# Patient Record
Sex: FEMALE | Race: BLACK OR AFRICAN AMERICAN | NOT HISPANIC OR LATINO | Employment: OTHER | URBAN - METROPOLITAN AREA
[De-identification: names, ages, dates, MRNs, and addresses within clinical notes are randomized per-mention and may not be internally consistent; named-entity substitution may affect disease eponyms.]

---

## 2017-01-03 ENCOUNTER — TELEPHONE (OUTPATIENT)
Dept: ADMINISTRATIVE | Facility: HOSPITAL | Age: 57
End: 2017-01-03

## 2017-01-03 NOTE — TELEPHONE ENCOUNTER
----- Message from Nilam Craven sent at 1/3/2017  8:48 AM CST -----  Contact: [facility]/Meredith/[phone]  Type: Home Health (orders, updates, clarifications, etc.)    Home Health Agency/Nurse:[facility]/[place]    Phone [phone]    Reason for call:need orders    Comments:Meredith said that she is calling in regards to pt has developed new stasis ulcer to her right lower leg and, she is requesting continued home health and wound care, she wants to know if a [facility] nurse can got out to visit pt because she is home bound. Please call and advise      Thank you

## 2017-01-04 NOTE — TELEPHONE ENCOUNTER
Earlene is calling to let the doctor know the pt has a new open wound and need to have wound care orders. Pt need to have home health orders as well

## 2017-01-04 NOTE — TELEPHONE ENCOUNTER
----- Message from Latisha Panda sent at 1/4/2017  8:50 AM CST -----  Contact: Earlene/ [facility]/ [phone]   Earlene is calling to let the doctor know the pt has a new open wound and need to have wound care orders. Pt need to have home health orders as well. Please call and advise       Thank you

## 2017-01-06 DIAGNOSIS — M79.662 PAIN OF LEFT LOWER LEG: Primary | ICD-10-CM

## 2017-01-19 RX ORDER — OXYCODONE HYDROCHLORIDE 5 MG/1
5 TABLET ORAL EVERY 8 HOURS PRN
Qty: 20 TABLET | Refills: 0 | Status: SHIPPED | OUTPATIENT
Start: 2017-01-19 | End: 2017-02-09 | Stop reason: SDUPTHER

## 2017-01-19 NOTE — TELEPHONE ENCOUNTER
----- Message from Lj Crockett sent at 1/19/2017 10:16 AM CST -----  Contact: Self/[phone] cell  Type: Rx    Name of medication(s): oxycodone (ROXICODONE) 5 MG immediate release tablet    Is this a refill? New rx?refill    Who prescribed medication?    Pharmacy Name, Phone, & Location:[facility]    Comments:Patient is calling to request a refill on medication above. Please call and advise.    Thank you!

## 2017-01-27 ENCOUNTER — TELEPHONE (OUTPATIENT)
Dept: INTERNAL MEDICINE | Facility: CLINIC | Age: 57
End: 2017-01-27

## 2017-02-10 NOTE — TELEPHONE ENCOUNTER
----- Message from Latisha Panda sent at 2/10/2017  9:11 AM CST -----  Contact: Self/ [phone]   Type: Rx    Name of medication(s):  oxycodone (ROXICODONE) 5 MG immediate release tablet    Is this a refill? New rx? Refill     Who prescribed medication? Dr. Salazar     Pharmacy Name, Phone, & Location:       Comments: pt is calling to have a  on Monday. Please call and advise     Thank you

## 2017-02-14 ENCOUNTER — TELEPHONE (OUTPATIENT)
Dept: INTERNAL MEDICINE | Facility: CLINIC | Age: 57
End: 2017-02-14

## 2017-02-14 RX ORDER — OXYCODONE HYDROCHLORIDE 5 MG/1
5 TABLET ORAL EVERY 8 HOURS PRN
Qty: 30 TABLET | Refills: 0 | Status: SHIPPED | OUTPATIENT
Start: 2017-02-14 | End: 2017-06-30 | Stop reason: SDUPTHER

## 2017-02-14 NOTE — TELEPHONE ENCOUNTER
Pt is calling to check the status of the request made on last Thursday for her pain medications.  Pt is completely out and would like to request that it be printed out today, if possible

## 2017-02-14 NOTE — TELEPHONE ENCOUNTER
----- Message from Evan Najera sent at 2/14/2017 12:44 PM CST -----  Contact: self/ [phone] cell  Pt is calling to check the status of the request made on last Thursday for her pain medications.  Pt is completely out and would like to request that it be printed out today, if possible.  Please call and advise.    Thank you

## 2017-03-02 ENCOUNTER — TELEPHONE (OUTPATIENT)
Dept: ORTHOPEDICS | Facility: CLINIC | Age: 57
End: 2017-03-02

## 2017-03-02 NOTE — TELEPHONE ENCOUNTER
----- Message from Iain Mariano MD sent at 3/1/2017  5:56 PM CST -----  Contact: self/home  Ask her why she wants to cancel? She is wheelchair bound. If it is transportation then that is fine.  ----- Message -----     From: Antonette Argueta LPN     Sent: 3/1/2017   1:28 PM       To: Iain Mariano MD     Not sure what to do in regards to this, typically  does not operate on patient's who doesn't attend the class. What's you protocol in regards to patients not attending class?  ----- Message -----     From: Dewayne Monroe     Sent: 3/1/2017  12:58 PM       To: Lisette Johnson Staff    Pt would like to cancel her joint camp class appt.

## 2017-03-03 ENCOUNTER — TELEPHONE (OUTPATIENT)
Dept: SPINE | Facility: CLINIC | Age: 57
End: 2017-03-03

## 2017-03-03 NOTE — TELEPHONE ENCOUNTER
----- Message from Angeles Shin sent at 3/3/2017 10:33 AM CST -----  Contact: pt   _X  1st Request  _  2nd Request  _  3rd Request        Who: CATY MELENDEZ [[id number]]    Why: pt is calling in regards to getting injections in her knees pt states she was unable to have surgery     What Number to Call Back: [phone].    When to Expect a call back: (Before the end of the day)   -- if the call is after 12:00, the call back will be tomorrow.

## 2017-03-08 ENCOUNTER — TELEPHONE (OUTPATIENT)
Dept: ADMINISTRATIVE | Facility: CLINIC | Age: 57
End: 2017-03-08

## 2017-03-08 ENCOUNTER — OFFICE VISIT (OUTPATIENT)
Dept: SPINE | Facility: CLINIC | Age: 57
End: 2017-03-08
Attending: PHYSICAL MEDICINE & REHABILITATION
Payer: MEDICARE

## 2017-03-08 VITALS
HEIGHT: 60 IN | BODY MASS INDEX: 43.19 KG/M2 | DIASTOLIC BLOOD PRESSURE: 83 MMHG | SYSTOLIC BLOOD PRESSURE: 129 MMHG | WEIGHT: 220 LBS | HEART RATE: 92 BPM

## 2017-03-08 DIAGNOSIS — M25.562 ARTHRALGIA OF BOTH KNEES: ICD-10-CM

## 2017-03-08 DIAGNOSIS — M17.0 PRIMARY OSTEOARTHRITIS OF BOTH KNEES: Primary | ICD-10-CM

## 2017-03-08 DIAGNOSIS — M25.561 ARTHRALGIA OF BOTH KNEES: ICD-10-CM

## 2017-03-08 DIAGNOSIS — G80.1 SPASTIC DIPLEGIC CEREBRAL PALSY: ICD-10-CM

## 2017-03-08 DIAGNOSIS — R26.81 GAIT INSTABILITY: ICD-10-CM

## 2017-03-08 PROCEDURE — 99999 PR PBB SHADOW E&M-EST. PATIENT-LVL II: CPT | Mod: PBBFAC,,, | Performed by: PHYSICAL MEDICINE & REHABILITATION

## 2017-03-08 PROCEDURE — 99214 OFFICE O/P EST MOD 30 MIN: CPT | Mod: 25,S$GLB,, | Performed by: PHYSICAL MEDICINE & REHABILITATION

## 2017-03-08 PROCEDURE — 99499 UNLISTED E&M SERVICE: CPT | Mod: S$PBB,,, | Performed by: PHYSICAL MEDICINE & REHABILITATION

## 2017-03-08 PROCEDURE — 3074F SYST BP LT 130 MM HG: CPT | Mod: S$GLB,,, | Performed by: PHYSICAL MEDICINE & REHABILITATION

## 2017-03-08 PROCEDURE — 3079F DIAST BP 80-89 MM HG: CPT | Mod: S$GLB,,, | Performed by: PHYSICAL MEDICINE & REHABILITATION

## 2017-03-08 PROCEDURE — 20610 DRAIN/INJ JOINT/BURSA W/O US: CPT | Mod: 50,S$GLB,, | Performed by: PHYSICAL MEDICINE & REHABILITATION

## 2017-03-08 PROCEDURE — 1160F RVW MEDS BY RX/DR IN RCRD: CPT | Mod: S$GLB,,, | Performed by: PHYSICAL MEDICINE & REHABILITATION

## 2017-03-08 RX ORDER — TRIAMCINOLONE ACETONIDE 40 MG/ML
80 INJECTION, SUSPENSION INTRA-ARTICULAR; INTRAMUSCULAR
Status: DISCONTINUED | OUTPATIENT
Start: 2017-03-08 | End: 2017-03-08 | Stop reason: HOSPADM

## 2017-03-08 RX ORDER — DICLOFENAC SODIUM 10 MG/G
GEL TOPICAL 4 TIMES DAILY
Qty: 1 TUBE | Refills: 1 | Status: SHIPPED | OUTPATIENT
Start: 2017-03-08 | End: 2017-12-12

## 2017-03-08 RX ORDER — BACLOFEN 10 MG/1
TABLET ORAL
COMMUNITY
Start: 2017-01-10 | End: 2017-12-12 | Stop reason: SDUPTHER

## 2017-03-08 RX ADMIN — TRIAMCINOLONE ACETONIDE 80 MG: 40 INJECTION, SUSPENSION INTRA-ARTICULAR; INTRAMUSCULAR at 03:03

## 2017-03-08 NOTE — PROGRESS NOTES
Subjective:      Patient ID: Naga Benavidez is a 56 y.o. female.    Chief Complaint: Knee Pain    HPI Comments: Ms Benavidez is a 55 yo female with CP  here for follow up of her bilateral knee pain and knee DJD.  She was last seen by me on 8/26/2016 and she had bilateral knee injections and we discussed knee replacements.  She has had the knee replacement scheduled multiple times, but cannot find a reliable person to care for her mother.  Her sister still cannot help.  She has not had an injection in august.  She has been trying to put off injection to have surgery.  She is ready to have the injections today.  The pain is in both knees.  She is having some hip pain as well.  The knees bother the most.  The hip is on the right.  Left knee is worse than right, so put more pressure on the right knee and leg.  The pain is 9/10.  She does feel like she is able to function more with shots.  She does not walk at all, and is all in wheelchair    Past Medical History:  No date: Anemia  No date: Arthritis  No date: Asthma  No date: Cerebral palsy      Comment: stable  No date: DJD (degenerative joint disease)  No date: Hyperlipidemia  No date: Hypertension  No date: Neuromuscular disorder  No date: Obstructive sleep apnea  No date: Uterine fibroids affecting pregnancy      Comment: with adnexal mass    Past Surgical History:  No date: FOOT CAPSULE RELEASE W/ PERCUTANEOUS HEEL CORD*  No date: HAMSTRING RELEASE    Review of patient's family history indicates:    Diabetes                       Mother                    Hypertension                   Mother                      Social History    Marital status: Single              Spouse name:                       Years of education:                 Number of children:               Social History Main Topics    Smoking status: Never Smoker                                                                Alcohol use: No              Sexual activity: No                "        Current Outpatient Prescriptions:  acetaminophen (TYLENOL) 325 MG tablet, Take 650 mg by mouth 4 (four) times daily as needed for Pain., Disp: , Rfl:   amlodipine (NORVASC) 2.5 MG tablet, Take 1 tablet (2.5 mg total) by mouth once daily., Disp: 90 tablet, Rfl: 3  ascorbic acid (VITAMIN C) 1000 MG tablet, Take 1,000 mg by mouth Daily. , Disp: , Rfl:   b complex vitamins capsule, Take 1 capsule by mouth every morning., Disp: , Rfl:   biotin 10 mg Tab, Take 1 capsule by mouth once daily. , Disp: , Rfl:   calcium carbonate (OS-HERNANDEZ) 600 mg (1,500 mg) Tab, Take 600 mg by mouth once daily. , Disp: , Rfl:   co-enzyme Q-10 30 mg capsule, Take 300 mg by mouth once daily., Disp: , Rfl:   foam bandage (MEPILEX) 4 X 4 " Bndg, Apply 2 application topically daily as needed., Disp: 1 each, Rfl: 10  hydrochlorothiazide (HYDRODIURIL) 25 MG tablet, Take 1 tablet (25 mg total) by mouth once daily. (Patient taking differently: Take 25 mg by mouth daily as needed. ), Disp: 30 tablet, Rfl: 11  losartan (COZAAR) 100 MG tablet, TAKE ONE TABLET BY MOUTH ONCE DAILY, Disp: 90 tablet, Rfl: 3  meloxicam (MOBIC) 15 MG tablet, TAKE ONE TABLET BY MOUTH DAILY WITH FOOD, Disp: 90 tablet, Rfl: 3  miconazole (MICATIN) 2 % cream, Apply topically 2 (two) times daily. (Patient taking differently: Apply topically 2 (two) times daily as needed. ), Disp: , Rfl: 0  multivitamin capsule, Take 1 capsule by mouth once daily., Disp: , Rfl:   omega-3 fatty acids-dha-epa (MEGARED PLANT-OMEGA-3) 300 mg Cap, Take 1 tablet by mouth once daily., Disp: , Rfl:   oxycodone (ROXICODONE) 5 MG immediate release tablet, Take 1 tablet (5 mg total) by mouth every 8 (eight) hours as needed for Pain., Disp: 30 tablet, Rfl: 0  diclofenac sodium (VOLTAREN) 1 % Gel, Apply topically 4 (four) times daily., Disp: 1 Tube, Rfl: 1    No current facility-administered medications for this visit.       Review of patient's allergies indicates:   -- Tramadol -- Other (See Comments)    " --  She could not wake up        Review of Systems   Constitution: Negative for weight gain and weight loss.   Cardiovascular: Negative for chest pain.   Respiratory: Negative for shortness of breath.    Musculoskeletal: Positive for joint pain and joint swelling. Negative for back pain.   Gastrointestinal: Negative for abdominal pain and bowel incontinence.   Genitourinary: Negative for bladder incontinence.   Neurological: Negative for numbness.         Objective:        General: Naga is well-developed, well-nourished, appears stated age, in no acute distress, alert and oriented to time, place and person.     General    Vitals reviewed.  Constitutional: She is oriented to person, place, and time. She appears well-developed and well-nourished.   HENT:   Head: Normocephalic and atraumatic.   Pulmonary/Chest: Effort normal.   Neurological: She is alert and oriented to person, place, and time. She displays abnormal reflex. She exhibits abnormal muscle tone.   Psychiatric: She has a normal mood and affect. Her behavior is normal. Judgment and thought content normal.     General Musculoskeletal Exam   Gait: abnormal     Right Ankle/Foot Exam     Tests   Heel Walk: unable to perform  Tiptoe Walk: unable to perform    Comments:  Ankles wrapped    Left Ankle/Foot Exam     Tests   Heel Walk: unable to perform  Tiptoe Walk: unable to perform    Right Knee Exam     Inspection   Swelling: present  Deformity: deformity    Tenderness   The patient is tender to palpation of the lateral joint line.    Crepitus   The patient has crepitus of the patella.    Tests   Meniscus   Brandy:  Medial - negative Lateral - negative  Ligament Examination Lachman: normal (-1 to 2mm)   MCL - Valgus: normal (0 to 2mm)  LCL - Varus: normalReverse Pivot Shift: normal (Equal)    Other   Sensation: normal    Left Knee Exam     Inspection   Swelling: present  Deformity: present    Tenderness   The patient tender to palpation of the lateral joint  line.    Crepitus   The patient has crepitus of the patella.    Tests   Meniscus   Brandy:  Medial - negative Lateral - negative  Stability Lachman: normal (-1 to 2mm)   MCL - Valgus: normal (0 to 2mm)  LCL - Varus: normal (0 to 2mm)Reverse Pivot Shift: normal (Equal)    Other   Sensation: normal    Comments:  Bilateral knee deformity, bilateral spasticity  Valgus deformity    Muscle Strength   Right Lower Extremity   Hip Abduction: 5/5   Quadriceps:  5/5   Hamstrin/5   Left Lower Extremity   Hip Abduction: 5/5   Quadriceps:  5/5   Hamstrin/5     Reflexes     Left Side  Biceps:  2+  Quadriceps:  2+  Achilles:  2+    Right Side   Biceps:  2+  Quadriceps:  2+  Achilles:  2+    Vascular Exam     Right Pulses        Carotid:                  2+    Left Pulses        Carotid:                  2+    Edema  Right Lower Leg: absent  Left Lower Leg: present    Bruise on right medial ankle from table 3 days ago          Assessment:       1. Primary osteoarthritis of both knees    2. Spastic diplegic cerebral palsy    3. Gait instability    4. Arthralgia of both knees           Plan:       Orders Placed This Encounter    Large Joint Aspiration/Injection    diclofenac sodium (VOLTAREN) 1 % Gel     1. Bilateral knee injections please see procedure note. A time out was performed, the correct patient, procedure, site, and position confirmed.   2. Baclofen 10mg to try to help with spasticity as needed, she only takes when going to OB  3. Continue mobic daily  4. Continue tylenol   5. Continue voltaren gel as needed   6. Her joint is getting harder to inject without x-ray guidance.  We might have to get her to  Pain management for injections.  She does not want to go to pain management currently.  Her left knee deformity is increasing.  We discussed a replacement, pain is stopping her from standing and moving more.  She is becoming more and more dependent on wheelchair.  She has not been able to get the TKA due to her  mothers care.  We discussed the importance for her long term ability to care for herself that the knee replacement is important  8.  rtc 3 months    Follow-up: Return in about 3 months (around 6/8/2017). If there are any questions prior to this, the patient was instructed to contact the office.

## 2017-03-08 NOTE — MR AVS SNAPSHOT
[facility]  [address]  [address]  [facility] [address]  Phone: [phone]  Fax: [phone]                  Naga Benavidez   3/8/2017 3:00 PM   Office Visit    Description:  Female : 1960   Provider:  Miriam Tate MD   Department:  [facility]           Reason for Visit     Knee Pain           Diagnoses this Visit        Comments    Primary osteoarthritis of both knees    -  Primary     Spastic diplegic cerebral palsy         Gait instability         Arthralgia of both knees                To Do List           Future Appointments        Provider Department Dept Phone    2017 1:00 PM Miriam Tate MD [facility] [phone]      Goals (5 Years of Data)     None      Follow-Up and Disposition     Return in about 3 months (around 2017).    Follow-up and Disposition History       These Medications        Disp Refills Start End    diclofenac sodium (VOLTAREN) 1 % Gel 1 Tube 1 3/8/2017 3/18/2017    Apply topically 4 (four) times daily. - Topical    Pharmacy: [facility] - [address] Ph #: [phone]         [facility] On Call     [facility] On Call Nurse Care Line -  Assistance  Registered nurses in the [facility] On Call Center provide clinical advisement, health education, appointment booking, and other advisory services.  Call for this free service at [phone].             Medications           Message regarding Medications     Verify the changes and/or additions to your medication regime listed below are the same as discussed with your clinician today.  If any of these changes or additions are incorrect, please notify your healthcare provider.        These medications were administered today        Dose Freq    triamcinolone acetonide injection 80 mg 80 mg     Sig: Inject 80 mg into the articular space.    Class: Normal    Route: Intra-articular           Verify  "that the below list of medications is an accurate representation of the medications you are currently taking.  If none reported, the list may be blank. If incorrect, please contact your healthcare provider. Carry this list with you in case of emergency.           Current Medications     acetaminophen (TYLENOL) 325 MG tablet Take 650 mg by mouth 4 (four) times daily as needed for Pain.    amlodipine (NORVASC) 2.5 MG tablet Take 1 tablet (2.5 mg total) by mouth once daily.    ascorbic acid (VITAMIN C) 1000 MG tablet Take 1,000 mg by mouth Daily.     b complex vitamins capsule Take 1 capsule by mouth every morning.    biotin 10 mg Tab Take 1 capsule by mouth once daily.     calcium carbonate (OS-HERNANDEZ) 600 mg (1,500 mg) Tab Take 600 mg by mouth once daily.     co-enzyme Q-10 30 mg capsule Take 300 mg by mouth once daily.    foam bandage (MEPILEX) 4 X 4 " Bndg Apply 2 application topically daily as needed.    hydrochlorothiazide (HYDRODIURIL) 25 MG tablet Take 1 tablet (25 mg total) by mouth once daily.    losartan (COZAAR) 100 MG tablet TAKE ONE TABLET BY MOUTH ONCE DAILY    meloxicam (MOBIC) 15 MG tablet TAKE ONE TABLET BY MOUTH DAILY WITH FOOD    miconazole (MICATIN) 2 % cream Apply topically 2 (two) times daily.    multivitamin capsule Take 1 capsule by mouth once daily.    omega-3 fatty acids-dha-epa (MEGARED PLANT-OMEGA-3) 300 mg Cap Take 1 tablet by mouth once daily.    oxycodone (ROXICODONE) 5 MG immediate release tablet Take 1 tablet (5 mg total) by mouth every 8 (eight) hours as needed for Pain.    baclofen (LIORESAL) 10 MG tablet     diclofenac sodium (VOLTAREN) 1 % Gel Apply topically 4 (four) times daily.           Clinical Reference Information           Your Vitals Were     BP Pulse Height Weight Last Period BMI    129/83 (BP Location: Left arm, Patient Position: Sitting, BP Method: Automatic) 92 5' (1.524 m) 99.8 kg (220 lb) 09/07/2016 42.97 kg/m2      Blood Pressure          Most Recent Value    BP  " 129/83      Allergies as of 3/8/2017     Tramadol      Immunizations Administered on Date of Encounter - 3/8/2017     None      Orders Placed During Today's Visit      Normal Orders This Visit    Large Joint Aspiration/Injection       Administrations This Visit     triamcinolone acetonide injection 80 mg     Admin Date Action Dose Route Administered By             03/08/2017 Given 80 mg Intra-articular Miriam Tate MD                      Language Assistance Services     ATTENTION: Language assistance services are available, free of charge. Please call [phone].      ATENCIÓN: Si eleuteriola lopez, tiene a truong disposición servicios gratuitos de asistencia lingüística. Llame al [phone].     CHÚ Ý: N?u b?n nói Ti?ng Vi?t, có các d?ch v? h? tr? ngôn ng? mi?n phí dành cho b?n. G?i s? [phone].         [facility] complies with applicable Federal civil rights laws and does not discriminate on the basis of race, color, national origin, age, disability, or sex.

## 2017-03-08 NOTE — PROCEDURES
Large Joint Aspiration/Injection  Date/Time: 3/8/2017 3:14 PM  Performed by: DIANE HOLM  Authorized by: DIANE HOLM     Consent Done?:  Yes (Verbal)  Indications:  Pain  Procedure site marked: Yes    Timeout: Prior to procedure the correct patient, procedure, and site was verified      Location:  Knee  Site:  R knee and L knee  Prep: Patient was prepped and draped in usual sterile fashion    Ultrasonic Guidance for needle placement: No  Needle size:  22 G  Approach:  Lateral  Medications:  80 mg triamcinolone acetonide 40 mg/mL  Patient tolerance:  Patient tolerated the procedure well with no immediate complications

## 2017-03-13 ENCOUNTER — TELEPHONE (OUTPATIENT)
Dept: INTERNAL MEDICINE | Facility: CLINIC | Age: 57
End: 2017-03-13

## 2017-03-13 DIAGNOSIS — D64.9 ANEMIA, UNSPECIFIED TYPE: Primary | ICD-10-CM

## 2017-03-13 DIAGNOSIS — R73.9 HYPERGLYCEMIA: ICD-10-CM

## 2017-03-13 DIAGNOSIS — E78.5 HYPERLIPIDEMIA, UNSPECIFIED HYPERLIPIDEMIA TYPE: ICD-10-CM

## 2017-03-13 DIAGNOSIS — I10 ESSENTIAL HYPERTENSION: ICD-10-CM

## 2017-03-13 DIAGNOSIS — E55.9 MILD VITAMIN D DEFICIENCY: ICD-10-CM

## 2017-03-13 NOTE — TELEPHONE ENCOUNTER
----- Message from Corina Steward sent at 3/13/2017 10:48 AM CDT -----  Contact: self/[phone]  Pt called in regards to getting epp lab orders put into the system. Her appointment is on 5-17-17.      Please advise

## 2017-04-18 ENCOUNTER — TELEPHONE (OUTPATIENT)
Dept: ADMINISTRATIVE | Facility: CLINIC | Age: 57
End: 2017-04-18

## 2017-04-20 ENCOUNTER — TELEPHONE (OUTPATIENT)
Dept: INTERNAL MEDICINE | Facility: CLINIC | Age: 57
End: 2017-04-20

## 2017-06-07 ENCOUNTER — OFFICE VISIT (OUTPATIENT)
Dept: SPINE | Facility: CLINIC | Age: 57
End: 2017-06-07
Attending: PHYSICAL MEDICINE & REHABILITATION
Payer: MEDICARE

## 2017-06-07 VITALS
SYSTOLIC BLOOD PRESSURE: 155 MMHG | BODY MASS INDEX: 43.19 KG/M2 | HEIGHT: 60 IN | HEART RATE: 93 BPM | DIASTOLIC BLOOD PRESSURE: 102 MMHG | WEIGHT: 220 LBS

## 2017-06-07 DIAGNOSIS — G89.29 CHRONIC PAIN OF BOTH KNEES: ICD-10-CM

## 2017-06-07 DIAGNOSIS — G80.1 SPASTIC DIPLEGIC CEREBRAL PALSY: ICD-10-CM

## 2017-06-07 DIAGNOSIS — M25.562 ARTHRALGIA OF BOTH KNEES: ICD-10-CM

## 2017-06-07 DIAGNOSIS — R26.81 GAIT INSTABILITY: ICD-10-CM

## 2017-06-07 DIAGNOSIS — M25.562 CHRONIC PAIN OF BOTH KNEES: ICD-10-CM

## 2017-06-07 DIAGNOSIS — M25.561 CHRONIC PAIN OF BOTH KNEES: ICD-10-CM

## 2017-06-07 DIAGNOSIS — M17.0 PRIMARY OSTEOARTHRITIS OF BOTH KNEES: Primary | ICD-10-CM

## 2017-06-07 DIAGNOSIS — M25.561 ARTHRALGIA OF BOTH KNEES: ICD-10-CM

## 2017-06-07 PROCEDURE — 20610 DRAIN/INJ JOINT/BURSA W/O US: CPT | Mod: 50,S$GLB,, | Performed by: PHYSICAL MEDICINE & REHABILITATION

## 2017-06-07 PROCEDURE — 99499 UNLISTED E&M SERVICE: CPT | Mod: S$PBB,,, | Performed by: PHYSICAL MEDICINE & REHABILITATION

## 2017-06-07 PROCEDURE — 99999 PR PBB SHADOW E&M-EST. PATIENT-LVL III: CPT | Mod: PBBFAC,,, | Performed by: PHYSICAL MEDICINE & REHABILITATION

## 2017-06-07 PROCEDURE — 99214 OFFICE O/P EST MOD 30 MIN: CPT | Mod: 25,S$GLB,, | Performed by: PHYSICAL MEDICINE & REHABILITATION

## 2017-06-07 RX ORDER — MELOXICAM 15 MG/1
TABLET ORAL
Qty: 90 TABLET | Refills: 3 | Status: SHIPPED | OUTPATIENT
Start: 2017-06-07 | End: 2017-12-12 | Stop reason: SDUPTHER

## 2017-06-07 RX ORDER — TRIAMCINOLONE ACETONIDE 40 MG/ML
80 INJECTION, SUSPENSION INTRA-ARTICULAR; INTRAMUSCULAR
Status: DISCONTINUED | OUTPATIENT
Start: 2017-06-07 | End: 2017-06-07 | Stop reason: HOSPADM

## 2017-06-07 RX ADMIN — TRIAMCINOLONE ACETONIDE 80 MG: 40 INJECTION, SUSPENSION INTRA-ARTICULAR; INTRAMUSCULAR at 01:06

## 2017-06-07 NOTE — PROCEDURES
Large Joint Aspiration/Injection  Date/Time: 6/7/2017 1:09 PM  Performed by: DIANE HOLM  Authorized by: DIANE HOLM     Consent Done?:  Yes (Verbal)  Indications:  Pain  Procedure site marked: Yes    Timeout: Prior to procedure the correct patient, procedure, and site was verified      Location:  Knee  Site:  R knee and L knee  Prep: Patient was prepped and draped in usual sterile fashion    Ultrasonic Guidance for needle placement: No  Needle size:  22 G  Approach:  Lateral  Medications:  80 mg triamcinolone acetonide 40 mg/mL  Patient tolerance:  Patient tolerated the procedure well with no immediate complications

## 2017-06-07 NOTE — PROGRESS NOTES
Subjective:      Patient ID: Naga Benavidez is a 57 y.o. female.    Chief Complaint: Knee Pain    Ms Benavidez is a 58 yo female here for follow up of her bilateral knee pain and knee DJD.   She was last seen by me on 3/8/2017 and we did bilateral knee injections.  SHe was planning on knee replacement, but still having difficulty with care for her mother.  Today, she is having knee pain.  She feels like the knee injections helped.  She got almost 3 months worth of relief.  She is still not able to stand and walk.  She is still caring for her mother.  Pain is 9/10.  Her blood pressure is high, she feels like it happens when anxious about the bus.      Past Medical History:  No date: Anemia  No date: Arthritis  No date: Asthma  No date: Cerebral palsy      Comment: stable  No date: DJD (degenerative joint disease)  No date: Hyperlipidemia  No date: Hypertension  No date: Neuromuscular disorder  No date: Obstructive sleep apnea  No date: Uterine fibroids affecting pregnancy      Comment: with adnexal mass    Past Surgical History:  No date: FOOT CAPSULE RELEASE W/ PERCUTANEOUS HEEL CORD*  No date: HAMSTRING RELEASE    Review of patient's family history indicates:    Diabetes                       Mother                    Hypertension                   Mother                      Social History    Marital status: Single              Spouse name:                       Years of education:                 Number of children:               Social History Main Topics    Smoking status: Never Smoker                                                                Alcohol use: No              Sexual activity: No                       Current Outpatient Prescriptions:  acetaminophen (TYLENOL) 325 MG tablet, Take 650 mg by mouth 4 (four) times daily as needed for Pain., Disp: , Rfl:   amlodipine (NORVASC) 2.5 MG tablet, Take 1 tablet (2.5 mg total) by mouth once daily., Disp: 90 tablet, Rfl: 3  ascorbic acid (VITAMIN C)  "1000 MG tablet, Take 1,000 mg by mouth Daily. , Disp: , Rfl:   b complex vitamins capsule, Take 1 capsule by mouth every morning., Disp: , Rfl:   baclofen (LIORESAL) 10 MG tablet, , Disp: , Rfl:   biotin 10 mg Tab, Take 1 capsule by mouth once daily. , Disp: , Rfl:   calcium carbonate (OS-HERNANDEZ) 600 mg (1,500 mg) Tab, Take 600 mg by mouth once daily. , Disp: , Rfl:   co-enzyme Q-10 30 mg capsule, Take 300 mg by mouth once daily., Disp: , Rfl:   diclofenac sodium (VOLTAREN) 1 % Gel, Apply topically 4 (four) times daily., Disp: 1 Tube, Rfl: 1  foam bandage (MEPILEX) 4 X 4 " Bndg, Apply 2 application topically daily as needed., Disp: 1 each, Rfl: 10  hydrochlorothiazide (HYDRODIURIL) 25 MG tablet, Take 1 tablet (25 mg total) by mouth once daily. (Patient taking differently: Take 25 mg by mouth daily as needed. ), Disp: 30 tablet, Rfl: 11  losartan (COZAAR) 100 MG tablet, TAKE ONE TABLET BY MOUTH ONCE DAILY, Disp: 90 tablet, Rfl: 3  meloxicam (MOBIC) 15 MG tablet, TAKE ONE TABLET BY MOUTH DAILY WITH FOOD, Disp: 90 tablet, Rfl: 3  miconazole (MICATIN) 2 % cream, Apply topically 2 (two) times daily. (Patient taking differently: Apply topically 2 (two) times daily as needed. ), Disp: , Rfl: 0  multivitamin capsule, Take 1 capsule by mouth once daily., Disp: , Rfl:   omega-3 fatty acids-dha-epa (MEGARED PLANT-OMEGA-3) 300 mg Cap, Take 1 tablet by mouth once daily., Disp: , Rfl:   oxycodone (ROXICODONE) 5 MG immediate release tablet, Take 1 tablet (5 mg total) by mouth every 8 (eight) hours as needed for Pain., Disp: 30 tablet, Rfl: 0    No current facility-administered medications for this visit.       Review of patient's allergies indicates:   -- Tramadol -- Other (See Comments)    --  She could not wake up              Review of Systems   Constitution: Negative for weight gain and weight loss.   Cardiovascular: Negative for chest pain.   Respiratory: Negative for shortness of breath.    Musculoskeletal: Positive for joint " pain and joint swelling. Negative for back pain.   Gastrointestinal: Negative for abdominal pain and bowel incontinence.   Genitourinary: Negative for bladder incontinence.   Neurological: Negative for numbness.         Objective:        General: Naga is well-developed, well-nourished, appears stated age, in no acute distress, alert and oriented to time, place and person.     General    Vitals reviewed.  Constitutional: She is oriented to person, place, and time. She appears well-developed and well-nourished.   HENT:   Head: Normocephalic and atraumatic.   Pulmonary/Chest: Effort normal.   Neurological: She is alert and oriented to person, place, and time. She displays abnormal reflex. She exhibits abnormal muscle tone.   Psychiatric: She has a normal mood and affect. Her behavior is normal. Judgment and thought content normal.     General Musculoskeletal Exam   Gait: abnormal     Right Ankle/Foot Exam     Tests   Heel Walk: unable to perform  Tiptoe Walk: unable to perform    Comments:  Ankles wrapped    Left Ankle/Foot Exam     Tests   Heel Walk: unable to perform  Tiptoe Walk: unable to perform    Right Knee Exam     Inspection   Swelling: present  Deformity: deformity    Tenderness   The patient is tender to palpation of the lateral joint line.    Crepitus   The patient has crepitus of the patella.    Tests   Meniscus   Brandy:  Medial - negative Lateral - negative  Ligament Examination Lachman: normal (-1 to 2mm)   MCL - Valgus: normal (0 to 2mm)  LCL - Varus: normalReverse Pivot Shift: normal (Equal)    Other   Sensation: normal    Comments:  Unable to flex to 90 degrees    Left Knee Exam     Inspection   Swelling: present  Deformity: present    Tenderness   The patient tender to palpation of the lateral joint line.    Crepitus   The patient has crepitus of the patella.    Tests   Meniscus   Brandy:  Medial - negative Lateral - negative  Stability Lachman: normal (-1 to 2mm)   MCL - Valgus: normal (0 to  2mm)  LCL - Varus: normal (0 to 2mm)Reverse Pivot Shift: normal (Equal)    Other   Sensation: normal    Comments:  Bilateral knee deformity, bilateral spasticity  Valgus deformity  Unable to fully extend    Muscle Strength   Right Lower Extremity   Hip Abduction: 5/5   Quadriceps:  5/5   Hamstrin/5   Left Lower Extremity   Hip Abduction: 5/5   Quadriceps:  5/5   Hamstrin/5     Vascular Exam     Right Pulses        Carotid:                  2+    Left Pulses        Carotid:                  2+    Edema  Right Lower Leg: absent  Left Lower Leg: absent              Assessment:       1. Primary osteoarthritis of both knees    2. Spastic diplegic cerebral palsy    3. Gait instability    4. Arthralgia of both knees    5. Chronic pain of both knees           Plan:       Orders Placed This Encounter    Large Joint Aspiration/Injection    meloxicam (MOBIC) 15 MG tablet     1. Bilateral knee injections please see procedure note. A time out was performed, the correct patient, procedure, site, and position confirmed.   2. Baclofen 10mg to try to help with spasticity as needed, she does not take often  3. Continue mobic daily  4. Continue tylenol   5. Continue voltaren gel as needed   6. Her joint is getting harder to inject without x-ray guidance.  We might have to get her to  Pain management for injections.  She does not want to go to pain management currently.  Her left knee deformity is increasing, and is hard to enter the joint.  We discussed a replacement, pain is stopping her from standing and moving more.  She is becoming more and more dependent on wheelchair.  She has not been able to get the TKA due to her mothers care.    7.  rtc 3 months    Follow-up: Return in about 3 months (around 2017). If there are any questions prior to this, the patient was instructed to contact the office.

## 2017-06-09 ENCOUNTER — HOSPITAL ENCOUNTER (EMERGENCY)
Facility: HOSPITAL | Age: 57
Discharge: HOME | End: 2017-06-10
Attending: EMERGENCY MEDICINE
Payer: MEDICARE

## 2017-06-09 VITALS
HEIGHT: 62 IN | RESPIRATION RATE: 18 BRPM | BODY MASS INDEX: 40.48 KG/M2 | OXYGEN SATURATION: 100 % | HEART RATE: 82 BPM | WEIGHT: 220 LBS | TEMPERATURE: 99 F | DIASTOLIC BLOOD PRESSURE: 96 MMHG | SYSTOLIC BLOOD PRESSURE: 148 MMHG

## 2017-06-09 DIAGNOSIS — R07.9 CHEST PAIN: Primary | ICD-10-CM

## 2017-06-09 LAB
ALBUMIN SERPL BCP-MCNC: 3.6 G/DL
ALP SERPL-CCNC: 92 U/L
ALT SERPL W/O P-5'-P-CCNC: 17 U/L
ANION GAP SERPL CALC-SCNC: 11 MMOL/L
AST SERPL-CCNC: 20 U/L
BASOPHILS # BLD AUTO: 0 K/UL
BASOPHILS NFR BLD: 0 %
BILIRUB SERPL-MCNC: 0.6 MG/DL
BNP SERPL-MCNC: 57 PG/ML
BUN SERPL-MCNC: 26 MG/DL
CALCIUM SERPL-MCNC: 9.3 MG/DL
CHLORIDE SERPL-SCNC: 110 MMOL/L
CO2 SERPL-SCNC: 20 MMOL/L
CREAT SERPL-MCNC: 0.8 MG/DL
D DIMER PPP IA.FEU-MCNC: 0.72 MG/L FEU
DIFFERENTIAL METHOD: ABNORMAL
EOSINOPHIL # BLD AUTO: 0 K/UL
EOSINOPHIL NFR BLD: 0 %
ERYTHROCYTE [DISTWIDTH] IN BLOOD BY AUTOMATED COUNT: 14 %
EST. GFR  (AFRICAN AMERICAN): >60 ML/MIN/1.73 M^2
EST. GFR  (NON AFRICAN AMERICAN): >60 ML/MIN/1.73 M^2
GLUCOSE SERPL-MCNC: 97 MG/DL
HCT VFR BLD AUTO: 40.6 %
HGB BLD-MCNC: 13 G/DL
LYMPHOCYTES # BLD AUTO: 1 K/UL
LYMPHOCYTES NFR BLD: 10.2 %
MCH RBC QN AUTO: 28.5 PG
MCHC RBC AUTO-ENTMCNC: 32 %
MCV RBC AUTO: 89 FL
MONOCYTES # BLD AUTO: 0.6 K/UL
MONOCYTES NFR BLD: 6.4 %
NEUTROPHILS # BLD AUTO: 8.1 K/UL
NEUTROPHILS NFR BLD: 83.2 %
PLATELET # BLD AUTO: 197 K/UL
PMV BLD AUTO: 12 FL
POTASSIUM SERPL-SCNC: 3.9 MMOL/L
PROT SERPL-MCNC: 7.2 G/DL
RBC # BLD AUTO: 4.56 M/UL
SODIUM SERPL-SCNC: 141 MMOL/L
TROPONIN I SERPL DL<=0.01 NG/ML-MCNC: <0.006 NG/ML
TROPONIN I SERPL DL<=0.01 NG/ML-MCNC: <0.006 NG/ML
WBC # BLD AUTO: 9.69 K/UL

## 2017-06-09 PROCEDURE — 99285 EMERGENCY DEPT VISIT HI MDM: CPT | Mod: 25

## 2017-06-09 PROCEDURE — 84484 ASSAY OF TROPONIN QUANT: CPT

## 2017-06-09 PROCEDURE — 84484 ASSAY OF TROPONIN QUANT: CPT | Mod: 91

## 2017-06-09 PROCEDURE — 93010 ELECTROCARDIOGRAM REPORT: CPT | Mod: S$GLB,,, | Performed by: INTERNAL MEDICINE

## 2017-06-09 PROCEDURE — 80053 COMPREHEN METABOLIC PANEL: CPT

## 2017-06-09 PROCEDURE — 85025 COMPLETE CBC W/AUTO DIFF WBC: CPT

## 2017-06-09 PROCEDURE — 99285 EMERGENCY DEPT VISIT HI MDM: CPT | Mod: ,,, | Performed by: EMERGENCY MEDICINE

## 2017-06-09 PROCEDURE — 85379 FIBRIN DEGRADATION QUANT: CPT

## 2017-06-09 PROCEDURE — 83880 ASSAY OF NATRIURETIC PEPTIDE: CPT

## 2017-06-09 PROCEDURE — 94761 N-INVAS EAR/PLS OXIMETRY MLT: CPT

## 2017-06-09 PROCEDURE — 93005 ELECTROCARDIOGRAM TRACING: CPT

## 2017-06-09 NOTE — PROVIDER PROGRESS NOTES - EMERGENCY DEPT.
Encounter Date: 6/9/2017    ED Physician Progress Notes       SCRIBE NOTE: I, Beulah Terry, am scribing for, and in the presence of,  Dr. Oneill.  Physician Statement: I, Dr. Oneill, personally performed the services described in this documentation as scribed by Beulah Terry in my presence, and it is both accurate and complete.      EKG - STEMI Decision  Initial Reading: No STEMI present.

## 2017-06-10 PROCEDURE — 25500020 PHARM REV CODE 255: Performed by: EMERGENCY MEDICINE

## 2017-06-10 RX ORDER — ONDANSETRON 4 MG/1
4 TABLET, FILM COATED ORAL EVERY 6 HOURS
Qty: 12 TABLET | Refills: 0 | Status: SHIPPED | OUTPATIENT
Start: 2017-06-10 | End: 2017-06-30

## 2017-06-10 RX ADMIN — IOHEXOL 75 ML: 350 INJECTION, SOLUTION INTRAVENOUS at 12:06

## 2017-06-10 NOTE — ED PROVIDER NOTES
"Encounter Date: 6/9/2017    SCRIBE #1 NOTE: I, Harvinder Mayen, am scribing for, and in the presence of,  Dr. Desai. I have scribed the following portions of the note - the EKG reading and the Resident attestation.       History     Chief Complaint   Patient presents with    Chest Pain     acute onset about 1 hour ago with radiation to right jaw lasting about 5 minutes. currently reports chest pain is left chest, 3/10, reports bilateral jaw "tightness"     Review of patient's allergies indicates:   Allergen Reactions    Tramadol Other (See Comments)     She could not wake up      HPI   Pt presents after episode of CP that started about 1hr ago that radiated up into her right arm and her arm felt heavier.  She was sitting in her wheelchair resting when this happened.    The pain lasted 5-7min then got much better and was a dull pain.  Right now the pain is not there, it has resolved.  She states when the pain was there it radiated into both sides of her jaw as well.  Right now the jaw pain is not there and her right arm is no longer heavy.  When I asked her about the nursing note and stated that her chest pain was in the left chest she states that that was incorrect that it was in the right side of her chest.      She denies recent fever, cough, SOB, HA, Abd pain, N/V/D, dysuria, chills, flank pain.      Past Medical History:   Diagnosis Date    Anemia     Arthritis     Asthma     Cerebral palsy     stable    DJD (degenerative joint disease)     Hyperlipidemia     Hypertension     Neuromuscular disorder     Obstructive sleep apnea     Uterine fibroids affecting pregnancy     with adnexal mass     Past Surgical History:   Procedure Laterality Date    FOOT CAPSULE RELEASE W/ PERCUTANEOUS HEEL CORD LENGTHENING, TIBIAL TENDON TRANSFER      HAMSTRING RELEASE       Family History   Problem Relation Age of Onset    Diabetes Mother     Hypertension Mother      Social History   Substance Use Topics    Smoking " status: Never Smoker    Smokeless tobacco: Not on file    Alcohol use No     Review of Systems   Cardiovascular: Positive for chest pain.   Musculoskeletal:        Right arm heaviness, now resolved.   All other systems reviewed and are negative.      Physical Exam     Initial Vitals [06/09/17 1808]   BP Pulse Resp Temp SpO2   (!) 141/80 83 18 98.7 °F (37.1 °C) 98 %     Physical Exam  Gen/Constitutional: Interactive. No acute distress  Head: Normocephalic, Atraumatic  Neck: supple, no masses or LAD  Eyes: PERRLA, conjunctiva clear  Ears, Nose and Throat: No rhinorrhea or stridor.  Cardiac: Reg Rhythm, No murmur  Pulmonary: CTA Bilat, no wheezes, rhonchi, rales.  GI: Abdomen soft, non-tender, non-distended; no rebound or guarding  : No CVA tenderness.  Musculoskeletal: Extremities warm, well perfused, no erythema, no edema  Skin: No rashes  Neuro: Alert and Oriented x 3; No drift in either upper extremity. face droop, dysarthria, normal finger to nose.  Unable to participate in drift or hell to shin testing in lower extremities because of chronic neuro deficits 2/2 CP (pt states she feels at baseline).  Psych: Normal affect    ED Course   Procedures  Labs Reviewed   CBC W/ AUTO DIFFERENTIAL - Abnormal; Notable for the following:        Result Value    Gran # 8.1 (*)     Gran% 83.2 (*)     Lymph% 10.2 (*)     All other components within normal limits   COMPREHENSIVE METABOLIC PANEL - Abnormal; Notable for the following:     CO2 20 (*)     BUN, Bld 26 (*)     All other components within normal limits   D DIMER, QUANTITATIVE - Abnormal; Notable for the following:     D-Dimer 0.72 (*)     All other components within normal limits    Narrative:     add on [id number] per dr. saldaña 06/09/2017  20:39    TROPONIN I   B-TYPE NATRIURETIC PEPTIDE   D DIMER, QUANTITATIVE   TROPONIN I     CTA Chest Non-Coronary (PE Study)   Final Result         No evidence of pulmonary thromboembolism or pulmonary infarction.      Mild  pericardial effusion.   ______________________________________       Electronically signed by resident: AGA ROMEO MD   Date:     06/10/17   Time:    00:36                  As the supervising and teaching physician, I personally reviewed the images and resident's interpretation and I agree with the findings.               Electronically signed by: ALEJANDRO WARNER MD   Date:     06/10/17   Time:    01:11       X-Ray Chest AP Portable   Final Result           No acute findings in the chest.    ______________________________________       Electronically signed by resident: AGA ROMEO MD   Date:     06/09/17   Time:    20:15                  As the supervising and teaching physician, I personally reviewed the images and resident's interpretation and I agree with the findings.               Electronically signed by: ALEJANDRO WARNER MD   Date:     06/09/17   Time:    20:24           EKG Readings: (Independently Interpreted)   EKG: NSR, no ALANNA's or STD's, non-specific twave pattern, no STEMI                  APC / Resident Notes:   PGY3 MDM  DDx incl ACS, PNA, esophageal spasm, GERD, very low suspicion for PE given no SOB, tachycardia, hypoxemia, EKG changes. Very low suspicion for CVA given no true neurologic symptoms just subjective heaviness associated with pain. ASA given by EMS. EKG w/ no acute ischemic changes. Will trend Tns, get CXR, and reevaluate.  Darnell Sosa M.D.  PGY3 [facility]  6/9/2017 8:27 PM    Update  Initial Tn negative. Dimer elevated. Will get CTA.  Darnell Sosa M.D.  PGY3 [facility]  6/9/2017 11:39 PM         Scribe Attestation:   Scribe #1: I performed the above scribed service and the documentation accurately describes the services I performed. I attest to the accuracy of the note.    Attending Attestation:   Physician Attestation Statement for Resident:  As the supervising MD   Physician Attestation Statement: I have personally seen and examined this patient.   I agree with the above history.  -: Pt presents with right sided chest pain that radiated to her right arm and jaw. I considered MI/ACS, aortic dissection, PE, pneumonia, GERD, MSK chest pain among other diagnoses. Cardiac labs negative. EKG non ischemic appearing. D-dimer pending. Repeat troponin pending    As the supervising MD I agree with the above PE.    As the supervising MD I agree with the above treatment, course, plan, and disposition.  I have reviewed and agree with the residents interpretation of the following: EKG, lab data and x-rays.          Physician Attestation for Scribe:  Physician Attestation Statement for Scribe #1: I, Dr. Desai, reviewed documentation, as scribed by Harvinder Mayen in my presence, and it is both accurate and complete.         Attending ED Notes:   CTA chest negative for PE, troponin negative x2. The pt's chest pain has resolved. Clinically I felt the chance of MI/ACS was low given work up. I felt she was stable for discharge and outpatient cardiac risk stratification. She felt comfortable with this as well. She was given strict precautions regarding chest pain and expressed understanding of these. Stable for discharge and PCP follow up.           ED Course     Clinical Impression:   The encounter diagnosis was Chest pain.          Julian Desai MD  06/10/17 0266

## 2017-06-10 NOTE — ED TRIAGE NOTES
"acute onset about 1 hour ago with radiation to right jaw lasting about 5 minutes. Was reporting chest pain is left chest, 3/10, reports bilateral jaw "tightness". Denies pain at this time.   "

## 2017-06-30 ENCOUNTER — OFFICE VISIT (OUTPATIENT)
Dept: INTERNAL MEDICINE | Facility: CLINIC | Age: 57
End: 2017-06-30
Payer: MEDICARE

## 2017-06-30 VITALS
WEIGHT: 220 LBS | HEART RATE: 89 BPM | BODY MASS INDEX: 40.48 KG/M2 | HEIGHT: 62 IN | DIASTOLIC BLOOD PRESSURE: 87 MMHG | SYSTOLIC BLOOD PRESSURE: 142 MMHG

## 2017-06-30 DIAGNOSIS — R53.81 DEBILITY: ICD-10-CM

## 2017-06-30 DIAGNOSIS — R07.9 CHEST PAIN, UNSPECIFIED TYPE: Primary | ICD-10-CM

## 2017-06-30 DIAGNOSIS — M25.569 KNEE PAIN, UNSPECIFIED CHRONICITY, UNSPECIFIED LATERALITY: ICD-10-CM

## 2017-06-30 DIAGNOSIS — Z12.31 SCREENING MAMMOGRAM FOR HIGH-RISK PATIENT: ICD-10-CM

## 2017-06-30 DIAGNOSIS — R73.9 HYPERGLYCEMIA: ICD-10-CM

## 2017-06-30 DIAGNOSIS — E78.2 HYPERLIPIDEMIA, MIXED: ICD-10-CM

## 2017-06-30 DIAGNOSIS — E55.9 MILD VITAMIN D DEFICIENCY: ICD-10-CM

## 2017-06-30 DIAGNOSIS — I10 ESSENTIAL HYPERTENSION: ICD-10-CM

## 2017-06-30 PROCEDURE — 99499 UNLISTED E&M SERVICE: CPT | Mod: S$PBB,,, | Performed by: INTERNAL MEDICINE

## 2017-06-30 PROCEDURE — 99214 OFFICE O/P EST MOD 30 MIN: CPT | Mod: S$GLB,,, | Performed by: INTERNAL MEDICINE

## 2017-06-30 PROCEDURE — 99999 PR PBB SHADOW E&M-EST. PATIENT-LVL V: CPT | Mod: PBBFAC,,, | Performed by: INTERNAL MEDICINE

## 2017-06-30 RX ORDER — OXYCODONE HYDROCHLORIDE 5 MG/1
5 TABLET ORAL EVERY 8 HOURS PRN
Qty: 30 TABLET | Refills: 0 | Status: SHIPPED | OUTPATIENT
Start: 2017-06-30 | End: 2020-09-24

## 2017-07-05 NOTE — PROGRESS NOTES
Subjective:       Patient ID: Naga Benavidez is a 57 y.o. female.    Chief Complaint: Annual Exam    HPIPt had to cancel knee surgery as her mother is ill.  She reports occasional CP no SOB or radiation.  Review of Systems   Respiratory: Negative for shortness of breath.    Cardiovascular: Positive for chest pain. Negative for palpitations.   Gastrointestinal: Negative for abdominal pain, diarrhea, nausea and vomiting.   Genitourinary: Negative for dysuria.   Musculoskeletal: Negative for neck pain.   Neurological: Negative for seizures, syncope and headaches.       Objective:      Physical Exam   Constitutional: She is oriented to person, place, and time. She appears well-developed and well-nourished. No distress.   HENT:   Head: Normocephalic.   Mouth/Throat: Oropharynx is clear and moist.   Neck: Neck supple. No JVD present. No thyromegaly present.   Cardiovascular: Normal rate, regular rhythm, normal heart sounds and intact distal pulses.  Exam reveals no gallop and no friction rub.    No murmur heard.  Pulmonary/Chest: Effort normal and breath sounds normal. She has no wheezes. She has no rales.   Abdominal: Soft. Bowel sounds are normal. She exhibits no distension and no mass. There is no tenderness. There is no rebound and no guarding.   Musculoskeletal: She exhibits no edema.   Lymphadenopathy:     She has no cervical adenopathy.   Neurological: She is alert and oriented to person, place, and time.   Skin: Skin is warm and dry.   Psychiatric: She has a normal mood and affect. Her behavior is normal. Judgment and thought content normal.       Assessment:       1. Chest pain, unspecified type    2. Hyperlipidemia, mixed    3. Essential hypertension    4. Hyperglycemia    5. Mild vitamin D deficiency    6. Screening mammogram for high-risk patient    7. Knee pain, unspecified chronicity, unspecified laterality    8. Debility        Plan:   Chest pain, unspecified type  -     Pharmacological stress echo;  Future; Expected date: 07/14/2017    Hyperlipidemia, mixed  -     Lipid panel; Future; Expected date: 06/30/2017    Essential hypertension  -     TSH; Future; Expected date: 06/30/2017  -     Urinalysis; Future; Expected date: 06/30/2017  -     Microalbumin/creatinine urine ratio; Future; Expected date: 06/30/2017  Controlled - continue current meds    Hyperglycemia  -     Hemoglobin A1c; Future; Expected date: 06/30/2017    Mild vitamin D deficiency  -     Vitamin D; Future; Expected date: 06/30/2017    Screening mammogram for high-risk patient  -     Mammo Digital Screening Bilat with CAD; Future; Expected date: 06/30/2017    Knee pain, unspecified chronicity, unspecified laterality    Debility  -     Ambulatory consult to Physical Therapy    Other orders  -     oxycodone (ROXICODONE) 5 MG immediate release tablet; Take 1 tablet (5 mg total) by mouth every 8 (eight) hours as needed for Pain.  Dispense: 30 tablet; Refill: 0

## 2017-07-18 ENCOUNTER — NURSE TRIAGE (OUTPATIENT)
Dept: ADMINISTRATIVE | Facility: CLINIC | Age: 57
End: 2017-07-18

## 2017-07-18 NOTE — TELEPHONE ENCOUNTER
Reason for Disposition   Question about upcoming scheduled test, no triage required and triager able to answer question    Protocols used: ST INFORMATION ONLY CALL-A-  pt has questions re:fasting , meds and her scheduled labwork--questions answered    Jennifer Griffin RN

## 2017-07-18 NOTE — TELEPHONE ENCOUNTER
Reason for Disposition   Message left on identified answering machine.    Protocols used: ST NO CONTACT OR DUPLICATE CONTACT CALL-A-AH  call returned--no answer/ left VM on identified recorder/ # verified in EPIC    Jennifer Griffin RN

## 2017-09-06 ENCOUNTER — OFFICE VISIT (OUTPATIENT)
Dept: SPINE | Facility: CLINIC | Age: 57
End: 2017-09-06
Attending: PHYSICAL MEDICINE & REHABILITATION
Payer: MEDICARE

## 2017-09-06 VITALS
BODY MASS INDEX: 38.64 KG/M2 | DIASTOLIC BLOOD PRESSURE: 92 MMHG | WEIGHT: 210 LBS | HEIGHT: 62 IN | SYSTOLIC BLOOD PRESSURE: 130 MMHG | HEART RATE: 86 BPM

## 2017-09-06 DIAGNOSIS — G80.1 SPASTIC DIPLEGIC CEREBRAL PALSY: ICD-10-CM

## 2017-09-06 DIAGNOSIS — R26.81 GAIT INSTABILITY: ICD-10-CM

## 2017-09-06 DIAGNOSIS — M25.561 ARTHRALGIA OF BOTH KNEES: ICD-10-CM

## 2017-09-06 DIAGNOSIS — M17.0 PRIMARY OSTEOARTHRITIS OF BOTH KNEES: Primary | ICD-10-CM

## 2017-09-06 DIAGNOSIS — G89.29 CHRONIC PAIN OF BOTH KNEES: ICD-10-CM

## 2017-09-06 DIAGNOSIS — M25.562 ARTHRALGIA OF BOTH KNEES: ICD-10-CM

## 2017-09-06 DIAGNOSIS — M25.562 CHRONIC PAIN OF BOTH KNEES: ICD-10-CM

## 2017-09-06 DIAGNOSIS — M25.561 CHRONIC PAIN OF BOTH KNEES: ICD-10-CM

## 2017-09-06 PROCEDURE — 3075F SYST BP GE 130 - 139MM HG: CPT | Mod: S$GLB,,, | Performed by: PHYSICAL MEDICINE & REHABILITATION

## 2017-09-06 PROCEDURE — 99499 UNLISTED E&M SERVICE: CPT | Mod: S$PBB,,, | Performed by: PHYSICAL MEDICINE & REHABILITATION

## 2017-09-06 PROCEDURE — 3008F BODY MASS INDEX DOCD: CPT | Mod: S$GLB,,, | Performed by: PHYSICAL MEDICINE & REHABILITATION

## 2017-09-06 PROCEDURE — 99999 PR PBB SHADOW E&M-EST. PATIENT-LVL III: CPT | Mod: PBBFAC,,, | Performed by: PHYSICAL MEDICINE & REHABILITATION

## 2017-09-06 PROCEDURE — 3080F DIAST BP >= 90 MM HG: CPT | Mod: S$GLB,,, | Performed by: PHYSICAL MEDICINE & REHABILITATION

## 2017-09-06 PROCEDURE — 20610 DRAIN/INJ JOINT/BURSA W/O US: CPT | Mod: 50,S$GLB,, | Performed by: PHYSICAL MEDICINE & REHABILITATION

## 2017-09-06 PROCEDURE — 99214 OFFICE O/P EST MOD 30 MIN: CPT | Mod: 25,S$GLB,, | Performed by: PHYSICAL MEDICINE & REHABILITATION

## 2017-09-06 RX ORDER — TRIAMCINOLONE ACETONIDE 40 MG/ML
80 INJECTION, SUSPENSION INTRA-ARTICULAR; INTRAMUSCULAR
Status: DISCONTINUED | OUTPATIENT
Start: 2017-09-06 | End: 2017-09-06 | Stop reason: HOSPADM

## 2017-09-06 RX ADMIN — TRIAMCINOLONE ACETONIDE 80 MG: 40 INJECTION, SUSPENSION INTRA-ARTICULAR; INTRAMUSCULAR at 01:09

## 2017-09-06 NOTE — PROGRESS NOTES
Subjective:      Patient ID: Naga Benavidez is a 57 y.o. female.    Chief Complaint: No chief complaint on file.    Ms Benavidez is a 56 yo female with CP here for follow up of her bilateral knee DJD.  She was last seen by me on 6/7/2017 and she had bilateral knee injections.  She has been getting regular knee injections for several years.  We have discussed the pros and cons.  She has trouble using the walker and transferring without the knee injections.  She has been considering TKA however she has not been able to be away from her mom for any period.  She is doing ok.  She is anxious about the storm.  She is worried about transportation.  She does feel like the injections will work.  She has a couple of good workers for her mom, so she hopes that she will be able to get surgery soon.  Pain is 9/10    Past Medical History:  No date: Anemia  No date: Arthritis  No date: Asthma  No date: Cerebral palsy      Comment: stable  No date: DJD (degenerative joint disease)  No date: Hyperlipidemia  No date: Hypertension  No date: Neuromuscular disorder  No date: Obstructive sleep apnea  No date: Uterine fibroids affecting pregnancy      Comment: with adnexal mass    Past Surgical History:  No date: FOOT CAPSULE RELEASE W/ PERCUTANEOUS HEEL CORD*  No date: HAMSTRING RELEASE    Review of patient's family history indicates:    Diabetes                       Mother                    Hypertension                   Mother                      Social History    Marital status: Single              Spouse name:                       Years of education:                 Number of children:               Social History Main Topics    Smoking status: Never Smoker                                                                Alcohol use: No              Sexual activity: No                       Current Outpatient Prescriptions:  acetaminophen (TYLENOL) 325 MG tablet, Take 650 mg by mouth 4 (four) times daily as needed for Pain.,  Disp: , Rfl:   amlodipine (NORVASC) 2.5 MG tablet, Take 1 tablet (2.5 mg total) by mouth once daily., Disp: 90 tablet, Rfl: 3  ascorbic acid (VITAMIN C) 1000 MG tablet, Take 1,000 mg by mouth Daily. , Disp: , Rfl:   b complex vitamins capsule, Take 1 capsule by mouth every morning., Disp: , Rfl:   baclofen (LIORESAL) 10 MG tablet, , Disp: , Rfl:   biotin 10 mg Tab, Take 1 capsule by mouth once daily. , Disp: , Rfl:   calcium carbonate (OS-HERNANDEZ) 600 mg (1,500 mg) Tab, Take 600 mg by mouth once daily. , Disp: , Rfl:   co-enzyme Q-10 30 mg capsule, Take 300 mg by mouth once daily., Disp: , Rfl:   diclofenac sodium (VOLTAREN) 1 % Gel, Apply topically 4 (four) times daily., Disp: 1 Tube, Rfl: 1  hydrochlorothiazide (HYDRODIURIL) 25 MG tablet, Take 1 tablet (25 mg total) by mouth once daily. (Patient taking differently: Take 25 mg by mouth daily as needed. ), Disp: 30 tablet, Rfl: 11  losartan (COZAAR) 100 MG tablet, TAKE ONE TABLET BY MOUTH ONCE DAILY, Disp: 90 tablet, Rfl: 3  meloxicam (MOBIC) 15 MG tablet, TAKE ONE TABLET BY MOUTH DAILY WITH FOOD, Disp: 90 tablet, Rfl: 3  miconazole (MICATIN) 2 % cream, Apply topically 2 (two) times daily. (Patient taking differently: Apply topically 2 (two) times daily as needed. ), Disp: , Rfl: 0  multivitamin capsule, Take 1 capsule by mouth once daily., Disp: , Rfl:   omega-3 fatty acids-dha-epa (MEGARED PLANT-OMEGA-3) 300 mg Cap, Take 1 tablet by mouth once daily., Disp: , Rfl:   oxycodone (ROXICODONE) 5 MG immediate release tablet, Take 1 tablet (5 mg total) by mouth every 8 (eight) hours as needed for Pain., Disp: 30 tablet, Rfl: 0    No current facility-administered medications for this visit.       Review of patient's allergies indicates:   -- Tramadol -- Other (See Comments)    --  She could not wake up              Review of Systems   Constitution: Negative for weight gain and weight loss.   Cardiovascular: Negative for chest pain.   Respiratory: Negative for shortness of  breath.    Musculoskeletal: Positive for joint pain and joint swelling. Negative for back pain.   Gastrointestinal: Negative for abdominal pain and bowel incontinence.   Genitourinary: Negative for bladder incontinence.   Neurological: Negative for numbness.         Objective:        General: Naga is well-developed, well-nourished, appears stated age, in no acute distress, alert and oriented to time, place and person.     General    Vitals reviewed.  Constitutional: She is oriented to person, place, and time. She appears well-developed and well-nourished.   HENT:   Head: Normocephalic and atraumatic.   Pulmonary/Chest: Effort normal.   Neurological: She is alert and oriented to person, place, and time. She displays abnormal reflex. She exhibits abnormal muscle tone.   Psychiatric: She has a normal mood and affect. Her behavior is normal. Judgment and thought content normal.     General Musculoskeletal Exam   Gait: abnormal     Right Ankle/Foot Exam     Tests   Heel Walk: unable to perform  Tiptoe Walk: unable to perform    Comments:  Ankles wrapped    Left Ankle/Foot Exam     Tests   Heel Walk: unable to perform  Tiptoe Walk: unable to perform    Right Knee Exam     Inspection   Swelling: present  Deformity: deformity    Tenderness   The patient is tender to palpation of the lateral joint line and medial joint line.    Crepitus   The patient has crepitus of the patella.    Tests   Meniscus   Brandy:  Medial - negative Lateral - negative  Ligament Examination Lachman: normal (-1 to 2mm)   MCL - Valgus: normal (0 to 2mm)  LCL - Varus: normalReverse Pivot Shift: normal (Equal)    Other   Sensation: normal    Comments:  Unable to flex to 90 degrees    Left Knee Exam     Inspection   Swelling: present  Deformity: present    Tenderness   The patient tender to palpation of the lateral joint line and medial joint line.    Crepitus   The patient has crepitus of the patella.    Tests   Meniscus   Brandy:  Medial -  negative Lateral - negative  Stability Lachman: normal (-1 to 2mm)   MCL - Valgus: normal (0 to 2mm)  LCL - Varus: normal (0 to 2mm)Reverse Pivot Shift: normal (Equal)    Other   Sensation: normal    Comments:  Bilateral knee deformity, bilateral spasticity  Valgus deformity  Unable to fully extend    Muscle Strength   Right Lower Extremity   Hip Abduction: 5/5   Quadriceps:  5/5   Hamstrin/5   Left Lower Extremity   Hip Abduction: 5/5   Quadriceps:  5/5   Hamstrin/5     Vascular Exam     Right Pulses        Carotid:                  2+    Left Pulses        Carotid:                  2+    Edema  Right Lower Leg: absent  Left Lower Leg: absent              Assessment:       1. Primary osteoarthritis of both knees    2. Spastic diplegic cerebral palsy    3. Gait instability    4. Arthralgia of both knees    5. Chronic pain of both knees           Plan:       Orders Placed This Encounter    Large Joint Aspiration/Injection     1. Bilateral knee injections please see procedure note. A time out was performed, the correct patient, procedure, site, and position confirmed.   2. Baclofen 10mg to try to help with spasticity as needed, she does not take often  3. Continue mobic daily  4. Continue tylenol   5. Continue voltaren gel as needed   6. Her joint is getting harder to inject without x-ray guidance.  We might have to get her to  Pain management for injections.  She does not want to go to pain management currently.  Her left knee deformity is increasing, and is hard to enter the joint.  We discussed a replacement, pain is stopping her from standing and moving more.  She is becoming more and more dependent on wheelchair.  She has not been able to get the TKA due to her mothers care.  I encourage her to take care of herself.  We discussed the down side of repeated injections, and that it is not in her best interest to continue to do them every 3 months.  She wants to do them  7.  PT for transfers, standing,  quad strengthening and estim. At [facility].  She is going to call and let us know when she wants an order placed.  She is trying to work on transportation  8.  rtc 3 months, she is also planning on returning to ortho to consider knee replacement    Follow-up: Return in about 3 months (around 12/6/2017). If there are any questions prior to this, the patient was instructed to contact the office.

## 2017-09-06 NOTE — PROCEDURES
Large Joint Aspiration/Injection  Date/Time: 9/6/2017 1:19 PM  Performed by: DIANE HOLM  Authorized by: DIANE HOLM     Consent Done?:  Yes (Verbal)  Indications:  Pain  Procedure site marked: Yes    Timeout: Prior to procedure the correct patient, procedure, and site was verified      Location:  Knee  Site:  R knee and L knee  Prep: Patient was prepped and draped in usual sterile fashion    Ultrasonic Guidance for needle placement: No  Needle size:  22 G  Approach:  Lateral  Medications:  80 mg triamcinolone acetonide 40 mg/mL  Patient tolerance:  Patient tolerated the procedure well with no immediate complications

## 2017-09-13 ENCOUNTER — LAB VISIT (OUTPATIENT)
Dept: LAB | Facility: HOSPITAL | Age: 57
End: 2017-09-13
Attending: INTERNAL MEDICINE
Payer: MEDICARE

## 2017-09-13 DIAGNOSIS — I10 ESSENTIAL HYPERTENSION: ICD-10-CM

## 2017-09-13 DIAGNOSIS — E55.9 MILD VITAMIN D DEFICIENCY: ICD-10-CM

## 2017-09-13 DIAGNOSIS — R73.9 HYPERGLYCEMIA: ICD-10-CM

## 2017-09-13 DIAGNOSIS — E78.2 HYPERLIPIDEMIA, MIXED: ICD-10-CM

## 2017-09-13 LAB
25(OH)D3+25(OH)D2 SERPL-MCNC: 35 NG/ML
CHOLEST SERPL-MCNC: 239 MG/DL
CHOLEST/HDLC SERPL: 3.9 {RATIO}
ESTIMATED AVG GLUCOSE: 105 MG/DL
HBA1C MFR BLD HPLC: 5.3 %
HDLC SERPL-MCNC: 62 MG/DL
HDLC SERPL: 25.9 %
LDLC SERPL CALC-MCNC: 162 MG/DL
NONHDLC SERPL-MCNC: 177 MG/DL
TRIGL SERPL-MCNC: 75 MG/DL
TSH SERPL DL<=0.005 MIU/L-ACNC: 0.61 UIU/ML

## 2017-09-13 PROCEDURE — 83036 HEMOGLOBIN GLYCOSYLATED A1C: CPT

## 2017-09-13 PROCEDURE — 84443 ASSAY THYROID STIM HORMONE: CPT

## 2017-09-13 PROCEDURE — 82306 VITAMIN D 25 HYDROXY: CPT

## 2017-09-13 PROCEDURE — 80061 LIPID PANEL: CPT

## 2017-09-13 PROCEDURE — 36415 COLL VENOUS BLD VENIPUNCTURE: CPT

## 2017-09-22 NOTE — TELEPHONE ENCOUNTER
"----- Message from Ginny Timmons sent at 9/22/2017  9:19 AM CDT -----  Contact: lobfylk-[phone]  RX request - refill or new RX.  Is this a refill or new RX:  New Rx  RX name and strength: amlodipine (NORVASC) 2.5 MG tablet  Directions:   Is this a 30 day or 90 day RX:  90  Pharmacy name and phone # (DON'T enter "on file" or "in chart"): [facility] [phone] (Phone)  [phone] (Fax)  Comments:          "

## 2017-09-23 RX ORDER — AMLODIPINE BESYLATE 2.5 MG/1
2.5 TABLET ORAL DAILY
Qty: 90 TABLET | Refills: 3 | Status: SHIPPED | OUTPATIENT
Start: 2017-09-23 | End: 2018-07-24 | Stop reason: SDUPTHER

## 2017-10-20 RX ORDER — LOSARTAN POTASSIUM 100 MG/1
100 TABLET ORAL DAILY
Qty: 90 TABLET | Refills: 3 | Status: SHIPPED | OUTPATIENT
Start: 2017-10-20 | End: 2017-11-22 | Stop reason: SDUPTHER

## 2017-10-20 NOTE — TELEPHONE ENCOUNTER
----- Message from Latisha Panda sent at 10/20/2017  1:44 PM CDT -----  Contact: Self/ [phone]   Type: Rx    Name of medication(s): losartan (COZAAR) 100 MG tablet    Is this a refill? New rx? Refill     Who prescribed medication? Dr. Salazar     Pharmacy Name, Phone, & Location:  [facility] [address] [phone] (Phone)  [phone] (Fax)      Comments: pt is calling to have a refill on the Rx above. Please call and advise     Thank you

## 2017-11-08 ENCOUNTER — HOSPITAL ENCOUNTER (OUTPATIENT)
Dept: RADIOLOGY | Facility: OTHER | Age: 57
Discharge: HOME | End: 2017-11-08
Attending: INTERNAL MEDICINE
Payer: MEDICARE

## 2017-11-08 DIAGNOSIS — Z12.31 ENCOUNTER FOR SCREENING MAMMOGRAM FOR HIGH-RISK PATIENT: ICD-10-CM

## 2017-11-08 PROCEDURE — 77067 SCR MAMMO BI INCL CAD: CPT | Mod: TC

## 2017-11-08 PROCEDURE — 77067 SCR MAMMO BI INCL CAD: CPT | Mod: 26,,, | Performed by: INTERNAL MEDICINE

## 2017-11-15 ENCOUNTER — OFFICE VISIT (OUTPATIENT)
Dept: PODIATRY | Facility: CLINIC | Age: 57
End: 2017-11-15
Payer: MEDICARE

## 2017-11-15 ENCOUNTER — HOSPITAL ENCOUNTER (OUTPATIENT)
Dept: CARDIOLOGY | Facility: CLINIC | Age: 57
Discharge: HOME | End: 2017-11-15
Payer: MEDICARE

## 2017-11-15 ENCOUNTER — DOCUMENTATION ONLY (OUTPATIENT)
Dept: CARDIOLOGY | Facility: CLINIC | Age: 57
End: 2017-11-15

## 2017-11-15 VITALS
HEART RATE: 80 BPM | SYSTOLIC BLOOD PRESSURE: 170 MMHG | WEIGHT: 210 LBS | HEIGHT: 62 IN | BODY MASS INDEX: 38.64 KG/M2 | DIASTOLIC BLOOD PRESSURE: 101 MMHG

## 2017-11-15 DIAGNOSIS — R07.9 CHEST PAIN, UNSPECIFIED TYPE: ICD-10-CM

## 2017-11-15 DIAGNOSIS — L60.9 DISEASE OF NAIL: ICD-10-CM

## 2017-11-15 DIAGNOSIS — L84 CORN OR CALLUS: ICD-10-CM

## 2017-11-15 DIAGNOSIS — G80.9 CEREBRAL PALSY, UNSPECIFIED TYPE: Primary | ICD-10-CM

## 2017-11-15 LAB
DIASTOLIC DYSFUNCTION: NO
ESTIMATED PA SYSTOLIC PRESSURE: 28.6
GLOBAL PERICARDIAL EFFUSION: ABNORMAL
MITRAL VALVE MOBILITY: NORMAL
RETIRED EF AND QEF - SEE NOTES: 60 (ref 55–65)
TRICUSPID VALVE REGURGITATION: ABNORMAL

## 2017-11-15 PROCEDURE — 99999 PR PBB SHADOW E&M-EST. PATIENT-LVL III: CPT | Mod: PBBFAC,,, | Performed by: PODIATRIST

## 2017-11-15 PROCEDURE — 11721 DEBRIDE NAIL 6 OR MORE: CPT | Mod: 59,Q9,S$GLB, | Performed by: PODIATRIST

## 2017-11-15 PROCEDURE — 11056 PARNG/CUTG B9 HYPRKR LES 2-4: CPT | Mod: Q9,S$GLB,, | Performed by: PODIATRIST

## 2017-11-15 PROCEDURE — 93321 DOPPLER ECHO F-UP/LMTD STD: CPT | Mod: S$GLB,,, | Performed by: INTERNAL MEDICINE

## 2017-11-15 PROCEDURE — 99499 UNLISTED E&M SERVICE: CPT | Mod: S$PBB,,, | Performed by: PODIATRIST

## 2017-11-15 PROCEDURE — 93351 STRESS TTE COMPLETE: CPT | Mod: S$GLB,,, | Performed by: INTERNAL MEDICINE

## 2017-11-15 PROCEDURE — 99499 UNLISTED E&M SERVICE: CPT | Mod: S$GLB,,, | Performed by: PODIATRIST

## 2017-11-15 PROCEDURE — 96372 THER/PROPH/DIAG INJ SC/IM: CPT | Mod: 59,S$GLB,, | Performed by: INTERNAL MEDICINE

## 2017-11-15 NOTE — PROGRESS NOTES
Subjective:      Patient ID: Naga Benavidez is a 57 y.o. female.    Chief Complaint: Cerebral palsy, unspecified type (bilateral ) and Nail Care    Naga is a 57 y.o. female who presents to the clinic for evaluation and treatment of high risk feet. Naga has a past medical history of Anemia; Arthritis; Asthma; Cerebral palsy; DJD (degenerative joint disease); Hyperlipidemia; Hypertension; Neuromuscular disorder; Obstructive sleep apnea; and Uterine fibroids affecting pregnancy. The patient's chief complaint is long, thick toenails. The patient has documented high risk feet 2/2 Cerebal palsy  .    PCP: Kathrin Salazar MD    Date Last Seen by PCP:   Chief Complaint   Patient presents with    Cerebral palsy, unspecified type     bilateral     Nail Care         Current shoe gear:  Casual shoes         Hemoglobin A1C   Date Value Ref Range Status   09/13/2017 5.3 4.0 - 5.6 % Final     Comment:     According to ADA guidelines, hemoglobin A1c <7.0% represents  optimal control in non-pregnant diabetic patients. Different  metrics may apply to specific patient populations.   Standards of Medical Care in Diabetes-2016.  For the purpose of screening for the presence of diabetes:  <5.7%     Consistent with the absence of diabetes  5.7-6.4%  Consistent with increasing risk for diabetes   (prediabetes)  >or=6.5%  Consistent with diabetes  Currently, no consensus exists for use of hemoglobin A1c  for diagnosis of diabetes for children.  This Hemoglobin A1c assay has significant interference with fetal   hemoglobin   (HbF). The results are invalid for patients with abnormal amounts of   HbF,   including those with known Hereditary Persistence   of Fetal Hemoglobin. Heterozygous hemoglobin variants (HbAS, HbAC,   HbAD, HbAE, HbA2) do not significantly interfere with this assay;   however, presence of multiple variants in a sample may impact the %   interference.     03/22/2016 5.5 4.5 - 6.2 % Final   03/20/2014 5.4  4.5 - 6.2 % Final         Review of Systems   Constitution: Negative for chills, decreased appetite and fever.   Cardiovascular: Negative for chest pain and leg swelling.   Respiratory: Negative for cough.    Skin: Positive for dry skin and nail changes. Negative for flushing, itching and poor wound healing.   Musculoskeletal: Positive for arthritis. Negative for back pain, falls, gout, joint pain, joint swelling and myalgias.   Gastrointestinal: Negative for nausea and vomiting.   Neurological: Positive for numbness. Negative for loss of balance and paresthesias.           Objective:      Physical Exam   Constitutional: She is oriented to person, place, and time. She appears well-developed and well-nourished.   Cardiovascular:   Pulses:       Dorsalis pedis pulses are 2+ on the right side, and 2+ on the left side.        Posterior tibial pulses are 2+ on the right side, and 2+ on the left side.   Musculoskeletal: She exhibits edema (mild). She exhibits no tenderness.        Right ankle: Normal.        Left ankle: Normal.        Right foot: There is no swelling, no crepitus and no deformity.        Left foot: There is no swelling, no crepitus and no deformity.   Adequate joint range of motion without pain, limitation, nor crepitation Bilateral feet and ankle joints. Muscle strength is 5/5 in all groups bilaterally.         Lymphadenopathy:   No palpable lymph nodes   Neurological: She is alert and oriented to person, place, and time. She has normal strength.   Skin: Skin is warm, dry and intact. No abrasion, no bruising, no burn, no ecchymosis, no laceration and no rash noted. She is not diaphoretic. No erythema. Nails show no clubbing.   Nails x 10  are elongated by  2-9mm's, thickened by 3-4 mm's, dystrophic, and are darkened in  coloration . Xerosis Bilaterally. No open lesions noted.      Hyperkeratotic tissue noted to plantar medial midfoot b/l      Psychiatric: She has a normal mood and affect. Her behavior is  normal. Judgment normal.   Nursing note and vitals reviewed.            Assessment:       Encounter Diagnoses   Name Primary?    Cerebral palsy, unspecified type Yes    Disease of nail     Corn or callus          Plan:       Naga was seen today for cerebral palsy, unspecified type and nail care.    Diagnoses and all orders for this visit:    Cerebral palsy, unspecified type    Disease of nail    Corn or callus      I counseled the patient on her conditions, their implications and medical management.        - Shoe inspection. Patient instructed on proper foot hygeine. We discussed wearing proper shoe gear, daily foot inspections, never walking without protective shoe gear, never putting sharp instruments to feet, routine podiatric nail visits every 2-3 months.      - With patient's permission, nails were aggressively reduced and debrided x 10 to their soft tissue attachment mechanically and with electric , removing all offending nail and debris. Patient relates relief following the procedure. She will continue to monitor the areas daily, inspect her feet, wear protective shoe gear when ambulatory, moisturizer to maintain skin integrity and follow in this office in approximately 2-3 months, sooner p.r.n.    - After cleansing the  area w/ alcohol prep pad the above mentioned hyperkeratosis was trimmed utilizing No 15 scapel, to a smooth base with out incident. Patient tolerated this  well and reported comfort to the area of sub b/l midfoot. Padding applied and  dispensed to offload left midfoot area.

## 2017-11-15 NOTE — PROGRESS NOTES
Patient verified by 2 identifiers and allergies reviewed.  22g IV placed to Rt AC.  Denies previous reactions to blood transfusions, and no known holes in heart.  DSE & Optison consents obtained.  3cc Optison administered, echo images obtained, DSE testing completed.  Pt tolerated testing well. IV removed post testing.  Post study discharge instructions reviewed with patient, patient verbalized understanding.  Patient discharged to home in stable condition.

## 2017-11-23 RX ORDER — LOSARTAN POTASSIUM 100 MG/1
TABLET ORAL
Qty: 90 TABLET | Refills: 0 | Status: SHIPPED | OUTPATIENT
Start: 2017-11-23 | End: 2018-02-01

## 2017-12-12 ENCOUNTER — HOSPITAL ENCOUNTER (EMERGENCY)
Facility: OTHER | Age: 57
Discharge: HOME | End: 2017-12-12
Attending: EMERGENCY MEDICINE
Payer: MEDICARE

## 2017-12-12 ENCOUNTER — OFFICE VISIT (OUTPATIENT)
Dept: SPINE | Facility: CLINIC | Age: 57
End: 2017-12-12
Attending: PHYSICAL MEDICINE & REHABILITATION
Payer: MEDICARE

## 2017-12-12 ENCOUNTER — NURSE TRIAGE (OUTPATIENT)
Dept: ADMINISTRATIVE | Facility: CLINIC | Age: 57
End: 2017-12-12

## 2017-12-12 VITALS
HEART RATE: 99 BPM | TEMPERATURE: 99 F | OXYGEN SATURATION: 96 % | SYSTOLIC BLOOD PRESSURE: 176 MMHG | BODY MASS INDEX: 41.03 KG/M2 | HEIGHT: 60 IN | WEIGHT: 209 LBS | RESPIRATION RATE: 18 BRPM | DIASTOLIC BLOOD PRESSURE: 98 MMHG

## 2017-12-12 VITALS
HEART RATE: 94 BPM | BODY MASS INDEX: 40.48 KG/M2 | WEIGHT: 220 LBS | SYSTOLIC BLOOD PRESSURE: 137 MMHG | DIASTOLIC BLOOD PRESSURE: 86 MMHG | HEIGHT: 62 IN

## 2017-12-12 DIAGNOSIS — R60.0 LEG EDEMA, LEFT: ICD-10-CM

## 2017-12-12 DIAGNOSIS — G89.29 CHRONIC PAIN OF BOTH KNEES: ICD-10-CM

## 2017-12-12 DIAGNOSIS — M25.562 CHRONIC PAIN OF BOTH KNEES: ICD-10-CM

## 2017-12-12 DIAGNOSIS — R26.81 GAIT INSTABILITY: ICD-10-CM

## 2017-12-12 DIAGNOSIS — M25.561 ARTHRALGIA OF BOTH KNEES: ICD-10-CM

## 2017-12-12 DIAGNOSIS — M25.561 CHRONIC PAIN OF BOTH KNEES: ICD-10-CM

## 2017-12-12 DIAGNOSIS — M17.0 PRIMARY OSTEOARTHRITIS OF BOTH KNEES: Primary | ICD-10-CM

## 2017-12-12 DIAGNOSIS — M25.562 ARTHRALGIA OF BOTH KNEES: ICD-10-CM

## 2017-12-12 DIAGNOSIS — G80.1 SPASTIC DIPLEGIC CEREBRAL PALSY: ICD-10-CM

## 2017-12-12 LAB
ALBUMIN SERPL BCP-MCNC: 4.3 G/DL
ALP SERPL-CCNC: 110 U/L
ALT SERPL W/O P-5'-P-CCNC: 19 U/L
ANION GAP SERPL CALC-SCNC: 10 MMOL/L
AST SERPL-CCNC: 21 U/L
BASOPHILS # BLD AUTO: 0.01 K/UL
BASOPHILS NFR BLD: 0.2 %
BILIRUB SERPL-MCNC: 1.2 MG/DL
BNP SERPL-MCNC: 34 PG/ML
BUN SERPL-MCNC: 17 MG/DL
CALCIUM SERPL-MCNC: 10.4 MG/DL
CHLORIDE SERPL-SCNC: 106 MMOL/L
CO2 SERPL-SCNC: 24 MMOL/L
CREAT SERPL-MCNC: 0.8 MG/DL
DIFFERENTIAL METHOD: ABNORMAL
EOSINOPHIL # BLD AUTO: 0 K/UL
EOSINOPHIL NFR BLD: 0.3 %
ERYTHROCYTE [DISTWIDTH] IN BLOOD BY AUTOMATED COUNT: 13.8 %
EST. GFR  (AFRICAN AMERICAN): >60 ML/MIN/1.73 M^2
EST. GFR  (NON AFRICAN AMERICAN): >60 ML/MIN/1.73 M^2
GLUCOSE SERPL-MCNC: 95 MG/DL
HCT VFR BLD AUTO: 42.7 %
HGB BLD-MCNC: 13.9 G/DL
INR PPP: 0.9
LYMPHOCYTES # BLD AUTO: 0.6 K/UL
LYMPHOCYTES NFR BLD: 9.4 %
MCH RBC QN AUTO: 28.7 PG
MCHC RBC AUTO-ENTMCNC: 32.6 G/DL
MCV RBC AUTO: 88 FL
MONOCYTES # BLD AUTO: 0.1 K/UL
MONOCYTES NFR BLD: 2 %
NEUTROPHILS # BLD AUTO: 5.8 K/UL
NEUTROPHILS NFR BLD: 88.1 %
PLATELET # BLD AUTO: 184 K/UL
PMV BLD AUTO: 11.5 FL
POTASSIUM SERPL-SCNC: 4 MMOL/L
PROT SERPL-MCNC: 8.4 G/DL
PROTHROMBIN TIME: 10.1 SEC
RBC # BLD AUTO: 4.85 M/UL
SODIUM SERPL-SCNC: 140 MMOL/L
WBC # BLD AUTO: 6.58 K/UL

## 2017-12-12 PROCEDURE — 93005 ELECTROCARDIOGRAM TRACING: CPT

## 2017-12-12 PROCEDURE — 99499 UNLISTED E&M SERVICE: CPT | Mod: S$PBB,,, | Performed by: PHYSICAL MEDICINE & REHABILITATION

## 2017-12-12 PROCEDURE — 83880 ASSAY OF NATRIURETIC PEPTIDE: CPT

## 2017-12-12 PROCEDURE — 99214 OFFICE O/P EST MOD 30 MIN: CPT | Mod: 25,S$GLB,, | Performed by: PHYSICAL MEDICINE & REHABILITATION

## 2017-12-12 PROCEDURE — 93010 ELECTROCARDIOGRAM REPORT: CPT | Mod: ,,, | Performed by: INTERNAL MEDICINE

## 2017-12-12 PROCEDURE — 82803 BLOOD GASES ANY COMBINATION: CPT

## 2017-12-12 PROCEDURE — 99999 PR PBB SHADOW E&M-EST. PATIENT-LVL III: CPT | Mod: PBBFAC,,, | Performed by: PHYSICAL MEDICINE & REHABILITATION

## 2017-12-12 PROCEDURE — 80053 COMPREHEN METABOLIC PANEL: CPT

## 2017-12-12 PROCEDURE — 85025 COMPLETE CBC W/AUTO DIFF WBC: CPT

## 2017-12-12 PROCEDURE — 99284 EMERGENCY DEPT VISIT MOD MDM: CPT | Mod: 25

## 2017-12-12 PROCEDURE — 20610 DRAIN/INJ JOINT/BURSA W/O US: CPT | Mod: 50,S$GLB,, | Performed by: PHYSICAL MEDICINE & REHABILITATION

## 2017-12-12 PROCEDURE — 85610 PROTHROMBIN TIME: CPT

## 2017-12-12 PROCEDURE — 99900035 HC TECH TIME PER 15 MIN (STAT)

## 2017-12-12 RX ORDER — BACLOFEN 10 MG/1
10 TABLET ORAL 3 TIMES DAILY PRN
Qty: 90 TABLET | Refills: 2 | Status: SHIPPED | OUTPATIENT
Start: 2017-12-12 | End: 2018-07-20 | Stop reason: SDUPTHER

## 2017-12-12 RX ORDER — MELOXICAM 15 MG/1
TABLET ORAL
Qty: 90 TABLET | Refills: 3 | Status: SHIPPED | OUTPATIENT
Start: 2017-12-12 | End: 2018-08-24

## 2017-12-12 RX ORDER — TRIAMCINOLONE ACETONIDE 40 MG/ML
80 INJECTION, SUSPENSION INTRA-ARTICULAR; INTRAMUSCULAR
Status: DISCONTINUED | OUTPATIENT
Start: 2017-12-12 | End: 2017-12-12 | Stop reason: HOSPADM

## 2017-12-12 RX ADMIN — TRIAMCINOLONE ACETONIDE 80 MG: 40 INJECTION, SUSPENSION INTRA-ARTICULAR; INTRAMUSCULAR at 10:12

## 2017-12-12 NOTE — PLAN OF CARE
12/12/17 1611   Discharge Assessment   Assessment Type Discharge Planning Assessment   Confirmed/corrected address and phone number on facesheet? Yes   Assessment information obtained from? Patient;Caregiver;Medical Record   Communicated expected length of stay with patient/caregiver yes   Prior to hospitilization cognitive status: Alert/Oriented   Prior to hospitalization functional status: Assistive Equipment   Current cognitive status: Alert/Oriented   Current Functional Status: Assistive Equipment   Able to Return to Prior Arrangements yes   Is patient able to care for self after discharge? Yes   Patient currently being followed by outpatient case management? Unable to determine (comments)   Equipment Currently Used at Home power chair   Do you have any problems affording any of your prescribed medications? No   Is the patient taking medications as prescribed? yes   Does the patient have transportation home? Yes   Discharge Plan A Home   Patient/Family In Agreement With Plan yes

## 2017-12-12 NOTE — ED NOTES
Pt updated on POC and verbalized understanding; comfort needs addressed; pt in personal wheelchair; will monitor

## 2017-12-12 NOTE — PLAN OF CARE
Discharge Planning:  Patient admitted on 12-12-17  LOS-day 0  Chart reviewed  Care plan discussed  Discussed care plan with treatment team  Discussed care plan with the attending Dr Patton  Current dispo - home today  Case management to follow as needed  Consults following are: case mgt.    [facility] to transport  [phone]   6:10 PM conchis  Spoke with Kristy charge nurse.

## 2017-12-12 NOTE — ED PROVIDER NOTES
"Encounter Date: 12/12/2017    SCRIBE #1 NOTE: I, Laura Beauchamp, am scribing for, and in the presence of, Dr. Villasenor.       History     Chief Complaint   Patient presents with    Can't Take a Deep Breath     pt states she is unable to take a deep breath.  states she is not SOB just hard to take deep breath her PCP told her to come to ED     Time seen by provider: 12:35 PM    This is a 57 y.o. female with HTN and cerebral palsy who presents with complaint of difficulty taking a deep breath that has persisted since yesterday.  The patient states that she does not feel SOB, but she claims that her breathing feels restricted when deep breath or yawning.  She also complains of fatigue x weeks, an exacerbation of chronic left lower leg swelling, and left lower leg "fullness."  She states leg swell intermittently and resolve, but this LLE swelling is more persistent. She denies fevers, decreased appetite, choking, coughing, chest tightness, and CP.  The patient reports no identifying, alleviating, or exacerbating factors, including exertion.  She admits that she had a similar episode "years ago" when she was diagnosed with "walking pneumonia."  She denies any recent medication changes.  She is no longer ambulatory due to severe knee osteoarthritis, and just had B/L knee steroid injections earlier today with no known complications.        The history is provided by the patient.     Review of patient's allergies indicates:   Allergen Reactions    Tramadol Other (See Comments)     She could not wake up      Past Medical History:   Diagnosis Date    Anemia     Arthritis     Asthma     Cerebral palsy     stable    DJD (degenerative joint disease)     Hyperlipidemia     Hypertension     Neuromuscular disorder     Obstructive sleep apnea     Uterine fibroids affecting pregnancy     with adnexal mass     Past Surgical History:   Procedure Laterality Date    FOOT CAPSULE RELEASE W/ PERCUTANEOUS HEEL CORD " "LENGTHENING, TIBIAL TENDON TRANSFER      HAMSTRING RELEASE       Family History   Problem Relation Age of Onset    Diabetes Mother     Hypertension Mother      Social History   Substance Use Topics    Smoking status: Never Smoker    Smokeless tobacco: Never Used    Alcohol use No     Review of Systems   Constitutional: Positive for fatigue. Negative for appetite change, chills and fever.   HENT: Negative for facial swelling.    Respiratory: Negative for cough, choking, chest tightness and shortness of breath.         Positive for "difficulty taking a deep breath."     Cardiovascular: Positive for leg swelling (Left lower leg.). Negative for chest pain.   Gastrointestinal: Negative for abdominal pain, nausea and vomiting.   Endocrine: Negative for polyuria.   Genitourinary: Negative for dysuria.   Musculoskeletal: Negative for myalgias.   Skin: Negative for rash.   Neurological: Negative for headaches.       Physical Exam     Initial Vitals [12/12/17 1138]   BP Pulse Resp Temp SpO2   (!) 189/92 85 (!) 22 98.3 °F (36.8 °C) 98 %      MAP       124.33         Physical Exam    Nursing note and vitals reviewed.  Constitutional: She appears well-developed and well-nourished. She is not diaphoretic. No distress.   Chronically ill-appearing.   HENT:   Head: Normocephalic and atraumatic.   Right Ear: External ear normal.   Left Ear: External ear normal.   Eyes: EOM are normal. Right eye exhibits no discharge. Left eye exhibits no discharge.   Neck: Normal range of motion.   Cardiovascular: Normal rate, regular rhythm and normal heart sounds. Exam reveals no gallop and no friction rub.    No murmur heard.  Pulmonary/Chest: Breath sounds normal. No respiratory distress. She has no wheezes. She has no rhonchi. She has no rales. She exhibits no tenderness.   No limitation in inspiration noted.     Abdominal: Soft. There is no tenderness. There is no rebound and no guarding.   Musculoskeletal: Normal range of motion. She " exhibits edema. She exhibits no tenderness.   LLE with 3+ edema below the knee distally.  No tenderness.  RLE 1+ edema.  Bilateral knees s/p arthrocentesis with no joint effusion or signs of arthritis.     Neurological: She is alert and oriented to person, place, and time.   Skin: Skin is warm and dry. No rash and no abscess noted. There is erythema. No pallor.   LLE with erythema below the knee distally.  No warmth.  No sign of infection.  No erythema to the RLE.   Psychiatric: She has a normal mood and affect. Her behavior is normal. Judgment and thought content normal.         ED Course   Procedures  Labs Reviewed   CBC W/ AUTO DIFFERENTIAL - Abnormal; Notable for the following:        Result Value    Lymph # 0.6 (*)     Mono # 0.1 (*)     Gran% 88.1 (*)     Lymph% 9.4 (*)     Mono% 2.0 (*)     All other components within normal limits   COMPREHENSIVE METABOLIC PANEL - Abnormal; Notable for the following:     Total Bilirubin 1.2 (*)     All other components within normal limits   B-TYPE NATRIURETIC PEPTIDE   PROTIME-INR      Imaging Results          US Lower Extremity Veins Left (Final result)  Result time 12/12/17 14:17:28    Final result by Pilar Maya MD (12/12/17 14:17:28)                 Impression:        No evidence of acute deep venous thrombosis of the left lower extremity, noting suboptimal evaluation of the left posterior tibial vein, and subcutaneous edema.          Electronically signed by: PILAR MAYA MD  Date:     12/12/17  Time:    14:17              Narrative:    Comparison: 12/13/2016    Clinical history: Localized edema    Findings:    Duplex and color flow Doppler evaluation does not reveal any evidence of acute venous thrombosis in the common femoral, superficial femoral, greater saphenous, popliteal, peroneal, anterior tibial and posterior tibial veins of the left lower extremity.  There is no reflux to suggest valvular incompetence.  There is subcutaneous edema.                              X-Ray Chest AP Portable (Final result)  Result time 12/12/17 13:24:08    Final result by Florina Huggins MD (12/12/17 13:24:08)                 Impression:      No acute abnormality.      Electronically signed by: FLORINA HUGGINS  Date:     12/12/17  Time:    13:24              Narrative:    CLINICAL HISTORY:  Shortness of breath    TECHNIQUE: Single view portable frontal radiograph of the chest    COMPARISON: CTA chest 06/10/17.  Chest radiograph 06/09/17.      FINDINGS:  Examination mildly degraded by under penetration of x-ray beam related to patient body habitus.    Support devices: None    Chest: Cardiomediastinal silhouette is normal.  Diffuse attenuation of the lung bases is artifactual related to underpenetration of the exam.  No focal consolidative opacity.  No pleural effusion or pneumothorax.    Upper Abdomen: Normal.    Other: DJD of the shoulders with high riding humeral heads.                              EKG Readings: (Independently Interpreted)   Initial Reading: No STEMI.   Normal sinus rhythm. Rate of 80. No acute ischemia or arrhythmia.         X-Rays:   Independently Interpreted Readings:   Chest X-Ray: X-Ray Chest AP Portable: Trace bilateral pleural effusion.  Normal heart size.  No infiltrates.       Medical Decision Making:   History:   Old Medical Records: I decided to obtain old medical records.  Initial Assessment:       57F with h/o Cerebral Palsy, HTN, presents with sensation of inability to take a full breath x days. No SOB or worse than baseline AHMADI, no hypoxia or abnormal lung exam on arrival. She has stable Cerebral palsy with no history or symptoms of aspiration or diaphragm or intercostal muscle weakness, and CP would be unlikely to progress to involve this now. She reports one similar episode with dx walking PNA, but no cough or fever now. On exam she does have LLE edema worse than baseline with mild erythema, but no warmth or tenderness. She has chronic BLE edema related to  non-ambulatory status, but usually not persistent like this. Concern for DVT due to immobility, exam not c/w cellulitis and no fever or tenderness.      Workup with no sign DVT on sono, and no CXR or labs abnormalities.   Still no hypoxia, resp distress, or SOB on re-assess, very low suspicion for symptomatic PE.   She had (-) PE study in July after Ed visit for CP, and she does not want another CT now. She will return to ED if she develops SOB or AHMADI. No sign neuromuscular source for difficulty getting full inspiration, and VBG with no hypercapnea.   She does now mention she has been non-compliant with HCTZ, which could account for worsening edema. Unlikely to be related to arthrocentesis today, and no sign septic arthritis. Normal BNP with no sign CHF on CXR.   She will resume HCTZ and monitor edema, and return to ED for any worsening redness, fevers, or any sign of developing infection          Independently Interpreted Test(s):   I have ordered and independently interpreted X-rays - see prior notes.  I have ordered and independently interpreted EKG Reading(s) - see prior notes  Clinical Tests:   Lab Tests: Ordered and Reviewed  Radiological Study: Reviewed and Ordered  Medical Tests: Ordered and Reviewed            Scribe Attestation:   Scribe #1: I performed the above scribed service and the documentation accurately describes the services I performed. I attest to the accuracy of the note.    Attending Attestation:           Physician Attestation for Scribe:  Physician Attestation Statement for Scribe #1: I, Dr. Villasenor, reviewed documentation, as scribed by Laura Beauchamp in my presence, and it is both accurate and complete.                 ED Course      Clinical Impression:     1. Difficulty taking deep breaths    2. Leg edema, left                                 Néstor WANG Villasenor MD  12/12/17 1910

## 2017-12-12 NOTE — TELEPHONE ENCOUNTER
"Reason for Disposition   Recent illness requiring prolonged bedrest (i.e., immobilization)    Protocols used: ST BREATHING DIFFICULTY-A-OH    Pt states she would like to see her PCP d/t difficulty breathing for approx 1 week.  Pt states she feels like she cant take a "long breath."  Pt states she is wheelchair bound.  Pt advised per protocol and verbalizes understanding.       "

## 2017-12-12 NOTE — ED TRIAGE NOTES
"Pt c/o inability to take a deep breath, denies pain upon inspiration & expiration X 2 weeks. Pt denies SOB, cold symptoms & fever. Pt states "the last time I felt like this I had walking pneumonia."  "

## 2017-12-12 NOTE — ED NOTES
Patient Identifiers for Naga Benavidez checked and correct  LOC: The patient is awake, alert and aware of environment with an appropriate affect, the patient is oriented x 3 and speaking appropriate.  APPEARANCE: Patient resting comfortably and in no acute distress. The patient is clean and well groomed. The patient's clothing is properly fastened.  SKIN: The skin is warm and dry. The patient has normal skin turgor and moist mucus membranes. LLE redness extending from ankle to below knee.  Musculoskeletal :  Pt wheelchair bound with limited mobility in the lower extremities secondary to cerebral palsy. 2+ pitting edema in the RLE, 4+ pitting edema in the LLE.   RESPIRATORY: Airway is open and patent, respirations are spontaneous, patient has a normal effort and rate. Breath sounds are clear & equal, bilaterally.  PULSES: 2+ radial  pulses, symmetrical.    Will continue to monitor

## 2017-12-12 NOTE — PROCEDURES
Large Joint Aspiration/Injection  Date/Time: 12/12/2017 10:19 AM  Performed by: DIANE HOLM  Authorized by: DIANE HOLM     Consent Done?:  Yes (Verbal)  Indications:  Pain  Procedure site marked: Yes    Timeout: Prior to procedure the correct patient, procedure, and site was verified      Location:  Knee  Site:  R knee and L knee  Prep: Patient was prepped and draped in usual sterile fashion    Needle size:  22 G  Approach:  Lateral  Medications:  80 mg triamcinolone acetonide 40 mg/mL  Aspirate:  Clear  Patient tolerance:  Patient tolerated the procedure well with no immediate complications

## 2017-12-12 NOTE — PROGRESS NOTES
Subjective:      Patient ID: Naga Benavidez is a 57 y.o. female.    Chief Complaint: Knee Pain    Ms Benavidez is a 58 yo female with CP here for follow up of her bilateral knee DJD.  She was last seen by me on 9/6/2017 and she had bilateral knee injections.  She has been getting regular knee injections for several years.  We have discussed the pros and cons.  She has trouble using the walker and transferring without the knee injections.  She has been considering TKA however she has not been able to be away from her mom for any period.  She is still having problem with help for her mother.  She has been having more pain with the cold.  She is not able to do much standing due to pain.  She is still taking the baclofen.  She does not take all the time.  The pain is 9/10.  She feels like her breath does not have as much strength in her breath.  She is going to try to get into PCP.  She feels like it has been going on for a week.  She has not had fever, night sweats.      Past Medical History:  No date: Anemia  No date: Arthritis  No date: Asthma  No date: Cerebral palsy      Comment: stable  No date: DJD (degenerative joint disease)  No date: Hyperlipidemia  No date: Hypertension  No date: Neuromuscular disorder  No date: Obstructive sleep apnea  No date: Uterine fibroids affecting pregnancy      Comment: with adnexal mass    Past Surgical History:  No date: FOOT CAPSULE RELEASE W/ PERCUTANEOUS HEEL CORD*  No date: HAMSTRING RELEASE    Review of patient's family history indicates:    Diabetes                       Mother                    Hypertension                   Mother                      Social History    Marital status: Single              Spouse name:                       Years of education:                 Number of children:               Social History Main Topics    Smoking status: Never Smoker                                                                Alcohol use: No              Sexual  activity: No                       Current Outpatient Prescriptions:  acetaminophen (TYLENOL) 325 MG tablet, Take 650 mg by mouth 4 (four) times daily as needed for Pain., Disp: , Rfl:   amlodipine (NORVASC) 2.5 MG tablet, Take 1 tablet (2.5 mg total) by mouth once daily., Disp: 90 tablet, Rfl: 3  ascorbic acid (VITAMIN C) 1000 MG tablet, Take 1,000 mg by mouth Daily. , Disp: , Rfl:   b complex vitamins capsule, Take 1 capsule by mouth every morning., Disp: , Rfl:   baclofen (LIORESAL) 10 MG tablet, , Disp: , Rfl:   biotin 10 mg Tab, Take 1 capsule by mouth once daily. , Disp: , Rfl:   calcium carbonate (OS-HERNANDEZ) 600 mg (1,500 mg) Tab, Take 600 mg by mouth once daily. , Disp: , Rfl:   co-enzyme Q-10 30 mg capsule, Take 300 mg by mouth once daily., Disp: , Rfl:   FLUZONE QUAD [id number], PF, 60 mcg (15 mcg x 4)/0.5 mL vaccine, , Disp: , Rfl:   hydrochlorothiazide (HYDRODIURIL) 25 MG tablet, Take 1 tablet (25 mg total) by mouth once daily. (Patient taking differently: Take 25 mg by mouth daily as needed. ), Disp: 30 tablet, Rfl: 11  losartan (COZAAR) 100 MG tablet, TAKE ONE TABLET BY MOUTH EVERY DAY, Disp: 90 tablet, Rfl: 0  meloxicam (MOBIC) 15 MG tablet, TAKE ONE TABLET BY MOUTH DAILY WITH FOOD, Disp: 90 tablet, Rfl: 3  miconazole (MICATIN) 2 % cream, Apply topically 2 (two) times daily. (Patient taking differently: Apply topically 2 (two) times daily as needed. ), Disp: , Rfl: 0  multivitamin capsule, Take 1 capsule by mouth once daily., Disp: , Rfl:   omega-3 fatty acids-dha-epa (MEGARED PLANT-OMEGA-3) 300 mg Cap, Take 1 tablet by mouth once daily., Disp: , Rfl:   oxycodone (ROXICODONE) 5 MG immediate release tablet, Take 1 tablet (5 mg total) by mouth every 8 (eight) hours as needed for Pain., Disp: 30 tablet, Rfl: 0  diclofenac sodium (VOLTAREN) 1 % Gel, Apply topically 4 (four) times daily., Disp: 1 Tube, Rfl: 1    No current facility-administered medications for this visit.       Review of patient's allergies  indicates:   -- Tramadol -- Other (See Comments)    --  She could not wake up          Review of Systems   Constitution: Negative for weight gain and weight loss.   Cardiovascular: Negative for chest pain.   Respiratory: Positive for shortness of breath.    Musculoskeletal: Positive for joint pain and joint swelling. Negative for back pain.   Gastrointestinal: Negative for abdominal pain and bowel incontinence.   Genitourinary: Negative for bladder incontinence.   Neurological: Negative for numbness.         Objective:        General: Naga is well-developed, well-nourished, appears stated age, in no acute distress, alert and oriented to time, place and person.     General    Vitals reviewed.  Constitutional: She is oriented to person, place, and time. She appears well-developed and well-nourished.   HENT:   Head: Normocephalic and atraumatic.   Pulmonary/Chest: Effort normal.   Neurological: She is alert and oriented to person, place, and time. She displays abnormal reflex. She exhibits abnormal muscle tone.   Psychiatric: She has a normal mood and affect. Her behavior is normal. Judgment and thought content normal.     General Musculoskeletal Exam   Gait: abnormal     Right Ankle/Foot Exam     Tests   Heel Walk: unable to perform  Tiptoe Walk: unable to perform    Comments:  Ankles wrapped    Left Ankle/Foot Exam     Tests   Heel Walk: unable to perform  Tiptoe Walk: unable to perform    Right Knee Exam     Inspection   Swelling: present  Deformity: deformity    Tenderness   The patient is tender to palpation of the lateral joint line and medial joint line.    Crepitus   The patient has crepitus of the patella.    Tests   Meniscus   Brandy:  Medial - negative Lateral - negative  Ligament Examination Lachman: normal (-1 to 2mm)   MCL - Valgus: normal (0 to 2mm)  LCL - Varus: normalReverse Pivot Shift: normal (Equal)    Other   Sensation: normal    Comments:  Unable to flex to 90 degrees    Left Knee Exam      Inspection   Swelling: present  Deformity: present    Tenderness   The patient tender to palpation of the lateral joint line and medial joint line.    Crepitus   The patient has crepitus of the patella.    Tests   Meniscus   Brandy:  Medial - negative Lateral - negative  Stability Lachman: normal (-1 to 2mm)   MCL - Valgus: normal (0 to 2mm)  LCL - Varus: normal (0 to 2mm)Reverse Pivot Shift: normal (Equal)    Other   Sensation: normal    Comments:  Bilateral knee deformity, bilateral spasticity  Valgus deformity  Unable to fully extend    Muscle Strength   Right Lower Extremity   Hip Abduction: 5/5   Quadriceps:  5/5   Hamstrin/5   Left Lower Extremity   Hip Abduction: 5/5   Quadriceps:  5/5   Hamstrin/5     Vascular Exam     Right Pulses        Carotid:                  2+    Left Pulses        Carotid:                  2+    Edema  Right Lower Leg: absent  Left Lower Leg: absent              Assessment:       1. Primary osteoarthritis of both knees    2. Spastic diplegic cerebral palsy    3. Gait instability    4. Arthralgia of both knees    5. Chronic pain of both knees           Plan:       Orders Placed This Encounter    Large Joint Aspiration/Injection    baclofen (LIORESAL) 10 MG tablet    meloxicam (MOBIC) 15 MG tablet     1. Bilateral knee injections please see procedure note. A time out was performed, the correct patient, procedure, site, and position confirmed.   2. Baclofen 10mg to try to help with spasticity as needed, she does not take often  3. Continue mobic daily  4. Continue tylenol   5. Continue voltaren gel as needed   6. Her joint is getting harder to inject without x-ray guidance.  We might have to get her to  Pain management for injections.  She does not want to go to pain management currently.   We discussed a replacement, pain is stopping her from standing and moving more.  She is becoming more and more dependent on wheelchair.  She has not been able to get the TKA due to  her mothers care.  I encourage her to take care of herself.  We discussed that she is not functioning as well.  If she looses function she might not get the ability back.  We discussed the down side of repeated injections, and that it is not in her best interest to continue to do them every 3 months.  She wants to do them  7.  She has not been able to go to PT, might be helpful to help her move better.  8.  rtc 3 months, she is also planning on returning to ortho to consider knee replacement    Follow-up: Return in about 3 months (around 3/12/2018). If there are any questions prior to this, the patient was instructed to contact the office.

## 2017-12-14 LAB
ALLENS TEST: ABNORMAL
DELSYS: ABNORMAL
HCO3 UR-SCNC: 20.9 MMOL/L (ref 24–28)
PCO2 BLDA: 33.5 MMHG (ref 35–45)
PH SMN: 7.4 [PH] (ref 7.35–7.45)
PO2 BLDA: 63 MMHG (ref 40–60)
POC BE: -4 MMOL/L
POC SATURATED O2: 92 % (ref 95–100)
SAMPLE: ABNORMAL
SITE: ABNORMAL

## 2018-02-01 ENCOUNTER — OFFICE VISIT (OUTPATIENT)
Dept: INTERNAL MEDICINE | Facility: CLINIC | Age: 58
End: 2018-02-01
Payer: MEDICARE

## 2018-02-01 ENCOUNTER — LAB VISIT (OUTPATIENT)
Dept: LAB | Facility: HOSPITAL | Age: 58
End: 2018-02-01
Attending: INTERNAL MEDICINE
Payer: MEDICARE

## 2018-02-01 VITALS
SYSTOLIC BLOOD PRESSURE: 148 MMHG | WEIGHT: 209 LBS | BODY MASS INDEX: 41.03 KG/M2 | HEART RATE: 84 BPM | DIASTOLIC BLOOD PRESSURE: 99 MMHG | HEIGHT: 60 IN

## 2018-02-01 DIAGNOSIS — E78.5 HYPERLIPIDEMIA, UNSPECIFIED HYPERLIPIDEMIA TYPE: ICD-10-CM

## 2018-02-01 DIAGNOSIS — I10 ESSENTIAL HYPERTENSION: ICD-10-CM

## 2018-02-01 DIAGNOSIS — H91.93 BILATERAL HEARING LOSS, UNSPECIFIED HEARING LOSS TYPE: ICD-10-CM

## 2018-02-01 DIAGNOSIS — N39.46 MIXED STRESS AND URGE URINARY INCONTINENCE: ICD-10-CM

## 2018-02-01 DIAGNOSIS — I10 ESSENTIAL HYPERTENSION: Primary | ICD-10-CM

## 2018-02-01 DIAGNOSIS — R60.9 EDEMA, UNSPECIFIED TYPE: ICD-10-CM

## 2018-02-01 DIAGNOSIS — R06.00 DYSPNEA, UNSPECIFIED TYPE: ICD-10-CM

## 2018-02-01 DIAGNOSIS — R35.0 URINARY FREQUENCY: ICD-10-CM

## 2018-02-01 LAB
ALBUMIN SERPL BCP-MCNC: 4 G/DL
ALP SERPL-CCNC: 93 U/L
ALT SERPL W/O P-5'-P-CCNC: 13 U/L
ANION GAP SERPL CALC-SCNC: 9 MMOL/L
AST SERPL-CCNC: 18 U/L
BASOPHILS # BLD AUTO: 0.03 K/UL
BASOPHILS NFR BLD: 0.5 %
BILIRUB SERPL-MCNC: 1 MG/DL
BUN SERPL-MCNC: 19 MG/DL
CALCIUM SERPL-MCNC: 10.2 MG/DL
CHLORIDE SERPL-SCNC: 107 MMOL/L
CHOLEST SERPL-MCNC: 255 MG/DL
CHOLEST/HDLC SERPL: 3.7 {RATIO}
CK SERPL-CCNC: 95 U/L
CO2 SERPL-SCNC: 24 MMOL/L
CREAT SERPL-MCNC: 0.8 MG/DL
DIFFERENTIAL METHOD: NORMAL
EOSINOPHIL # BLD AUTO: 0.1 K/UL
EOSINOPHIL NFR BLD: 1.1 %
ERYTHROCYTE [DISTWIDTH] IN BLOOD BY AUTOMATED COUNT: 14.1 %
EST. GFR  (AFRICAN AMERICAN): >60 ML/MIN/1.73 M^2
EST. GFR  (NON AFRICAN AMERICAN): >60 ML/MIN/1.73 M^2
GLUCOSE SERPL-MCNC: 83 MG/DL
HCT VFR BLD AUTO: 41.3 %
HDLC SERPL-MCNC: 69 MG/DL
HDLC SERPL: 27.1 %
HGB BLD-MCNC: 13.2 G/DL
LDLC SERPL CALC-MCNC: 171 MG/DL
LYMPHOCYTES # BLD AUTO: 1.1 K/UL
LYMPHOCYTES NFR BLD: 18.3 %
MCH RBC QN AUTO: 28.4 PG
MCHC RBC AUTO-ENTMCNC: 32 G/DL
MCV RBC AUTO: 89 FL
MONOCYTES # BLD AUTO: 0.4 K/UL
MONOCYTES NFR BLD: 7.2 %
NEUTROPHILS # BLD AUTO: 4.5 K/UL
NEUTROPHILS NFR BLD: 72.9 %
NONHDLC SERPL-MCNC: 186 MG/DL
PLATELET # BLD AUTO: 172 K/UL
PMV BLD AUTO: 12.4 FL
POTASSIUM SERPL-SCNC: 3.5 MMOL/L
PROT SERPL-MCNC: 7.7 G/DL
RBC # BLD AUTO: 4.64 M/UL
SODIUM SERPL-SCNC: 140 MMOL/L
TRIGL SERPL-MCNC: 75 MG/DL
WBC # BLD AUTO: 6.13 K/UL

## 2018-02-01 PROCEDURE — 80061 LIPID PANEL: CPT

## 2018-02-01 PROCEDURE — 85025 COMPLETE CBC W/AUTO DIFF WBC: CPT | Mod: PO

## 2018-02-01 PROCEDURE — 96372 THER/PROPH/DIAG INJ SC/IM: CPT | Mod: S$GLB,,, | Performed by: INTERNAL MEDICINE

## 2018-02-01 PROCEDURE — 36415 COLL VENOUS BLD VENIPUNCTURE: CPT | Mod: PO

## 2018-02-01 PROCEDURE — 99999 PR PBB SHADOW E&M-EST. PATIENT-LVL V: CPT | Mod: PBBFAC,,, | Performed by: INTERNAL MEDICINE

## 2018-02-01 PROCEDURE — 82550 ASSAY OF CK (CPK): CPT

## 2018-02-01 PROCEDURE — 80053 COMPREHEN METABOLIC PANEL: CPT

## 2018-02-01 PROCEDURE — 3008F BODY MASS INDEX DOCD: CPT | Mod: S$GLB,,, | Performed by: INTERNAL MEDICINE

## 2018-02-01 PROCEDURE — 99214 OFFICE O/P EST MOD 30 MIN: CPT | Mod: 25,S$GLB,, | Performed by: INTERNAL MEDICINE

## 2018-02-01 RX ORDER — VALSARTAN 160 MG/1
160 TABLET ORAL DAILY
Qty: 90 TABLET | Refills: 3 | Status: SHIPPED | OUTPATIENT
Start: 2018-02-01 | End: 2018-07-20

## 2018-02-01 RX ORDER — FLUCONAZOLE 150 MG/1
150 TABLET ORAL DAILY
Qty: 1 TABLET | Refills: 1 | Status: SHIPPED | OUTPATIENT
Start: 2018-02-01 | End: 2018-02-02

## 2018-02-01 RX ORDER — AMOXICILLIN AND CLAVULANATE POTASSIUM 875; 125 MG/1; MG/1
1 TABLET, FILM COATED ORAL 2 TIMES DAILY
Qty: 20 TABLET | Refills: 0 | Status: SHIPPED | OUTPATIENT
Start: 2018-02-01 | End: 2018-02-11

## 2018-02-01 RX ORDER — CEFTRIAXONE 1 G/1
1 INJECTION, POWDER, FOR SOLUTION INTRAMUSCULAR; INTRAVENOUS
Status: COMPLETED | OUTPATIENT
Start: 2018-02-01 | End: 2018-02-01

## 2018-02-01 RX ADMIN — CEFTRIAXONE 1 G: 1 INJECTION, POWDER, FOR SOLUTION INTRAMUSCULAR; INTRAVENOUS at 12:02

## 2018-02-02 ENCOUNTER — PATIENT MESSAGE (OUTPATIENT)
Dept: INTERNAL MEDICINE | Facility: CLINIC | Age: 58
End: 2018-02-02

## 2018-02-02 ENCOUNTER — HOSPITAL ENCOUNTER (OUTPATIENT)
Dept: CARDIOLOGY | Facility: CLINIC | Age: 58
Discharge: HOME | End: 2018-02-02
Attending: INTERNAL MEDICINE
Payer: MEDICARE

## 2018-02-02 ENCOUNTER — CLINICAL SUPPORT (OUTPATIENT)
Dept: CARDIOLOGY | Facility: CLINIC | Age: 58
End: 2018-02-02
Attending: INTERNAL MEDICINE
Payer: MEDICARE

## 2018-02-02 DIAGNOSIS — R60.9 EDEMA, UNSPECIFIED TYPE: ICD-10-CM

## 2018-02-02 DIAGNOSIS — R06.00 DYSPNEA, UNSPECIFIED TYPE: ICD-10-CM

## 2018-02-02 LAB
DIASTOLIC DYSFUNCTION: NO
ESTIMATED PA SYSTOLIC PRESSURE: 25.66
RETIRED EF AND QEF - SEE NOTES: 55 (ref 55–65)
TRICUSPID VALVE REGURGITATION: NORMAL

## 2018-02-02 PROCEDURE — 93306 TTE W/DOPPLER COMPLETE: CPT | Mod: S$GLB,,, | Performed by: INTERNAL MEDICINE

## 2018-02-02 PROCEDURE — 93970 EXTREMITY STUDY: CPT | Mod: S$GLB,,, | Performed by: INTERNAL MEDICINE

## 2018-02-07 NOTE — PROGRESS NOTES
Subjective:       Patient ID: Naga Benavidez is a 57 y.o. female.    Chief Complaint: Follow-up    HPIPt went to ED for dyspnea - she still feels like she cannot take a good deep breath.  Has small pericardial effusion on echo.  Review of Systems   Constitutional: Negative for fever.   HENT: Negative for ear pain, rhinorrhea and sore throat.    Respiratory: Positive for shortness of breath. Negative for wheezing.    Cardiovascular: Positive for leg swelling. Negative for chest pain.   Gastrointestinal: Negative for abdominal pain and vomiting.   Musculoskeletal: Negative for neck pain.   Skin: Negative for rash.   Neurological: Negative for headaches.       Objective:      Physical Exam   Constitutional: She is oriented to person, place, and time. She appears well-developed and well-nourished. No distress.   HENT:   Head: Normocephalic.   Mouth/Throat: Oropharynx is clear and moist.   Neck: Neck supple. No JVD present. No thyromegaly present.   Cardiovascular: Normal rate, regular rhythm, normal heart sounds and intact distal pulses.  Exam reveals no gallop and no friction rub.    No murmur heard.  Pulmonary/Chest: Effort normal and breath sounds normal. She has no wheezes. She has no rales.   Abdominal: Soft. Bowel sounds are normal. She exhibits no distension and no mass. There is no tenderness. There is no rebound and no guarding.   Musculoskeletal: She exhibits no edema.   Lymphadenopathy:     She has no cervical adenopathy.   Neurological: She is alert and oriented to person, place, and time. She has normal reflexes.   Skin: Skin is warm and dry.   L leg swollen with some erythema and warmth - no open wounds   Psychiatric: She has a normal mood and affect. Her behavior is normal. Judgment and thought content normal.       Assessment:       1. Essential hypertension    2. Dyspnea, unspecified type    3. Bilateral hearing loss, unspecified hearing loss type    4. Mixed stress and urge urinary incontinence     5. Hyperlipidemia, unspecified hyperlipidemia type    6. Urinary frequency    7. Edema, unspecified type        Plan:   Essential hypertension  -     Urinalysis; Future; Expected date: 02/01/2018  -     Microalbumin/creatinine urine ratio; Future; Expected date: 02/01/2018  -     CBC auto differential; Future; Expected date: 02/01/2018  -     Comprehensive metabolic panel; Future; Expected date: 02/01/2018  Uncontrolled - stop losartan and switch to valsartan  Dyspnea, unspecified type  -     CT Chest With Contrast; Future; Expected date: 02/01/2018  -     2D echo with color flow doppler; Future    Bilateral hearing loss, unspecified hearing loss type  Per ENT  Mixed stress and urge urinary incontinence  Per Urology  Hyperlipidemia, unspecified hyperlipidemia type  -     Lipid panel; Future; Expected date: 02/01/2018  -     CK; Future; Expected date: 02/01/2018    Urinary frequency  -     Urine culture; Future; Expected date: 02/01/2018    Edema, unspecified type  -     CAR Ultrasound doppler venous legs bilat; Future  -     VAS US Venous Legs Bilateral; Future  -     US Lower Extremity Veins Bilateral; Future; Expected date: 02/01/2018  R/o DVT  Other orders  -     valsartan (DIOVAN) 160 MG tablet; Take 1 tablet (160 mg total) by mouth once daily.  Dispense: 90 tablet; Refill: 3  Cellulitis  -     cefTRIAXone injection 1 g; Inject 1 g into the muscle one time.  -     amoxicillin-clavulanate 875-125mg (AUGMENTIN) 875-125 mg per tablet; Take 1 tablet by mouth 2 (two) times daily.  Dispense: 20 tablet; Refill: 0  -     fluconazole (DIFLUCAN) 150 MG Tab; Take 1 tablet (150 mg total) by mouth once daily.  Dispense: 1 tablet; Refill: 1

## 2018-02-28 ENCOUNTER — TELEPHONE (OUTPATIENT)
Dept: INTERNAL MEDICINE | Facility: CLINIC | Age: 58
End: 2018-02-28

## 2018-02-28 NOTE — TELEPHONE ENCOUNTER
Spoke with patient about message, informed her it should be ok to take her pain medication, but I would send message.

## 2018-02-28 NOTE — TELEPHONE ENCOUNTER
----- Message from Cailin Paul sent at 2/28/2018  9:35 AM CST -----  Contact: Patient [phone]  Patient is having pain in her knees and wants to know if it is ok to take Rx oxycodone with her new BP medication.    Please call and advise.    Thank You

## 2018-03-08 ENCOUNTER — TELEPHONE (OUTPATIENT)
Dept: SPINE | Facility: CLINIC | Age: 58
End: 2018-03-08

## 2018-03-08 NOTE — TELEPHONE ENCOUNTER
Reschedule patient appointment on 3/19 at 330pm.  ----- Message from Laura Benavidez sent at 3/8/2018 12:06 PM CST -----  x_  1st Request  _  2nd Request  _  3rd Request        Who: indra    Why: pt. Would like to ford appt. On 03/15. Pt. Would like to come following week. Pt. Was advised next appt. Is 05/18/18. Pt. Stating she need to be seen sooner than date. Please call to discuss    What Number to Call Back:[phone]    When to Expect a call back: (Before the end of the day)   -- if the call is after 12:00, the call back will be tomorrow.

## 2018-03-13 ENCOUNTER — TELEPHONE (OUTPATIENT)
Dept: INTERNAL MEDICINE | Facility: CLINIC | Age: 58
End: 2018-03-13

## 2018-03-14 ENCOUNTER — TELEPHONE (OUTPATIENT)
Dept: SPINE | Facility: CLINIC | Age: 58
End: 2018-03-14

## 2018-03-14 NOTE — TELEPHONE ENCOUNTER
Reschedule patient for 4/13 at 4pm  ----- Message from Marcelino Engle sent at 3/14/2018 11:47 AM CDT -----  Contact: patient  x_ 1st Request   _ 2nd Request   _ 3rd Request     Who: CATY MELENDEZ [[id number]]    Why: patient is requesting a call back in reference to rescheduling her appointment to after her appointment with Dr Salazar on 03/21/18.  Next available appointment was 05/22/18 and patient would like to be seen prior to that.    What Number to Call Back: [phone]    When to Expect a call back: (Before the end of the day)   -- if call after 3:00 call back will be tomorrow.

## 2018-03-21 ENCOUNTER — OFFICE VISIT (OUTPATIENT)
Dept: INTERNAL MEDICINE | Facility: CLINIC | Age: 58
End: 2018-03-21
Payer: MEDICARE

## 2018-03-21 VITALS — TEMPERATURE: 99 F | SYSTOLIC BLOOD PRESSURE: 120 MMHG | DIASTOLIC BLOOD PRESSURE: 65 MMHG | HEIGHT: 60 IN

## 2018-03-21 DIAGNOSIS — E78.2 HYPERLIPIDEMIA, MIXED: Primary | ICD-10-CM

## 2018-03-21 PROCEDURE — 3074F SYST BP LT 130 MM HG: CPT | Mod: CPTII,S$GLB,, | Performed by: INTERNAL MEDICINE

## 2018-03-21 PROCEDURE — 3078F DIAST BP <80 MM HG: CPT | Mod: CPTII,S$GLB,, | Performed by: INTERNAL MEDICINE

## 2018-03-21 PROCEDURE — 99999 PR PBB SHADOW E&M-EST. PATIENT-LVL IV: CPT | Mod: PBBFAC,,, | Performed by: INTERNAL MEDICINE

## 2018-03-21 PROCEDURE — 99214 OFFICE O/P EST MOD 30 MIN: CPT | Mod: S$GLB,,, | Performed by: INTERNAL MEDICINE

## 2018-03-21 RX ORDER — AZITHROMYCIN 250 MG/1
TABLET, FILM COATED ORAL
Qty: 6 TABLET | Refills: 0 | Status: SHIPPED | OUTPATIENT
Start: 2018-03-21 | End: 2018-04-02

## 2018-03-21 RX ORDER — ROSUVASTATIN CALCIUM 5 MG/1
5 TABLET, COATED ORAL DAILY
Qty: 30 TABLET | Refills: 3 | Status: SHIPPED | OUTPATIENT
Start: 2018-03-21 | End: 2018-08-24

## 2018-03-21 RX ORDER — LEVOFLOXACIN 500 MG/1
500 TABLET, FILM COATED ORAL DAILY
Qty: 7 TABLET | Refills: 0 | Status: SHIPPED | OUTPATIENT
Start: 2018-03-21 | End: 2018-07-20

## 2018-04-02 NOTE — PROGRESS NOTES
Subjective:       Patient ID: Naga Benavidez is a 57 y.o. female.    Chief Complaint: Follow-up    HPIPt feeling better with her dyspnea.  May be depressed but she declines meds. Left leg is better - R leg more swollen today.  She is concerned she is developing an abrasion on her ankles again.    Review of Systems   Constitutional: Negative for activity change and unexpected weight change.   HENT: Positive for hearing loss. Negative for rhinorrhea and trouble swallowing.    Eyes: Negative for discharge and visual disturbance.   Respiratory: Negative for chest tightness, shortness of breath (PND or orthopnea) and wheezing.    Cardiovascular: Negative for chest pain and palpitations.   Gastrointestinal: Negative for abdominal pain, blood in stool, constipation, diarrhea, nausea and vomiting.   Endocrine: Negative for polydipsia and polyuria.   Genitourinary: Negative for difficulty urinating, dysuria, hematuria and menstrual problem.   Musculoskeletal: Positive for arthralgias and joint swelling. Negative for neck pain.   Neurological: Negative for seizures, syncope, weakness and headaches.   Psychiatric/Behavioral: Negative for confusion and dysphoric mood.       Objective:      Physical Exam   Constitutional: She is oriented to person, place, and time. She appears well-developed and well-nourished. No distress.   HENT:   Head: Normocephalic.   Mouth/Throat: Oropharynx is clear and moist.   Neck: Neck supple. No JVD present. No thyromegaly present.   Cardiovascular: Normal rate, regular rhythm, normal heart sounds and intact distal pulses.  Exam reveals no gallop and no friction rub.    No murmur heard.  Pulmonary/Chest: Effort normal and breath sounds normal. She has no wheezes. She has no rales.   Abdominal: Soft. Bowel sounds are normal. She exhibits no distension and no mass. There is no tenderness. There is no rebound and no guarding.   Musculoskeletal: She exhibits edema (1-2+R , 1+ left).   No erythema  or warmth and no abrasion or skin breakdown seen   Lymphadenopathy:     She has no cervical adenopathy.   Neurological: She is alert and oriented to person, place, and time. She has normal reflexes.   Skin: Skin is warm and dry.   Psychiatric: She has a normal mood and affect. Her behavior is normal. Judgment and thought content normal.       Assessment:       1. Hyperlipidemia, mixed        Plan:   Hyperlipidemia, mixed  -     Lipid panel; Future; Expected date: 03/21/2018  -     Comprehensive metabolic panel; Future; Expected date: 03/21/2018  -     CK; Future; Expected date: 03/21/2018    Other orders  -     rosuvastatin (CRESTOR) 5 MG tablet; Take 1 tablet (5 mg total) by mouth once daily.  Dispense: 30 tablet; Refill: 3  -     azithromycin (Z-CRISTOFER) 250 MG tablet; Two today then one daily thereafter  Dispense: 6 tablet; Refill: 0  -     levoFLOXacin (LEVAQUIN) 500 MG tablet; Take 1 tablet (500 mg total) by mouth once daily.  Dispense: 7 tablet; Refill: 0    Levaquin to cover bladder and URI (she could not give a urine today but has dysuria.    Pt to schedule leg ultrasound paulino and CT chest again due to trace pleural effusion seen on echo

## 2018-04-05 ENCOUNTER — HOSPITAL ENCOUNTER (OUTPATIENT)
Dept: RADIOLOGY | Facility: HOSPITAL | Age: 58
Discharge: HOME | End: 2018-04-05
Attending: INTERNAL MEDICINE
Payer: MEDICARE

## 2018-04-05 DIAGNOSIS — R60.9 EDEMA, UNSPECIFIED TYPE: ICD-10-CM

## 2018-04-05 DIAGNOSIS — R06.00 DYSPNEA, UNSPECIFIED TYPE: ICD-10-CM

## 2018-04-05 PROCEDURE — 71260 CT THORAX DX C+: CPT | Mod: TC

## 2018-04-05 PROCEDURE — 93970 EXTREMITY STUDY: CPT | Mod: 26,,, | Performed by: RADIOLOGY

## 2018-04-05 PROCEDURE — 93970 EXTREMITY STUDY: CPT | Mod: TC

## 2018-04-05 PROCEDURE — 25500020 PHARM REV CODE 255: Performed by: INTERNAL MEDICINE

## 2018-04-05 PROCEDURE — 71260 CT THORAX DX C+: CPT | Mod: 26,,, | Performed by: RADIOLOGY

## 2018-04-05 RX ADMIN — IOHEXOL 100 ML: 350 INJECTION, SOLUTION INTRAVENOUS at 05:04

## 2018-04-06 ENCOUNTER — TELEPHONE (OUTPATIENT)
Dept: SPINE | Facility: CLINIC | Age: 58
End: 2018-04-06

## 2018-04-06 NOTE — TELEPHONE ENCOUNTER
----- Message from Tanja Dee sent at 4/6/2018 11:46 AM CDT -----  Contact: pt            Name of Who is Calling: indra      What is the request in detail: wants to reschedule her appt. Nothing available until June. Call pt       Can the clinic reply by [facility]: no      [facility] Back [facility] [phone]

## 2018-04-09 ENCOUNTER — PATIENT MESSAGE (OUTPATIENT)
Dept: INTERNAL MEDICINE | Facility: CLINIC | Age: 58
End: 2018-04-09

## 2018-04-09 NOTE — TELEPHONE ENCOUNTER
Requesting you further explain the results of these cyst and nodules found. Patients wants to know if its something to be concerned about.     Please Advise  Thank You

## 2018-04-11 ENCOUNTER — TELEPHONE (OUTPATIENT)
Dept: SPINE | Facility: CLINIC | Age: 58
End: 2018-04-11

## 2018-04-11 NOTE — TELEPHONE ENCOUNTER
Called patient no answer left message on voicemail to return office call.  ----- Message from Keturah Pedersen sent at 4/11/2018  8:34 AM CDT -----  Name of Who is Calling: CATY MELENDEZ [[id number]]      What is the request in detail: Pt states she did not get the message in time to schedule a sooner appt on [facility]. Pt would like to know If she can get an appt for tomorrow.       Can the clinic reply by [facility]: No      What Number to Call Back if not in [facility]: [phone]

## 2018-04-12 NOTE — PROGRESS NOTES
Subjective:      Patient ID: Naga Benavidez is a 57 y.o. female.    Chief Complaint: Knee Pain    Ms Benavidez is a 56 yo female with CP here for follow up of her bilateral knee DJD.  She was last seen by me on 12/12/2017 and she had bilateral knee injections.  She has been getting regular knee injections for several years.  We have discussed the pros and cons.  She has trouble using the walker and transferring without the knee injections.  She has been considering TKA however she has not been able to be away from her mom for any period.  She has not been able to get knee replacement.  She has been watching her heels.  The pain is 9/10.  She feels like knee pain limits her activity.  She has not been able to go to PT.  She wants to do PT.  She is worried about transport.      Past Medical History:  No date: Anemia  No date: Arthritis  No date: Asthma  No date: Cerebral palsy      Comment: stable  No date: DJD (degenerative joint disease)  No date: Hyperlipidemia  No date: Hypertension  No date: Neuromuscular disorder  No date: Obstructive sleep apnea  No date: Uterine fibroids affecting pregnancy      Comment: with adnexal mass    Past Surgical History:  No date: FOOT CAPSULE RELEASE W/ PERCUTANEOUS HEEL CORD*  No date: HAMSTRING RELEASE    Review of patient's family history indicates:    Diabetes                       Mother                    Hypertension                   Mother                      Social History    Marital status: Single              Spouse name:                       Years of education:                 Number of children:               Social History Main Topics    Smoking status: Never Smoker                                                                Alcohol use: No              Sexual activity: No                       Current Outpatient Prescriptions:  acetaminophen (TYLENOL) 325 MG tablet, Take 650 mg by mouth 4 (four) times daily as needed for Pain., Disp: , Rfl:   amlodipine  (NORVASC) 2.5 MG tablet, Take 1 tablet (2.5 mg total) by mouth once daily., Disp: 90 tablet, Rfl: 3  ascorbic acid (VITAMIN C) 1000 MG tablet, Take 1,000 mg by mouth Daily. , Disp: , Rfl:   b complex vitamins capsule, Take 1 capsule by mouth every morning., Disp: , Rfl:   baclofen (LIORESAL) 10 MG tablet, , Disp: , Rfl:   biotin 10 mg Tab, Take 1 capsule by mouth once daily. , Disp: , Rfl:   calcium carbonate (OS-HERNANDEZ) 600 mg (1,500 mg) Tab, Take 600 mg by mouth once daily. , Disp: , Rfl:   co-enzyme Q-10 30 mg capsule, Take 300 mg by mouth once daily., Disp: , Rfl:   FLUZONE QUAD 2663-2578, PF, 60 mcg (15 mcg x 4)/0.5 mL vaccine, , Disp: , Rfl:   hydrochlorothiazide (HYDRODIURIL) 25 MG tablet, Take 1 tablet (25 mg total) by mouth once daily. (Patient taking differently: Take 25 mg by mouth daily as needed. ), Disp: 30 tablet, Rfl: 11  losartan (COZAAR) 100 MG tablet, TAKE ONE TABLET BY MOUTH EVERY DAY, Disp: 90 tablet, Rfl: 0  meloxicam (MOBIC) 15 MG tablet, TAKE ONE TABLET BY MOUTH DAILY WITH FOOD, Disp: 90 tablet, Rfl: 3  miconazole (MICATIN) 2 % cream, Apply topically 2 (two) times daily. (Patient taking differently: Apply topically 2 (two) times daily as needed. ), Disp: , Rfl: 0  multivitamin capsule, Take 1 capsule by mouth once daily., Disp: , Rfl:   omega-3 fatty acids-dha-epa (MEGARED PLANT-OMEGA-3) 300 mg Cap, Take 1 tablet by mouth once daily., Disp: , Rfl:   oxycodone (ROXICODONE) 5 MG immediate release tablet, Take 1 tablet (5 mg total) by mouth every 8 (eight) hours as needed for Pain., Disp: 30 tablet, Rfl: 0  diclofenac sodium (VOLTAREN) 1 % Gel, Apply topically 4 (four) times daily., Disp: 1 Tube, Rfl: 1    No current facility-administered medications for this visit.       Review of patient's allergies indicates:   -- Tramadol -- Other (See Comments)    --  She could not wake up              Review of Systems   Constitution: Negative for weight gain and weight loss.   Cardiovascular: Negative for  chest pain.   Respiratory: Positive for shortness of breath.    Musculoskeletal: Positive for joint pain and joint swelling. Negative for back pain.   Gastrointestinal: Negative for abdominal pain and bowel incontinence.   Genitourinary: Negative for bladder incontinence.   Neurological: Negative for numbness.         Objective:        General: Naga is well-developed, well-nourished, appears stated age, in no acute distress, alert and oriented to time, place and person.     General    Vitals reviewed.  Constitutional: She is oriented to person, place, and time. She appears well-developed and well-nourished.   HENT:   Head: Normocephalic and atraumatic.   Pulmonary/Chest: Effort normal.   Neurological: She is alert and oriented to person, place, and time. She displays abnormal reflex. She exhibits abnormal muscle tone.   Psychiatric: She has a normal mood and affect. Her behavior is normal. Judgment and thought content normal.     General Musculoskeletal Exam   Gait: abnormal     Right Ankle/Foot Exam     Tests   Heel Walk: unable to perform  Tiptoe Walk: unable to perform    Comments:  Ankles wrapped    Left Ankle/Foot Exam     Tests   Heel Walk: unable to perform  Tiptoe Walk: unable to perform    Right Knee Exam     Inspection   Swelling: present  Deformity: deformity    Tenderness   The patient is tender to palpation of the lateral joint line and medial joint line.    Crepitus   The patient has crepitus of the patella.    Range of Motion   Extension: 0   Flexion: 0     Tests   Meniscus   Brandy:  Medial - negative Lateral - negative  Ligament Examination Lachman: normal (-1 to 2mm)   MCL - Valgus: normal (0 to 2mm)  LCL - Varus: normalReverse Pivot Shift: normal (Equal)    Other   Sensation: normal    Comments:  Unable to flex voluntarily at all but unable to get knee to 70 degrees passively, keeps knee straight    Left Knee Exam     Inspection   Erythema: present (left calf)  Swelling: present  Deformity:  present    Tenderness   The patient tender to palpation of the lateral joint line and medial joint line.    Crepitus   The patient has crepitus of the patella.    Tests   Meniscus   Brandy:  Medial - negative Lateral - negative  Stability Lachman: normal (-1 to 2mm)   MCL - Valgus: normal (0 to 2mm)  LCL - Varus: normal (0 to 2mm)Reverse Pivot Shift: normal (Equal)    Other   Sensation: normal    Comments:  Bilateral knee deformity, bilateral spasticity  Valgus deformity  Unable to fully extend    Muscle Strength   Right Lower Extremity   Hip Abduction: 5/5   Quadriceps:  5/5   Hamstrin/5   Left Lower Extremity   Hip Abduction: 5/5   Quadriceps:  5/5   Hamstrin/5     Vascular Exam     Right Pulses        Carotid:                  2+    Left Pulses        Carotid:                  2+    Edema  Right Lower Leg: absent  Left Lower Leg: absent              Assessment:       1. Primary osteoarthritis of both knees    2. Spastic diplegic cerebral palsy    3. Gait instability    4. Arthralgia of both knees    5. Chronic pain of both knees           Plan:       Orders Placed This Encounter    Large Joint Aspiration/Injection    Ambulatory Referral to Physical/Occupational Therapy     1. Bilateral knee injections please see procedure note. A time out was performed, the correct patient, procedure, site, and position confirmed. We discussed the redness in the legs and the risk of an injection.  She would like to proceed.  She will have her sister continue to watch her leg redness  2. Baclofen 10mg to try to help with spasticity as needed, she does not take often  3. Continue mobic daily  4. Continue tylenol   5. Continue voltaren gel as needed   6. Her joint is getting harder to inject without x-ray guidance.  We might have to get her to  Pain management for injections.  She does not want to go to pain management currently.   We discussed a replacement, pain is stopping her from standing and moving more.  She is  becoming more and more dependent on wheelchair.  She has not been able to get the TKA due to her mothers care.  I encourage her to take care of herself.    7.  PT for knee ROM, strengthening quads dry needling and transfers and HEp at vets  8.  rtc 3 months, she is also planning on returning to ortho to consider knee replacement when she can, but states she cannot    Follow-up: Follow-up in about 3 months (around 7/13/2018). If there are any questions prior to this, the patient was instructed to contact the office.

## 2018-04-13 ENCOUNTER — OFFICE VISIT (OUTPATIENT)
Dept: SPINE | Facility: CLINIC | Age: 58
End: 2018-04-13
Attending: PHYSICAL MEDICINE & REHABILITATION
Payer: MEDICARE

## 2018-04-13 VITALS
HEIGHT: 60 IN | WEIGHT: 209 LBS | DIASTOLIC BLOOD PRESSURE: 84 MMHG | BODY MASS INDEX: 41.03 KG/M2 | HEART RATE: 87 BPM | SYSTOLIC BLOOD PRESSURE: 136 MMHG

## 2018-04-13 DIAGNOSIS — M25.562 ARTHRALGIA OF BOTH KNEES: ICD-10-CM

## 2018-04-13 DIAGNOSIS — M25.561 ARTHRALGIA OF BOTH KNEES: ICD-10-CM

## 2018-04-13 DIAGNOSIS — M17.0 PRIMARY OSTEOARTHRITIS OF BOTH KNEES: Primary | ICD-10-CM

## 2018-04-13 DIAGNOSIS — M25.562 CHRONIC PAIN OF BOTH KNEES: ICD-10-CM

## 2018-04-13 DIAGNOSIS — G80.1 SPASTIC DIPLEGIC CEREBRAL PALSY: ICD-10-CM

## 2018-04-13 DIAGNOSIS — M25.561 CHRONIC PAIN OF BOTH KNEES: ICD-10-CM

## 2018-04-13 DIAGNOSIS — R26.81 GAIT INSTABILITY: ICD-10-CM

## 2018-04-13 DIAGNOSIS — G89.29 CHRONIC PAIN OF BOTH KNEES: ICD-10-CM

## 2018-04-13 PROCEDURE — 3075F SYST BP GE 130 - 139MM HG: CPT | Mod: CPTII,S$GLB,, | Performed by: PHYSICAL MEDICINE & REHABILITATION

## 2018-04-13 PROCEDURE — 99499 UNLISTED E&M SERVICE: CPT | Mod: S$GLB,,, | Performed by: PHYSICAL MEDICINE & REHABILITATION

## 2018-04-13 PROCEDURE — 99999 PR PBB SHADOW E&M-EST. PATIENT-LVL III: CPT | Mod: PBBFAC,,, | Performed by: PHYSICAL MEDICINE & REHABILITATION

## 2018-04-13 PROCEDURE — 20610 DRAIN/INJ JOINT/BURSA W/O US: CPT | Mod: 50,S$GLB,, | Performed by: PHYSICAL MEDICINE & REHABILITATION

## 2018-04-13 PROCEDURE — 3079F DIAST BP 80-89 MM HG: CPT | Mod: CPTII,S$GLB,, | Performed by: PHYSICAL MEDICINE & REHABILITATION

## 2018-04-13 PROCEDURE — 99214 OFFICE O/P EST MOD 30 MIN: CPT | Mod: 25,S$GLB,, | Performed by: PHYSICAL MEDICINE & REHABILITATION

## 2018-04-13 RX ORDER — TRIAMCINOLONE ACETONIDE 40 MG/ML
80 INJECTION, SUSPENSION INTRA-ARTICULAR; INTRAMUSCULAR
Status: DISCONTINUED | OUTPATIENT
Start: 2018-04-13 | End: 2018-04-13 | Stop reason: HOSPADM

## 2018-04-13 RX ORDER — FLUCONAZOLE 150 MG/1
TABLET ORAL
COMMUNITY
Start: 2018-02-03 | End: 2018-06-04 | Stop reason: SDUPTHER

## 2018-04-13 RX ADMIN — TRIAMCINOLONE ACETONIDE 80 MG: 40 INJECTION, SUSPENSION INTRA-ARTICULAR; INTRAMUSCULAR at 01:04

## 2018-04-13 NOTE — PROCEDURES
Large Joint Aspiration/Injection  Date/Time: 4/13/2018 1:08 PM  Performed by: DIANE HOLM  Authorized by: DIANE HOLM     Consent Done?:  Yes (Verbal)  Indications:  Pain  Procedure site marked: Yes    Timeout: Prior to procedure the correct patient, procedure, and site was verified      Location:  Knee  Site:  R knee and L knee  Prep: Patient was prepped and draped in usual sterile fashion    Needle size:  22 G  Approach:  Medial  Medications:  80 mg triamcinolone acetonide 40 mg/mL  Aspirate:  Clear  Patient tolerance:  Patient tolerated the procedure well with no immediate complications

## 2018-05-16 ENCOUNTER — PATIENT MESSAGE (OUTPATIENT)
Dept: INTERNAL MEDICINE | Facility: CLINIC | Age: 58
End: 2018-05-16

## 2018-05-16 ENCOUNTER — TELEPHONE (OUTPATIENT)
Dept: INTERNAL MEDICINE | Facility: CLINIC | Age: 58
End: 2018-05-16

## 2018-05-16 NOTE — TELEPHONE ENCOUNTER
Patient states her left leg is swollen and red, also the skin on the back of her heal is open.  States she hasn't been taking the fluid pills regularly. Wants to know if you still want her to take antibiotics or just keep taking fluid pills and leg elevated.     Please Advise  Thank You

## 2018-05-16 NOTE — TELEPHONE ENCOUNTER
----- Message from Sanjuana Llamas sent at 5/16/2018 10:25 AM CDT -----  Contact: Pt  Pt would like to be called back regarding care,  Pt states she's has redness and swelling in her left leg     Pt can be reached at [phone]

## 2018-05-16 NOTE — TELEPHONE ENCOUNTER
Spoke with patient, she states her leg is swollen and red, also the back of skin on heal is open, wants to know if she should take antibiotics, or continue taking Fluid pills and elevating leg. States she also has not been taking fluid pills regularly     Please Advise  Thank You

## 2018-05-17 ENCOUNTER — TELEPHONE (OUTPATIENT)
Dept: INTERNAL MEDICINE | Facility: CLINIC | Age: 58
End: 2018-05-17

## 2018-05-17 DIAGNOSIS — S81.802A WOUND OF LEFT LOWER EXTREMITY, INITIAL ENCOUNTER: Primary | ICD-10-CM

## 2018-05-17 RX ORDER — FLUCONAZOLE 150 MG/1
150 TABLET ORAL DAILY
Qty: 1 TABLET | Refills: 0 | Status: SHIPPED | OUTPATIENT
Start: 2018-05-17 | End: 2018-05-18

## 2018-05-17 RX ORDER — AMOXICILLIN AND CLAVULANATE POTASSIUM 875; 125 MG/1; MG/1
1 TABLET, FILM COATED ORAL 2 TIMES DAILY
Qty: 20 TABLET | Refills: 0 | Status: SHIPPED | OUTPATIENT
Start: 2018-05-17 | End: 2018-05-27

## 2018-05-17 NOTE — TELEPHONE ENCOUNTER
Pt has no transportation.  No fever or chills - heel tends to open if she scrapes it and leg is slightly red and swollen - will send abx and diflucan and ask home health to come out too.

## 2018-05-21 ENCOUNTER — TELEPHONE (OUTPATIENT)
Dept: INTERNAL MEDICINE | Facility: CLINIC | Age: 58
End: 2018-05-21

## 2018-05-21 NOTE — TELEPHONE ENCOUNTER
----- Message from Chilo Sweeney sent at 5/21/2018 12:07 PM CDT -----  Contact: Jennifer [facility] [phone]  Stating patient can not have out Patient service at the same time as Home Health , stating wanting know if you wanted to cancel a Order and needs to know which one please, stating would like for you to give [facility] a call to clarify as to what you want to do, [facility] [phone] Jennifer    Please call and advise  Thank you

## 2018-05-22 ENCOUNTER — TELEPHONE (OUTPATIENT)
Dept: INTERNAL MEDICINE | Facility: CLINIC | Age: 58
End: 2018-05-22

## 2018-05-22 NOTE — TELEPHONE ENCOUNTER
Jayna CABEZAS BrittanyJessee IBARRA Staff   Caller: Rachel/[facility] [employer]/[phone] (Today,  3:45 PM)             Type: Returning a call     Who left a message? Viviana     When did the practice call? 05/22/18     Comments:  Please advise , Thanks    Fax number [phone]

## 2018-05-22 NOTE — TELEPHONE ENCOUNTER
----- Message from Fina Crump MA sent at 5/22/2018  2:33 PM CDT -----  Contact: Rachel/[facility]/[phone]      ----- Message -----  From: Jody Barraza  Sent: 5/22/2018   1:58 PM  To: Martin MONTES [facility] Staff    Rachel would like to speak with the nurse in regards to the progress notes of the pt, Rachel also stated she needs the phone notes of the pt complaining of the swelling of her legs. Rachel also stated the home health nurse would not  Able to process w/o the progress notes. Fax [phone].

## 2018-05-22 NOTE — TELEPHONE ENCOUNTER
Call and left message with Jennifer with [facility], faxed progress note and phone note requested 5-22-18 @ 3:00 pm.

## 2018-05-23 ENCOUNTER — TELEPHONE (OUTPATIENT)
Dept: INTERNAL MEDICINE | Facility: CLINIC | Age: 58
End: 2018-05-23

## 2018-05-23 NOTE — TELEPHONE ENCOUNTER
----- Message from Jayden Blackwell sent at 5/23/2018 11:07 AM CDT -----  Contact: [facility]/[place] [phone] option 1  She need clarification of the orders that she received for wound care.  It can be verbal.    Thank you

## 2018-05-24 ENCOUNTER — TELEPHONE (OUTPATIENT)
Dept: INTERNAL MEDICINE | Facility: CLINIC | Age: 58
End: 2018-05-24

## 2018-05-24 ENCOUNTER — PATIENT MESSAGE (OUTPATIENT)
Dept: INTERNAL MEDICINE | Facility: CLINIC | Age: 58
End: 2018-05-24

## 2018-05-24 NOTE — TELEPHONE ENCOUNTER
Maritza/Ms Gonzalez,   Please call , Ms Martin and let her know that Coban for leg wound ok. Please clarify what she is requesting in reference to pt's urine and genital area?Thanks

## 2018-05-24 NOTE — TELEPHONE ENCOUNTER
----- Message from Chilo Sweeney sent at 5/24/2018 12:27 PM CDT -----  Contact: H/H Ms. Martin [phone]  H/H stating would like to have another kind of dressing for patient to keep womb from coming in contact with Urine and getting wet, stating put on the patient womb hydro gel, foam dressing, then wrap, stating requesting   Rx Coban to put on patient leg    Pharmacy: [facility] - [address] [phone] (Phone) [phone] (Fax)    Please call and advise  Thank you

## 2018-05-25 ENCOUNTER — TELEPHONE (OUTPATIENT)
Dept: INTERNAL MEDICINE | Facility: CLINIC | Age: 58
End: 2018-05-25

## 2018-05-25 NOTE — TELEPHONE ENCOUNTER
Type:Home Health(orders,updates,clarifications,etc)     Home Health Agency/Nurse: [facility]/ home health     Phone number: [phone] ext [phone]     Reason for call: wound care order.     Comments:   [facility] call in regards needing a change of wound care order. Wound have healthy tissue. Also would like to get order for home health aid 2 times a week for 8 weeks. Please call and advise. Thank you!!!

## 2018-05-25 NOTE — TELEPHONE ENCOUNTER
Agree with wound dressing with mepilex and protection from urine etc and 2x/week nurse to increase to three if wound not healing.

## 2018-05-25 NOTE — TELEPHONE ENCOUNTER
- 5:50p.m.   The dressing came off that the nurse put on earlier this afternoon. Please place an order for a dressing that can stay dry and in place in spite of my incontinence. Thank you.   Naga Benavidez

## 2018-05-25 NOTE — TELEPHONE ENCOUNTER
----- Message from Viviana Roblero sent at 5/25/2018  9:24 AM CDT -----  Contact: [facility] home health care/[phone] ext [phone]  Type:Home Health(orders,updates,clarifications,etc)    Home Health Agency/Nurse: [facility]    Phone number: [phone] ext [phone]    Reason for call: wound care order.     Comments:  [facility] call in regards needing a change of wound care order. Wound have healthy tissue. Also would like to get order for home health aid 2 times a week for 8 weeks. Please call and advise. Thank you!!!

## 2018-05-25 NOTE — TELEPHONE ENCOUNTER
Grady paige home health nurse is suggesting cleaning pt  wound with wound clenser and apply wound gel neilex foam, foam border dressing or water proof island dressing and wrap it in culex , wrapping the coban from the base of the toes to the knee. The will go out  two time this week then three time next 2  then re eval. She has already gotten the ok to wrap the pt with coban.      The reason she wants the coban on her leg is because of inconstancies and get urine on her leg and the coban will protect that the neilex foam is somewhat water resistant         She needs a home health aid also     ty to call back [phone] ext [phone]

## 2018-05-25 NOTE — TELEPHONE ENCOUNTER
----- Message from Chilo Sweeney sent at 5/25/2018  8:49 AM CDT -----  Contact: Mrs. Martin, [facility] [phone]  Calling stating returning call back with Nurse, requesting a call back, stating needs a Nurse to go out to see patient today, stating it's Urgent to call back ASAP.  Please call and advise  Thank you

## 2018-06-01 ENCOUNTER — TELEPHONE (OUTPATIENT)
Dept: INTERNAL MEDICINE | Facility: CLINIC | Age: 58
End: 2018-06-01

## 2018-06-04 NOTE — TELEPHONE ENCOUNTER
----- Message from Jayna Hanson sent at 6/4/2018 11:42 AM CDT -----  Contact: [facility]   Type: Home Health (orders, updates, clarifications, etc.)    Home Health Agency/Nurse: [facility]     Phone number: [phone]    Reason for call: refill on   Yeast infection medication      Comments: please advise, Thanks

## 2018-06-05 RX ORDER — FLUCONAZOLE 150 MG/1
150 TABLET ORAL ONCE
Qty: 1 TABLET | Refills: 1 | Status: SHIPPED | OUTPATIENT
Start: 2018-06-05 | End: 2018-06-05

## 2018-06-11 ENCOUNTER — TELEPHONE (OUTPATIENT)
Dept: INTERNAL MEDICINE | Facility: CLINIC | Age: 58
End: 2018-06-11

## 2018-06-11 ENCOUNTER — TELEPHONE (OUTPATIENT)
Dept: ADMINISTRATIVE | Facility: CLINIC | Age: 58
End: 2018-06-11

## 2018-06-11 NOTE — TELEPHONE ENCOUNTER
----- Message from Rocio Longoria sent at 6/11/2018 12:20 PM CDT -----  Contact: self [phone]  Patient has an apt tomorrow for labs Lipid panel. CMP and CK her home health agency[facility], stating that they can do the lab at home but need an order for it, please fax order to [phone]. Patient would like a call back if this is ok to cancel the apt, please call her back    thanks

## 2018-06-11 NOTE — TELEPHONE ENCOUNTER
Informed patient I will cancel her lab appointment, lab orders are being faxed to her Home Health for them to do the lab there

## 2018-06-20 ENCOUNTER — TELEPHONE (OUTPATIENT)
Dept: INTERNAL MEDICINE | Facility: CLINIC | Age: 58
End: 2018-06-20

## 2018-06-20 NOTE — TELEPHONE ENCOUNTER
----- Message from Marisela Santiago sent at 6/20/2018 11:11 AM CDT -----  Contact: pt [phone]  Pt would like a call back regarding getting her wound care extended. Please advise.

## 2018-06-22 ENCOUNTER — TELEPHONE (OUTPATIENT)
Dept: INTERNAL MEDICINE | Facility: CLINIC | Age: 58
End: 2018-06-22

## 2018-06-22 ENCOUNTER — LAB VISIT (OUTPATIENT)
Dept: LAB | Facility: HOSPITAL | Age: 58
End: 2018-06-22
Attending: INTERNAL MEDICINE
Payer: MEDICARE

## 2018-06-22 DIAGNOSIS — R35.0 URINARY FREQUENCY: ICD-10-CM

## 2018-06-22 DIAGNOSIS — I10 ESSENTIAL HYPERTENSION: ICD-10-CM

## 2018-06-22 LAB
BACTERIA #/AREA URNS AUTO: NORMAL /HPF
BILIRUB UR QL STRIP: NEGATIVE
CLARITY UR REFRACT.AUTO: ABNORMAL
COLOR UR AUTO: YELLOW
CREAT UR-MCNC: 114 MG/DL
GLUCOSE UR QL STRIP: NEGATIVE
HGB UR QL STRIP: NEGATIVE
KETONES UR QL STRIP: NEGATIVE
LEUKOCYTE ESTERASE UR QL STRIP: NEGATIVE
MICROALBUMIN UR DL<=1MG/L-MCNC: 15 UG/ML
MICROALBUMIN/CREATININE RATIO: 13.2 UG/MG
MICROSCOPIC COMMENT: NORMAL
NITRITE UR QL STRIP: NEGATIVE
PH UR STRIP: 6 [PH] (ref 5–8)
PROT UR QL STRIP: NEGATIVE
RBC #/AREA URNS AUTO: 0 /HPF (ref 0–4)
SP GR UR STRIP: 1.02 (ref 1–1.03)
SQUAMOUS #/AREA URNS AUTO: 2 /HPF
URN SPEC COLLECT METH UR: ABNORMAL
UROBILINOGEN UR STRIP-ACNC: NEGATIVE EU/DL
WBC #/AREA URNS AUTO: 1 /HPF (ref 0–5)

## 2018-06-22 PROCEDURE — 82043 UR ALBUMIN QUANTITATIVE: CPT

## 2018-06-22 PROCEDURE — 81001 URINALYSIS AUTO W/SCOPE: CPT

## 2018-06-22 PROCEDURE — 87086 URINE CULTURE/COLONY COUNT: CPT

## 2018-06-22 NOTE — TELEPHONE ENCOUNTER
----- Message from Josue Looney sent at 6/21/2018 10:46 AM CDT -----  Contact: self [phone]  Caller is requesting a sooner appointment. Caller declined first available appointment listed below. Caller will not accept being placed on the wait list and is requesting a message be sent to the provider.    When is the next available appointment:  09/21  Did you offer to schedule the next available appt and put the patient on the wait list?:     What visit type: f/u  Symptoms:    Patient preference of timeframe to be scheduled:  Anytime   What is the reason the patient is requesting a sooner appointment? (insurance terminating, changing jobs):    Would you prefer an answer via [email or web address]?:  no  Comments:  Pt unable to keep appt for today and request to rescheduled any Friday in July Only!

## 2018-06-23 LAB
BACTERIA UR CULT: NORMAL
BACTERIA UR CULT: NORMAL

## 2018-06-27 ENCOUNTER — TELEPHONE (OUTPATIENT)
Dept: INTERNAL MEDICINE | Facility: CLINIC | Age: 58
End: 2018-06-27

## 2018-06-27 NOTE — TELEPHONE ENCOUNTER
----- Message from Violeta Dyer sent at 6/27/2018  8:44 AM CDT -----  Contact: Self/[phone]  [facility] faxed blood work on last week and also faxed on Monday. The patient would like to know if information was received. Please advise.

## 2018-06-27 NOTE — TELEPHONE ENCOUNTER
Spoke with patient and left voice mail with correct fax number so she can fax her blood work to us Fax: [phone]

## 2018-07-02 ENCOUNTER — TELEPHONE (OUTPATIENT)
Dept: INTERNAL MEDICINE | Facility: CLINIC | Age: 58
End: 2018-07-02

## 2018-07-02 NOTE — TELEPHONE ENCOUNTER
----- Message from Kenia Bah sent at 7/2/2018  9:37 AM CDT -----  Contact: call lele with [facility]  [phone]  She is treating patient for her wound care, and she states that it is healing but it does have some overgrowth, would you like to order something for the overgrowth,  Fax orders to them at [phone]

## 2018-07-05 ENCOUNTER — TELEPHONE (OUTPATIENT)
Dept: INTERNAL MEDICINE | Facility: CLINIC | Age: 58
End: 2018-07-05

## 2018-07-05 NOTE — TELEPHONE ENCOUNTER
Spoke with Gilda with [facility], informed her we did receive the forms and we are waiting for the Doctor to fill them out

## 2018-07-05 NOTE — TELEPHONE ENCOUNTER
----- Message from Marisela Santiago sent at 7/5/2018  9:16 AM CDT -----  Contact: Gilda from [facility] [phone]  [facility] faxed over some paperwork on June 26, 2018 and on July 2, 2018 that needs to be signed by the doctor and they were just calling to get a status on it being completed and returned to them. Please advise.

## 2018-07-19 ENCOUNTER — PATIENT MESSAGE (OUTPATIENT)
Dept: INTERNAL MEDICINE | Facility: CLINIC | Age: 58
End: 2018-07-19

## 2018-07-19 ENCOUNTER — TELEPHONE (OUTPATIENT)
Dept: INTERNAL MEDICINE | Facility: CLINIC | Age: 58
End: 2018-07-19

## 2018-07-19 NOTE — TELEPHONE ENCOUNTER
----- Message from Jayna Hanson sent at 7/19/2018 12:04 PM CDT -----  Contact: self [phone]  Patient is calling to follow up on message she left     Did your office receive the fax from [facility] with my blood work information?  There number is [phone]. My nurse is Duarte. Coordinator is Amelia Ext. [phone]. Thank you.   Naga Benavidez

## 2018-07-20 ENCOUNTER — OFFICE VISIT (OUTPATIENT)
Dept: INTERNAL MEDICINE | Facility: CLINIC | Age: 58
End: 2018-07-20
Payer: MEDICARE

## 2018-07-20 VITALS — SYSTOLIC BLOOD PRESSURE: 112 MMHG | DIASTOLIC BLOOD PRESSURE: 82 MMHG | HEART RATE: 87 BPM | HEIGHT: 60 IN

## 2018-07-20 DIAGNOSIS — I10 HYPERTENSION, UNSPECIFIED TYPE: Primary | ICD-10-CM

## 2018-07-20 DIAGNOSIS — S81.802D WOUND OF LEFT LOWER EXTREMITY, SUBSEQUENT ENCOUNTER: ICD-10-CM

## 2018-07-20 PROCEDURE — 99999 PR PBB SHADOW E&M-EST. PATIENT-LVL III: CPT | Mod: PBBFAC,,, | Performed by: INTERNAL MEDICINE

## 2018-07-20 PROCEDURE — 99213 OFFICE O/P EST LOW 20 MIN: CPT | Mod: S$GLB,,, | Performed by: INTERNAL MEDICINE

## 2018-07-20 PROCEDURE — 3079F DIAST BP 80-89 MM HG: CPT | Mod: CPTII,S$GLB,, | Performed by: INTERNAL MEDICINE

## 2018-07-20 PROCEDURE — 3074F SYST BP LT 130 MM HG: CPT | Mod: CPTII,S$GLB,, | Performed by: INTERNAL MEDICINE

## 2018-07-20 RX ORDER — IRBESARTAN 150 MG/1
150 TABLET ORAL NIGHTLY
Qty: 90 TABLET | Refills: 3 | Status: SHIPPED | OUTPATIENT
Start: 2018-07-20 | End: 2018-08-24

## 2018-07-20 RX ORDER — BACLOFEN 10 MG/1
TABLET ORAL
Qty: 90 TABLET | Refills: 1 | Status: SHIPPED | OUTPATIENT
Start: 2018-07-20 | End: 2019-08-09 | Stop reason: SDUPTHER

## 2018-07-20 RX ORDER — FLUOXETINE HYDROCHLORIDE 20 MG/1
20 CAPSULE ORAL DAILY
Qty: 30 CAPSULE | Refills: 6 | Status: SHIPPED | OUTPATIENT
Start: 2018-07-20 | End: 2018-08-24

## 2018-07-20 RX ORDER — ECONAZOLE NITRATE 10 MG/G
CREAM TOPICAL 2 TIMES DAILY
Qty: 85 G | Refills: 1 | Status: SHIPPED | OUTPATIENT
Start: 2018-07-20 | End: 2021-12-13

## 2018-07-23 ENCOUNTER — TELEPHONE (OUTPATIENT)
Dept: INTERNAL MEDICINE | Facility: CLINIC | Age: 58
End: 2018-07-23

## 2018-07-23 ENCOUNTER — PATIENT MESSAGE (OUTPATIENT)
Dept: INTERNAL MEDICINE | Facility: CLINIC | Age: 58
End: 2018-07-23

## 2018-07-23 NOTE — TELEPHONE ENCOUNTER
Left message for patient informing the orders for Home Health and Medi honey were faxed to [facility]     Please Advise  Thank You

## 2018-07-23 NOTE — TELEPHONE ENCOUNTER
----- Message from Jayden Blackwell sent at 7/23/2018  1:28 PM CDT -----  Contact: Patient [phone]  The patient said that [facility] need orders for the medication, medi-honey, to put on her wound.    She said she need orders for a home health aide. Fax orders to them at [phone]    Thank you

## 2018-07-24 RX ORDER — AMLODIPINE BESYLATE 2.5 MG/1
TABLET ORAL
Qty: 90 TABLET | Refills: 3 | Status: SHIPPED | OUTPATIENT
Start: 2018-07-24 | End: 2019-05-15 | Stop reason: SDUPTHER

## 2018-07-29 NOTE — PROGRESS NOTES
Subjective:       Patient ID: Naga Benavidez is a 58 y.o. female.    Chief Complaint: Follow-up    HPIPt is feeling okay but has open wound on her leg.  No fever or chills.  No CP or SOB.    Review of Systems   Constitutional: Positive for activity change. Negative for unexpected weight change.   HENT: Positive for hearing loss. Negative for rhinorrhea and trouble swallowing.    Eyes: Negative for discharge and visual disturbance.   Respiratory: Negative for chest tightness and wheezing.    Cardiovascular: Negative for chest pain and palpitations.   Gastrointestinal: Negative for blood in stool, constipation, diarrhea and vomiting.   Endocrine: Negative for polydipsia and polyuria.   Genitourinary: Negative for difficulty urinating, dysuria, hematuria and menstrual problem.   Musculoskeletal: Positive for arthralgias and joint swelling. Negative for neck pain.   Neurological: Negative for headaches.   Psychiatric/Behavioral: Negative for confusion and dysphoric mood.       Objective:      Physical Exam   Constitutional: She is oriented to person, place, and time. She appears well-developed and well-nourished. No distress.   HENT:   Head: Normocephalic.   Mouth/Throat: Oropharynx is clear and moist.   Neck: Neck supple. No JVD present. No thyromegaly present.   Cardiovascular: Normal rate, regular rhythm, normal heart sounds and intact distal pulses.  Exam reveals no gallop and no friction rub.    No murmur heard.  Pulmonary/Chest: Effort normal and breath sounds normal. She has no wheezes. She has no rales.   Abdominal: Soft. Bowel sounds are normal. She exhibits no distension and no mass. There is no tenderness. There is no rebound and no guarding.   Musculoskeletal: She exhibits no edema.   Lymphadenopathy:     She has no cervical adenopathy.   Neurological: She is alert and oriented to person, place, and time. She has normal reflexes.   Skin: Skin is warm and dry.   Psychiatric: She has a normal mood and  affect. Her behavior is normal. Judgment and thought content normal.       Assessment:       No diagnosis found.    Plan:   HTN  Controlled - continue current meds    Leg wound  Pt cannot come in to see Kady Rivera due to her mother being sick.  Will write for same wound care she prescribed last time.      No evidence of infection seen    We discussed depression - she denies being suicidal or homicidal - she declines meds for depression at this time

## 2018-08-10 ENCOUNTER — NURSE TRIAGE (OUTPATIENT)
Dept: ADMINISTRATIVE | Facility: CLINIC | Age: 58
End: 2018-08-10

## 2018-08-10 NOTE — TELEPHONE ENCOUNTER
Pt with rash on hands, arms and back - she already stopped irbesartan yesterday.  She took benadryl last night advised zyrtec in the day and OTC hydrocortisone cream.  ED for worsening - denies any tightness in throat, no swelling of the lips or tongue, no SOB.  FOR BP double amlodipine to 5mg daily.  Home Health to re-eval tomorrow .  She never took the fluoxetine so only new drug is irbesartan.

## 2018-08-10 NOTE — TELEPHONE ENCOUNTER
Reason for Disposition   [1] Drug rash suspected AND [2] started taking new medicine within last 2 weeks(Exception: antihistamine, eye drops, ear drops, decongestant or other OTC cough/cold medicines)   All other patients with widespread rash taking a new medication    Protocols used: ST RASH OR REDNESS - WIDESPREAD-A-AH, ST RASH - WIDESPREAD ON DRUGS - DRUG [id number]    Patient is having reaction to ibesartan and states it is making her tired and she has a rash on her lower back, arms and hands and it itches. Benadryl helps but she has stopped taking the ibesartan and wants to speak with pcp because she is unable to travel without special accommodations, patient asked to speak with Dr. Salazar because she cannot follow the care disposition. Patient informed that Dr. Salazar's office will be contacted via message. Patient verbalized understanding and will have her home health nurse look at it.

## 2018-08-11 DIAGNOSIS — M25.561 CHRONIC PAIN OF BOTH KNEES: ICD-10-CM

## 2018-08-11 DIAGNOSIS — G89.29 CHRONIC PAIN OF BOTH KNEES: ICD-10-CM

## 2018-08-11 DIAGNOSIS — M25.562 CHRONIC PAIN OF BOTH KNEES: ICD-10-CM

## 2018-08-13 RX ORDER — MELOXICAM 15 MG/1
TABLET ORAL
Qty: 90 TABLET | Refills: 3 | Status: SHIPPED | OUTPATIENT
Start: 2018-08-13 | End: 2018-10-24 | Stop reason: SDUPTHER

## 2018-08-20 ENCOUNTER — TELEPHONE (OUTPATIENT)
Dept: INTERNAL MEDICINE | Facility: CLINIC | Age: 58
End: 2018-08-20

## 2018-08-20 NOTE — TELEPHONE ENCOUNTER
----- Message from Viviana Roblero sent at 8/20/2018  9:56 AM CDT -----  Contact: Aliyah/[facility]/[phone]  Type:Home Health(orders,updates,clarifications,etc)    Home Health Agency/Nurse: Aliyah/[facility]    Phone number: [phone]    Reason for call:    Comments: Limit range motion for both lower leg, and patient is wheelchair van. Patient is unable to f/u with the compression leg. Aliyah just want to make sure that the doctor know about it. Thank you!!!

## 2018-08-24 ENCOUNTER — OFFICE VISIT (OUTPATIENT)
Dept: INTERNAL MEDICINE | Facility: CLINIC | Age: 58
End: 2018-08-24
Payer: MEDICARE

## 2018-08-24 VITALS
BODY MASS INDEX: 40.82 KG/M2 | DIASTOLIC BLOOD PRESSURE: 82 MMHG | OXYGEN SATURATION: 96 % | HEIGHT: 60 IN | HEART RATE: 88 BPM | SYSTOLIC BLOOD PRESSURE: 138 MMHG

## 2018-08-24 DIAGNOSIS — R91.1 PULMONARY NODULE: ICD-10-CM

## 2018-08-24 DIAGNOSIS — I10 HYPERTENSION, UNSPECIFIED TYPE: Primary | ICD-10-CM

## 2018-08-24 DIAGNOSIS — E78.5 HYPERLIPIDEMIA, UNSPECIFIED HYPERLIPIDEMIA TYPE: ICD-10-CM

## 2018-08-24 DIAGNOSIS — R73.9 HYPERGLYCEMIA: ICD-10-CM

## 2018-08-24 DIAGNOSIS — D64.9 ANEMIA, UNSPECIFIED TYPE: ICD-10-CM

## 2018-08-24 DIAGNOSIS — R21 RASH: ICD-10-CM

## 2018-08-24 DIAGNOSIS — E55.9 MILD VITAMIN D DEFICIENCY: ICD-10-CM

## 2018-08-24 DIAGNOSIS — S81.802D WOUND OF LEFT LOWER EXTREMITY, SUBSEQUENT ENCOUNTER: ICD-10-CM

## 2018-08-24 PROCEDURE — 99999 PR PBB SHADOW E&M-EST. PATIENT-LVL IV: CPT | Mod: PBBFAC,,, | Performed by: INTERNAL MEDICINE

## 2018-08-24 PROCEDURE — 3008F BODY MASS INDEX DOCD: CPT | Mod: CPTII,S$GLB,, | Performed by: INTERNAL MEDICINE

## 2018-08-24 PROCEDURE — 3075F SYST BP GE 130 - 139MM HG: CPT | Mod: CPTII,S$GLB,, | Performed by: INTERNAL MEDICINE

## 2018-08-24 PROCEDURE — 3079F DIAST BP 80-89 MM HG: CPT | Mod: CPTII,S$GLB,, | Performed by: INTERNAL MEDICINE

## 2018-08-24 PROCEDURE — 99213 OFFICE O/P EST LOW 20 MIN: CPT | Mod: S$GLB,,, | Performed by: INTERNAL MEDICINE

## 2018-08-24 RX ORDER — TRIAMCINOLONE ACETONIDE 1 MG/G
CREAM TOPICAL 2 TIMES DAILY
Qty: 80 G | Refills: 0 | Status: SHIPPED | OUTPATIENT
Start: 2018-08-24 | End: 2018-11-07 | Stop reason: SDUPTHER

## 2018-08-25 NOTE — PROGRESS NOTES
Subjective:       Patient ID: Naga Benavidez is a 58 y.o. female.    Chief Complaint: Follow-up    HPIPt feeling well.  Still getting wound care.  No fever or chills.  Denies CP or SOB.  Had rash on arms still present somewhat itchy no rash elsewhere.  Only taking amlodipine for BP and pressures are good.   Review of Systems   Constitutional: Negative for activity change and unexpected weight change.   HENT: Negative for hearing loss, rhinorrhea and trouble swallowing.    Eyes: Negative for discharge and visual disturbance.   Respiratory: Negative for chest tightness, shortness of breath (PND or orthopnea) and wheezing.    Cardiovascular: Negative for chest pain and palpitations.   Gastrointestinal: Negative for abdominal pain, blood in stool, constipation, diarrhea, nausea and vomiting.   Endocrine: Negative for polydipsia and polyuria.   Genitourinary: Negative for difficulty urinating, dysuria, hematuria and menstrual problem.   Musculoskeletal: Positive for arthralgias. Negative for joint swelling and neck pain.   Neurological: Negative for seizures, syncope, weakness and headaches.   Psychiatric/Behavioral: Negative for confusion and dysphoric mood.       Objective:      Physical Exam   Constitutional: She is oriented to person, place, and time. She appears well-developed and well-nourished. No distress.   HENT:   Head: Normocephalic.   Mouth/Throat: Oropharynx is clear and moist.   Neck: Neck supple. No JVD present. No thyromegaly present.   Cardiovascular: Normal rate, regular rhythm, normal heart sounds and intact distal pulses. Exam reveals no gallop and no friction rub.   No murmur heard.  Pulmonary/Chest: Effort normal and breath sounds normal. She has no wheezes. She has no rales.   Abdominal: Soft. Bowel sounds are normal. She exhibits no distension and no mass. There is no tenderness. There is no rebound and no guarding.   Musculoskeletal: She exhibits no edema.   Lymphadenopathy:     She has no  cervical adenopathy.   Neurological: She is alert and oriented to person, place, and time. She has normal reflexes.   Skin: Skin is warm and dry.   R ankle healed, L ankle 3.5x 2 wound with some keloid formation and hypergranulation tissue, no erythema, no odor, no weeping. Area was cleaned with sterile saline and dressed with medihoney, telfa, kerlix and ace wrap.     Psychiatric: She has a normal mood and affect. Her behavior is normal. Judgment and thought content normal.       Assessment:       1. Hypertension, unspecified type    2. Hyperlipidemia, unspecified hyperlipidemia type    3. Anemia, unspecified type    4. Mild vitamin D deficiency    5. Hyperglycemia    6. Rash    7. Wound of left lower extremity, subsequent encounter    8. Pulmonary nodule        Plan:   Hypertension, unspecified type  -     CBC auto differential; Future; Expected date: 08/24/2018  -     Comprehensive metabolic panel; Future; Expected date: 08/24/2018  -     TSH; Future; Expected date: 08/24/2018  Controlled - continue current meds    Hyperlipidemia, unspecified hyperlipidemia type  -     Lipid panel; Future; Expected date: 08/24/2018    Anemia, unspecified type  Has been controlled  Mild vitamin D deficiency  -     Vitamin D; Future; Expected date: 08/24/2018    Hyperglycemia  -     Hemoglobin A1c; Future; Expected date: 08/24/2018    Rash  -     Ambulatory consult to Dermatology  Unclear will try tac for eczematous type rash  Wound of left lower extremity, subsequent encounter  -     Sedimentation rate; Future; Expected date: 08/24/2018  -     C-reactive protein; Future; Expected date: 08/24/2018  Sent picture to [facility] wound care and she recommended medihoney and mepilex  Pulmonary nodule  -     CT Chest Without Contrast; Future; Expected date: 08/24/2018    Other orders  -     triamcinolone acetonide 0.1% (KENALOG) 0.1 % cream; Apply topically 2 (two) times daily. for 10 days  Dispense: 80 g; Refill: 0    Note sent to  home health [facility] to increase wound care to three times weekly with medihoney and mepilex.

## 2018-08-30 ENCOUNTER — TELEPHONE (OUTPATIENT)
Dept: INTERNAL MEDICINE | Facility: CLINIC | Age: 58
End: 2018-08-30

## 2018-08-30 NOTE — TELEPHONE ENCOUNTER
----- Message from Viviana Roblero sent at 8/30/2018  9:35 AM CDT -----  Contact: self/[phone]  Patient called in regards needing to talk with Dr Salazar about the order for transport chair. Please add to the order that needs to be light weight wheelchair.   Please call and advise. Thank you!!!

## 2018-09-12 ENCOUNTER — TELEPHONE (OUTPATIENT)
Dept: INTERNAL MEDICINE | Facility: CLINIC | Age: 58
End: 2018-09-12

## 2018-09-12 DIAGNOSIS — G80.9 CEREBRAL PALSY, UNSPECIFIED TYPE: Primary | ICD-10-CM

## 2018-09-12 NOTE — TELEPHONE ENCOUNTER
----- Message from Viviana Roblero sent at 9/12/2018 10:45 AM CDT -----  Contact: Sary/[facility]/[phone]/fax [phone]/   Sary called in regards patient is requesting orders for a light weight wheelchair. Sary stated that patient refuse the other order because that is not what she is needing. Please call and advise. Thank you!!

## 2018-09-18 ENCOUNTER — TELEPHONE (OUTPATIENT)
Dept: ADMINISTRATIVE | Facility: CLINIC | Age: 58
End: 2018-09-18

## 2018-09-24 ENCOUNTER — PATIENT MESSAGE (OUTPATIENT)
Dept: INTERNAL MEDICINE | Facility: CLINIC | Age: 58
End: 2018-09-24

## 2018-09-26 ENCOUNTER — TELEPHONE (OUTPATIENT)
Dept: INTERNAL MEDICINE | Facility: CLINIC | Age: 58
End: 2018-09-26

## 2018-09-26 NOTE — TELEPHONE ENCOUNTER
Patient is requesting am order for a new hospital bed, one that lowers close to the floor. Patient states she has bed bugs and has been sleeping in the chair this past week. She says her legs are getting weaker from sleeping in the chair and would like to get the new bed as soon as possible      Please Advise  Thank You

## 2018-09-27 ENCOUNTER — TELEPHONE (OUTPATIENT)
Dept: INTERNAL MEDICINE | Facility: CLINIC | Age: 58
End: 2018-09-27

## 2018-09-27 DIAGNOSIS — G80.9 CEREBRAL PALSY, UNSPECIFIED TYPE: Primary | ICD-10-CM

## 2018-09-27 NOTE — TELEPHONE ENCOUNTER
Informed patient that I faxed her order for a new hospital bed to [facility]. Patient will call to check the status

## 2018-09-27 NOTE — TELEPHONE ENCOUNTER
----- Message from Anastacio Kolb sent at 9/27/2018 10:18 AM CDT -----  Contact: self/[phone]  Pt is calling again to see if  has put in orders for her hospital bed yet. States that this is urgent and needs to be done as soon as possible. Please advise.      Thanks

## 2018-09-27 NOTE — TELEPHONE ENCOUNTER
----- Message from Marisela Santiago sent at 9/27/2018  4:45 PM CDT -----  [phone]  Request has been approved for [facility] bed.     #[id number]

## 2018-10-03 ENCOUNTER — TELEPHONE (OUTPATIENT)
Dept: INTERNAL MEDICINE | Facility: CLINIC | Age: 58
End: 2018-10-03

## 2018-10-03 NOTE — TELEPHONE ENCOUNTER
Patients sates the bed that was delivered to her does not lower enough for her to get in the bed herself. She would like a different bed.. States she does not have enough strength to stand up herself      Please Advise  Thank You        I rescheduled patients appointment, she agreed to new appointment time on 11/09/18

## 2018-10-10 ENCOUNTER — TELEPHONE (OUTPATIENT)
Dept: INTERNAL MEDICINE | Facility: CLINIC | Age: 58
End: 2018-10-10

## 2018-10-10 DIAGNOSIS — M25.569 KNEE PAIN, UNSPECIFIED CHRONICITY, UNSPECIFIED LATERALITY: Primary | ICD-10-CM

## 2018-10-10 NOTE — TELEPHONE ENCOUNTER
Patient would like to know if is OK for her to get another Steroid injection to help with her strength in her knees, so she will be able to get in bed easier.     Please Advise  Thank You

## 2018-10-12 ENCOUNTER — TELEPHONE (OUTPATIENT)
Dept: SPINE | Facility: CLINIC | Age: 58
End: 2018-10-12

## 2018-10-12 ENCOUNTER — PATIENT MESSAGE (OUTPATIENT)
Dept: SPINE | Facility: CLINIC | Age: 58
End: 2018-10-12

## 2018-10-12 NOTE — TELEPHONE ENCOUNTER
Patient was contacted as per patient she stated that she would like an earlier appt for her knee injection patient was informed as of now there is no availability and to keep her appt 10/24/18 and if there is a cancellation she would be contacted. Patient verbalized understanding

## 2018-10-12 NOTE — TELEPHONE ENCOUNTER
Patient states she already has an appointment to see [facility], she says she can get the injection with them. She just want to make sure it was ok. She is going to reach out to Dr. De Jesus to see if she can get a sooner appointment. Patient would like to know if she can get additional care at home without her having to go to a nursing home.     Please Advise  Thank You

## 2018-10-12 NOTE — TELEPHONE ENCOUNTER
----- Message from Laverne Elvi sent at 10/12/2018  8:38 AM CDT -----  Contact: Pt    Name of Who is Calling:CATY MELENDEZ [[id number]]    What is the request in detail: Patient would like a call back and patient states is in a lot of pain .Patient states she could not get her injection due to wound care , primary doctor cleared patient to have injection since wound is almost healed Please contact to further discuss and advise    Can the clinic reply by [facility]: No    [facility]: [phone]

## 2018-10-15 ENCOUNTER — TELEPHONE (OUTPATIENT)
Dept: INTERNAL MEDICINE | Facility: CLINIC | Age: 58
End: 2018-10-15

## 2018-10-15 NOTE — TELEPHONE ENCOUNTER
Home health Nurse wanted to notify you of patient wound on leg, she doesn't think it needs wound care, but wanted to see what the Doctor wanted to do. And when to discharge patient. States orders can be faxed to [phone]      Please Advise  Thank You

## 2018-10-17 ENCOUNTER — TELEPHONE (OUTPATIENT)
Dept: INTERNAL MEDICINE | Facility: CLINIC | Age: 58
End: 2018-10-17

## 2018-10-17 DIAGNOSIS — G80.9 CEREBRAL PALSY, UNSPECIFIED TYPE: Primary | ICD-10-CM

## 2018-10-17 NOTE — TELEPHONE ENCOUNTER
Patient is waiting on orders to be sent over to [facility] for labs, she states you can also call the orders over at [phone]    Please advise when done        Thank You

## 2018-10-18 NOTE — TELEPHONE ENCOUNTER
----- Message from Kamala Carroll sent at 10/18/2018  3:02 PM CDT -----  Contact: [facility] [phone]  Caller is requesting medical necessity form and clinical notes for pt to receive a sliding board to get in and out of bed. Please fax to [phone]

## 2018-10-18 NOTE — TELEPHONE ENCOUNTER
Spoke to [facility] home health nurse - she is working to get bed switched out - legs are healed but they will continue to see her until appt 11/9/18.  Will get lab that I ordered but she could not get done she she was here in August.

## 2018-10-22 ENCOUNTER — TELEPHONE (OUTPATIENT)
Dept: ADMINISTRATIVE | Facility: CLINIC | Age: 58
End: 2018-10-22

## 2018-10-22 NOTE — TELEPHONE ENCOUNTER
Home Health Recert 09/21/2018 to 11/19/2018 with  [facility], Dr Kathrin Salazar. Sn, A services.

## 2018-10-23 NOTE — PROGRESS NOTES
Subjective:      Patient ID: Naga Benavidez is a 58 y.o. female.    Chief Complaint: Knee Pain (bilateral )    Ms Benavidez is a 59yo female with CP here for follow up of her bilateral knee DJD.  She was last seen by me on 4/13/2018 and she had bilateral knee injections.  She has been getting regular knee injections for several years.  We have discussed the pros and cons.  She has trouble using the walker and transferring without the knee injections.  She has been considering TKA however she has not been able to be away from her mom for any period.  She has been dealing with a heal wound and so has not been able to come for her injections.  She has been in pain, and unable to get into the bed and unable to push up with the knees.  She does feel like the last injections were helpful.  She does feel like the knee injections help use the walker for transfers.  Pain 9/10.  She has not had any fever or infection. She is not on abx.  She was told by PCP ok to get injections, to help her function    Past Medical History:  No date: Anemia  No date: Arthritis  No date: Asthma  No date: Cerebral palsy      Comment: stable  No date: DJD (degenerative joint disease)  No date: Hyperlipidemia  No date: Hypertension  No date: Neuromuscular disorder  No date: Obstructive sleep apnea  No date: Uterine fibroids affecting pregnancy      Comment: with adnexal mass    Past Surgical History:  No date: FOOT CAPSULE RELEASE W/ PERCUTANEOUS HEEL CORD*  No date: HAMSTRING RELEASE    Review of patient's family history indicates:    Diabetes                       Mother                    Hypertension                   Mother                      Social History    Marital status: Single              Spouse name:                       Years of education:                 Number of children:               Social History Main Topics    Smoking status: Never Smoker                                                                Alcohol use: No               Sexual activity: No                       Current Outpatient Prescriptions:  acetaminophen (TYLENOL) 325 MG tablet, Take 650 mg by mouth 4 (four) times daily as needed for Pain., Disp: , Rfl:   amlodipine (NORVASC) 2.5 MG tablet, Take 1 tablet (2.5 mg total) by mouth once daily., Disp: 90 tablet, Rfl: 3  ascorbic acid (VITAMIN C) 1000 MG tablet, Take 1,000 mg by mouth Daily. , Disp: , Rfl:   b complex vitamins capsule, Take 1 capsule by mouth every morning., Disp: , Rfl:   baclofen (LIORESAL) 10 MG tablet, , Disp: , Rfl:   biotin 10 mg Tab, Take 1 capsule by mouth once daily. , Disp: , Rfl:   calcium carbonate (OS-HERNANDEZ) 600 mg (1,500 mg) Tab, Take 600 mg by mouth once daily. , Disp: , Rfl:   co-enzyme Q-10 30 mg capsule, Take 300 mg by mouth once daily., Disp: , Rfl:   FLUZONE QUAD 6178-8342, PF, 60 mcg (15 mcg x 4)/0.5 mL vaccine, , Disp: , Rfl:   hydrochlorothiazide (HYDRODIURIL) 25 MG tablet, Take 1 tablet (25 mg total) by mouth once daily. (Patient taking differently: Take 25 mg by mouth daily as needed. ), Disp: 30 tablet, Rfl: 11  losartan (COZAAR) 100 MG tablet, TAKE ONE TABLET BY MOUTH EVERY DAY, Disp: 90 tablet, Rfl: 0  meloxicam (MOBIC) 15 MG tablet, TAKE ONE TABLET BY MOUTH DAILY WITH FOOD, Disp: 90 tablet, Rfl: 3  miconazole (MICATIN) 2 % cream, Apply topically 2 (two) times daily. (Patient taking differently: Apply topically 2 (two) times daily as needed. ), Disp: , Rfl: 0  multivitamin capsule, Take 1 capsule by mouth once daily., Disp: , Rfl:   omega-3 fatty acids-dha-epa (MEGARED PLANT-OMEGA-3) 300 mg Cap, Take 1 tablet by mouth once daily., Disp: , Rfl:   oxycodone (ROXICODONE) 5 MG immediate release tablet, Take 1 tablet (5 mg total) by mouth every 8 (eight) hours as needed for Pain., Disp: 30 tablet, Rfl: 0  diclofenac sodium (VOLTAREN) 1 % Gel, Apply topically 4 (four) times daily., Disp: 1 Tube, Rfl: 1    No current facility-administered medications for this visit.       Review of  patient's allergies indicates:   -- Tramadol -- Other (See Comments)    --  She could not wake up          Review of Systems   Constitution: Negative for weight gain and weight loss.   Cardiovascular: Negative for chest pain.   Respiratory: Positive for shortness of breath.    Musculoskeletal: Positive for joint pain and joint swelling. Negative for back pain.   Gastrointestinal: Negative for abdominal pain and bowel incontinence.   Genitourinary: Negative for bladder incontinence.   Neurological: Negative for numbness.         Objective:        General: Naga is well-developed, well-nourished, appears stated age, in no acute distress, alert and oriented to time, place and person.     General    Vitals reviewed.  Constitutional: She is oriented to person, place, and time. She appears well-developed and well-nourished.   HENT:   Head: Normocephalic and atraumatic.   Pulmonary/Chest: Effort normal.   Neurological: She is alert and oriented to person, place, and time. She displays abnormal reflex. She exhibits abnormal muscle tone.   Psychiatric: She has a normal mood and affect. Her behavior is normal. Judgment and thought content normal.     General Musculoskeletal Exam   Gait: abnormal     Right Ankle/Foot Exam     Tests   Heel Walk: unable to perform  Tiptoe Walk: unable to perform    Comments:  Ankles wrapped    Left Ankle/Foot Exam     Tests   Heel Walk: unable to perform  Tiptoe Walk: unable to perform    Right Knee Exam     Inspection   Swelling: present  Deformity: present    Tenderness   The patient is tender to palpation of the lateral joint line and medial joint line.    Crepitus   The patient has crepitus of the patella.    Range of Motion   Extension: 0   Flexion: 0     Tests   Meniscus   Brandy:  Medial - negative Lateral - negative  Ligament Examination Lachman: normal (-1 to 2mm)   MCL - Valgus: normal (0 to 2mm)  LCL - Varus: normalReverse Pivot Shift: normal (Equal)    Other   Sensation:  normal    Comments:  Unable to flex voluntarily at all but unable to get knee to 70 degrees passively, keeps knee straight    Left Knee Exam     Inspection   Erythema: present (left calf)  Swelling: present  Deformity: present    Tenderness   The patient tender to palpation of the lateral joint line and medial joint line.    Crepitus   The patient has crepitus of the patella.    Tests   Meniscus   Brandy:  Medial - negative Lateral - negative  Stability Lachman: normal (-1 to 2mm)   MCL - Valgus: normal (0 to 2mm)  LCL - Varus: normal (0 to 2mm)Reverse Pivot Shift: normal (Equal)    Other   Sensation: normal    Comments:  Bilateral knee deformity, bilateral spasticity  Valgus deformity  Unable to fully extend    Muscle Strength   Right Lower Extremity   Hip Abduction: 5/5   Quadriceps:  5/5   Hamstrin/5   Left Lower Extremity   Hip Abduction: 5/5   Quadriceps:  5/5   Hamstrin/5     Vascular Exam     Right Pulses        Carotid:                  2+    Left Pulses        Carotid:                  2+    Edema  Right Lower Leg: present  Left Lower Leg: present              Assessment:       1. Primary osteoarthritis of both knees    2. Spastic diplegic cerebral palsy    3. Gait instability    4. Arthralgia of both knees    5. Chronic pain of both knees           Plan:       Orders Placed This Encounter    Large Joint Aspiration/Injection: R knee, L knee    meloxicam (MOBIC) 15 MG tablet     1. Bilateral knee injections please see procedure note. A time out was performed, the correct patient, procedure, site, and position confirmed. We discussed the redness in the legs and the risk of an injection.  She would like to proceed.   2. Baclofen 10mg to try to help with spasticity as needed, she does not take often  3. Continue mobic daily  4. Continue tylenol   5. Continue voltaren gel as needed   6. Her joint is getting harder to inject without x-ray guidance.  We might have to get her to  Pain management for  injections.  She does not want to go to pain management currently.   We discussed a replacement, pain is stopping her from standing and moving more. We disucssed that if waits too long and has been non ambulatory there is chance she will not be able to ambulate after surgery   7.  rtc 3 months, she is also planning on returning to ortho to consider knee replacement when she can, but states she cannot    Follow-up: Follow-up in about 3 months (around 1/24/2019). If there are any questions prior to this, the patient was instructed to contact the office.

## 2018-10-24 ENCOUNTER — OFFICE VISIT (OUTPATIENT)
Dept: SPINE | Facility: CLINIC | Age: 58
End: 2018-10-24
Attending: PHYSICAL MEDICINE & REHABILITATION
Payer: MEDICARE

## 2018-10-24 VITALS
BODY MASS INDEX: 41.03 KG/M2 | HEIGHT: 60 IN | HEART RATE: 91 BPM | SYSTOLIC BLOOD PRESSURE: 195 MMHG | WEIGHT: 209 LBS | DIASTOLIC BLOOD PRESSURE: 108 MMHG

## 2018-10-24 DIAGNOSIS — R26.81 GAIT INSTABILITY: ICD-10-CM

## 2018-10-24 DIAGNOSIS — G80.1 SPASTIC DIPLEGIC CEREBRAL PALSY: ICD-10-CM

## 2018-10-24 DIAGNOSIS — M25.562 ARTHRALGIA OF BOTH KNEES: ICD-10-CM

## 2018-10-24 DIAGNOSIS — M25.561 CHRONIC PAIN OF BOTH KNEES: ICD-10-CM

## 2018-10-24 DIAGNOSIS — M25.562 CHRONIC PAIN OF BOTH KNEES: ICD-10-CM

## 2018-10-24 DIAGNOSIS — G89.29 CHRONIC PAIN OF BOTH KNEES: ICD-10-CM

## 2018-10-24 DIAGNOSIS — M17.0 PRIMARY OSTEOARTHRITIS OF BOTH KNEES: Primary | ICD-10-CM

## 2018-10-24 DIAGNOSIS — M25.561 ARTHRALGIA OF BOTH KNEES: ICD-10-CM

## 2018-10-24 PROCEDURE — 3080F DIAST BP >= 90 MM HG: CPT | Mod: CPTII,,, | Performed by: PHYSICAL MEDICINE & REHABILITATION

## 2018-10-24 PROCEDURE — 3077F SYST BP >= 140 MM HG: CPT | Mod: CPTII,,, | Performed by: PHYSICAL MEDICINE & REHABILITATION

## 2018-10-24 PROCEDURE — 99214 OFFICE O/P EST MOD 30 MIN: CPT | Mod: 25,S$PBB,, | Performed by: PHYSICAL MEDICINE & REHABILITATION

## 2018-10-24 PROCEDURE — 99999 PR PBB SHADOW E&M-EST. PATIENT-LVL III: CPT | Mod: PBBFAC,,, | Performed by: PHYSICAL MEDICINE & REHABILITATION

## 2018-10-24 PROCEDURE — 99213 OFFICE O/P EST LOW 20 MIN: CPT | Mod: PBBFAC | Performed by: PHYSICAL MEDICINE & REHABILITATION

## 2018-10-24 PROCEDURE — 3008F BODY MASS INDEX DOCD: CPT | Mod: CPTII,,, | Performed by: PHYSICAL MEDICINE & REHABILITATION

## 2018-10-24 PROCEDURE — 20610 DRAIN/INJ JOINT/BURSA W/O US: CPT | Mod: 50,PBBFAC | Performed by: PHYSICAL MEDICINE & REHABILITATION

## 2018-10-24 PROCEDURE — 99499 UNLISTED E&M SERVICE: CPT | Mod: S$GLB,,, | Performed by: PHYSICAL MEDICINE & REHABILITATION

## 2018-10-24 RX ORDER — MELOXICAM 15 MG/1
15 TABLET ORAL DAILY
Qty: 90 TABLET | Refills: 3 | Status: SHIPPED | OUTPATIENT
Start: 2018-10-24 | End: 2020-09-24

## 2018-10-24 RX ORDER — TRIAMCINOLONE ACETONIDE 40 MG/ML
80 INJECTION, SUSPENSION INTRA-ARTICULAR; INTRAMUSCULAR
Status: DISCONTINUED | OUTPATIENT
Start: 2018-10-24 | End: 2018-10-24 | Stop reason: HOSPADM

## 2018-10-24 RX ADMIN — TRIAMCINOLONE ACETONIDE 80 MG: 40 INJECTION, SUSPENSION INTRA-ARTICULAR; INTRAMUSCULAR at 01:10

## 2018-10-24 NOTE — PROCEDURES
Large Joint Aspiration/Injection: R knee, L knee  Date/Time: 10/24/2018 1:53 PM  Performed by: Miriam Tate MD  Authorized by: Miriam Tate MD     Consent Done?:  Yes (Verbal)  Indications:  Pain  Procedure site marked: Yes    Timeout: Prior to procedure the correct patient, procedure, and site was verified      Location:  Knee  Site:  R knee and L knee  Prep: Patient was prepped and draped in usual sterile fashion    Ultrasonic Guidance for needle placement: No  Needle size:  22 G  Approach:  Medial  Medications:  80 mg triamcinolone acetonide 40 mg/mL  Patient tolerance:  Patient tolerated the procedure well with no immediate complications

## 2018-10-24 NOTE — PROGRESS NOTES
Patient, Naga Benavidez (MRN #[id number]), presented with a recorded BMI of 40.82 kg/m^2 consistent with the definition of morbid obesity (ICD-10 E66.01). The patient's morbid obesity was monitored, evaluated, addressed and/or treated. This addendum to the medical record is made on 10/24/2018.

## 2018-11-08 ENCOUNTER — TELEPHONE (OUTPATIENT)
Dept: OBSTETRICS AND GYNECOLOGY | Facility: CLINIC | Age: 58
End: 2018-11-08

## 2018-11-08 NOTE — TELEPHONE ENCOUNTER
Called patient regarding scheduling annual exam. Patient states she is dealing with mobility and wounds on her legs that she is currently being seen for. Patient states she will call to schedule when she is able to find assistance to come to her appointments.

## 2018-11-08 NOTE — TELEPHONE ENCOUNTER
----- Message from Alisson Myers MD sent at 11/8/2018  6:40 AM CST -----  Good morning,     Can you please schedule an appointment for patient to be seen for a  WWE for next available appointment?     Thanks,   Alisson Myers M.D.  Obstetrics and Gynecology

## 2018-11-09 ENCOUNTER — IMMUNIZATION (OUTPATIENT)
Dept: INTERNAL MEDICINE | Facility: CLINIC | Age: 58
End: 2018-11-09
Payer: MEDICARE

## 2018-11-09 ENCOUNTER — OFFICE VISIT (OUTPATIENT)
Dept: INTERNAL MEDICINE | Facility: CLINIC | Age: 58
End: 2018-11-09
Payer: MEDICARE

## 2018-11-09 ENCOUNTER — TELEPHONE (OUTPATIENT)
Dept: INTERNAL MEDICINE | Facility: CLINIC | Age: 58
End: 2018-11-09

## 2018-11-09 VITALS
HEIGHT: 60 IN | BODY MASS INDEX: 40.82 KG/M2 | TEMPERATURE: 98 F | OXYGEN SATURATION: 97 % | DIASTOLIC BLOOD PRESSURE: 82 MMHG | SYSTOLIC BLOOD PRESSURE: 140 MMHG | HEART RATE: 98 BPM

## 2018-11-09 DIAGNOSIS — Z12.31 SCREENING MAMMOGRAM, ENCOUNTER FOR: ICD-10-CM

## 2018-11-09 DIAGNOSIS — G80.9 CEREBRAL PALSY, UNSPECIFIED TYPE: Primary | ICD-10-CM

## 2018-11-09 PROCEDURE — 90686 IIV4 VACC NO PRSV 0.5 ML IM: CPT | Mod: S$GLB,,, | Performed by: INTERNAL MEDICINE

## 2018-11-09 PROCEDURE — 99214 OFFICE O/P EST MOD 30 MIN: CPT | Mod: 25,S$GLB,, | Performed by: INTERNAL MEDICINE

## 2018-11-09 PROCEDURE — 3008F BODY MASS INDEX DOCD: CPT | Mod: CPTII,S$GLB,, | Performed by: INTERNAL MEDICINE

## 2018-11-09 PROCEDURE — 99999 PR PBB SHADOW E&M-EST. PATIENT-LVL V: CPT | Mod: PBBFAC,,, | Performed by: INTERNAL MEDICINE

## 2018-11-09 PROCEDURE — 3077F SYST BP >= 140 MM HG: CPT | Mod: CPTII,S$GLB,, | Performed by: INTERNAL MEDICINE

## 2018-11-09 PROCEDURE — 3079F DIAST BP 80-89 MM HG: CPT | Mod: CPTII,S$GLB,, | Performed by: INTERNAL MEDICINE

## 2018-11-09 PROCEDURE — G0008 ADMIN INFLUENZA VIRUS VAC: HCPCS | Mod: S$GLB,,, | Performed by: INTERNAL MEDICINE

## 2018-11-09 RX ORDER — PRAVASTATIN SODIUM 40 MG/1
40 TABLET ORAL DAILY
Qty: 30 TABLET | Refills: 6 | Status: SHIPPED | OUTPATIENT
Start: 2018-11-09 | End: 2019-02-15

## 2018-11-09 RX ORDER — TRIAMCINOLONE ACETONIDE 1 MG/G
CREAM TOPICAL
Qty: 80 G | Refills: 0 | Status: ON HOLD | OUTPATIENT
Start: 2018-11-09 | End: 2022-10-27 | Stop reason: HOSPADM

## 2018-11-09 NOTE — TELEPHONE ENCOUNTER
----- Message from Torri Magaña sent at 11/9/2018  8:15 AM CST -----  Contact: [phone]/ Ed/ [facility]  [facility] called and reported that all patient's wounds are closed, and they requesting a order to discharge, because the insurance will not pay once the wounds are closed.    Please advise, thank you

## 2018-11-11 NOTE — PROGRESS NOTES
Subjective:       Patient ID: Naga Benavidez is a 58 y.o. female.    Chief Complaint: Follow-up    HPIPt is feeling better.  No CP or SOB.  Legs are mostly healed.  Still cannot get in the bed she has nor did she get the sliding board.    Review of Systems   Constitutional: Negative for chills.   Respiratory: Negative for shortness of breath (PND or orthopnea).    Cardiovascular: Negative for chest pain (arm pain or jaw pain).   Gastrointestinal: Negative for abdominal pain, constipation, diarrhea, nausea and vomiting.   Genitourinary: Positive for dysuria. Negative for flank pain, frequency, hematuria and urgency.   Skin: Negative for rash.   Neurological: Negative for seizures, syncope and headaches.       Objective:      Physical Exam   Constitutional: She is oriented to person, place, and time. She appears well-developed and well-nourished. No distress.   HENT:   Head: Normocephalic.   Mouth/Throat: Oropharynx is clear and moist.   Neck: Neck supple. No JVD present. No thyromegaly present.   Cardiovascular: Normal rate, regular rhythm, normal heart sounds and intact distal pulses. Exam reveals no gallop and no friction rub.   No murmur heard.  Pulmonary/Chest: Effort normal and breath sounds normal. She has no wheezes. She has no rales.   Abdominal: Soft. Bowel sounds are normal. She exhibits no distension and no mass. There is no tenderness. There is no rebound and no guarding.   Musculoskeletal: She exhibits no edema.   Lymphadenopathy:     She has no cervical adenopathy.   Neurological: She is alert and oriented to person, place, and time. She has normal reflexes.   Skin: Skin is warm and dry.   Psychiatric: She has a normal mood and affect. Her behavior is normal. Judgment and thought content normal.       Assessment:       1. Cerebral palsy, unspecified type    2. Screening mammogram, encounter for        Plan:   Cerebral palsy, unspecified type  -     HOSPITAL BED FOR HOME USE  -     SLIDING BOARD  FOR HOME USE    Screening mammogram, encounter for  -     Mammo Digital Screening Bilat w/ [facility]; Future; Expected date: 11/09/2018    Other orders  -     pravastatin (PRAVACHOL) 40 MG tablet; Take 1 tablet (40 mg total) by mouth once daily.  Dispense: 30 tablet; Refill: 6      Schedule CT chest  Lab per home health showed normal CBC, CMP, Lipids elevated add statin (could not tolerate crestor or lipitor)  Try to keep wound care going as she cannot dress areas on ankles - would want to protect these areas from scratching them again - would keep dressing them and protecting them with an ace wrap

## 2018-11-12 ENCOUNTER — TELEPHONE (OUTPATIENT)
Dept: INTERNAL MEDICINE | Facility: CLINIC | Age: 58
End: 2018-11-12

## 2018-11-14 ENCOUNTER — TELEPHONE (OUTPATIENT)
Dept: INTERNAL MEDICINE | Facility: CLINIC | Age: 58
End: 2018-11-14

## 2018-11-14 NOTE — TELEPHONE ENCOUNTER
----- Message from Anastacio Kolb sent at 11/14/2018  1:18 PM CST -----  Contact: self/[phone]  Pt is calling stating that [facility] is still giving her issues and she would like to speak with someone in the office in regards to who Fina spoke with. Please advise.        Thanks

## 2018-11-15 NOTE — TELEPHONE ENCOUNTER
I spoke with patient, let her know I did not know the name of the person I spoke with, but insured her [facility] should be able to provide the bed she is requesting. they are to come out today to patients home. patient will let us know if she has ant issues.

## 2018-12-07 ENCOUNTER — HOSPITAL ENCOUNTER (OUTPATIENT)
Dept: RADIOLOGY | Facility: HOSPITAL | Age: 58
Discharge: HOME | End: 2018-12-07
Attending: INTERNAL MEDICINE
Payer: MEDICARE

## 2018-12-07 DIAGNOSIS — Z12.31 SCREENING MAMMOGRAM, ENCOUNTER FOR: ICD-10-CM

## 2018-12-07 DIAGNOSIS — R91.1 PULMONARY NODULE: ICD-10-CM

## 2018-12-07 PROCEDURE — 77067 SCR MAMMO BI INCL CAD: CPT | Mod: 26,,, | Performed by: RADIOLOGY

## 2018-12-07 PROCEDURE — 71250 CT THORAX DX C-: CPT | Mod: 26,,, | Performed by: RADIOLOGY

## 2018-12-07 PROCEDURE — 77067 SCR MAMMO BI INCL CAD: CPT | Mod: TC

## 2018-12-07 PROCEDURE — 71250 CT THORAX DX C-: CPT | Mod: TC

## 2018-12-21 ENCOUNTER — TELEPHONE (OUTPATIENT)
Dept: INTERNAL MEDICINE | Facility: CLINIC | Age: 58
End: 2018-12-21

## 2018-12-21 ENCOUNTER — PATIENT MESSAGE (OUTPATIENT)
Dept: INTERNAL MEDICINE | Facility: CLINIC | Age: 58
End: 2018-12-21

## 2019-01-02 ENCOUNTER — TELEPHONE (OUTPATIENT)
Dept: INTERNAL MEDICINE | Facility: CLINIC | Age: 59
End: 2019-01-02

## 2019-01-02 NOTE — TELEPHONE ENCOUNTER
Patient is requesting orders for Wound Care to get some one to come out to help clean wound on her leg. States she has trouble reaching to clean it herself, and it is heard for her to get someone to do it.       Please Advise  Thank You

## 2019-01-04 ENCOUNTER — TELEPHONE (OUTPATIENT)
Dept: INTERNAL MEDICINE | Facility: CLINIC | Age: 59
End: 2019-01-04

## 2019-01-04 DIAGNOSIS — S81.802D WOUND OF LEFT LOWER EXTREMITY, SUBSEQUENT ENCOUNTER: Primary | ICD-10-CM

## 2019-01-04 NOTE — TELEPHONE ENCOUNTER
----- Message from Divya Engle sent at 1/4/2019  9:28 AM CST -----  Contact: self/ [phone]  Patient is calling about orders for home health and wound care, please call and advise.

## 2019-01-09 ENCOUNTER — TELEPHONE (OUTPATIENT)
Dept: INTERNAL MEDICINE | Facility: CLINIC | Age: 59
End: 2019-01-09

## 2019-01-11 ENCOUNTER — TELEPHONE (OUTPATIENT)
Dept: INTERNAL MEDICINE | Facility: CLINIC | Age: 59
End: 2019-01-11

## 2019-01-11 NOTE — TELEPHONE ENCOUNTER
----- Message from Torri Magaña sent at 1/11/2019  2:32 PM CST -----  Contact: Brenda/Home Care/[phone]  Brenda/ Home Care/RN - called a reported that the patient wound has a foul odor, nurse stated the patient does not like telfa, patient wants wound wrapped with a ace band.    Please all and advise, thank

## 2019-01-14 ENCOUNTER — TELEPHONE (OUTPATIENT)
Dept: INTERNAL MEDICINE | Facility: CLINIC | Age: 59
End: 2019-01-14

## 2019-01-14 NOTE — TELEPHONE ENCOUNTER
----- Message from Cailin Paul sent at 1/14/2019  1:13 PM CST -----  Contact: Home Health nurse/Els[facility] [phone]  Requesting updated wound care orders. Stated that the patient would not let her do what was ordered on Friday 01/11/2019 and she is going back today and would like to speak with you.    Please call and advise.    Thank You

## 2019-01-14 NOTE — TELEPHONE ENCOUNTER
----- Message from Amanda Wu sent at 1/14/2019  3:01 PM CST -----  Contact: [facility] [phone]  Bridget is requesting orders for patient to have home health aid.    Please advise, thanks

## 2019-01-15 RX ORDER — FLUCONAZOLE 150 MG/1
150 TABLET ORAL DAILY
Qty: 3 TABLET | Refills: 1 | Status: SHIPPED | OUTPATIENT
Start: 2019-01-15 | End: 2019-01-16

## 2019-01-15 RX ORDER — AMOXICILLIN AND CLAVULANATE POTASSIUM 875; 125 MG/1; MG/1
1 TABLET, FILM COATED ORAL 2 TIMES DAILY
Qty: 20 TABLET | Refills: 0 | Status: SHIPPED | OUTPATIENT
Start: 2019-01-15 | End: 2019-01-25

## 2019-01-22 ENCOUNTER — TELEPHONE (OUTPATIENT)
Dept: ADMINISTRATIVE | Facility: CLINIC | Age: 59
End: 2019-01-22

## 2019-01-22 NOTE — TELEPHONE ENCOUNTER
Home Health SOC 01/11/2019 - 03/11/2019 with [facility] ([place]) - Dr. Kathrin Salazar. Patient received SN and PT services.

## 2019-01-23 ENCOUNTER — TELEPHONE (OUTPATIENT)
Dept: INTERNAL MEDICINE | Facility: CLINIC | Age: 59
End: 2019-01-23

## 2019-01-23 NOTE — TELEPHONE ENCOUNTER
[facility] is requesting a home health aid to come out to patient 2x a week for 6 weeks. To help bath. Etc. Also requesting an order for

## 2019-01-23 NOTE — TELEPHONE ENCOUNTER
"Patient is requesting an order to have her left arm rest on her power wheelchair to be longer. She needs an order stating "Modification to power wheelchair" faxed to [facility], at [phone]      Please Advise  Thank You    "

## 2019-01-24 NOTE — TELEPHONE ENCOUNTER
Spoke with Lee Ann from [facility], gave the OK for americaisabela to have a home health Aid, and a medical social worker.

## 2019-01-28 ENCOUNTER — TELEPHONE (OUTPATIENT)
Dept: INTERNAL MEDICINE | Facility: CLINIC | Age: 59
End: 2019-01-28

## 2019-01-28 NOTE — TELEPHONE ENCOUNTER
----- Message from Franklin Shin sent at 1/28/2019 12:13 PM CST -----  Contact: [facility] [phone] ext [phone]  Elle will like to speak to nurse in regards to order to decrease to twice a week for wound care effective 1/28/19  Fax Number [phone]

## 2019-01-28 NOTE — TELEPHONE ENCOUNTER
Left a message for Lee Ann at [facility]. For Verbal orders to give the OK to decrease wound care to twice a week

## 2019-02-08 ENCOUNTER — PATIENT MESSAGE (OUTPATIENT)
Dept: INTERNAL MEDICINE | Facility: CLINIC | Age: 59
End: 2019-02-08

## 2019-02-15 ENCOUNTER — OFFICE VISIT (OUTPATIENT)
Dept: INTERNAL MEDICINE | Facility: CLINIC | Age: 59
End: 2019-02-15
Payer: MEDICARE

## 2019-02-15 VITALS
DIASTOLIC BLOOD PRESSURE: 80 MMHG | OXYGEN SATURATION: 99 % | TEMPERATURE: 98 F | HEART RATE: 80 BPM | SYSTOLIC BLOOD PRESSURE: 122 MMHG

## 2019-02-15 DIAGNOSIS — R07.9 CHEST PAIN, UNSPECIFIED TYPE: Primary | ICD-10-CM

## 2019-02-15 DIAGNOSIS — R60.9 EDEMA, UNSPECIFIED TYPE: ICD-10-CM

## 2019-02-15 DIAGNOSIS — I83.028 VENOUS STASIS ULCER OF OTHER PART OF LEFT LOWER LEG, UNSPECIFIED ULCER STAGE, UNSPECIFIED WHETHER VARICOSE VEINS PRESENT: ICD-10-CM

## 2019-02-15 DIAGNOSIS — L97.829 VENOUS STASIS ULCER OF OTHER PART OF LEFT LOWER LEG, UNSPECIFIED ULCER STAGE, UNSPECIFIED WHETHER VARICOSE VEINS PRESENT: ICD-10-CM

## 2019-02-15 PROCEDURE — 99213 OFFICE O/P EST LOW 20 MIN: CPT | Mod: S$GLB,,, | Performed by: INTERNAL MEDICINE

## 2019-02-15 PROCEDURE — 99999 PR PBB SHADOW E&M-EST. PATIENT-LVL V: CPT | Mod: PBBFAC,,, | Performed by: INTERNAL MEDICINE

## 2019-02-15 PROCEDURE — 99213 PR OFFICE/OUTPT VISIT, EST, LEVL III, 20-29 MIN: ICD-10-PCS | Mod: S$GLB,,, | Performed by: INTERNAL MEDICINE

## 2019-02-15 PROCEDURE — 3079F DIAST BP 80-89 MM HG: CPT | Mod: CPTII,S$GLB,, | Performed by: INTERNAL MEDICINE

## 2019-02-15 PROCEDURE — 3074F SYST BP LT 130 MM HG: CPT | Mod: CPTII,S$GLB,, | Performed by: INTERNAL MEDICINE

## 2019-02-15 PROCEDURE — 99999 PR PBB SHADOW E&M-EST. PATIENT-LVL V: ICD-10-PCS | Mod: PBBFAC,,, | Performed by: INTERNAL MEDICINE

## 2019-02-15 PROCEDURE — 3074F PR MOST RECENT SYSTOLIC BLOOD PRESSURE < 130 MM HG: ICD-10-PCS | Mod: CPTII,S$GLB,, | Performed by: INTERNAL MEDICINE

## 2019-02-15 PROCEDURE — 99499 RISK ADDL DX/OHS AUDIT: ICD-10-PCS | Mod: S$GLB,,, | Performed by: INTERNAL MEDICINE

## 2019-02-15 PROCEDURE — 99499 UNLISTED E&M SERVICE: CPT | Mod: S$GLB,,, | Performed by: INTERNAL MEDICINE

## 2019-02-15 PROCEDURE — 3079F PR MOST RECENT DIASTOLIC BLOOD PRESSURE 80-89 MM HG: ICD-10-PCS | Mod: CPTII,S$GLB,, | Performed by: INTERNAL MEDICINE

## 2019-02-15 RX ORDER — EZETIMIBE 10 MG/1
10 TABLET ORAL DAILY
Qty: 30 TABLET | Refills: 6 | Status: SHIPPED | OUTPATIENT
Start: 2019-02-15 | End: 2020-09-24

## 2019-02-20 ENCOUNTER — TELEPHONE (OUTPATIENT)
Dept: INTERNAL MEDICINE | Facility: CLINIC | Age: 59
End: 2019-02-20

## 2019-02-20 NOTE — TELEPHONE ENCOUNTER
She will hold meloxicam for 2 weeks.  Declines PPI - no N/V/D.  Call for poor results.  Kady Solano has a jr lift - they are checking on one at imaging center.

## 2019-02-20 NOTE — TELEPHONE ENCOUNTER
Patient is complaining of having stomach aches since last visit, she report taking her medication as follows: vit C, tylenol, amlodipine, meloxicam, and a muscle relaxer. States she takes medication about 1-2 hours apart      Please Advise  Thank You

## 2019-02-27 ENCOUNTER — TELEPHONE (OUTPATIENT)
Dept: INTERNAL MEDICINE | Facility: CLINIC | Age: 59
End: 2019-02-27

## 2019-02-28 ENCOUNTER — TELEPHONE (OUTPATIENT)
Dept: INTERNAL MEDICINE | Facility: CLINIC | Age: 59
End: 2019-02-28

## 2019-02-28 NOTE — PROGRESS NOTES
Subjective:       Patient ID: Naga Benavidez is a 58 y.o. female.    Chief Complaint: Follow-up    HPIPt feeling some CP thinks it may be due to reaching her arm behind her when she moves.  No SOB, no AP or IFEOMA, no dyspnea, no nausea or diaphoresis.  Review of Systems   Constitutional: Negative for activity change and unexpected weight change.   HENT: Negative for hearing loss, rhinorrhea and trouble swallowing.    Eyes: Negative for discharge and visual disturbance.   Respiratory: Negative for chest tightness, shortness of breath (PND or orthopnea) and wheezing.    Cardiovascular: Negative for chest pain and palpitations.   Gastrointestinal: Positive for constipation. Negative for abdominal pain, blood in stool, diarrhea, nausea and vomiting.   Endocrine: Negative for polydipsia and polyuria.   Genitourinary: Negative for difficulty urinating, dysuria, hematuria and menstrual problem.   Musculoskeletal: Positive for arthralgias and joint swelling. Negative for neck pain.   Neurological: Negative for seizures, syncope, weakness and headaches.   Psychiatric/Behavioral: Negative for confusion and dysphoric mood.       Objective:      Physical Exam   Constitutional: She is oriented to person, place, and time. She appears well-developed and well-nourished. No distress.   HENT:   Head: Normocephalic.   Mouth/Throat: Oropharynx is clear and moist.   Neck: Neck supple. No JVD present. No thyromegaly present.   Cardiovascular: Normal rate, regular rhythm, normal heart sounds and intact distal pulses. Exam reveals no gallop and no friction rub.   No murmur heard.  Pulmonary/Chest: Effort normal and breath sounds normal. She has no wheezes. She has no rales. She exhibits tenderness (in the chest wall).   Abdominal: Soft. Bowel sounds are normal. She exhibits no distension and no mass. There is no tenderness. There is no rebound and no guarding.   Musculoskeletal: She exhibits edema (2+ paulino).   Lymphadenopathy:     She  has no cervical adenopathy.   Neurological: She is alert and oriented to person, place, and time. She has normal reflexes.   Skin: Skin is warm and dry.   Psychiatric: She has a normal mood and affect. Her behavior is normal. Judgment and thought content normal.     L medial ankle with healing wound - has some dry/necrotic tissue ? Debridement - no erythema, no odor, no discharge  Assessment:       1. Chest pain, unspecified type    2. Edema, unspecified type    3. Venous stasis ulcer of other part of left lower leg, unspecified ulcer stage, unspecified whether varicose veins present        Plan:   Chest pain, unspecified type  -     EKG 12-lead  Unchanged - feel pain in MS as is reproducible  Edema, unspecified type  -      Lower Extremity Veins Bilateral; Future; Expected date: 02/15/2019    Venous stasis ulcer of other part of left lower leg, unspecified ulcer stage, unspecified whether varicose veins present  -     Ambulatory consult to Wound Clinic    Other orders  -     ezetimibe (ZETIA) 10 mg tablet; Take 1 tablet (10 mg total) by mouth once daily.  Dispense: 30 tablet; Refill: 6    Cannot tolerate statins

## 2019-03-01 ENCOUNTER — INITIAL CONSULT (OUTPATIENT)
Dept: WOUND CARE | Facility: CLINIC | Age: 59
End: 2019-03-01
Payer: MEDICARE

## 2019-03-01 VITALS
TEMPERATURE: 98 F | DIASTOLIC BLOOD PRESSURE: 113 MMHG | BODY MASS INDEX: 40.82 KG/M2 | SYSTOLIC BLOOD PRESSURE: 174 MMHG | HEIGHT: 60 IN | HEART RATE: 94 BPM

## 2019-03-01 DIAGNOSIS — I83.899 VARICOSE VEINS OF LEG WITH COMPLICATIONS: Primary | ICD-10-CM

## 2019-03-01 DIAGNOSIS — L97.322 SKIN ULCER OF LEFT ANKLE WITH FAT LAYER EXPOSED: ICD-10-CM

## 2019-03-01 DIAGNOSIS — R60.0 BILATERAL LEG EDEMA: ICD-10-CM

## 2019-03-01 PROCEDURE — 99203 OFFICE O/P NEW LOW 30 MIN: CPT | Mod: 25,S$GLB,, | Performed by: NURSE PRACTITIONER

## 2019-03-01 PROCEDURE — 99999 PR PBB SHADOW E&M-EST. PATIENT-LVL V: CPT | Mod: PBBFAC,,, | Performed by: NURSE PRACTITIONER

## 2019-03-01 PROCEDURE — 3077F PR MOST RECENT SYSTOLIC BLOOD PRESSURE >= 140 MM HG: ICD-10-PCS | Mod: CPTII,S$GLB,, | Performed by: NURSE PRACTITIONER

## 2019-03-01 PROCEDURE — 3080F DIAST BP >= 90 MM HG: CPT | Mod: CPTII,S$GLB,, | Performed by: NURSE PRACTITIONER

## 2019-03-01 PROCEDURE — 3080F PR MOST RECENT DIASTOLIC BLOOD PRESSURE >= 90 MM HG: ICD-10-PCS | Mod: CPTII,S$GLB,, | Performed by: NURSE PRACTITIONER

## 2019-03-01 PROCEDURE — 3077F SYST BP >= 140 MM HG: CPT | Mod: CPTII,S$GLB,, | Performed by: NURSE PRACTITIONER

## 2019-03-01 PROCEDURE — 99203 PR OFFICE/OUTPT VISIT, NEW, LEVL III, 30-44 MIN: ICD-10-PCS | Mod: 25,S$GLB,, | Performed by: NURSE PRACTITIONER

## 2019-03-01 PROCEDURE — 3008F BODY MASS INDEX DOCD: CPT | Mod: CPTII,S$GLB,, | Performed by: NURSE PRACTITIONER

## 2019-03-01 PROCEDURE — 3008F PR BODY MASS INDEX (BMI) DOCUMENTED: ICD-10-PCS | Mod: CPTII,S$GLB,, | Performed by: NURSE PRACTITIONER

## 2019-03-01 PROCEDURE — 99999 PR PBB SHADOW E&M-EST. PATIENT-LVL V: ICD-10-PCS | Mod: PBBFAC,,, | Performed by: NURSE PRACTITIONER

## 2019-03-01 NOTE — LETTER
March 1, 2019      Kathrin Salazar MD  [address]  [place] [facility] [address]           [facility]  [address]  [facility] [address]  Phone: [phone]          Patient: Naga Benavidez   MR Number: [id number]   YOB: 1960   Date of Visit: 3/1/2019       Dear Dr. Kathrin Salazar:    Thank you for referring Naga Benavidez to me for evaluation. Attached you will find relevant portions of my assessment and plan of care.    If you have questions, please do not hesitate to call me. I look forward to following Naga Benavidez along with you.    Sincerely,    Sofiya Rivera, NP    Enclosure  CC:  No Recipients    If you would like to receive this communication electronically, please contact [email or web address] or [phone] to request more information on [employer] access.    For providers and/or their staff who would like to refer a patient to [facility], please contact us through our one-stop-shop provider referral line, Ulysses Comer, at [phone].    If you feel you have received this communication in error or would no longer like to receive these types of communications, please e-mail [email or web address]

## 2019-03-01 NOTE — PROGRESS NOTES
Subjective:       Patient ID: Naga Benavidez is a 58 y.o. female.    Chief Complaint: Wound Check    HPI   This is a 58 year old female referred by Dr. Salazar for evaluation and management of an ulcer to the left posterior ankle. The wound has been present since the Summer of 2018. Her medical history is significant for cerebral palsy and she is wheelchair bound requiring maximum assist for transfers.  She has home health services through [facility].  They are using mepilex foam dressings on the wound but no form of compression.  The wound is not healing. She is afebrile. She denies increased redness, swelling or purulent drainage. Her pain level is 6/10.  Review of Systems   Constitutional: Negative for chills, diaphoresis and fever.   HENT: Positive for postnasal drip. Negative for hearing loss, rhinorrhea, sinus pressure, sneezing, sore throat, tinnitus and trouble swallowing.    Eyes: Negative for visual disturbance.   Respiratory: Positive for shortness of breath. Negative for apnea, cough and wheezing.    Cardiovascular: Positive for leg swelling. Negative for chest pain and palpitations.   Gastrointestinal: Positive for constipation. Negative for diarrhea, nausea and vomiting.   Genitourinary: Negative for difficulty urinating, dysuria, frequency and hematuria.        Urinary incontinence   Musculoskeletal: Positive for arthralgias. Negative for back pain and joint swelling.   Skin: Negative for wound.   Neurological: Positive for dizziness and weakness. Negative for light-headedness and headaches.   Hematological: Does not bruise/bleed easily.   Psychiatric/Behavioral: Positive for dysphoric mood and sleep disturbance. Negative for confusion and decreased concentration. The patient is nervous/anxious.        Objective:      Physical Exam   Constitutional: She is oriented to person, place, and time. She appears well-developed and well-nourished. No distress.   HENT:   Head: Normocephalic and  atraumatic.   Mouth/Throat: Oropharynx is clear and moist.   Eyes: Conjunctivae and EOM are normal. Pupils are equal, round, and reactive to light. Right eye exhibits no discharge. Left eye exhibits no discharge. No scleral icterus.   Neck: Neck supple. No JVD present. No thyromegaly present.   Cardiovascular: Normal rate, regular rhythm and normal heart sounds. Exam reveals no gallop and no friction rub.   No murmur heard.  Pulmonary/Chest: Effort normal and breath sounds normal. No respiratory distress. She has no wheezes. She has no rales.   Abdominal: Soft. Bowel sounds are normal. She exhibits no distension. There is no tenderness.   Musculoskeletal: She exhibits edema (lower leg) and deformity (left leg). She exhibits no tenderness.        Legs:  Neurological: She is alert and oriented to person, place, and time.   Skin: Skin is warm and dry. No rash noted. She is not diaphoretic. No erythema.   Psychiatric: She has a normal mood and affect. Her behavior is normal. Judgment and thought content normal.   Nursing note and vitals reviewed.      Naga was seen in the clinic room and placed in the supine position on the treatment table.  The left leg was cleansed with Easi-clense sponges and dried thoroughly.  Eucerin cream was applied to the lower legs. Medihoney gel and a hydrofiber dressing was applied to the wound.  The patient's foot was positioned at a 90 degree angle.  The coflex was applied per package instructions.  A two layered application was performed using a spiral technique beginning with the foam layer followed by the cohesive bandage avoiding creases or folds.  The wrap was started behind the first metatarsal and ended below the tibial tubercle of the knee.  There was overlap of each turn half the width of the previous turn.  The compression wrap will be changed every 2-3 days.    Assessment:       1. Varicose veins of leg with complications    2. Skin ulcer of left ankle with fat layer exposed     3. Bilateral leg edema               Plan:            Coflex compression wrap left lower leg as detailed above.  Kerlix and coban right lower leg for compression.  Patient was warned not to get the dressings wet and to use cast covers for showering.  Should the dressing become wet, she is to remove it, place a wet-to-dry dressing over the wound, cover with gauze and roll gauze and use ace wraps for compression and to secure bandages.  She should then notify Home health as soon as possible to have a new dressing applied.  Return to clinic in one month.  [facility] notified of orders via Epic fax.  We will ask them to see the patient three times weekly.    Home Health Orders:  Cleanse wound with wound cleanser.  Apply medihney gel to left leg wound and cover with hydrofiber.  Coflex compression wrap with zinc oxide left lower leg.  Kerlix and coban right lower leg.  Skilled nurse visit-3 x weekly

## 2019-03-07 ENCOUNTER — TELEPHONE (OUTPATIENT)
Dept: INTERNAL MEDICINE | Facility: CLINIC | Age: 59
End: 2019-03-07

## 2019-03-07 NOTE — TELEPHONE ENCOUNTER
Patient states she is in extreme pain. She has been off of the meloxicam for 10days now, and says the tylenol has not been helping. She wants to know if she can take the meloxicam again. Also states if she does not get any relief she will not be able to make it to her ultrasound appointment tomorrow.   Patient also needs a new eval and orders for new motorized wheelchair. Also needs orders for Har[employer] for jr lift sent to [facility].       Please Advise  Thank You

## 2019-03-08 ENCOUNTER — TELEPHONE (OUTPATIENT)
Dept: WOUND CARE | Facility: CLINIC | Age: 59
End: 2019-03-08

## 2019-03-08 NOTE — TELEPHONE ENCOUNTER
----- Message from Kristajoshua Hanson sent at 3/8/2019  1:56 PM CST -----  Contact: Deedee/Home health   Reason for call:caller states pt would not open the door for wound care services today, caller states pt did not go for ultrasound.        Communication Preference:[phone] ext [phone]    Additional Information:

## 2019-03-08 NOTE — TELEPHONE ENCOUNTER
Patient unable to reschedule at this time, will call back when she knows a good date to go. Patient also states after taking the Meloxicam last night with a piece of bread, her stomach feels soar this morning. She is unsure if she will be able to continue taking this medication. She will try again after she eats and see how she feels, she will call back if symptoms worsen.

## 2019-03-13 ENCOUNTER — TELEPHONE (OUTPATIENT)
Dept: INTERNAL MEDICINE | Facility: CLINIC | Age: 59
End: 2019-03-13

## 2019-03-20 ENCOUNTER — TELEPHONE (OUTPATIENT)
Dept: INTERNAL MEDICINE | Facility: CLINIC | Age: 59
End: 2019-03-20

## 2019-03-20 NOTE — TELEPHONE ENCOUNTER
Patient states she needs a script  for a new wheelchair not part replacement  and one for [employer] for the jr lift pad    Please Advise  Thank You

## 2019-03-21 ENCOUNTER — TELEPHONE (OUTPATIENT)
Dept: INTERNAL MEDICINE | Facility: CLINIC | Age: 59
End: 2019-03-21

## 2019-03-21 NOTE — TELEPHONE ENCOUNTER
----- Message from Viviana Roblero sent at 3/21/2019  9:33 AM CDT -----  Contact: self/[phone]  Patient called in regards needing to talk with Dr Salazar medical assistant information on order for wheelchair. If there is any further information.  Please call and advise. Thank you

## 2019-03-21 NOTE — TELEPHONE ENCOUNTER
----- Message from Shayleehi Engle sent at 3/21/2019 12:18 PM CDT -----  Contact: Lara/ [employer]/ [phone]  [facility] states that they are calling to speak with someone in regards to some orders that were to be sent over for the pt's power wheelchair. She states that someone did go out and the pt was advised that by the chair being over 7 years old, the parts for the chair are no longer being made. Please call back and advise.      Thanks

## 2019-03-26 ENCOUNTER — TELEPHONE (OUTPATIENT)
Dept: WOUND CARE | Facility: CLINIC | Age: 59
End: 2019-03-26

## 2019-03-26 NOTE — TELEPHONE ENCOUNTER
Spoke with Nannette of [facility] who advised when HH nurse went out last week wounds were healed.  HH nurse went back on Monday and wounds were healed and therefore patient was discharged from their services on Monday, 3/25/19 according to Medicare guidelines.  Patient has a scheduled follow up appointment with this service on 4/5/19 at 1020am.

## 2019-03-28 ENCOUNTER — TELEPHONE (OUTPATIENT)
Dept: INTERNAL MEDICINE | Facility: CLINIC | Age: 59
End: 2019-03-28

## 2019-03-28 NOTE — TELEPHONE ENCOUNTER
----- Message from Radha Wagoner sent at 3/28/2019  2:29 PM CDT -----  Contact: Patient [phone]  [facility] has not received information for Hoya sling and wheelchair (motorized).    Needed: RX and supporting documentation(Notes and Order) Fax # [phone]    Please call and advise  Thank you

## 2019-04-02 ENCOUNTER — TELEPHONE (OUTPATIENT)
Dept: INTERNAL MEDICINE | Facility: CLINIC | Age: 59
End: 2019-04-02

## 2019-04-02 NOTE — TELEPHONE ENCOUNTER
Left a message for Jessika with [facility]. We have not recieved request after 03/25/19 .

## 2019-04-02 NOTE — TELEPHONE ENCOUNTER
----- Message from Jayden Blackwell sent at 4/2/2019  9:07 AM CDT -----  Contact: Turn / Jessika [phone]  She wants to know if you received her request for a prescription for a power wheel chair.  Please call and advise of the status.    Thank you

## 2019-05-07 ENCOUNTER — PATIENT MESSAGE (OUTPATIENT)
Dept: INTERNAL MEDICINE | Facility: CLINIC | Age: 59
End: 2019-05-07

## 2019-05-15 ENCOUNTER — TELEPHONE (OUTPATIENT)
Dept: INTERNAL MEDICINE | Facility: CLINIC | Age: 59
End: 2019-05-15

## 2019-05-15 RX ORDER — AMLODIPINE BESYLATE 2.5 MG/1
TABLET ORAL
Qty: 90 TABLET | Refills: 3 | Status: SHIPPED | OUTPATIENT
Start: 2019-05-15 | End: 2020-07-08

## 2019-05-16 RX ORDER — SULFAMETHOXAZOLE AND TRIMETHOPRIM 800; 160 MG/1; MG/1
1 TABLET ORAL 2 TIMES DAILY
Qty: 14 TABLET | Refills: 0 | Status: SHIPPED | OUTPATIENT
Start: 2019-05-16 | End: 2019-05-23

## 2019-05-21 ENCOUNTER — TELEPHONE (OUTPATIENT)
Dept: INTERNAL MEDICINE | Facility: CLINIC | Age: 59
End: 2019-05-21

## 2019-05-21 NOTE — TELEPHONE ENCOUNTER
----- Message from Anastacio Kolb sent at 5/21/2019  9:07 AM CDT -----  Contact: [facility]/[employer]/[phone]  Office is calling to speak with someone in the office in regards to getting home health orders for home health orders. Fax orders to [phone] Please call and advise.        Thank You

## 2019-05-22 ENCOUNTER — PATIENT MESSAGE (OUTPATIENT)
Dept: INTERNAL MEDICINE | Facility: CLINIC | Age: 59
End: 2019-05-22

## 2019-05-23 ENCOUNTER — TELEPHONE (OUTPATIENT)
Dept: INTERNAL MEDICINE | Facility: CLINIC | Age: 59
End: 2019-05-23

## 2019-05-23 DIAGNOSIS — S81.809A OPEN WOUND OF LOWER EXTREMITY, UNSPECIFIED LATERALITY, INITIAL ENCOUNTER: Primary | ICD-10-CM

## 2019-05-28 ENCOUNTER — TELEPHONE (OUTPATIENT)
Dept: INTERNAL MEDICINE | Facility: CLINIC | Age: 59
End: 2019-05-28

## 2019-05-28 NOTE — TELEPHONE ENCOUNTER
Spoke with Haleigh, faxed process notes to office----- Message from Cailin Paul sent at 5/28/2019 10:13 AM CDT -----  Contact: Haleigh/Barry [phone]  Needs to speak with you concerning the request for home health that was sent over.    Please call and advise.    Thank You

## 2019-05-29 ENCOUNTER — TELEPHONE (OUTPATIENT)
Dept: INTERNAL MEDICINE | Facility: CLINIC | Age: 59
End: 2019-05-29

## 2019-05-29 DIAGNOSIS — I83.008 VENOUS STASIS ULCER OF OTHER PART OF LOWER LEG, UNSPECIFIED LATERALITY, UNSPECIFIED ULCER STAGE, UNSPECIFIED WHETHER VARICOSE VEINS PRESENT: Primary | ICD-10-CM

## 2019-05-29 DIAGNOSIS — L97.809 VENOUS STASIS ULCER OF OTHER PART OF LOWER LEG, UNSPECIFIED LATERALITY, UNSPECIFIED ULCER STAGE, UNSPECIFIED WHETHER VARICOSE VEINS PRESENT: Primary | ICD-10-CM

## 2019-05-29 NOTE — TELEPHONE ENCOUNTER
Patient calls with wound/heel of foot requesting further wound care at home. Dr Salazar has recommended Home Health Wound Care prior and I am happy to reorder such today.  Given pt's Cerebral Palsy, skin breakdown/ulcers will be a chronic issue.  Rushe, please fax Home Health order printed to [facility].  Thanks

## 2019-05-30 ENCOUNTER — TELEPHONE (OUTPATIENT)
Dept: INTERNAL MEDICINE | Facility: CLINIC | Age: 59
End: 2019-05-30

## 2019-05-31 PROCEDURE — G0180 PR HOME HEALTH MD CERTIFICATION: ICD-10-PCS | Mod: ,,, | Performed by: INTERNAL MEDICINE

## 2019-05-31 PROCEDURE — G0180 MD CERTIFICATION HHA PATIENT: HCPCS | Mod: ,,, | Performed by: INTERNAL MEDICINE

## 2019-06-04 ENCOUNTER — TELEPHONE (OUTPATIENT)
Dept: INTERNAL MEDICINE | Facility: CLINIC | Age: 59
End: 2019-06-04

## 2019-06-04 NOTE — TELEPHONE ENCOUNTER
----- Message from Chilo Sweeney sent at 6/4/2019  3:01 PM CDT -----  Contact: Ale Occpational Therapist [phone]  Patient would like to get medical advice.    Comments: O.T. Calling stating patient has reading of 142/100 elevated blood pressure this morning, did take BP Rx, no other complaints, just wanted to inform the office. Ale Nunezpational Therapist [phone]    Please call an advise  Thank you

## 2019-06-25 ENCOUNTER — EXTERNAL HOME HEALTH (OUTPATIENT)
Dept: HOME HEALTH SERVICES | Facility: HOSPITAL | Age: 59
End: 2019-06-25
Payer: MEDICARE

## 2019-07-10 NOTE — TELEPHONE ENCOUNTER
----- Message from Torri Magaña sent at 7/10/2019 11:36 AM CDT -----  Contact: [phone]  Type: Rx    Name of medication(s): fluconazole (DIFLUCAN) 150 MG Tab    Is this a refill? New rx? refill    Who prescribed medication? [facility]    Pharmacy Name, Phone, & Location:  [facility]  [phone]    Comments:  Please advise, thank you

## 2019-07-11 RX ORDER — FLUCONAZOLE 50 MG/1
TABLET ORAL DAILY
OUTPATIENT
Start: 2019-07-11 | End: 2019-08-10

## 2019-07-11 RX ORDER — FLUCONAZOLE 150 MG/1
150 TABLET ORAL DAILY
Qty: 1 TABLET | Refills: 1 | Status: SHIPPED | OUTPATIENT
Start: 2019-07-11 | End: 2019-07-12

## 2019-07-11 NOTE — TELEPHONE ENCOUNTER
----- Message from Corina Steward sent at 7/11/2019 12:10 PM CDT -----  Contact: self/[phone]  Pt called in regards to the first message that she sent about a rx. pt wants to change the pharmacy. She would like it to go to [employer] [facility].      Please advise

## 2019-07-11 NOTE — TELEPHONE ENCOUNTER
----- Message from Radha Wagoner sent at 7/11/2019 11:59 AM CDT -----  Contact: [facility] [phone]  Patient is requesting to have an RX sent to [address] [phone] (Phone) [phone] (Fax) for vaginal itching, dark discharge. No fever.    Patient is requesting a call back    Please call and advise  Thank you

## 2019-07-31 ENCOUNTER — PATIENT MESSAGE (OUTPATIENT)
Dept: INTERNAL MEDICINE | Facility: CLINIC | Age: 59
End: 2019-07-31

## 2019-07-31 ENCOUNTER — TELEPHONE (OUTPATIENT)
Dept: INTERNAL MEDICINE | Facility: CLINIC | Age: 59
End: 2019-07-31

## 2019-07-31 DIAGNOSIS — R39.9 UTI SYMPTOMS: Primary | ICD-10-CM

## 2019-07-31 NOTE — TELEPHONE ENCOUNTER
Orders and appointment made for patient to drop off home collect urine today.     Please Advise  Thank You

## 2019-07-31 NOTE — TELEPHONE ENCOUNTER
----- Message from Cailin Paul sent at 7/31/2019 10:40 AM CDT -----  Contact: Patient [phone]  Patient is having burning when urinating. Wants to know if she can  a specimen cup.    Please call and advise.    Thank You

## 2019-08-01 ENCOUNTER — LAB VISIT (OUTPATIENT)
Dept: LAB | Facility: HOSPITAL | Age: 59
End: 2019-08-01
Attending: INTERNAL MEDICINE
Payer: MEDICARE

## 2019-08-01 DIAGNOSIS — R39.9 UTI SYMPTOMS: ICD-10-CM

## 2019-08-01 LAB
BACTERIA #/AREA URNS AUTO: ABNORMAL /HPF
BILIRUB UR QL STRIP: NEGATIVE
CLARITY UR REFRACT.AUTO: ABNORMAL
COLOR UR AUTO: YELLOW
GLUCOSE UR QL STRIP: NEGATIVE
HGB UR QL STRIP: ABNORMAL
KETONES UR QL STRIP: NEGATIVE
LEUKOCYTE ESTERASE UR QL STRIP: ABNORMAL
MICROSCOPIC COMMENT: ABNORMAL
NITRITE UR QL STRIP: POSITIVE
PH UR STRIP: 5 [PH] (ref 5–8)
PROT UR QL STRIP: NEGATIVE
RBC #/AREA URNS AUTO: 9 /HPF (ref 0–4)
SP GR UR STRIP: 1.01 (ref 1–1.03)
URN SPEC COLLECT METH UR: ABNORMAL
WBC #/AREA URNS AUTO: >100 /HPF (ref 0–5)

## 2019-08-01 PROCEDURE — 81001 URINALYSIS AUTO W/SCOPE: CPT

## 2019-08-01 PROCEDURE — 87077 CULTURE AEROBIC IDENTIFY: CPT

## 2019-08-01 PROCEDURE — 87086 URINE CULTURE/COLONY COUNT: CPT

## 2019-08-01 PROCEDURE — 87088 URINE BACTERIA CULTURE: CPT

## 2019-08-01 PROCEDURE — 87186 SC STD MICRODIL/AGAR DIL: CPT

## 2019-08-01 RX ORDER — FLUCONAZOLE 150 MG/1
150 TABLET ORAL DAILY
Qty: 1 TABLET | Refills: 1 | Status: SHIPPED | OUTPATIENT
Start: 2019-08-01 | End: 2019-08-02

## 2019-08-01 RX ORDER — AMOXICILLIN AND CLAVULANATE POTASSIUM 875; 125 MG/1; MG/1
TABLET, FILM COATED ORAL
Qty: 14 TABLET | Refills: 0 | Status: SHIPPED | OUTPATIENT
Start: 2019-08-01 | End: 2019-12-08

## 2019-08-03 ENCOUNTER — TELEPHONE (OUTPATIENT)
Dept: INTERNAL MEDICINE | Facility: CLINIC | Age: 59
End: 2019-08-03

## 2019-08-03 LAB — BACTERIA UR CULT: ABNORMAL

## 2019-08-03 RX ORDER — CIPROFLOXACIN 500 MG/1
500 TABLET ORAL EVERY 12 HOURS
Qty: 14 TABLET | Refills: 0 | Status: SHIPPED | OUTPATIENT
Start: 2019-08-03 | End: 2019-12-08

## 2019-08-11 RX ORDER — BACLOFEN 10 MG/1
TABLET ORAL
Qty: 90 TABLET | Refills: 1 | Status: SHIPPED | OUTPATIENT
Start: 2019-08-11 | End: 2019-12-08 | Stop reason: SDUPTHER

## 2019-08-13 ENCOUNTER — TELEPHONE (OUTPATIENT)
Dept: INTERNAL MEDICINE | Facility: CLINIC | Age: 59
End: 2019-08-13

## 2019-08-13 NOTE — TELEPHONE ENCOUNTER
----- Message from Viviana Roblero sent at 8/13/2019  9:29 AM CDT -----  Contact: Cher/[facility]/[phone]/ case # [id number]  Member id # [id number]  The request does not meet the criteria for expedite review and is pending. Please call and advise. Thank you!!!

## 2019-08-13 NOTE — TELEPHONE ENCOUNTER
[facility] has patient order for wheelchair and it is currently pending under review.

## 2019-08-19 ENCOUNTER — PATIENT MESSAGE (OUTPATIENT)
Dept: INTERNAL MEDICINE | Facility: CLINIC | Age: 59
End: 2019-08-19

## 2019-08-22 NOTE — TELEPHONE ENCOUNTER
----- Message from Radha Wagoner sent at 8/22/2019 12:30 PM CDT -----  Contact: Patient [phone]  Was order sent in for PT services?  Calling for status of the request.    Patient needs callback.    Having problems getting into the new Wheelchair    Please call and advise  Thank you

## 2019-08-29 NOTE — TELEPHONE ENCOUNTER
----- Message from Corina Steward sent at 8/29/2019 12:18 PM CDT -----  Contact: [facility]/tin/[phone] ref tin 8-29-19  Rep called in regard to checking the request for physical therapy orders. They would like them faxed to [phone].      Please advise

## 2019-09-09 ENCOUNTER — TELEPHONE (OUTPATIENT)
Dept: INTERNAL MEDICINE | Facility: CLINIC | Age: 59
End: 2019-09-09

## 2019-09-09 DIAGNOSIS — G80.9 CEREBRAL PALSY, UNSPECIFIED TYPE: ICD-10-CM

## 2019-09-09 DIAGNOSIS — M17.0 PRIMARY OSTEOARTHRITIS OF BOTH KNEES: Primary | ICD-10-CM

## 2019-09-09 NOTE — TELEPHONE ENCOUNTER
----- Message from Divya Engle sent at 9/9/2019  9:20 AM CDT -----  Contact: Monse/[facility]/[phone]  Patient would like P. T. Orders faxed to [facility] @ [phone]

## 2019-09-10 NOTE — TELEPHONE ENCOUNTER
----- Message from Jade Howe sent at 9/10/2019  9:47 AM CDT -----  Contact: Arianne Deleon @ [facility] Direct# [phone], fax# [phone]  Ms. Acuña Nurse at [facility] is calling in regards to her saying that she would like to put in order for physical therapy for the patient and she also need clinical notes.

## 2019-09-18 ENCOUNTER — TELEPHONE (OUTPATIENT)
Dept: INTERNAL MEDICINE | Facility: CLINIC | Age: 59
End: 2019-09-18

## 2019-09-18 NOTE — TELEPHONE ENCOUNTER
dura med going to home to help patient learn how to use power wheelchair. patient is needing to learning how to transfer from wheelchair to bed. Kezia is requesting home health to help patient and PT, due to patient being home bound and has trouble leaving the home.      [phone]

## 2019-09-18 NOTE — TELEPHONE ENCOUNTER
----- Message from Corina Steward sent at 9/18/2019 11:02 AM CDT -----  Contact: [facility]/sanjiv/[phone]  Rep called in regards to having questions about the pt therapy orders that was faxed over to them on  9-10-19.      Please advise

## 2019-10-15 ENCOUNTER — PATIENT MESSAGE (OUTPATIENT)
Dept: INTERNAL MEDICINE | Facility: CLINIC | Age: 59
End: 2019-10-15

## 2019-10-15 ENCOUNTER — TELEPHONE (OUTPATIENT)
Dept: INTERNAL MEDICINE | Facility: CLINIC | Age: 59
End: 2019-10-15

## 2019-10-15 NOTE — TELEPHONE ENCOUNTER
----- Message from Amanda Wu sent at 10/15/2019 12:37 PM CDT -----  Contact: [employer] [phone]  [employer] is requesting orders for a grab bar for a power wheel chair.  Please advise ,thanks   FAX# [phone].

## 2019-10-22 ENCOUNTER — PATIENT MESSAGE (OUTPATIENT)
Dept: INTERNAL MEDICINE | Facility: CLINIC | Age: 59
End: 2019-10-22

## 2019-10-22 NOTE — TELEPHONE ENCOUNTER
----- Message from Anastacio Kolb sent at 10/22/2019  8:58 AM CDT -----  Contact: Mary Lou/[employer]/[phone]  Pt is requesting a  grab bar for her power wheelchair . The office needs medical nessaciaty forms, clinical notes and doctors orders faxed over to [employer] at [phone]. Please call and advise.        Thank You

## 2019-10-23 ENCOUNTER — PATIENT MESSAGE (OUTPATIENT)
Dept: INTERNAL MEDICINE | Facility: CLINIC | Age: 59
End: 2019-10-23

## 2019-10-23 NOTE — TELEPHONE ENCOUNTER
----- Message from Divya Engle sent at 10/23/2019  2:02 PM CDT -----  Contact: self/ [phone]  Patient is returning a phone call.  Who left a message for the patient: Fina  Does patient know what this is regarding:    Comments: Please if no answer on phone please respond on my [facility].

## 2019-10-24 ENCOUNTER — PATIENT MESSAGE (OUTPATIENT)
Dept: INTERNAL MEDICINE | Facility: CLINIC | Age: 59
End: 2019-10-24

## 2019-10-24 NOTE — TELEPHONE ENCOUNTER
----- Message from Corina Steward sent at 10/24/2019  2:22 PM CDT -----  Contact: [facility]/leticia/[phone]   Rep called in regard to the pt is requesting additional items for her  power wheel chair. she says that her foot rest attachment is broken. They would needs repair orders/clinical notes/medical necessity form. Fax to  [phone].    Please advise

## 2019-10-25 ENCOUNTER — TELEPHONE (OUTPATIENT)
Dept: INTERNAL MEDICINE | Facility: CLINIC | Age: 59
End: 2019-10-25

## 2019-10-25 DIAGNOSIS — G80.9 CEREBRAL PALSY, UNSPECIFIED TYPE: Primary | ICD-10-CM

## 2019-10-25 NOTE — TELEPHONE ENCOUNTER
----- Message from Helen Calvo sent at 10/25/2019  9:49 AM CDT -----  Contact: [facility]  [phone]  Requesting modifications to patients Power Wheel Chair to add a foot rest and a grab bar.

## 2019-10-28 ENCOUNTER — TELEPHONE (OUTPATIENT)
Dept: SPINE | Facility: CLINIC | Age: 59
End: 2019-10-28

## 2019-10-28 NOTE — TELEPHONE ENCOUNTER
----- Message from Shahida Salcedo sent at 10/28/2019  1:46 PM CDT -----  Contact: CATY MELENDZE [[id number]]  Name of Who is Calling: CATY MELENDEZ [[id number]]    What is the request in detail: Patient is requesting to speak with provider in regards to her wheelchair. She states her wheelchair is not working properly and needs an order for a handle. Please contact to further discuss and advise.       Can the clinic reply by [facility]: no    What Number to [facility]: [phone]

## 2019-10-29 ENCOUNTER — PATIENT MESSAGE (OUTPATIENT)
Dept: INTERNAL MEDICINE | Facility: CLINIC | Age: 59
End: 2019-10-29

## 2019-10-29 NOTE — TELEPHONE ENCOUNTER
Cuco with jose, did power chair, they need transfer handles. Need order for Modifications/repair for power wheelchair, and  Clinical notes. Send to [facility]      Also  PT to come in to help patient learn how to transfer to wheel chair  correctly.

## 2019-10-31 ENCOUNTER — PATIENT MESSAGE (OUTPATIENT)
Dept: INTERNAL MEDICINE | Facility: CLINIC | Age: 59
End: 2019-10-31

## 2019-11-04 NOTE — TELEPHONE ENCOUNTER
"----- Message from Chilo Sweeney sent at 11/4/2019  3:32 PM CST -----  [phone]  Patient would like to get medical advice.    Comments: [facility] calling regarding,  The repair to the power wheel chair, the orders are needing to read "  Modification to the power wheel chair" . [facility] [phone]    Please call an advise  Thank you    "

## 2019-12-06 ENCOUNTER — TELEPHONE (OUTPATIENT)
Dept: INTERNAL MEDICINE | Facility: CLINIC | Age: 59
End: 2019-12-06

## 2019-12-06 NOTE — TELEPHONE ENCOUNTER
----- Message from Chilo Sweeney sent at 12/6/2019 10:58 AM CST -----  Contact: Patient [phone]  Patient would like to get medical advice.    Pharmacy name and phone #: [facility] [address] [phone] (Phone) [phone] (Fax)    Comments: stating is having problems with UTI and drainage on left leg, also scratchy throat, would like to know if something could be sent to local pharmacy. Call to inform.    Please call an advise  Thank you

## 2019-12-08 ENCOUNTER — TELEPHONE (OUTPATIENT)
Dept: INTERNAL MEDICINE | Facility: CLINIC | Age: 59
End: 2019-12-08

## 2019-12-08 DIAGNOSIS — I83.899 VARICOSE VEINS OF LEG WITH COMPLICATIONS: ICD-10-CM

## 2019-12-08 DIAGNOSIS — G80.9 CEREBRAL PALSY, UNSPECIFIED TYPE: Primary | ICD-10-CM

## 2019-12-08 RX ORDER — FLUCONAZOLE 150 MG/1
150 TABLET ORAL DAILY
Qty: 1 TABLET | Refills: 1 | Status: SHIPPED | OUTPATIENT
Start: 2019-12-08 | End: 2019-12-09

## 2019-12-08 RX ORDER — BACLOFEN 10 MG/1
TABLET ORAL
Qty: 90 TABLET | Refills: 1 | Status: SHIPPED | OUTPATIENT
Start: 2019-12-08 | End: 2021-01-06 | Stop reason: SDUPTHER

## 2019-12-08 RX ORDER — NITROFURANTOIN 25; 75 MG/1; MG/1
100 CAPSULE ORAL 2 TIMES DAILY
Qty: 14 CAPSULE | Refills: 0 | Status: SHIPPED | OUTPATIENT
Start: 2019-12-08 | End: 2020-09-24

## 2019-12-08 NOTE — TELEPHONE ENCOUNTER
Pt denies fever or chills.  SHe has burning with urination - no cough or SOB, respiratory symptoms improved.  Still getting wheelchair repaired.  Needs home health.  Will send macrobid and diflucan to pharm.  Refill baclofen.  U/A C&S  for lack of resolution of symptoms.  Please fax [facility] orders to [facility] - thanks.

## 2019-12-10 ENCOUNTER — TELEPHONE (OUTPATIENT)
Dept: INTERNAL MEDICINE | Facility: CLINIC | Age: 59
End: 2019-12-10

## 2019-12-10 NOTE — TELEPHONE ENCOUNTER
I spoke to patient she now has an open wound on the back of her  L ankle that needs wound care in order to heal.  This has been a wound that closes and re- opens chronically due to her scraping it on her wheelchair.  She cannot come in as her wheelchair is broken and she has no other transportation.

## 2019-12-10 NOTE — TELEPHONE ENCOUNTER
----- Message from Cailin Paul sent at 12/10/2019  3:11 PM CST -----  Contact: [facility] [phone]  Received orders for wound care, home health and PT. Requesting to speak with you to answer additional questions.    Please call and advise.    Thank You

## 2019-12-11 ENCOUNTER — TELEPHONE (OUTPATIENT)
Dept: INTERNAL MEDICINE | Facility: CLINIC | Age: 59
End: 2019-12-11

## 2019-12-11 NOTE — TELEPHONE ENCOUNTER
Angely called to askiif it was ok to use a different home health company because [facility] can not accept patient

## 2019-12-11 NOTE — TELEPHONE ENCOUNTER
----- Message from Kristy Lorenzana sent at 12/11/2019 11:58 AM CST -----  Contact: [facility] [phone]  Requesting a call concerning this patient.    Please call and advise.    Thank You

## 2019-12-12 PROCEDURE — G0180 PR HOME HEALTH MD CERTIFICATION: ICD-10-PCS | Mod: ,,, | Performed by: INTERNAL MEDICINE

## 2019-12-12 PROCEDURE — G0180 MD CERTIFICATION HHA PATIENT: HCPCS | Mod: ,,, | Performed by: INTERNAL MEDICINE

## 2019-12-20 ENCOUNTER — PATIENT MESSAGE (OUTPATIENT)
Dept: INTERNAL MEDICINE | Facility: CLINIC | Age: 59
End: 2019-12-20

## 2019-12-23 ENCOUNTER — EXTERNAL HOME HEALTH (OUTPATIENT)
Dept: HOME HEALTH SERVICES | Facility: HOSPITAL | Age: 59
End: 2019-12-23
Payer: MEDICARE

## 2019-12-26 ENCOUNTER — PATIENT OUTREACH (OUTPATIENT)
Dept: ADMINISTRATIVE | Facility: HOSPITAL | Age: 59
End: 2019-12-26

## 2020-01-15 ENCOUNTER — TELEPHONE (OUTPATIENT)
Dept: INTERNAL MEDICINE | Facility: CLINIC | Age: 60
End: 2020-01-15

## 2020-01-15 NOTE — TELEPHONE ENCOUNTER
----- Message from Helen Calvo sent at 1/15/2020  9:35 AM CST -----  Contact: Self  [phone]  She's requesting an Ace Wrap for her leg to be left at the .

## 2020-01-16 ENCOUNTER — TELEPHONE (OUTPATIENT)
Dept: INTERNAL MEDICINE | Facility: CLINIC | Age: 60
End: 2020-01-16

## 2020-01-17 ENCOUNTER — PATIENT OUTREACH (OUTPATIENT)
Dept: ADMINISTRATIVE | Facility: HOSPITAL | Age: 60
End: 2020-01-17

## 2020-01-23 ENCOUNTER — TELEPHONE (OUTPATIENT)
Dept: INTERNAL MEDICINE | Facility: CLINIC | Age: 60
End: 2020-01-23

## 2020-01-24 ENCOUNTER — PATIENT OUTREACH (OUTPATIENT)
Dept: ADMINISTRATIVE | Facility: HOSPITAL | Age: 60
End: 2020-01-24

## 2020-01-27 ENCOUNTER — TELEPHONE (OUTPATIENT)
Dept: HOME HEALTH SERVICES | Facility: HOSPITAL | Age: 60
End: 2020-01-27

## 2020-02-10 PROCEDURE — G0179 PR HOME HEALTH MD RECERTIFICATION: ICD-10-PCS | Mod: ,,, | Performed by: INTERNAL MEDICINE

## 2020-02-10 PROCEDURE — G0179 MD RECERTIFICATION HHA PT: HCPCS | Mod: ,,, | Performed by: INTERNAL MEDICINE

## 2020-02-11 ENCOUNTER — PATIENT MESSAGE (OUTPATIENT)
Dept: INTERNAL MEDICINE | Facility: CLINIC | Age: 60
End: 2020-02-11

## 2020-02-19 ENCOUNTER — EXTERNAL HOME HEALTH (OUTPATIENT)
Dept: HOME HEALTH SERVICES | Facility: HOSPITAL | Age: 60
End: 2020-02-19
Payer: MEDICARE

## 2020-02-21 ENCOUNTER — TELEPHONE (OUTPATIENT)
Dept: INTERNAL MEDICINE | Facility: CLINIC | Age: 60
End: 2020-02-21

## 2020-02-21 NOTE — TELEPHONE ENCOUNTER
----- Message from Radha Wagoner sent at 2/20/2020  4:34 PM CST -----  Contact: Joe Wagner [phone]  Type: Home Health (orders, updates, clarifications, etc.)    Home Health Agency/Nurse: [facility]    Phone number: [phone]    Reason for call: Patients wound has scabbed over and there es no drainage.  They have ppicture is you need to see.  How to procede.        Comments:

## 2020-06-22 ENCOUNTER — TELEPHONE (OUTPATIENT)
Dept: PODIATRY | Facility: CLINIC | Age: 60
End: 2020-06-22

## 2020-06-22 NOTE — TELEPHONE ENCOUNTER
----- Message from Kevon Coker sent at 6/22/2020  4:16 PM CDT -----  Contact: pt  Please call pt at [phone]    Patient would like to change her appt time for 07/07/2020 (need an earlier time about 1030 or 11 am)    Thank you

## 2020-06-29 ENCOUNTER — TELEPHONE (OUTPATIENT)
Dept: INTERNAL MEDICINE | Facility: CLINIC | Age: 60
End: 2020-06-29

## 2020-06-29 NOTE — TELEPHONE ENCOUNTER
----- Message from Kristy Lorenzana sent at 6/29/2020  2:55 PM CDT -----  Contact: [employer] [phone]  Patient is requesting a tray for her power wheelchair. Please fax orders and clinical notes to [phone].    Please call and advise.    Thank You

## 2020-07-06 ENCOUNTER — PATIENT OUTREACH (OUTPATIENT)
Dept: ADMINISTRATIVE | Facility: OTHER | Age: 60
End: 2020-07-06

## 2020-07-06 DIAGNOSIS — Z12.31 BREAST CANCER SCREENING BY MAMMOGRAM: Primary | ICD-10-CM

## 2020-07-07 ENCOUNTER — OFFICE VISIT (OUTPATIENT)
Dept: PODIATRY | Facility: CLINIC | Age: 60
End: 2020-07-07
Payer: MEDICARE

## 2020-07-07 VITALS
HEART RATE: 83 BPM | RESPIRATION RATE: 18 BRPM | DIASTOLIC BLOOD PRESSURE: 96 MMHG | HEIGHT: 60 IN | BODY MASS INDEX: 41.03 KG/M2 | WEIGHT: 209 LBS | SYSTOLIC BLOOD PRESSURE: 171 MMHG

## 2020-07-07 DIAGNOSIS — G80.9 CEREBRAL PALSY, UNSPECIFIED TYPE: Primary | ICD-10-CM

## 2020-07-07 DIAGNOSIS — L60.9 DISEASE OF NAIL: ICD-10-CM

## 2020-07-07 PROCEDURE — 3008F BODY MASS INDEX DOCD: CPT | Mod: CPTII,S$GLB,, | Performed by: PODIATRIST

## 2020-07-07 PROCEDURE — 3077F PR MOST RECENT SYSTOLIC BLOOD PRESSURE >= 140 MM HG: ICD-10-PCS | Mod: CPTII,S$GLB,, | Performed by: PODIATRIST

## 2020-07-07 PROCEDURE — 99999 PR PBB SHADOW E&M-EST. PATIENT-LVL IV: CPT | Mod: PBBFAC,,, | Performed by: PODIATRIST

## 2020-07-07 PROCEDURE — 3080F DIAST BP >= 90 MM HG: CPT | Mod: CPTII,S$GLB,, | Performed by: PODIATRIST

## 2020-07-07 PROCEDURE — 99499 RISK ADDL DX/OHS AUDIT: ICD-10-PCS | Mod: S$GLB,,, | Performed by: PODIATRIST

## 2020-07-07 PROCEDURE — 3077F SYST BP >= 140 MM HG: CPT | Mod: CPTII,S$GLB,, | Performed by: PODIATRIST

## 2020-07-07 PROCEDURE — 11721 DEBRIDE NAIL 6 OR MORE: CPT | Mod: GY,Q9,S$GLB, | Performed by: PODIATRIST

## 2020-07-07 PROCEDURE — 99999 PR PBB SHADOW E&M-EST. PATIENT-LVL IV: ICD-10-PCS | Mod: PBBFAC,,, | Performed by: PODIATRIST

## 2020-07-07 PROCEDURE — 11721 PR DEBRIDEMENT OF NAILS, 6 OR MORE: ICD-10-PCS | Mod: GY,Q9,S$GLB, | Performed by: PODIATRIST

## 2020-07-07 PROCEDURE — 3080F PR MOST RECENT DIASTOLIC BLOOD PRESSURE >= 90 MM HG: ICD-10-PCS | Mod: CPTII,S$GLB,, | Performed by: PODIATRIST

## 2020-07-07 PROCEDURE — 99203 PR OFFICE/OUTPT VISIT, NEW, LEVL III, 30-44 MIN: ICD-10-PCS | Mod: 25,S$GLB,, | Performed by: PODIATRIST

## 2020-07-07 PROCEDURE — 99203 OFFICE O/P NEW LOW 30 MIN: CPT | Mod: 25,S$GLB,, | Performed by: PODIATRIST

## 2020-07-07 PROCEDURE — 3008F PR BODY MASS INDEX (BMI) DOCUMENTED: ICD-10-PCS | Mod: CPTII,S$GLB,, | Performed by: PODIATRIST

## 2020-07-07 PROCEDURE — 99499 UNLISTED E&M SERVICE: CPT | Mod: S$GLB,,, | Performed by: PODIATRIST

## 2020-07-12 NOTE — PROGRESS NOTES
Subjective:      Patient ID: Naga Benavidez is a 60 y.o. female.    Chief Complaint: Routine Foot Care, Nail Problem, Nail Care, and Toe Pain    Naga is a 60 y.o. female who presents to the clinic for evaluation and treatment of high risk feet. Naga has a past medical history of Anemia, Arthritis, Asthma, Cerebral palsy, DJD (degenerative joint disease), Hyperlipidemia, Hypertension, Neuromuscular disorder, Obstructive sleep apnea, and Uterine fibroids affecting pregnancy. The patient's chief complaint is long, thick toenails. This patient has documented high risk feet requiring routine maintenance secondary to CP and those secondary complications , as mentioned..    PCP: Kathrin Salazar MD    Date Last Seen by PCP:   Chief Complaint   Patient presents with    Routine Foot Care    Nail Problem    Nail Care    Toe Pain       Last encounter in this department: Visit date not found    Hemoglobin A1C   Date Value Ref Range Status   09/13/2017 5.3 4.0 - 5.6 % Final     Comment:     According to ADA guidelines, hemoglobin A1c <7.0% represents  optimal control in non-pregnant diabetic patients. Different  metrics may apply to specific patient populations.   Standards of Medical Care in Diabetes-2016.  For the purpose of screening for the presence of diabetes:  <5.7%     Consistent with the absence of diabetes  5.7-6.4%  Consistent with increasing risk for diabetes   (prediabetes)  >or=6.5%  Consistent with diabetes  Currently, no consensus exists for use of hemoglobin A1c  for diagnosis of diabetes for children.  This Hemoglobin A1c assay has significant interference with fetal   hemoglobin   (HbF). The results are invalid for patients with abnormal amounts of   HbF,   including those with known Hereditary Persistence   of Fetal Hemoglobin. Heterozygous hemoglobin variants (HbAS, HbAC,   HbAD, HbAE, HbA2) do not significantly interfere with this assay;   however, presence of multiple variants in a sample  may impact the %   interference.     03/22/2016 5.5 4.5 - 6.2 % Final   03/20/2014 5.4 4.5 - 6.2 % Final       Review of Systems   Constitution: Negative for chills, decreased appetite and fever.   Cardiovascular: Negative for leg swelling.   Skin: Positive for nail changes.   Musculoskeletal: Negative for arthritis, joint pain, joint swelling and myalgias.   Gastrointestinal: Negative for nausea and vomiting.   Neurological: Negative for loss of balance, numbness and paresthesias.           Objective:      Physical Exam  Constitutional:       Appearance: She is well-developed.   Cardiovascular:      Comments: Dorsalis pedis and posterior tibial pulses are diminished Bilaterally. Toes are cool to touch. Feet are warm proximally.There is decreased digital hair. Skin is atrophic, slightly hyperpigmented, and mildly edematous      Musculoskeletal: Normal range of motion.         General: No tenderness.      Comments:      Skin:     General: Skin is warm and dry.      Findings: No ecchymosis, erythema or lesion.      Comments: Nails x10 are elongated by  5-12mm's, thickened by 3-9 mm's, dystrophic, and are yellow in  coloration . Xerosis Bilaterally. No open lesions noted.       Neurological:      Mental Status: She is alert and oriented to person, place, and time.      Comments: [place] 5.07 monofilamant testing is diminished Bob feet. Sharp/dull sensation diminished Bilaterally. Light touch absent Bilaterally.       Psychiatric:         Behavior: Behavior normal.               Assessment:       Encounter Diagnoses   Name Primary?    Cerebral palsy, unspecified type Yes    Disease of nail          Plan:       Naga was seen today for routine foot care, nail problem, nail care and toe pain.    Diagnoses and all orders for this visit:    Cerebral palsy, unspecified type    Disease of nail      I counseled the patient on her conditions, their implications and medical management.        - Shoe inspection.  Patient instructed on proper foot hygeine. We discussed wearing proper shoe gear, daily foot inspections, never walking without protective shoe gear, never putting sharp instruments to feet, routine podiatric nail visits every 2-3 months.      - With patient's permission, nails were aggressively reduced and debrided x 10 to their soft tissue attachment mechanically and with electric , removing all offending nail and debris. Patient relates relief following the procedure. She will continue to monitor the areas daily, inspect her feet, wear protective shoe gear when ambulatory, moisturizer to maintain skin integrity and follow in this office in approximately 2-3 months, sooner p.r.n.

## 2020-08-17 ENCOUNTER — TELEPHONE (OUTPATIENT)
Dept: INTERNAL MEDICINE | Facility: CLINIC | Age: 60
End: 2020-08-17

## 2020-08-17 DIAGNOSIS — M79.605 LEG PAIN, DIFFUSE, LEFT: Primary | ICD-10-CM

## 2020-08-17 NOTE — TELEPHONE ENCOUNTER
Please schedule 1. Xray of L foot                              2.   u/s of left leg                               3.  MMG for Thursday 8/20/20                               4.   please add her in for an appt in person the middle of September

## 2020-08-20 ENCOUNTER — HOSPITAL ENCOUNTER (OUTPATIENT)
Dept: RADIOLOGY | Facility: HOSPITAL | Age: 60
Discharge: HOME | End: 2020-08-20
Attending: INTERNAL MEDICINE
Payer: MEDICARE

## 2020-08-20 ENCOUNTER — TELEPHONE (OUTPATIENT)
Dept: INTERNAL MEDICINE | Facility: CLINIC | Age: 60
End: 2020-08-20

## 2020-08-20 DIAGNOSIS — M79.605 LEG PAIN, DIFFUSE, LEFT: ICD-10-CM

## 2020-08-20 DIAGNOSIS — Z12.31 BREAST CANCER SCREENING BY MAMMOGRAM: ICD-10-CM

## 2020-08-20 PROCEDURE — 77067 MAMMO DIGITAL SCREENING BILAT WITH TOMOSYNTHESIS_CAD: ICD-10-PCS | Mod: 26,,, | Performed by: RADIOLOGY

## 2020-08-20 PROCEDURE — 93971 EXTREMITY STUDY: CPT | Mod: TC,LT

## 2020-08-20 PROCEDURE — 77067 SCR MAMMO BI INCL CAD: CPT | Mod: 26,,, | Performed by: RADIOLOGY

## 2020-08-20 PROCEDURE — 93971 EXTREMITY STUDY: CPT | Mod: 26,LT,, | Performed by: RADIOLOGY

## 2020-08-20 PROCEDURE — 73630 X-RAY EXAM OF FOOT: CPT | Mod: TC,LT

## 2020-08-20 PROCEDURE — 77063 BREAST TOMOSYNTHESIS BI: CPT | Mod: 26,,, | Performed by: RADIOLOGY

## 2020-08-20 PROCEDURE — 77067 SCR MAMMO BI INCL CAD: CPT | Mod: TC

## 2020-08-20 PROCEDURE — 73630 X-RAY EXAM OF FOOT: CPT | Mod: 26,LT,, | Performed by: RADIOLOGY

## 2020-08-20 PROCEDURE — 77063 MAMMO DIGITAL SCREENING BILAT WITH TOMOSYNTHESIS_CAD: ICD-10-PCS | Mod: 26,,, | Performed by: RADIOLOGY

## 2020-08-20 PROCEDURE — 93971 US LOWER EXTREMITY VEINS LEFT: ICD-10-PCS | Mod: 26,LT,, | Performed by: RADIOLOGY

## 2020-08-20 PROCEDURE — 73630 XR FOOT COMPLETE 3 VIEW LEFT: ICD-10-PCS | Mod: 26,LT,, | Performed by: RADIOLOGY

## 2020-08-20 RX ORDER — GABAPENTIN 100 MG/1
CAPSULE ORAL
Qty: 60 CAPSULE | Refills: 6 | Status: SHIPPED | OUTPATIENT
Start: 2020-08-20 | End: 2021-10-14

## 2020-09-10 ENCOUNTER — PATIENT OUTREACH (OUTPATIENT)
Dept: ADMINISTRATIVE | Facility: HOSPITAL | Age: 60
End: 2020-09-10

## 2020-09-10 DIAGNOSIS — E78.5 HYPERLIPIDEMIA, UNSPECIFIED HYPERLIPIDEMIA TYPE: Primary | ICD-10-CM

## 2020-09-17 ENCOUNTER — PATIENT MESSAGE (OUTPATIENT)
Dept: INTERNAL MEDICINE | Facility: CLINIC | Age: 60
End: 2020-09-17

## 2020-09-24 ENCOUNTER — OFFICE VISIT (OUTPATIENT)
Dept: INTERNAL MEDICINE | Facility: CLINIC | Age: 60
End: 2020-09-24
Payer: MEDICARE

## 2020-09-24 ENCOUNTER — LAB VISIT (OUTPATIENT)
Dept: LAB | Facility: HOSPITAL | Age: 60
End: 2020-09-24
Attending: INTERNAL MEDICINE
Payer: MEDICARE

## 2020-09-24 ENCOUNTER — IMMUNIZATION (OUTPATIENT)
Dept: INTERNAL MEDICINE | Facility: CLINIC | Age: 60
End: 2020-09-24
Payer: MEDICARE

## 2020-09-24 ENCOUNTER — TELEPHONE (OUTPATIENT)
Dept: INTERNAL MEDICINE | Facility: CLINIC | Age: 60
End: 2020-09-24

## 2020-09-24 DIAGNOSIS — I10 HYPERTENSION, UNSPECIFIED TYPE: Primary | ICD-10-CM

## 2020-09-24 DIAGNOSIS — R06.00 DYSPNEA, UNSPECIFIED TYPE: ICD-10-CM

## 2020-09-24 DIAGNOSIS — R53.83 FATIGUE, UNSPECIFIED TYPE: ICD-10-CM

## 2020-09-24 DIAGNOSIS — I10 HYPERTENSION, UNSPECIFIED TYPE: ICD-10-CM

## 2020-09-24 DIAGNOSIS — M54.9 DORSALGIA, UNSPECIFIED: ICD-10-CM

## 2020-09-24 DIAGNOSIS — G80.9 CEREBRAL PALSY, UNSPECIFIED TYPE: ICD-10-CM

## 2020-09-24 DIAGNOSIS — Z01.419 NORMAL GYNECOLOGIC EXAMINATION: ICD-10-CM

## 2020-09-24 DIAGNOSIS — E78.5 HYPERLIPIDEMIA, UNSPECIFIED HYPERLIPIDEMIA TYPE: Primary | ICD-10-CM

## 2020-09-24 DIAGNOSIS — E55.9 MILD VITAMIN D DEFICIENCY: ICD-10-CM

## 2020-09-24 DIAGNOSIS — R73.9 HYPERGLYCEMIA: ICD-10-CM

## 2020-09-24 DIAGNOSIS — H91.90 HEARING LOSS, UNSPECIFIED HEARING LOSS TYPE, UNSPECIFIED LATERALITY: ICD-10-CM

## 2020-09-24 DIAGNOSIS — M81.0 OSTEOPOROSIS, UNSPECIFIED OSTEOPOROSIS TYPE, UNSPECIFIED PATHOLOGICAL FRACTURE PRESENCE: ICD-10-CM

## 2020-09-24 DIAGNOSIS — J01.90 ACUTE SINUSITIS, RECURRENCE NOT SPECIFIED, UNSPECIFIED LOCATION: ICD-10-CM

## 2020-09-24 DIAGNOSIS — Z01.00 ROUTINE EYE EXAM: ICD-10-CM

## 2020-09-24 DIAGNOSIS — R91.1 SOLITARY PULMONARY NODULE: ICD-10-CM

## 2020-09-24 DIAGNOSIS — M54.9 BACK PAIN, UNSPECIFIED BACK LOCATION, UNSPECIFIED BACK PAIN LATERALITY, UNSPECIFIED CHRONICITY: ICD-10-CM

## 2020-09-24 DIAGNOSIS — N39.0 URINARY TRACT INFECTION WITHOUT HEMATURIA, SITE UNSPECIFIED: ICD-10-CM

## 2020-09-24 DIAGNOSIS — E78.5 HYPERLIPIDEMIA, UNSPECIFIED HYPERLIPIDEMIA TYPE: ICD-10-CM

## 2020-09-24 LAB
25(OH)D3+25(OH)D2 SERPL-MCNC: 33 NG/ML (ref 30–96)
ALBUMIN SERPL BCP-MCNC: 4 G/DL (ref 3.5–5.2)
ALP SERPL-CCNC: 111 U/L (ref 55–135)
ALT SERPL W/O P-5'-P-CCNC: 22 U/L (ref 10–44)
ANION GAP SERPL CALC-SCNC: 9 MMOL/L (ref 8–16)
AST SERPL-CCNC: 21 U/L (ref 10–40)
BASOPHILS # BLD AUTO: 0.03 K/UL (ref 0–0.2)
BASOPHILS NFR BLD: 0.8 % (ref 0–1.9)
BILIRUB SERPL-MCNC: 0.6 MG/DL (ref 0.1–1)
BUN SERPL-MCNC: 18 MG/DL (ref 8–23)
CALCIUM SERPL-MCNC: 9.6 MG/DL (ref 8.7–10.5)
CHLORIDE SERPL-SCNC: 107 MMOL/L (ref 95–110)
CHOLEST SERPL-MCNC: 204 MG/DL (ref 120–199)
CHOLEST/HDLC SERPL: 3.1 {RATIO} (ref 2–5)
CO2 SERPL-SCNC: 24 MMOL/L (ref 23–29)
CREAT SERPL-MCNC: 0.6 MG/DL (ref 0.5–1.4)
CRP SERPL-MCNC: 1 MG/L (ref 0–8.2)
DIFFERENTIAL METHOD: ABNORMAL
EOSINOPHIL # BLD AUTO: 0.1 K/UL (ref 0–0.5)
EOSINOPHIL NFR BLD: 1.5 % (ref 0–8)
ERYTHROCYTE [DISTWIDTH] IN BLOOD BY AUTOMATED COUNT: 13.5 % (ref 11.5–14.5)
ERYTHROCYTE [SEDIMENTATION RATE] IN BLOOD BY WESTERGREN METHOD: 42 MM/HR (ref 0–36)
EST. GFR  (AFRICAN AMERICAN): >60 ML/MIN/1.73 M^2
EST. GFR  (NON AFRICAN AMERICAN): >60 ML/MIN/1.73 M^2
ESTIMATED AVG GLUCOSE: 105 MG/DL (ref 68–131)
GLUCOSE SERPL-MCNC: 84 MG/DL (ref 70–110)
HBA1C MFR BLD HPLC: 5.3 % (ref 4–5.6)
HCT VFR BLD AUTO: 42.9 % (ref 37–48.5)
HDLC SERPL-MCNC: 65 MG/DL (ref 40–75)
HDLC SERPL: 31.9 % (ref 20–50)
HGB BLD-MCNC: 13 G/DL (ref 12–16)
IMM GRANULOCYTES # BLD AUTO: 0.01 K/UL (ref 0–0.04)
IMM GRANULOCYTES NFR BLD AUTO: 0.3 % (ref 0–0.5)
LDLC SERPL CALC-MCNC: 131 MG/DL (ref 63–159)
LYMPHOCYTES # BLD AUTO: 1 K/UL (ref 1–4.8)
LYMPHOCYTES NFR BLD: 25.8 % (ref 18–48)
MCH RBC QN AUTO: 28.1 PG (ref 27–31)
MCHC RBC AUTO-ENTMCNC: 30.3 G/DL (ref 32–36)
MCV RBC AUTO: 93 FL (ref 82–98)
MONOCYTES # BLD AUTO: 0.2 K/UL (ref 0.3–1)
MONOCYTES NFR BLD: 5.9 % (ref 4–15)
NEUTROPHILS # BLD AUTO: 2.6 K/UL (ref 1.8–7.7)
NEUTROPHILS NFR BLD: 65.7 % (ref 38–73)
NONHDLC SERPL-MCNC: 139 MG/DL
NRBC BLD-RTO: 0 /100 WBC
PLATELET # BLD AUTO: 176 K/UL (ref 150–350)
PMV BLD AUTO: 12.6 FL (ref 9.2–12.9)
POTASSIUM SERPL-SCNC: 3.6 MMOL/L (ref 3.5–5.1)
PROT SERPL-MCNC: 7.5 G/DL (ref 6–8.4)
RBC # BLD AUTO: 4.63 M/UL (ref 4–5.4)
SODIUM SERPL-SCNC: 140 MMOL/L (ref 136–145)
TRIGL SERPL-MCNC: 40 MG/DL (ref 30–150)
TSH SERPL DL<=0.005 MIU/L-ACNC: 0.64 UIU/ML (ref 0.4–4)
WBC # BLD AUTO: 3.88 K/UL (ref 3.9–12.7)

## 2020-09-24 PROCEDURE — 99499 RISK ADDL DX/OHS AUDIT: ICD-10-PCS | Mod: S$GLB,,, | Performed by: INTERNAL MEDICINE

## 2020-09-24 PROCEDURE — 85652 RBC SED RATE AUTOMATED: CPT

## 2020-09-24 PROCEDURE — 3078F DIAST BP <80 MM HG: CPT | Mod: CPTII,S$GLB,, | Performed by: INTERNAL MEDICINE

## 2020-09-24 PROCEDURE — 3078F PR MOST RECENT DIASTOLIC BLOOD PRESSURE < 80 MM HG: ICD-10-PCS | Mod: CPTII,S$GLB,, | Performed by: INTERNAL MEDICINE

## 2020-09-24 PROCEDURE — 3008F BODY MASS INDEX DOCD: CPT | Mod: CPTII,S$GLB,, | Performed by: INTERNAL MEDICINE

## 2020-09-24 PROCEDURE — G0008 FLU VACCINE (QUAD) GREATER THAN OR EQUAL TO 3YO PRESERVATIVE FREE IM: ICD-10-PCS | Mod: S$GLB,,, | Performed by: INTERNAL MEDICINE

## 2020-09-24 PROCEDURE — 83036 HEMOGLOBIN GLYCOSYLATED A1C: CPT

## 2020-09-24 PROCEDURE — 99499 UNLISTED E&M SERVICE: CPT | Mod: S$GLB,,, | Performed by: INTERNAL MEDICINE

## 2020-09-24 PROCEDURE — 3077F PR MOST RECENT SYSTOLIC BLOOD PRESSURE >= 140 MM HG: ICD-10-PCS | Mod: CPTII,S$GLB,, | Performed by: INTERNAL MEDICINE

## 2020-09-24 PROCEDURE — 80053 COMPREHEN METABOLIC PANEL: CPT

## 2020-09-24 PROCEDURE — 36415 COLL VENOUS BLD VENIPUNCTURE: CPT

## 2020-09-24 PROCEDURE — 86140 C-REACTIVE PROTEIN: CPT

## 2020-09-24 PROCEDURE — G0008 ADMIN INFLUENZA VIRUS VAC: HCPCS | Mod: S$GLB,,, | Performed by: INTERNAL MEDICINE

## 2020-09-24 PROCEDURE — 99999 PR PBB SHADOW E&M-EST. PATIENT-LVL V: CPT | Mod: PBBFAC,,, | Performed by: INTERNAL MEDICINE

## 2020-09-24 PROCEDURE — 99999 PR PBB SHADOW E&M-EST. PATIENT-LVL V: ICD-10-PCS | Mod: PBBFAC,,, | Performed by: INTERNAL MEDICINE

## 2020-09-24 PROCEDURE — 90686 IIV4 VACC NO PRSV 0.5 ML IM: CPT | Mod: S$GLB,,, | Performed by: INTERNAL MEDICINE

## 2020-09-24 PROCEDURE — 99214 PR OFFICE/OUTPT VISIT, EST, LEVL IV, 30-39 MIN: ICD-10-PCS | Mod: 25,S$GLB,, | Performed by: INTERNAL MEDICINE

## 2020-09-24 PROCEDURE — 85025 COMPLETE CBC W/AUTO DIFF WBC: CPT

## 2020-09-24 PROCEDURE — 99214 OFFICE O/P EST MOD 30 MIN: CPT | Mod: 25,S$GLB,, | Performed by: INTERNAL MEDICINE

## 2020-09-24 PROCEDURE — 84443 ASSAY THYROID STIM HORMONE: CPT

## 2020-09-24 PROCEDURE — 3077F SYST BP >= 140 MM HG: CPT | Mod: CPTII,S$GLB,, | Performed by: INTERNAL MEDICINE

## 2020-09-24 PROCEDURE — 82306 VITAMIN D 25 HYDROXY: CPT

## 2020-09-24 PROCEDURE — 3008F PR BODY MASS INDEX (BMI) DOCUMENTED: ICD-10-PCS | Mod: CPTII,S$GLB,, | Performed by: INTERNAL MEDICINE

## 2020-09-24 PROCEDURE — 90686 FLU VACCINE (QUAD) GREATER THAN OR EQUAL TO 3YO PRESERVATIVE FREE IM: ICD-10-PCS | Mod: S$GLB,,, | Performed by: INTERNAL MEDICINE

## 2020-09-24 PROCEDURE — 80061 LIPID PANEL: CPT

## 2020-09-24 RX ORDER — AMOXICILLIN AND CLAVULANATE POTASSIUM 875; 125 MG/1; MG/1
1 TABLET, FILM COATED ORAL 2 TIMES DAILY
Qty: 20 TABLET | Refills: 0 | Status: SHIPPED | OUTPATIENT
Start: 2020-09-24 | End: 2020-10-04

## 2020-09-24 RX ORDER — FLUCONAZOLE 150 MG/1
150 TABLET ORAL DAILY
Qty: 1 TABLET | Refills: 1 | Status: SHIPPED | OUTPATIENT
Start: 2020-09-24 | End: 2020-09-25

## 2020-09-24 RX ORDER — AMLODIPINE BESYLATE 5 MG/1
5 TABLET ORAL DAILY
Qty: 90 TABLET | Refills: 3 | Status: SHIPPED | OUTPATIENT
Start: 2020-09-24 | End: 2021-06-10

## 2020-09-24 RX ORDER — FLUTICASONE PROPIONATE 50 MCG
2 SPRAY, SUSPENSION (ML) NASAL DAILY
Qty: 16 G | Refills: 6 | Status: SHIPPED | OUTPATIENT
Start: 2020-09-24 | End: 2023-04-06 | Stop reason: SDUPTHER

## 2020-09-24 NOTE — PROGRESS NOTES
Subjective:       Patient ID: Naga Benavidez is a 60 y.o. female.    Chief Complaint: Follow-up    HPIPt's mother passed away.  She is here and did lab and urine.  No CP but occasionally dyspnea.  SHe has lost some weight - feels like her back and hips are going in two different directions.    Review of Systems   Constitutional: Positive for unexpected weight change. Negative for activity change.   HENT: Positive for hearing loss. Negative for rhinorrhea and trouble swallowing.    Eyes: Negative for discharge and visual disturbance.   Respiratory: Negative for chest tightness and wheezing.    Cardiovascular: Negative for chest pain and palpitations.   Gastrointestinal: Positive for constipation. Negative for blood in stool, diarrhea and vomiting.   Endocrine: Negative for polydipsia and polyuria.   Genitourinary: Positive for dysuria. Negative for difficulty urinating, hematuria and menstrual problem.   Musculoskeletal: Positive for arthralgias. Negative for joint swelling and neck pain.   Neurological: Negative for weakness and headaches.   Psychiatric/Behavioral: Negative for confusion and dysphoric mood.       Objective:      Physical Exam  Constitutional:       General: She is not in acute distress.     Appearance: She is well-developed.   HENT:      Head: Normocephalic.   Neck:      Musculoskeletal: Neck supple.      Thyroid: No thyromegaly.      Vascular: No JVD.   Cardiovascular:      Rate and Rhythm: Normal rate and regular rhythm.      Heart sounds: Normal heart sounds. No murmur. No friction rub. No gallop.    Pulmonary:      Effort: Pulmonary effort is normal.      Breath sounds: Normal breath sounds. No wheezing or rales.   Abdominal:      General: Bowel sounds are normal. There is no distension.      Palpations: Abdomen is soft. There is no mass.      Tenderness: There is no abdominal tenderness. There is no guarding or rebound.   Genitourinary:     Rectum: Guaiac result negative.    Musculoskeletal:      Comments: Significant leg deformity - no open wounds - no significant edema   Lymphadenopathy:      Cervical: No cervical adenopathy.   Skin:     General: Skin is warm and dry.   Neurological:      Mental Status: She is alert and oriented to person, place, and time.      Deep Tendon Reflexes: Reflexes are normal and symmetric.   Psychiatric:         Behavior: Behavior normal.         Thought Content: Thought content normal.         Judgment: Judgment normal.         Assessment:       1. Hypertension, unspecified type    2. Urinary tract infection without hematuria, site unspecified    3. Solitary pulmonary nodule    4. Dorsalgia, unspecified    5. Acute sinusitis, recurrence not specified, unspecified location    6. Dyspnea, unspecified type    7. Back pain, unspecified back location, unspecified back pain laterality, unspecified chronicity    8. Normal gynecologic examination    9. Osteoporosis, unspecified osteoporosis type, unspecified pathological fracture presence    10. Cerebral palsy, unspecified type    11. Hearing loss, unspecified hearing loss type, unspecified laterality    12. Routine eye exam        Plan:   Hypertension, unspecified type  -     Urinalysis  -     Microalbumin/creatinine urine ratio  Controlled - continue current meds    Urinary tract infection without hematuria, site unspecified  -     Urine culture    Solitary pulmonary nodule  -     CT Chest Without Contrast; Future; Expected date: 09/24/2020    Dorsalgia, unspecified  -     X-Ray Lumbar Spine Ap And Lateral; Future; Expected date: 09/24/2020    Acute sinusitis, recurrence not specified, unspecified location  -     CT Sinuses without Contrast; Future; Expected date: 09/24/2020  Treat with augmentin  Dyspnea, unspecified type  -     Echo Color Flow Doppler? Yes; Bubble Contrast? No    Back pain, unspecified back location, unspecified back pain laterality, unspecified chronicity  -     X-Ray Thoracic Spine AP  Lateral; Future; Expected date: 09/24/2020  -     X-Ray Hips Bilateral 2 View Incl AP Pelvis; Future; Expected date: 09/24/2020    Normal gynecologic examination  -     Ambulatory referral/consult to Gynecology; Future; Expected date: 10/01/2020    Osteoporosis, unspecified osteoporosis type, unspecified pathological fracture presence  -     DXA Bone Density Spine And Hip; Future; Expected date: 09/24/2020    Cerebral palsy, unspecified type  -     BATH/SHOWER CHAIR FOR HOME USE  -     WHEELCHAIR FOR HOME USE    Hearing loss, unspecified hearing loss type, unspecified laterality  -     Comprehensive audiogram; Future    Routine eye exam  -     Ambulatory referral/consult to Optometry; Future; Expected date: 10/01/2020    Other orders  -     amLODIPine (NORVASC) 5 MG tablet; Take 1 tablet (5 mg total) by mouth once daily.  Dispense: 90 tablet; Refill: 3  -     fluticasone propionate (FLONASE) 50 mcg/actuation nasal spray; 2 sprays (100 mcg total) by Each Nostril route once daily.  Dispense: 16 g; Refill: 6  -     amoxicillin-clavulanate 875-125mg (AUGMENTIN) 875-125 mg per tablet; Take 1 tablet by mouth 2 (two) times daily. for 10 days  Dispense: 20 tablet; Refill: 0  -     fluconazole (DIFLUCAN) 150 MG Tab; Take 1 tablet (150 mg total) by mouth once daily. for 1 day  Dispense: 1 tablet; Refill: 1    Note written for power source for wheelchair

## 2020-09-27 VITALS
HEART RATE: 64 BPM | DIASTOLIC BLOOD PRESSURE: 70 MMHG | OXYGEN SATURATION: 99 % | SYSTOLIC BLOOD PRESSURE: 140 MMHG | TEMPERATURE: 98 F | HEIGHT: 60 IN | WEIGHT: 209 LBS | BODY MASS INDEX: 41.03 KG/M2

## 2020-09-27 NOTE — PROGRESS NOTES
Patient, Naga Benavidez (MRN #[id number]), presented with a recorded BMI of 40.82 kg/m^2 consistent with the definition of morbid obesity (ICD-10 E66.01). The patient's morbid obesity was monitored, evaluated, addressed and/or treated. This addendum to the medical record is made on 09/27/2020.

## 2020-09-30 ENCOUNTER — HOSPITAL ENCOUNTER (OUTPATIENT)
Dept: RADIOLOGY | Facility: HOSPITAL | Age: 60
Discharge: HOME | End: 2020-09-30
Attending: INTERNAL MEDICINE
Payer: MEDICARE

## 2020-09-30 DIAGNOSIS — M54.9 BACK PAIN, UNSPECIFIED BACK LOCATION, UNSPECIFIED BACK PAIN LATERALITY, UNSPECIFIED CHRONICITY: ICD-10-CM

## 2020-09-30 DIAGNOSIS — J01.90 ACUTE SINUSITIS, RECURRENCE NOT SPECIFIED, UNSPECIFIED LOCATION: ICD-10-CM

## 2020-09-30 DIAGNOSIS — R91.1 SOLITARY PULMONARY NODULE: ICD-10-CM

## 2020-09-30 DIAGNOSIS — M54.9 DORSALGIA, UNSPECIFIED: ICD-10-CM

## 2020-09-30 PROCEDURE — 71250 CT CHEST WITHOUT CONTRAST: ICD-10-PCS | Mod: 26,,, | Performed by: RADIOLOGY

## 2020-09-30 PROCEDURE — 72100 X-RAY EXAM L-S SPINE 2/3 VWS: CPT | Mod: TC

## 2020-09-30 PROCEDURE — 70486 CT SINUSES WITHOUT CONTRAST: ICD-10-PCS | Mod: 26,,, | Performed by: RADIOLOGY

## 2020-09-30 PROCEDURE — 72070 X-RAY EXAM THORAC SPINE 2VWS: CPT | Mod: 26,,, | Performed by: RADIOLOGY

## 2020-09-30 PROCEDURE — 72100 XR LUMBAR SPINE AP AND LATERAL: ICD-10-PCS | Mod: 26,,, | Performed by: RADIOLOGY

## 2020-09-30 PROCEDURE — 70486 CT MAXILLOFACIAL W/O DYE: CPT | Mod: TC

## 2020-09-30 PROCEDURE — 72070 XR THORACIC SPINE AP LATERAL: ICD-10-PCS | Mod: 26,,, | Performed by: RADIOLOGY

## 2020-09-30 PROCEDURE — 72100 X-RAY EXAM L-S SPINE 2/3 VWS: CPT | Mod: 26,,, | Performed by: RADIOLOGY

## 2020-09-30 PROCEDURE — 70486 CT MAXILLOFACIAL W/O DYE: CPT | Mod: 26,,, | Performed by: RADIOLOGY

## 2020-09-30 PROCEDURE — 72070 X-RAY EXAM THORAC SPINE 2VWS: CPT | Mod: TC

## 2020-09-30 PROCEDURE — 73521 X-RAY EXAM HIPS BI 2 VIEWS: CPT | Mod: TC

## 2020-09-30 PROCEDURE — 73521 XR HIPS BILATERAL 2 VIEW INCL AP PELVIS: ICD-10-PCS | Mod: 26,,, | Performed by: RADIOLOGY

## 2020-09-30 PROCEDURE — 73521 X-RAY EXAM HIPS BI 2 VIEWS: CPT | Mod: 26,,, | Performed by: RADIOLOGY

## 2020-09-30 PROCEDURE — 71250 CT THORAX DX C-: CPT | Mod: 26,,, | Performed by: RADIOLOGY

## 2020-09-30 PROCEDURE — 71250 CT THORAX DX C-: CPT | Mod: TC

## 2020-10-05 ENCOUNTER — PATIENT MESSAGE (OUTPATIENT)
Dept: INTERNAL MEDICINE | Facility: CLINIC | Age: 60
End: 2020-10-05

## 2020-10-06 ENCOUNTER — PATIENT MESSAGE (OUTPATIENT)
Dept: INTERNAL MEDICINE | Facility: CLINIC | Age: 60
End: 2020-10-06

## 2020-10-07 ENCOUNTER — PATIENT MESSAGE (OUTPATIENT)
Dept: ADMINISTRATIVE | Facility: HOSPITAL | Age: 60
End: 2020-10-07

## 2020-10-07 ENCOUNTER — TELEPHONE (OUTPATIENT)
Dept: INTERNAL MEDICINE | Facility: CLINIC | Age: 60
End: 2020-10-07

## 2020-10-07 NOTE — TELEPHONE ENCOUNTER
----- Message from Roge Toro sent at 10/7/2020  8:44 AM CDT -----  Contact: Self- [phone]  Pt has issues with the echo scheduled for today because they dont have a lift to assist her on to the table. Please call to advise if the doctor still wants her to proceed.

## 2020-10-20 ENCOUNTER — CLINICAL SUPPORT (OUTPATIENT)
Dept: AUDIOLOGY | Facility: CLINIC | Age: 60
End: 2020-10-20
Payer: MEDICARE

## 2020-10-20 DIAGNOSIS — H90.3 SENSORINEURAL HEARING LOSS, BILATERAL: Primary | ICD-10-CM

## 2020-10-20 DIAGNOSIS — H91.90 HEARING LOSS, UNSPECIFIED HEARING LOSS TYPE, UNSPECIFIED LATERALITY: ICD-10-CM

## 2020-10-20 PROCEDURE — 92567 TYMPANOMETRY: CPT | Mod: S$GLB,,, | Performed by: PHYSICIAN ASSISTANT

## 2020-10-20 PROCEDURE — 99999 PR PBB SHADOW E&M-EST. PATIENT-LVL I: ICD-10-PCS | Mod: PBBFAC,,, | Performed by: PHYSICIAN ASSISTANT

## 2020-10-20 PROCEDURE — 92557 PR COMPREHENSIVE HEARING TEST: ICD-10-PCS | Mod: S$GLB,,, | Performed by: PHYSICIAN ASSISTANT

## 2020-10-20 PROCEDURE — 99999 PR PBB SHADOW E&M-EST. PATIENT-LVL I: CPT | Mod: PBBFAC,,, | Performed by: PHYSICIAN ASSISTANT

## 2020-10-20 PROCEDURE — 92557 COMPREHENSIVE HEARING TEST: CPT | Mod: S$GLB,,, | Performed by: PHYSICIAN ASSISTANT

## 2020-10-20 PROCEDURE — 92567 PR TYMPA2METRY: ICD-10-PCS | Mod: S$GLB,,, | Performed by: PHYSICIAN ASSISTANT

## 2020-10-20 NOTE — PROGRESS NOTES
Naga Benavidez was seen today in the clinic for an audiologic evaluation.  Patients main complaint was hearing loss.    Audiogram results revealed a bilateral, moderate sensorineural hearing loss from 250-2000 Hz and essentially normal hearing from 4442-1158 Hz.  Seech reception thresholds were noted at 35 dB in the right ear and 35 dB in the left ear.  Speech discrimination scores were 92% in the right ear and 92% in the left ear. Tympanometry revealed Type A in the right ear and Type A in the left ear.      Recommendations:  1. Otologic evaluation  2. Annual audiogram  3. Noise protection when in noise  4. Hearing aid consultation             
normal sinus rhythm/lbbb,firts degree

## 2020-10-23 ENCOUNTER — HOSPITAL ENCOUNTER (OUTPATIENT)
Dept: CARDIOLOGY | Facility: HOSPITAL | Age: 60
Discharge: HOME | End: 2020-10-23
Attending: INTERNAL MEDICINE
Payer: MEDICARE

## 2020-10-23 VITALS
HEART RATE: 70 BPM | HEIGHT: 60 IN | SYSTOLIC BLOOD PRESSURE: 138 MMHG | DIASTOLIC BLOOD PRESSURE: 70 MMHG | BODY MASS INDEX: 41.23 KG/M2 | WEIGHT: 210 LBS

## 2020-10-23 LAB
ASCENDING AORTA: 2.74 CM
AV INDEX (PROSTH): 0.78
AV MEAN GRADIENT: 3 MMHG
AV PEAK GRADIENT: 5 MMHG
AV VALVE AREA: 2.92 CM2
AV VELOCITY RATIO: 0.82
BSA FOR ECHO PROCEDURE: 2.01 M2
CV ECHO LV RWT: 0.63 CM
DOP CALC AO PEAK VEL: 1.17 M/S
DOP CALC AO VTI: 24.56 CM
DOP CALC LVOT AREA: 3.8 CM2
DOP CALC LVOT DIAMETER: 2.19 CM
DOP CALC LVOT PEAK VEL: 0.96 M/S
DOP CALC LVOT STROKE VOLUME: 71.76 CM3
DOP CALCLVOT PEAK VEL VTI: 19.06 CM
E WAVE DECELERATION TIME: 187.66 MSEC
E/A RATIO: 0.87
E/E' RATIO: 11.27 M/S
ECHO LV POSTERIOR WALL: 1.1 CM (ref 0.6–1.1)
FRACTIONAL SHORTENING: 35 % (ref 28–44)
INTERVENTRICULAR SEPTUM: 1.2 CM (ref 0.6–1.1)
LA MAJOR: 3.76 CM
LA MINOR: 3.66 CM
LA WIDTH: 3.35 CM
LEFT ATRIUM SIZE: 3.23 CM
LEFT ATRIUM VOLUME INDEX: 17.9 ML/M2
LEFT ATRIUM VOLUME: 34.12 CM3
LEFT INTERNAL DIMENSION IN SYSTOLE: 2.29 CM (ref 2.1–4)
LEFT VENTRICLE DIASTOLIC VOLUME INDEX: 22.99 ML/M2
LEFT VENTRICLE DIASTOLIC VOLUME: 43.81 ML
LEFT VENTRICLE MASS INDEX: 67 G/M2
LEFT VENTRICLE SYSTOLIC VOLUME INDEX: 9.4 ML/M2
LEFT VENTRICLE SYSTOLIC VOLUME: 17.9 ML
LEFT VENTRICULAR INTERNAL DIMENSION IN DIASTOLE: 3.5 CM (ref 3.5–6)
LEFT VENTRICULAR MASS: 127.26 G
LV LATERAL E/E' RATIO: 10.33 M/S
LV SEPTAL E/E' RATIO: 12.4 M/S
MV PEAK A VEL: 0.71 M/S
MV PEAK E VEL: 0.62 M/S
MV STENOSIS PRESSURE HALF TIME: 54.42 MS
MV VALVE AREA P 1/2 METHOD: 4.04 CM2
PISA TR MAX VEL: 2.09 M/S
PULM VEIN S/D RATIO: 0.97
PV PEAK D VEL: 0.34 M/S
PV PEAK S VEL: 0.33 M/S
RA MAJOR: 3.99 CM
RA PRESSURE: 8 MMHG
RA WIDTH: 2.84 CM
RIGHT VENTRICULAR END-DIASTOLIC DIMENSION: 2.85 CM
RV TISSUE DOPPLER FREE WALL SYSTOLIC VELOCITY 1 (APICAL 4 CHAMBER VIEW): 9.86 CM/S
SINUS: 2.35 CM
STJ: 2.33 CM
TDI LATERAL: 0.06 M/S
TDI SEPTAL: 0.05 M/S
TDI: 0.06 M/S
TR MAX PG: 17 MMHG
TV REST PULMONARY ARTERY PRESSURE: 25 MMHG

## 2020-10-23 PROCEDURE — 93306 ECHO (CUPID ONLY): ICD-10-PCS | Mod: 26,,, | Performed by: INTERNAL MEDICINE

## 2020-10-23 PROCEDURE — 93306 TTE W/DOPPLER COMPLETE: CPT | Mod: 26,,, | Performed by: INTERNAL MEDICINE

## 2020-10-23 PROCEDURE — 93306 TTE W/DOPPLER COMPLETE: CPT

## 2020-11-19 ENCOUNTER — CLINICAL SUPPORT (OUTPATIENT)
Dept: AUDIOLOGY | Facility: CLINIC | Age: 60
End: 2020-11-19
Payer: MEDICARE

## 2020-11-19 DIAGNOSIS — H90.3 SENSORINEURAL HEARING LOSS, BILATERAL: Primary | ICD-10-CM

## 2020-11-19 PROCEDURE — 99499 UNLISTED E&M SERVICE: CPT | Mod: S$GLB,,, | Performed by: AUDIOLOGIST

## 2020-11-19 PROCEDURE — 99499 NO LOS: ICD-10-PCS | Mod: S$GLB,,, | Performed by: AUDIOLOGIST

## 2020-11-19 NOTE — PROGRESS NOTES
HEARING AID CONSULT:    Naga Benavidez was seen in the clinic today for a hearing aid consultation.      Pricing and styles were discussed. Two hearing aids were recommended. Naga Benavidez was interested in two [place] M50 hearing aids in [place].  Naga Benavidez would like to think about her options. She will call the clinic if she decides to purchase hearing aids. Mrs. Benavidez would like to contact her insurance company first to determine if she has any hearing aid benefits to utilize.    RECOMMENDATIONS:  Return or call if Mrs. Benavidez would like to purchase hearing aids

## 2020-12-11 ENCOUNTER — PATIENT MESSAGE (OUTPATIENT)
Dept: OTHER | Facility: OTHER | Age: 60
End: 2020-12-11

## 2020-12-12 ENCOUNTER — PATIENT MESSAGE (OUTPATIENT)
Dept: ADMINISTRATIVE | Facility: OTHER | Age: 60
End: 2020-12-12

## 2020-12-15 ENCOUNTER — PATIENT MESSAGE (OUTPATIENT)
Dept: ADMINISTRATIVE | Facility: OTHER | Age: 60
End: 2020-12-15

## 2020-12-17 ENCOUNTER — PATIENT MESSAGE (OUTPATIENT)
Dept: INTERNAL MEDICINE | Facility: CLINIC | Age: 60
End: 2020-12-17

## 2020-12-22 ENCOUNTER — PATIENT OUTREACH (OUTPATIENT)
Dept: ADMINISTRATIVE | Facility: HOSPITAL | Age: 60
End: 2020-12-22

## 2021-01-04 ENCOUNTER — PATIENT MESSAGE (OUTPATIENT)
Dept: ADMINISTRATIVE | Facility: HOSPITAL | Age: 61
End: 2021-01-04

## 2021-01-05 ENCOUNTER — OFFICE VISIT (OUTPATIENT)
Dept: INTERNAL MEDICINE | Facility: CLINIC | Age: 61
End: 2021-01-05
Payer: MEDICARE

## 2021-01-05 DIAGNOSIS — M62.838 MUSCLE SPASMS OF BOTH LOWER EXTREMITIES: ICD-10-CM

## 2021-01-05 DIAGNOSIS — J32.9 SINUSITIS, UNSPECIFIED CHRONICITY, UNSPECIFIED LOCATION: Primary | ICD-10-CM

## 2021-01-05 PROCEDURE — 99442 PR PHYSICIAN TELEPHONE EVALUATION 11-20 MIN: ICD-10-PCS | Mod: 95,,, | Performed by: INTERNAL MEDICINE

## 2021-01-05 PROCEDURE — 99442 PR PHYSICIAN TELEPHONE EVALUATION 11-20 MIN: CPT | Mod: 95,,, | Performed by: INTERNAL MEDICINE

## 2021-01-06 RX ORDER — BACLOFEN 10 MG/1
TABLET ORAL
Qty: 90 TABLET | Refills: 1 | Status: SHIPPED | OUTPATIENT
Start: 2021-01-06 | End: 2022-02-01

## 2021-01-06 RX ORDER — DOXYCYCLINE HYCLATE 100 MG
TABLET ORAL
Qty: 20 TABLET | Refills: 0 | Status: SHIPPED | OUTPATIENT
Start: 2021-01-06 | End: 2021-06-10

## 2021-02-22 ENCOUNTER — PATIENT MESSAGE (OUTPATIENT)
Dept: INTERNAL MEDICINE | Facility: CLINIC | Age: 61
End: 2021-02-22

## 2021-03-05 RX ORDER — FLUCONAZOLE 150 MG/1
TABLET ORAL
COMMUNITY
Start: 2020-12-18 | End: 2021-03-05 | Stop reason: SDUPTHER

## 2021-03-05 RX ORDER — FLUCONAZOLE 150 MG/1
TABLET ORAL
Qty: 1 TABLET | Refills: 1 | Status: SHIPPED | OUTPATIENT
Start: 2021-03-05 | End: 2021-05-21 | Stop reason: SDUPTHER

## 2021-03-15 ENCOUNTER — TELEPHONE (OUTPATIENT)
Dept: INTERNAL MEDICINE | Facility: CLINIC | Age: 61
End: 2021-03-15

## 2021-03-15 ENCOUNTER — PATIENT MESSAGE (OUTPATIENT)
Dept: INTERNAL MEDICINE | Facility: CLINIC | Age: 61
End: 2021-03-15

## 2021-03-15 RX ORDER — DOXYCYCLINE HYCLATE 100 MG
TABLET ORAL
Qty: 14 TABLET | Refills: 0 | Status: SHIPPED | OUTPATIENT
Start: 2021-03-15 | End: 2021-06-10

## 2021-04-05 ENCOUNTER — PATIENT MESSAGE (OUTPATIENT)
Dept: ADMINISTRATIVE | Facility: HOSPITAL | Age: 61
End: 2021-04-05

## 2021-04-13 ENCOUNTER — PATIENT MESSAGE (OUTPATIENT)
Dept: INTERNAL MEDICINE | Facility: CLINIC | Age: 61
End: 2021-04-13

## 2021-04-21 ENCOUNTER — PATIENT MESSAGE (OUTPATIENT)
Dept: INTERNAL MEDICINE | Facility: CLINIC | Age: 61
End: 2021-04-21

## 2021-04-22 ENCOUNTER — TELEPHONE (OUTPATIENT)
Dept: SPINE | Facility: CLINIC | Age: 61
End: 2021-04-22

## 2021-05-21 RX ORDER — FLUCONAZOLE 150 MG/1
TABLET ORAL
Qty: 1 TABLET | Refills: 1 | Status: SHIPPED | OUTPATIENT
Start: 2021-05-21 | End: 2021-08-26

## 2021-05-31 ENCOUNTER — PATIENT OUTREACH (OUTPATIENT)
Dept: ADMINISTRATIVE | Facility: HOSPITAL | Age: 61
End: 2021-05-31

## 2021-06-10 ENCOUNTER — HOSPITAL ENCOUNTER (OUTPATIENT)
Dept: RADIOLOGY | Facility: HOSPITAL | Age: 61
Discharge: HOME | End: 2021-06-10
Attending: INTERNAL MEDICINE
Payer: MEDICARE

## 2021-06-10 ENCOUNTER — OFFICE VISIT (OUTPATIENT)
Dept: INTERNAL MEDICINE | Facility: CLINIC | Age: 61
End: 2021-06-10
Payer: MEDICARE

## 2021-06-10 VITALS
WEIGHT: 154.31 LBS | HEART RATE: 80 BPM | HEIGHT: 60 IN | OXYGEN SATURATION: 99 % | BODY MASS INDEX: 30.29 KG/M2 | DIASTOLIC BLOOD PRESSURE: 76 MMHG | SYSTOLIC BLOOD PRESSURE: 120 MMHG

## 2021-06-10 DIAGNOSIS — E78.5 HYPERLIPIDEMIA, UNSPECIFIED HYPERLIPIDEMIA TYPE: ICD-10-CM

## 2021-06-10 DIAGNOSIS — G89.29 CHRONIC PAIN OF BOTH SHOULDERS: ICD-10-CM

## 2021-06-10 DIAGNOSIS — E55.9 MILD VITAMIN D DEFICIENCY: ICD-10-CM

## 2021-06-10 DIAGNOSIS — I73.9 PVD (PERIPHERAL VASCULAR DISEASE): ICD-10-CM

## 2021-06-10 DIAGNOSIS — M25.512 CHRONIC PAIN OF BOTH SHOULDERS: ICD-10-CM

## 2021-06-10 DIAGNOSIS — M25.511 CHRONIC PAIN OF BOTH SHOULDERS: ICD-10-CM

## 2021-06-10 DIAGNOSIS — Z12.11 SCREENING FOR COLON CANCER: ICD-10-CM

## 2021-06-10 DIAGNOSIS — G80.9 CEREBRAL PALSY, UNSPECIFIED TYPE: ICD-10-CM

## 2021-06-10 DIAGNOSIS — R73.9 HYPERGLYCEMIA: ICD-10-CM

## 2021-06-10 DIAGNOSIS — R53.81 DEBILITY: ICD-10-CM

## 2021-06-10 DIAGNOSIS — Z12.31 SCREENING MAMMOGRAM, ENCOUNTER FOR: ICD-10-CM

## 2021-06-10 DIAGNOSIS — D51.3 OTHER DIETARY VITAMIN B12 DEFICIENCY ANEMIA: ICD-10-CM

## 2021-06-10 DIAGNOSIS — N39.0 URINARY TRACT INFECTION WITHOUT HEMATURIA, SITE UNSPECIFIED: ICD-10-CM

## 2021-06-10 DIAGNOSIS — M17.10 ARTHRITIS OF KNEE: Primary | ICD-10-CM

## 2021-06-10 DIAGNOSIS — I10 HYPERTENSION, UNSPECIFIED TYPE: ICD-10-CM

## 2021-06-10 PROCEDURE — 99999 PR PBB SHADOW E&M-EST. PATIENT-LVL V: ICD-10-PCS | Mod: PBBFAC,,, | Performed by: INTERNAL MEDICINE

## 2021-06-10 PROCEDURE — 99214 OFFICE O/P EST MOD 30 MIN: CPT | Mod: S$GLB,,, | Performed by: INTERNAL MEDICINE

## 2021-06-10 PROCEDURE — 99499 UNLISTED E&M SERVICE: CPT | Mod: S$GLB,,, | Performed by: INTERNAL MEDICINE

## 2021-06-10 PROCEDURE — 3008F BODY MASS INDEX DOCD: CPT | Mod: CPTII,S$GLB,, | Performed by: INTERNAL MEDICINE

## 2021-06-10 PROCEDURE — 73030 XR SHOULDER COMPLETE 2 OR MORE VIEWS BILATERAL: ICD-10-PCS | Mod: 26,50,, | Performed by: RADIOLOGY

## 2021-06-10 PROCEDURE — 99214 PR OFFICE/OUTPT VISIT, EST, LEVL IV, 30-39 MIN: ICD-10-PCS | Mod: S$GLB,,, | Performed by: INTERNAL MEDICINE

## 2021-06-10 PROCEDURE — 99999 PR PBB SHADOW E&M-EST. PATIENT-LVL V: CPT | Mod: PBBFAC,,, | Performed by: INTERNAL MEDICINE

## 2021-06-10 PROCEDURE — 73030 X-RAY EXAM OF SHOULDER: CPT | Mod: TC,50

## 2021-06-10 PROCEDURE — 3008F PR BODY MASS INDEX (BMI) DOCUMENTED: ICD-10-PCS | Mod: CPTII,S$GLB,, | Performed by: INTERNAL MEDICINE

## 2021-06-10 PROCEDURE — 73030 X-RAY EXAM OF SHOULDER: CPT | Mod: 26,50,, | Performed by: RADIOLOGY

## 2021-06-10 PROCEDURE — 99499 RISK ADDL DX/OHS AUDIT: ICD-10-PCS | Mod: S$GLB,,, | Performed by: INTERNAL MEDICINE

## 2021-06-10 RX ORDER — AMLODIPINE BESYLATE 2.5 MG/1
2.5 TABLET ORAL DAILY
Qty: 90 TABLET | Refills: 3 | Status: SHIPPED | OUTPATIENT
Start: 2021-06-10 | End: 2021-10-07

## 2021-06-11 ENCOUNTER — TELEPHONE (OUTPATIENT)
Dept: INTERNAL MEDICINE | Facility: CLINIC | Age: 61
End: 2021-06-11

## 2021-06-16 ENCOUNTER — LAB VISIT (OUTPATIENT)
Dept: LAB | Facility: HOSPITAL | Age: 61
End: 2021-06-16
Attending: INTERNAL MEDICINE
Payer: MEDICARE

## 2021-06-16 DIAGNOSIS — N39.0 URINARY TRACT INFECTION WITHOUT HEMATURIA, SITE UNSPECIFIED: ICD-10-CM

## 2021-06-16 DIAGNOSIS — I10 HYPERTENSION, UNSPECIFIED TYPE: ICD-10-CM

## 2021-06-16 LAB
BILIRUB UR QL STRIP: NEGATIVE
CLARITY UR REFRACT.AUTO: CLEAR
COLOR UR AUTO: YELLOW
GLUCOSE UR QL STRIP: NEGATIVE
HGB UR QL STRIP: NEGATIVE
KETONES UR QL STRIP: NEGATIVE
LEUKOCYTE ESTERASE UR QL STRIP: NEGATIVE
NITRITE UR QL STRIP: NEGATIVE
PH UR STRIP: 6 [PH] (ref 5–8)
PROT UR QL STRIP: NEGATIVE
SP GR UR STRIP: 1.01 (ref 1–1.03)
URN SPEC COLLECT METH UR: NORMAL

## 2021-06-16 PROCEDURE — 87086 URINE CULTURE/COLONY COUNT: CPT | Performed by: INTERNAL MEDICINE

## 2021-06-16 PROCEDURE — 82570 ASSAY OF URINE CREATININE: CPT | Performed by: INTERNAL MEDICINE

## 2021-06-16 PROCEDURE — 82043 UR ALBUMIN QUANTITATIVE: CPT | Performed by: INTERNAL MEDICINE

## 2021-06-16 PROCEDURE — 81003 URINALYSIS AUTO W/O SCOPE: CPT | Performed by: INTERNAL MEDICINE

## 2021-06-17 LAB
ALBUMIN/CREAT UR: 35 UG/MG (ref 0–30)
CREAT UR-MCNC: 60 MG/DL (ref 15–325)
MICROALBUMIN UR DL<=1MG/L-MCNC: 21 UG/ML

## 2021-06-18 LAB — BACTERIA UR CULT: NORMAL

## 2021-06-23 ENCOUNTER — PATIENT MESSAGE (OUTPATIENT)
Dept: PODIATRY | Facility: CLINIC | Age: 61
End: 2021-06-23

## 2021-06-24 ENCOUNTER — LAB VISIT (OUTPATIENT)
Dept: LAB | Facility: HOSPITAL | Age: 61
End: 2021-06-24
Attending: INTERNAL MEDICINE
Payer: MEDICARE

## 2021-06-24 DIAGNOSIS — Z12.11 SCREENING FOR COLON CANCER: ICD-10-CM

## 2021-06-24 PROCEDURE — 82274 ASSAY TEST FOR BLOOD FECAL: CPT | Performed by: INTERNAL MEDICINE

## 2021-06-28 ENCOUNTER — TELEPHONE (OUTPATIENT)
Dept: ORTHOPEDICS | Facility: CLINIC | Age: 61
End: 2021-06-28

## 2021-06-28 ENCOUNTER — PATIENT MESSAGE (OUTPATIENT)
Dept: ORTHOPEDICS | Facility: CLINIC | Age: 61
End: 2021-06-28

## 2021-06-28 DIAGNOSIS — M25.562 ACUTE PAIN OF BOTH KNEES: Primary | ICD-10-CM

## 2021-06-28 DIAGNOSIS — M25.561 ACUTE PAIN OF BOTH KNEES: Primary | ICD-10-CM

## 2021-06-29 ENCOUNTER — PATIENT OUTREACH (OUTPATIENT)
Dept: ADMINISTRATIVE | Facility: OTHER | Age: 61
End: 2021-06-29

## 2021-06-29 LAB — HEMOCCULT STL QL IA: NEGATIVE

## 2021-07-01 ENCOUNTER — OFFICE VISIT (OUTPATIENT)
Dept: ORTHOPEDICS | Facility: CLINIC | Age: 61
End: 2021-07-01
Payer: MEDICARE

## 2021-07-01 ENCOUNTER — HOSPITAL ENCOUNTER (OUTPATIENT)
Dept: RADIOLOGY | Facility: HOSPITAL | Age: 61
Discharge: HOME | End: 2021-07-01
Attending: ORTHOPAEDIC SURGERY
Payer: MEDICARE

## 2021-07-01 DIAGNOSIS — M25.562 ACUTE PAIN OF BOTH KNEES: ICD-10-CM

## 2021-07-01 DIAGNOSIS — M25.561 ACUTE PAIN OF BOTH KNEES: ICD-10-CM

## 2021-07-01 DIAGNOSIS — M17.0 PRIMARY OSTEOARTHRITIS OF BOTH KNEES: ICD-10-CM

## 2021-07-01 PROCEDURE — 73562 XR KNEE 3 VIEW BILATERAL: ICD-10-PCS | Mod: 26,50,, | Performed by: RADIOLOGY

## 2021-07-01 PROCEDURE — 1125F PR PAIN SEVERITY QUANTIFIED, PAIN PRESENT: ICD-10-PCS | Mod: S$GLB,,, | Performed by: ORTHOPAEDIC SURGERY

## 2021-07-01 PROCEDURE — 99499 UNLISTED E&M SERVICE: CPT | Mod: S$GLB,,, | Performed by: ORTHOPAEDIC SURGERY

## 2021-07-01 PROCEDURE — 73562 X-RAY EXAM OF KNEE 3: CPT | Mod: TC,50

## 2021-07-01 PROCEDURE — 99203 PR OFFICE/OUTPT VISIT, NEW, LEVL III, 30-44 MIN: ICD-10-PCS | Mod: S$GLB,,, | Performed by: ORTHOPAEDIC SURGERY

## 2021-07-01 PROCEDURE — 99999 PR PBB SHADOW E&M-EST. PATIENT-LVL III: ICD-10-PCS | Mod: PBBFAC,,, | Performed by: ORTHOPAEDIC SURGERY

## 2021-07-01 PROCEDURE — 73562 X-RAY EXAM OF KNEE 3: CPT | Mod: 26,50,, | Performed by: RADIOLOGY

## 2021-07-01 PROCEDURE — 99203 OFFICE O/P NEW LOW 30 MIN: CPT | Mod: S$GLB,,, | Performed by: ORTHOPAEDIC SURGERY

## 2021-07-01 PROCEDURE — 1125F AMNT PAIN NOTED PAIN PRSNT: CPT | Mod: S$GLB,,, | Performed by: ORTHOPAEDIC SURGERY

## 2021-07-01 PROCEDURE — 99499 RISK ADDL DX/OHS AUDIT: ICD-10-PCS | Mod: S$GLB,,, | Performed by: ORTHOPAEDIC SURGERY

## 2021-07-01 PROCEDURE — 99999 PR PBB SHADOW E&M-EST. PATIENT-LVL III: CPT | Mod: PBBFAC,,, | Performed by: ORTHOPAEDIC SURGERY

## 2021-07-06 ENCOUNTER — PATIENT MESSAGE (OUTPATIENT)
Dept: ADMINISTRATIVE | Facility: HOSPITAL | Age: 61
End: 2021-07-06

## 2021-07-07 ENCOUNTER — PATIENT MESSAGE (OUTPATIENT)
Dept: INTERNAL MEDICINE | Facility: CLINIC | Age: 61
End: 2021-07-07

## 2021-07-07 ENCOUNTER — TELEPHONE (OUTPATIENT)
Dept: PODIATRY | Facility: CLINIC | Age: 61
End: 2021-07-07

## 2021-07-07 ENCOUNTER — TELEPHONE (OUTPATIENT)
Dept: INTERNAL MEDICINE | Facility: CLINIC | Age: 61
End: 2021-07-07

## 2021-07-08 ENCOUNTER — TELEPHONE (OUTPATIENT)
Dept: INTERNAL MEDICINE | Facility: CLINIC | Age: 61
End: 2021-07-08

## 2021-07-08 ENCOUNTER — OFFICE VISIT (OUTPATIENT)
Dept: PODIATRY | Facility: CLINIC | Age: 61
End: 2021-07-08
Payer: MEDICARE

## 2021-07-08 VITALS
DIASTOLIC BLOOD PRESSURE: 92 MMHG | HEIGHT: 60 IN | SYSTOLIC BLOOD PRESSURE: 141 MMHG | HEART RATE: 75 BPM | WEIGHT: 152 LBS | RESPIRATION RATE: 18 BRPM | BODY MASS INDEX: 29.84 KG/M2

## 2021-07-08 DIAGNOSIS — G80.9 CEREBRAL PALSY, UNSPECIFIED TYPE: Primary | ICD-10-CM

## 2021-07-08 DIAGNOSIS — L60.9 DISEASE OF NAIL: ICD-10-CM

## 2021-07-08 DIAGNOSIS — R53.81 PHYSICAL DEBILITY: ICD-10-CM

## 2021-07-08 PROCEDURE — 99499 UNLISTED E&M SERVICE: CPT | Mod: S$GLB,,, | Performed by: PODIATRIST

## 2021-07-08 PROCEDURE — 99213 OFFICE O/P EST LOW 20 MIN: CPT | Mod: S$GLB,,, | Performed by: PODIATRIST

## 2021-07-08 PROCEDURE — 3080F PR MOST RECENT DIASTOLIC BLOOD PRESSURE >= 90 MM HG: ICD-10-PCS | Mod: CPTII,S$GLB,, | Performed by: PODIATRIST

## 2021-07-08 PROCEDURE — 99213 PR OFFICE/OUTPT VISIT, EST, LEVL III, 20-29 MIN: ICD-10-PCS | Mod: S$GLB,,, | Performed by: PODIATRIST

## 2021-07-08 PROCEDURE — 3077F PR MOST RECENT SYSTOLIC BLOOD PRESSURE >= 140 MM HG: ICD-10-PCS | Mod: CPTII,S$GLB,, | Performed by: PODIATRIST

## 2021-07-08 PROCEDURE — 3080F DIAST BP >= 90 MM HG: CPT | Mod: CPTII,S$GLB,, | Performed by: PODIATRIST

## 2021-07-08 PROCEDURE — 99499 NO LOS: ICD-10-PCS | Mod: S$GLB,,, | Performed by: PODIATRIST

## 2021-07-08 PROCEDURE — 3008F BODY MASS INDEX DOCD: CPT | Mod: CPTII,S$GLB,, | Performed by: PODIATRIST

## 2021-07-08 PROCEDURE — 3077F SYST BP >= 140 MM HG: CPT | Mod: CPTII,S$GLB,, | Performed by: PODIATRIST

## 2021-07-08 PROCEDURE — 3008F PR BODY MASS INDEX (BMI) DOCUMENTED: ICD-10-PCS | Mod: CPTII,S$GLB,, | Performed by: PODIATRIST

## 2021-07-08 PROCEDURE — 99999 PR PBB SHADOW E&M-EST. PATIENT-LVL III: ICD-10-PCS | Mod: PBBFAC,,, | Performed by: PODIATRIST

## 2021-07-08 PROCEDURE — 99999 PR PBB SHADOW E&M-EST. PATIENT-LVL III: CPT | Mod: PBBFAC,,, | Performed by: PODIATRIST

## 2021-07-08 PROCEDURE — 1126F PR PAIN SEVERITY QUANTIFIED, NO PAIN PRESENT: ICD-10-PCS | Mod: CPTII,S$GLB,, | Performed by: PODIATRIST

## 2021-07-08 PROCEDURE — 1126F AMNT PAIN NOTED NONE PRSNT: CPT | Mod: CPTII,S$GLB,, | Performed by: PODIATRIST

## 2021-07-08 RX ORDER — SULFAMETHOXAZOLE AND TRIMETHOPRIM 800; 160 MG/1; MG/1
1 TABLET ORAL 2 TIMES DAILY
Qty: 14 TABLET | Refills: 0 | Status: SHIPPED | OUTPATIENT
Start: 2021-07-08 | End: 2021-07-12

## 2021-07-09 ENCOUNTER — PATIENT MESSAGE (OUTPATIENT)
Dept: INTERNAL MEDICINE | Facility: CLINIC | Age: 61
End: 2021-07-09

## 2021-07-12 ENCOUNTER — TELEPHONE (OUTPATIENT)
Dept: INTERNAL MEDICINE | Facility: CLINIC | Age: 61
End: 2021-07-12

## 2021-07-12 ENCOUNTER — PATIENT MESSAGE (OUTPATIENT)
Dept: INTERNAL MEDICINE | Facility: CLINIC | Age: 61
End: 2021-07-12

## 2021-07-12 DIAGNOSIS — G80.9 CEREBRAL PALSY, UNSPECIFIED TYPE: Primary | ICD-10-CM

## 2021-07-12 DIAGNOSIS — F41.9 ANXIETY: Primary | ICD-10-CM

## 2021-07-12 RX ORDER — SERTRALINE HYDROCHLORIDE 50 MG/1
50 TABLET, FILM COATED ORAL DAILY
Qty: 30 TABLET | Refills: 6 | Status: SHIPPED | OUTPATIENT
Start: 2021-07-12 | End: 2021-10-14

## 2021-07-12 RX ORDER — NITROFURANTOIN 25; 75 MG/1; MG/1
100 CAPSULE ORAL 2 TIMES DAILY
Qty: 14 CAPSULE | Refills: 0 | Status: SHIPPED | OUTPATIENT
Start: 2021-07-12 | End: 2021-09-10

## 2021-07-21 ENCOUNTER — TELEPHONE (OUTPATIENT)
Dept: INTERNAL MEDICINE | Facility: CLINIC | Age: 61
End: 2021-07-21

## 2021-07-23 PROCEDURE — G0180 PR HOME HEALTH MD CERTIFICATION: ICD-10-PCS | Mod: ,,, | Performed by: INTERNAL MEDICINE

## 2021-07-23 PROCEDURE — G0180 MD CERTIFICATION HHA PATIENT: HCPCS | Mod: ,,, | Performed by: INTERNAL MEDICINE

## 2021-07-29 ENCOUNTER — TELEPHONE (OUTPATIENT)
Dept: INTERNAL MEDICINE | Facility: CLINIC | Age: 61
End: 2021-07-29

## 2021-07-29 DIAGNOSIS — N39.0 URINARY TRACT INFECTION WITHOUT HEMATURIA, SITE UNSPECIFIED: Primary | ICD-10-CM

## 2021-07-30 ENCOUNTER — PATIENT MESSAGE (OUTPATIENT)
Dept: INTERNAL MEDICINE | Facility: CLINIC | Age: 61
End: 2021-07-30

## 2021-07-30 ENCOUNTER — LAB VISIT (OUTPATIENT)
Dept: LAB | Facility: HOSPITAL | Age: 61
End: 2021-07-30
Attending: INTERNAL MEDICINE
Payer: MEDICARE

## 2021-07-30 DIAGNOSIS — N39.0 URINARY TRACT INFECTION WITHOUT HEMATURIA, SITE UNSPECIFIED: ICD-10-CM

## 2021-07-30 LAB
BACTERIA #/AREA URNS AUTO: ABNORMAL /HPF
BILIRUB UR QL STRIP: NEGATIVE
CLARITY UR REFRACT.AUTO: ABNORMAL
COLOR UR AUTO: YELLOW
GLUCOSE UR QL STRIP: NEGATIVE
HGB UR QL STRIP: NEGATIVE
KETONES UR QL STRIP: NEGATIVE
LEUKOCYTE ESTERASE UR QL STRIP: ABNORMAL
MICROSCOPIC COMMENT: ABNORMAL
NITRITE UR QL STRIP: POSITIVE
PH UR STRIP: 7 [PH] (ref 5–8)
PROT UR QL STRIP: NEGATIVE
RBC #/AREA URNS AUTO: 7 /HPF (ref 0–4)
SP GR UR STRIP: 1.01 (ref 1–1.03)
SQUAMOUS #/AREA URNS AUTO: 0 /HPF
URN SPEC COLLECT METH UR: ABNORMAL
WBC #/AREA URNS AUTO: >100 /HPF (ref 0–5)
WBC CLUMPS UR QL AUTO: ABNORMAL

## 2021-07-30 PROCEDURE — 87086 URINE CULTURE/COLONY COUNT: CPT | Performed by: INTERNAL MEDICINE

## 2021-07-30 PROCEDURE — 81001 URINALYSIS AUTO W/SCOPE: CPT | Performed by: INTERNAL MEDICINE

## 2021-07-30 PROCEDURE — 87186 SC STD MICRODIL/AGAR DIL: CPT | Performed by: INTERNAL MEDICINE

## 2021-07-30 PROCEDURE — 87088 URINE BACTERIA CULTURE: CPT | Performed by: INTERNAL MEDICINE

## 2021-07-30 PROCEDURE — 87077 CULTURE AEROBIC IDENTIFY: CPT | Performed by: INTERNAL MEDICINE

## 2021-08-02 ENCOUNTER — TELEPHONE (OUTPATIENT)
Dept: INTERNAL MEDICINE | Facility: CLINIC | Age: 61
End: 2021-08-02

## 2021-08-02 ENCOUNTER — PATIENT MESSAGE (OUTPATIENT)
Dept: INTERNAL MEDICINE | Facility: CLINIC | Age: 61
End: 2021-08-02

## 2021-08-02 LAB — BACTERIA UR CULT: ABNORMAL

## 2021-08-02 RX ORDER — CEPHALEXIN 500 MG/1
500 CAPSULE ORAL 4 TIMES DAILY
Qty: 28 CAPSULE | Refills: 0 | Status: SHIPPED | OUTPATIENT
Start: 2021-08-02 | End: 2021-08-09

## 2021-08-18 ENCOUNTER — EXTERNAL HOME HEALTH (OUTPATIENT)
Dept: HOME HEALTH SERVICES | Facility: HOSPITAL | Age: 61
End: 2021-08-18
Payer: MEDICARE

## 2021-08-26 ENCOUNTER — DOCUMENT SCAN (OUTPATIENT)
Dept: HOME HEALTH SERVICES | Facility: HOSPITAL | Age: 61
End: 2021-08-26
Payer: MEDICARE

## 2021-09-16 ENCOUNTER — PATIENT MESSAGE (OUTPATIENT)
Dept: INTERNAL MEDICINE | Facility: CLINIC | Age: 61
End: 2021-09-16

## 2021-09-20 ENCOUNTER — TELEPHONE (OUTPATIENT)
Dept: INTERNAL MEDICINE | Facility: CLINIC | Age: 61
End: 2021-09-20

## 2021-09-20 DIAGNOSIS — I10 HYPERTENSION, UNSPECIFIED TYPE: Primary | ICD-10-CM

## 2021-09-21 ENCOUNTER — DOCUMENT SCAN (OUTPATIENT)
Dept: HOME HEALTH SERVICES | Facility: HOSPITAL | Age: 61
End: 2021-09-21
Payer: MEDICARE

## 2021-09-21 PROCEDURE — G0179 PR HOME HEALTH MD RECERTIFICATION: ICD-10-PCS | Mod: ,,, | Performed by: INTERNAL MEDICINE

## 2021-09-21 PROCEDURE — G0179 MD RECERTIFICATION HHA PT: HCPCS | Mod: ,,, | Performed by: INTERNAL MEDICINE

## 2021-09-28 ENCOUNTER — PATIENT MESSAGE (OUTPATIENT)
Dept: INTERNAL MEDICINE | Facility: CLINIC | Age: 61
End: 2021-09-28

## 2021-10-01 ENCOUNTER — PATIENT MESSAGE (OUTPATIENT)
Dept: INTERNAL MEDICINE | Facility: CLINIC | Age: 61
End: 2021-10-01

## 2021-10-05 ENCOUNTER — DOCUMENT SCAN (OUTPATIENT)
Dept: HOME HEALTH SERVICES | Facility: HOSPITAL | Age: 61
End: 2021-10-05
Payer: MEDICARE

## 2021-10-05 ENCOUNTER — PATIENT MESSAGE (OUTPATIENT)
Dept: ADMINISTRATIVE | Facility: HOSPITAL | Age: 61
End: 2021-10-05

## 2021-10-07 ENCOUNTER — PATIENT MESSAGE (OUTPATIENT)
Dept: INTERNAL MEDICINE | Facility: CLINIC | Age: 61
End: 2021-10-07

## 2021-10-08 ENCOUNTER — PATIENT OUTREACH (OUTPATIENT)
Dept: HOME HEALTH SERVICES | Facility: HOSPITAL | Age: 61
End: 2021-10-08

## 2021-10-12 ENCOUNTER — EXTERNAL HOME HEALTH (OUTPATIENT)
Dept: HOME HEALTH SERVICES | Facility: HOSPITAL | Age: 61
End: 2021-10-12
Payer: MEDICARE

## 2021-10-13 ENCOUNTER — LAB VISIT (OUTPATIENT)
Dept: LAB | Facility: HOSPITAL | Age: 61
End: 2021-10-13
Attending: INTERNAL MEDICINE
Payer: MEDICARE

## 2021-10-13 DIAGNOSIS — N39.0 URINARY TRACT INFECTION WITHOUT HEMATURIA, SITE UNSPECIFIED: ICD-10-CM

## 2021-10-13 LAB
BACTERIA #/AREA URNS AUTO: ABNORMAL /HPF
BILIRUB UR QL STRIP: NEGATIVE
BILIRUB UR QL STRIP: NEGATIVE
CLARITY UR REFRACT.AUTO: ABNORMAL
CLARITY UR REFRACT.AUTO: ABNORMAL
COLOR UR AUTO: YELLOW
COLOR UR AUTO: YELLOW
GLUCOSE UR QL STRIP: NEGATIVE
GLUCOSE UR QL STRIP: NEGATIVE
HGB UR QL STRIP: ABNORMAL
HGB UR QL STRIP: ABNORMAL
KETONES UR QL STRIP: NEGATIVE
KETONES UR QL STRIP: NEGATIVE
LEUKOCYTE ESTERASE UR QL STRIP: ABNORMAL
LEUKOCYTE ESTERASE UR QL STRIP: ABNORMAL
MICROSCOPIC COMMENT: ABNORMAL
NITRITE UR QL STRIP: NEGATIVE
NITRITE UR QL STRIP: NEGATIVE
PH UR STRIP: 6 [PH] (ref 5–8)
PH UR STRIP: 6 [PH] (ref 5–8)
PROT UR QL STRIP: NEGATIVE
PROT UR QL STRIP: NEGATIVE
RBC #/AREA URNS AUTO: 5 /HPF (ref 0–4)
SP GR UR STRIP: 1.01 (ref 1–1.03)
SP GR UR STRIP: 1.01 (ref 1–1.03)
SQUAMOUS #/AREA URNS AUTO: 1 /HPF
URN SPEC COLLECT METH UR: ABNORMAL
URN SPEC COLLECT METH UR: ABNORMAL
WBC #/AREA URNS AUTO: 68 /HPF (ref 0–5)
WBC CLUMPS UR QL AUTO: ABNORMAL

## 2021-10-13 PROCEDURE — 87086 URINE CULTURE/COLONY COUNT: CPT | Performed by: INTERNAL MEDICINE

## 2021-10-13 PROCEDURE — 81001 URINALYSIS AUTO W/SCOPE: CPT | Performed by: INTERNAL MEDICINE

## 2021-10-13 PROCEDURE — 87077 CULTURE AEROBIC IDENTIFY: CPT | Performed by: INTERNAL MEDICINE

## 2021-10-13 PROCEDURE — 87088 URINE BACTERIA CULTURE: CPT | Mod: 59 | Performed by: INTERNAL MEDICINE

## 2021-10-13 PROCEDURE — 87186 SC STD MICRODIL/AGAR DIL: CPT | Mod: 59 | Performed by: INTERNAL MEDICINE

## 2021-10-14 ENCOUNTER — TELEPHONE (OUTPATIENT)
Dept: INTERNAL MEDICINE | Facility: CLINIC | Age: 61
End: 2021-10-14

## 2021-10-14 ENCOUNTER — IMMUNIZATION (OUTPATIENT)
Dept: INTERNAL MEDICINE | Facility: CLINIC | Age: 61
End: 2021-10-14
Payer: MEDICARE

## 2021-10-14 ENCOUNTER — LAB VISIT (OUTPATIENT)
Dept: LAB | Facility: HOSPITAL | Age: 61
End: 2021-10-14
Attending: INTERNAL MEDICINE
Payer: MEDICARE

## 2021-10-14 ENCOUNTER — OFFICE VISIT (OUTPATIENT)
Dept: INTERNAL MEDICINE | Facility: CLINIC | Age: 61
End: 2021-10-14
Payer: MEDICARE

## 2021-10-14 VITALS
DIASTOLIC BLOOD PRESSURE: 84 MMHG | HEART RATE: 83 BPM | OXYGEN SATURATION: 99 % | BODY MASS INDEX: 29.69 KG/M2 | HEIGHT: 60 IN | TEMPERATURE: 98 F | SYSTOLIC BLOOD PRESSURE: 142 MMHG

## 2021-10-14 DIAGNOSIS — L03.90 CELLULITIS, UNSPECIFIED CELLULITIS SITE: ICD-10-CM

## 2021-10-14 DIAGNOSIS — R53.81 DEBILITY: ICD-10-CM

## 2021-10-14 DIAGNOSIS — E83.52 HYPERCALCEMIA: Primary | ICD-10-CM

## 2021-10-14 DIAGNOSIS — R60.9 EDEMA, UNSPECIFIED TYPE: ICD-10-CM

## 2021-10-14 DIAGNOSIS — E83.52 HYPERCALCEMIA: ICD-10-CM

## 2021-10-14 DIAGNOSIS — F33.1 MODERATE EPISODE OF RECURRENT MAJOR DEPRESSIVE DISORDER: ICD-10-CM

## 2021-10-14 DIAGNOSIS — Z12.31 SCREENING MAMMOGRAM, ENCOUNTER FOR: ICD-10-CM

## 2021-10-14 LAB
ALBUMIN SERPL BCP-MCNC: 3.8 G/DL (ref 3.5–5.2)
ALP SERPL-CCNC: 108 U/L (ref 55–135)
ALT SERPL W/O P-5'-P-CCNC: 17 U/L (ref 10–44)
ANION GAP SERPL CALC-SCNC: 15 MMOL/L (ref 8–16)
AST SERPL-CCNC: 21 U/L (ref 10–40)
BASOPHILS # BLD AUTO: 0.03 K/UL (ref 0–0.2)
BASOPHILS NFR BLD: 0.7 % (ref 0–1.9)
BILIRUB SERPL-MCNC: 1 MG/DL (ref 0.1–1)
BNP SERPL-MCNC: 18 PG/ML (ref 0–99)
BUN SERPL-MCNC: 18 MG/DL (ref 8–23)
CALCIUM SERPL-MCNC: 10.3 MG/DL (ref 8.7–10.5)
CHLORIDE SERPL-SCNC: 102 MMOL/L (ref 95–110)
CO2 SERPL-SCNC: 19 MMOL/L (ref 23–29)
CREAT SERPL-MCNC: 0.7 MG/DL (ref 0.5–1.4)
CRP SERPL-MCNC: 2.1 MG/L (ref 0–8.2)
DIFFERENTIAL METHOD: ABNORMAL
EOSINOPHIL # BLD AUTO: 0 K/UL (ref 0–0.5)
EOSINOPHIL NFR BLD: 0.5 % (ref 0–8)
ERYTHROCYTE [DISTWIDTH] IN BLOOD BY AUTOMATED COUNT: 13.3 % (ref 11.5–14.5)
ERYTHROCYTE [SEDIMENTATION RATE] IN BLOOD BY WESTERGREN METHOD: 98 MM/HR (ref 0–36)
EST. GFR  (AFRICAN AMERICAN): >60 ML/MIN/1.73 M^2
EST. GFR  (NON AFRICAN AMERICAN): >60 ML/MIN/1.73 M^2
GLUCOSE SERPL-MCNC: 72 MG/DL (ref 70–110)
HCT VFR BLD AUTO: 43.5 % (ref 37–48.5)
HGB BLD-MCNC: 13.8 G/DL (ref 12–16)
IMM GRANULOCYTES # BLD AUTO: 0.01 K/UL (ref 0–0.04)
IMM GRANULOCYTES NFR BLD AUTO: 0.2 % (ref 0–0.5)
LYMPHOCYTES # BLD AUTO: 0.7 K/UL (ref 1–4.8)
LYMPHOCYTES NFR BLD: 17.4 % (ref 18–48)
MCH RBC QN AUTO: 29 PG (ref 27–31)
MCHC RBC AUTO-ENTMCNC: 31.7 G/DL (ref 32–36)
MCV RBC AUTO: 91 FL (ref 82–98)
MONOCYTES # BLD AUTO: 0.4 K/UL (ref 0.3–1)
MONOCYTES NFR BLD: 9.2 % (ref 4–15)
NEUTROPHILS # BLD AUTO: 3.1 K/UL (ref 1.8–7.7)
NEUTROPHILS NFR BLD: 72 % (ref 38–73)
NRBC BLD-RTO: 0 /100 WBC
PLATELET # BLD AUTO: 165 K/UL (ref 150–450)
PMV BLD AUTO: 12.9 FL (ref 9.2–12.9)
POTASSIUM SERPL-SCNC: 3.9 MMOL/L (ref 3.5–5.1)
PROT SERPL-MCNC: 7.6 G/DL (ref 6–8.4)
PTH-INTACT SERPL-MCNC: 66.5 PG/ML (ref 9–77)
RBC # BLD AUTO: 4.76 M/UL (ref 4–5.4)
SODIUM SERPL-SCNC: 136 MMOL/L (ref 136–145)
WBC # BLD AUTO: 4.25 K/UL (ref 3.9–12.7)

## 2021-10-14 PROCEDURE — 80053 COMPREHEN METABOLIC PANEL: CPT | Performed by: INTERNAL MEDICINE

## 2021-10-14 PROCEDURE — 96372 THER/PROPH/DIAG INJ SC/IM: CPT | Mod: S$GLB,,, | Performed by: INTERNAL MEDICINE

## 2021-10-14 PROCEDURE — 3077F PR MOST RECENT SYSTOLIC BLOOD PRESSURE >= 140 MM HG: ICD-10-PCS | Mod: CPTII,S$GLB,, | Performed by: INTERNAL MEDICINE

## 2021-10-14 PROCEDURE — 3044F PR MOST RECENT HEMOGLOBIN A1C LEVEL <7.0%: ICD-10-PCS | Mod: CPTII,S$GLB,, | Performed by: INTERNAL MEDICINE

## 2021-10-14 PROCEDURE — 36415 COLL VENOUS BLD VENIPUNCTURE: CPT | Performed by: INTERNAL MEDICINE

## 2021-10-14 PROCEDURE — 90686 IIV4 VACC NO PRSV 0.5 ML IM: CPT | Mod: S$GLB,,, | Performed by: INTERNAL MEDICINE

## 2021-10-14 PROCEDURE — 99499 UNLISTED E&M SERVICE: CPT | Mod: S$GLB,,, | Performed by: INTERNAL MEDICINE

## 2021-10-14 PROCEDURE — 3060F PR POS MICROALBUMINURIA RESULT DOCUMENTED/REVIEW: ICD-10-PCS | Mod: CPTII,S$GLB,, | Performed by: INTERNAL MEDICINE

## 2021-10-14 PROCEDURE — 83970 ASSAY OF PARATHORMONE: CPT | Performed by: INTERNAL MEDICINE

## 2021-10-14 PROCEDURE — 96372 PR INJECTION,THERAP/PROPH/DIAG2ST, IM OR SUBCUT: ICD-10-PCS | Mod: S$GLB,,, | Performed by: INTERNAL MEDICINE

## 2021-10-14 PROCEDURE — 3060F POS MICROALBUMINURIA REV: CPT | Mod: CPTII,S$GLB,, | Performed by: INTERNAL MEDICINE

## 2021-10-14 PROCEDURE — 3079F DIAST BP 80-89 MM HG: CPT | Mod: CPTII,S$GLB,, | Performed by: INTERNAL MEDICINE

## 2021-10-14 PROCEDURE — 85652 RBC SED RATE AUTOMATED: CPT | Performed by: INTERNAL MEDICINE

## 2021-10-14 PROCEDURE — 1159F MED LIST DOCD IN RCRD: CPT | Mod: CPTII,S$GLB,, | Performed by: INTERNAL MEDICINE

## 2021-10-14 PROCEDURE — 99499 RISK ADDL DX/OHS AUDIT: ICD-10-PCS | Mod: S$GLB,,, | Performed by: INTERNAL MEDICINE

## 2021-10-14 PROCEDURE — 99214 PR OFFICE/OUTPT VISIT, EST, LEVL IV, 30-39 MIN: ICD-10-PCS | Mod: 25,S$GLB,, | Performed by: INTERNAL MEDICINE

## 2021-10-14 PROCEDURE — 1159F PR MEDICATION LIST DOCUMENTED IN MEDICAL RECORD: ICD-10-PCS | Mod: CPTII,S$GLB,, | Performed by: INTERNAL MEDICINE

## 2021-10-14 PROCEDURE — 99999 PR PBB SHADOW E&M-EST. PATIENT-LVL V: CPT | Mod: PBBFAC,,, | Performed by: INTERNAL MEDICINE

## 2021-10-14 PROCEDURE — 85025 COMPLETE CBC W/AUTO DIFF WBC: CPT | Performed by: INTERNAL MEDICINE

## 2021-10-14 PROCEDURE — 86140 C-REACTIVE PROTEIN: CPT | Performed by: INTERNAL MEDICINE

## 2021-10-14 PROCEDURE — 3077F SYST BP >= 140 MM HG: CPT | Mod: CPTII,S$GLB,, | Performed by: INTERNAL MEDICINE

## 2021-10-14 PROCEDURE — G0008 ADMIN INFLUENZA VIRUS VAC: HCPCS | Mod: S$GLB,,, | Performed by: INTERNAL MEDICINE

## 2021-10-14 PROCEDURE — 3079F PR MOST RECENT DIASTOLIC BLOOD PRESSURE 80-89 MM HG: ICD-10-PCS | Mod: CPTII,S$GLB,, | Performed by: INTERNAL MEDICINE

## 2021-10-14 PROCEDURE — 90686 FLU VACCINE (QUAD) GREATER THAN OR EQUAL TO 3YO PRESERVATIVE FREE IM: ICD-10-PCS | Mod: S$GLB,,, | Performed by: INTERNAL MEDICINE

## 2021-10-14 PROCEDURE — 3066F PR DOCUMENTATION OF TREATMENT FOR NEPHROPATHY: ICD-10-PCS | Mod: CPTII,S$GLB,, | Performed by: INTERNAL MEDICINE

## 2021-10-14 PROCEDURE — 99999 PR PBB SHADOW E&M-EST. PATIENT-LVL V: ICD-10-PCS | Mod: PBBFAC,,, | Performed by: INTERNAL MEDICINE

## 2021-10-14 PROCEDURE — 3008F BODY MASS INDEX DOCD: CPT | Mod: CPTII,S$GLB,, | Performed by: INTERNAL MEDICINE

## 2021-10-14 PROCEDURE — 83880 ASSAY OF NATRIURETIC PEPTIDE: CPT | Performed by: INTERNAL MEDICINE

## 2021-10-14 PROCEDURE — 3008F PR BODY MASS INDEX (BMI) DOCUMENTED: ICD-10-PCS | Mod: CPTII,S$GLB,, | Performed by: INTERNAL MEDICINE

## 2021-10-14 PROCEDURE — 3066F NEPHROPATHY DOC TX: CPT | Mod: CPTII,S$GLB,, | Performed by: INTERNAL MEDICINE

## 2021-10-14 PROCEDURE — 99214 OFFICE O/P EST MOD 30 MIN: CPT | Mod: 25,S$GLB,, | Performed by: INTERNAL MEDICINE

## 2021-10-14 PROCEDURE — G0008 FLU VACCINE (QUAD) GREATER THAN OR EQUAL TO 3YO PRESERVATIVE FREE IM: ICD-10-PCS | Mod: S$GLB,,, | Performed by: INTERNAL MEDICINE

## 2021-10-14 PROCEDURE — 3044F HG A1C LEVEL LT 7.0%: CPT | Mod: CPTII,S$GLB,, | Performed by: INTERNAL MEDICINE

## 2021-10-14 RX ORDER — MIRTAZAPINE 7.5 MG/1
7.5 TABLET, FILM COATED ORAL NIGHTLY
Qty: 30 TABLET | Refills: 6 | Status: SHIPPED | OUTPATIENT
Start: 2021-10-14 | End: 2022-02-25

## 2021-10-14 RX ORDER — CEPHALEXIN 500 MG/1
500 CAPSULE ORAL EVERY 6 HOURS
Qty: 40 CAPSULE | Refills: 0 | Status: SHIPPED | OUTPATIENT
Start: 2021-10-14 | End: 2021-10-15

## 2021-10-14 RX ORDER — AMLODIPINE BESYLATE 2.5 MG/1
2.5 TABLET ORAL 2 TIMES DAILY
Qty: 180 TABLET | Refills: 3 | Status: SHIPPED | OUTPATIENT
Start: 2021-10-14 | End: 2022-11-18 | Stop reason: SDUPTHER

## 2021-10-14 RX ORDER — FLUCONAZOLE 150 MG/1
TABLET ORAL
Qty: 1 TABLET | Refills: 1 | Status: SHIPPED | OUTPATIENT
Start: 2021-10-14

## 2021-10-14 RX ORDER — CEFTRIAXONE 1 G/1
1 INJECTION, POWDER, FOR SOLUTION INTRAMUSCULAR; INTRAVENOUS
Status: DISCONTINUED | OUTPATIENT
Start: 2021-10-14 | End: 2021-10-14

## 2021-10-15 ENCOUNTER — TELEPHONE (OUTPATIENT)
Dept: INTERNAL MEDICINE | Facility: CLINIC | Age: 61
End: 2021-10-15

## 2021-10-15 LAB — BACTERIA UR CULT: ABNORMAL

## 2021-10-15 RX ORDER — CIPROFLOXACIN 500 MG/1
500 TABLET ORAL EVERY 12 HOURS
Qty: 14 TABLET | Refills: 0 | Status: SHIPPED | OUTPATIENT
Start: 2021-10-15 | End: 2022-02-11

## 2021-10-16 LAB — BACTERIA UR CULT: ABNORMAL

## 2021-10-27 ENCOUNTER — PATIENT MESSAGE (OUTPATIENT)
Dept: INTERNAL MEDICINE | Facility: CLINIC | Age: 61
End: 2021-10-27
Payer: MEDICARE

## 2021-11-01 ENCOUNTER — TELEPHONE (OUTPATIENT)
Dept: INTERNAL MEDICINE | Facility: CLINIC | Age: 61
End: 2021-11-01
Payer: MEDICARE

## 2021-11-12 ENCOUNTER — PATIENT MESSAGE (OUTPATIENT)
Dept: RESEARCH | Facility: HOSPITAL | Age: 61
End: 2021-11-12
Payer: MEDICARE

## 2021-11-16 ENCOUNTER — HOSPITAL ENCOUNTER (OUTPATIENT)
Dept: RADIOLOGY | Facility: HOSPITAL | Age: 61
Discharge: HOME | End: 2021-11-16
Attending: INTERNAL MEDICINE
Payer: MEDICARE

## 2021-11-16 DIAGNOSIS — Z12.31 SCREENING MAMMOGRAM, ENCOUNTER FOR: ICD-10-CM

## 2021-11-16 PROCEDURE — 77063 BREAST TOMOSYNTHESIS BI: CPT | Mod: 26,,, | Performed by: RADIOLOGY

## 2021-11-16 PROCEDURE — 77067 MAMMO DIGITAL SCREENING BILAT WITH TOMO: ICD-10-PCS | Mod: 26,,, | Performed by: RADIOLOGY

## 2021-11-16 PROCEDURE — 77067 SCR MAMMO BI INCL CAD: CPT | Mod: TC

## 2021-11-16 PROCEDURE — 77063 MAMMO DIGITAL SCREENING BILAT WITH TOMO: ICD-10-PCS | Mod: 26,,, | Performed by: RADIOLOGY

## 2021-11-16 PROCEDURE — 77067 SCR MAMMO BI INCL CAD: CPT | Mod: 26,,, | Performed by: RADIOLOGY

## 2021-11-17 ENCOUNTER — PATIENT MESSAGE (OUTPATIENT)
Dept: ADMINISTRATIVE | Facility: OTHER | Age: 61
End: 2021-11-17
Payer: MEDICARE

## 2021-11-17 ENCOUNTER — IMMUNIZATION (OUTPATIENT)
Dept: PHARMACY | Facility: CLINIC | Age: 61
End: 2021-11-17
Payer: MEDICARE

## 2021-11-17 DIAGNOSIS — Z23 NEED FOR VACCINATION: Primary | ICD-10-CM

## 2021-11-20 PROCEDURE — G0179 PR HOME HEALTH MD RECERTIFICATION: ICD-10-PCS | Mod: ,,, | Performed by: INTERNAL MEDICINE

## 2021-11-20 PROCEDURE — G0179 MD RECERTIFICATION HHA PT: HCPCS | Mod: ,,, | Performed by: INTERNAL MEDICINE

## 2021-11-29 ENCOUNTER — PATIENT MESSAGE (OUTPATIENT)
Dept: PSYCHIATRY | Facility: CLINIC | Age: 61
End: 2021-11-29
Payer: MEDICARE

## 2021-11-29 ENCOUNTER — PATIENT MESSAGE (OUTPATIENT)
Dept: INTERNAL MEDICINE | Facility: CLINIC | Age: 61
End: 2021-11-29
Payer: MEDICARE

## 2021-12-10 ENCOUNTER — EXTERNAL HOME HEALTH (OUTPATIENT)
Dept: HOME HEALTH SERVICES | Facility: HOSPITAL | Age: 61
End: 2021-12-10
Payer: MEDICARE

## 2021-12-13 ENCOUNTER — OFFICE VISIT (OUTPATIENT)
Dept: PSYCHIATRY | Facility: CLINIC | Age: 61
End: 2021-12-13
Payer: MEDICAID

## 2021-12-13 ENCOUNTER — PATIENT MESSAGE (OUTPATIENT)
Dept: PSYCHIATRY | Facility: CLINIC | Age: 61
End: 2021-12-13
Payer: MEDICARE

## 2021-12-13 DIAGNOSIS — F33.1 MODERATE EPISODE OF RECURRENT MAJOR DEPRESSIVE DISORDER: ICD-10-CM

## 2021-12-13 DIAGNOSIS — F43.81 PROLONGED GRIEF DISORDER: ICD-10-CM

## 2021-12-13 DIAGNOSIS — F43.23 ADJUSTMENT DISORDER WITH MIXED ANXIETY AND DEPRESSED MOOD: Primary | ICD-10-CM

## 2021-12-13 PROCEDURE — 3066F PR DOCUMENTATION OF TREATMENT FOR NEPHROPATHY: ICD-10-PCS | Mod: CPTII,95,, | Performed by: NURSE PRACTITIONER

## 2021-12-13 PROCEDURE — 3066F NEPHROPATHY DOC TX: CPT | Mod: CPTII,95,, | Performed by: NURSE PRACTITIONER

## 2021-12-13 PROCEDURE — 3060F PR POS MICROALBUMINURIA RESULT DOCUMENTED/REVIEW: ICD-10-PCS | Mod: CPTII,95,, | Performed by: NURSE PRACTITIONER

## 2021-12-13 PROCEDURE — 3060F POS MICROALBUMINURIA REV: CPT | Mod: CPTII,95,, | Performed by: NURSE PRACTITIONER

## 2021-12-13 PROCEDURE — 90792 PSYCH DIAG EVAL W/MED SRVCS: CPT | Mod: 95,,, | Performed by: NURSE PRACTITIONER

## 2021-12-13 PROCEDURE — 90792 PR PSYCHIATRIC DIAGNOSTIC EVALUATION W/MEDICAL SERVICES: ICD-10-PCS | Mod: 95,,, | Performed by: NURSE PRACTITIONER

## 2021-12-13 RX ORDER — ESCITALOPRAM OXALATE 5 MG/1
5 TABLET ORAL DAILY
Qty: 30 TABLET | Refills: 2 | Status: SHIPPED | OUTPATIENT
Start: 2021-12-13 | End: 2022-02-25

## 2021-12-21 ENCOUNTER — PATIENT MESSAGE (OUTPATIENT)
Dept: INTERNAL MEDICINE | Facility: CLINIC | Age: 61
End: 2021-12-21
Payer: MEDICARE

## 2021-12-21 ENCOUNTER — TELEPHONE (OUTPATIENT)
Dept: INTERNAL MEDICINE | Facility: CLINIC | Age: 61
End: 2021-12-21
Payer: MEDICARE

## 2021-12-21 RX ORDER — NITROFURANTOIN 25; 75 MG/1; MG/1
100 CAPSULE ORAL 2 TIMES DAILY
Qty: 14 CAPSULE | Refills: 0 | Status: SHIPPED | OUTPATIENT
Start: 2021-12-21 | End: 2022-02-11

## 2021-12-27 ENCOUNTER — PATIENT MESSAGE (OUTPATIENT)
Dept: INTERNAL MEDICINE | Facility: CLINIC | Age: 61
End: 2021-12-27
Payer: MEDICARE

## 2021-12-29 ENCOUNTER — PATIENT MESSAGE (OUTPATIENT)
Dept: INTERNAL MEDICINE | Facility: CLINIC | Age: 61
End: 2021-12-29
Payer: MEDICARE

## 2022-01-03 ENCOUNTER — PATIENT MESSAGE (OUTPATIENT)
Dept: INTERNAL MEDICINE | Facility: CLINIC | Age: 62
End: 2022-01-03
Payer: MEDICARE

## 2022-01-04 ENCOUNTER — TELEPHONE (OUTPATIENT)
Dept: INTERNAL MEDICINE | Facility: CLINIC | Age: 62
End: 2022-01-04
Payer: MEDICARE

## 2022-01-04 NOTE — TELEPHONE ENCOUNTER
----- Message from Debra Jovel sent at 1/4/2022 10:26 AM CST -----  Contact: [phone]  Pt called to speak to the nurse in reference to an appointment with the provider on the 6th. Please Advise

## 2022-01-06 ENCOUNTER — PATIENT MESSAGE (OUTPATIENT)
Dept: INTERNAL MEDICINE | Facility: CLINIC | Age: 62
End: 2022-01-06
Payer: MEDICARE

## 2022-01-19 PROCEDURE — G0179 MD RECERTIFICATION HHA PT: HCPCS | Mod: ,,, | Performed by: INTERNAL MEDICINE

## 2022-01-19 PROCEDURE — G0179 PR HOME HEALTH MD RECERTIFICATION: ICD-10-PCS | Mod: ,,, | Performed by: INTERNAL MEDICINE

## 2022-01-26 ENCOUNTER — PATIENT MESSAGE (OUTPATIENT)
Dept: ADMINISTRATIVE | Facility: HOSPITAL | Age: 62
End: 2022-01-26
Payer: MEDICARE

## 2022-01-27 ENCOUNTER — EXTERNAL HOME HEALTH (OUTPATIENT)
Dept: HOME HEALTH SERVICES | Facility: HOSPITAL | Age: 62
End: 2022-01-27
Payer: MEDICARE

## 2022-02-09 ENCOUNTER — PATIENT MESSAGE (OUTPATIENT)
Dept: INTERNAL MEDICINE | Facility: CLINIC | Age: 62
End: 2022-02-09
Payer: MEDICARE

## 2022-02-11 ENCOUNTER — HOSPITAL ENCOUNTER (OUTPATIENT)
Facility: HOSPITAL | Age: 62
Discharge: HOME HEALTH | End: 2022-02-14
Attending: EMERGENCY MEDICINE | Admitting: INTERNAL MEDICINE
Payer: MEDICARE

## 2022-02-11 ENCOUNTER — PATIENT OUTREACH (OUTPATIENT)
Dept: ADMINISTRATIVE | Facility: HOSPITAL | Age: 62
End: 2022-02-11
Payer: MEDICARE

## 2022-02-11 ENCOUNTER — OFFICE VISIT (OUTPATIENT)
Dept: INTERNAL MEDICINE | Facility: CLINIC | Age: 62
End: 2022-02-11
Payer: MEDICARE

## 2022-02-11 VITALS
DIASTOLIC BLOOD PRESSURE: 78 MMHG | BODY MASS INDEX: 29.69 KG/M2 | HEIGHT: 60 IN | HEART RATE: 87 BPM | SYSTOLIC BLOOD PRESSURE: 132 MMHG | TEMPERATURE: 97 F | OXYGEN SATURATION: 99 %

## 2022-02-11 DIAGNOSIS — R07.9 CHEST PAIN: ICD-10-CM

## 2022-02-11 DIAGNOSIS — R53.81 PHYSICAL DEBILITY: ICD-10-CM

## 2022-02-11 DIAGNOSIS — I50.33 ACUTE ON CHRONIC DIASTOLIC CHF (CONGESTIVE HEART FAILURE): ICD-10-CM

## 2022-02-11 DIAGNOSIS — Z75.8 DISCHARGE PLANNING ISSUES: ICD-10-CM

## 2022-02-11 DIAGNOSIS — M79.89 LEG SWELLING: ICD-10-CM

## 2022-02-11 DIAGNOSIS — G80.9 CEREBRAL PALSY, UNSPECIFIED TYPE: ICD-10-CM

## 2022-02-11 DIAGNOSIS — R60.0 BILATERAL LOWER EXTREMITY EDEMA: Primary | ICD-10-CM

## 2022-02-11 DIAGNOSIS — R60.9 DEPENDENT EDEMA: Primary | ICD-10-CM

## 2022-02-11 PROBLEM — Z99.3 WHEELCHAIR BOUND: Status: ACTIVE | Noted: 2022-02-11

## 2022-02-11 LAB
ALBUMIN SERPL BCP-MCNC: 3.3 G/DL (ref 3.5–5.2)
ALP SERPL-CCNC: 109 U/L (ref 55–135)
ALT SERPL W/O P-5'-P-CCNC: 31 U/L (ref 10–44)
ANION GAP SERPL CALC-SCNC: 10 MMOL/L (ref 8–16)
AST SERPL-CCNC: 34 U/L (ref 10–40)
BASOPHILS # BLD AUTO: 0.03 K/UL (ref 0–0.2)
BASOPHILS NFR BLD: 0.7 % (ref 0–1.9)
BILIRUB SERPL-MCNC: 0.7 MG/DL (ref 0.1–1)
BNP SERPL-MCNC: 26 PG/ML (ref 0–99)
BUN SERPL-MCNC: 20 MG/DL (ref 8–23)
CALCIUM SERPL-MCNC: 9.9 MG/DL (ref 8.7–10.5)
CHLORIDE SERPL-SCNC: 107 MMOL/L (ref 95–110)
CO2 SERPL-SCNC: 27 MMOL/L (ref 23–29)
CREAT SERPL-MCNC: 0.6 MG/DL (ref 0.5–1.4)
CTP QC/QA: YES
DIFFERENTIAL METHOD: ABNORMAL
EOSINOPHIL # BLD AUTO: 0.1 K/UL (ref 0–0.5)
EOSINOPHIL NFR BLD: 2.1 % (ref 0–8)
ERYTHROCYTE [DISTWIDTH] IN BLOOD BY AUTOMATED COUNT: 14 % (ref 11.5–14.5)
EST. GFR  (AFRICAN AMERICAN): >60 ML/MIN/1.73 M^2
EST. GFR  (NON AFRICAN AMERICAN): >60 ML/MIN/1.73 M^2
GLUCOSE SERPL-MCNC: 77 MG/DL (ref 70–110)
HCT VFR BLD AUTO: 50.8 % (ref 37–48.5)
HGB BLD-MCNC: 15.8 G/DL (ref 12–16)
IMM GRANULOCYTES # BLD AUTO: 0.01 K/UL (ref 0–0.04)
IMM GRANULOCYTES NFR BLD AUTO: 0.2 % (ref 0–0.5)
LYMPHOCYTES # BLD AUTO: 1 K/UL (ref 1–4.8)
LYMPHOCYTES NFR BLD: 22.3 % (ref 18–48)
MAGNESIUM SERPL-MCNC: 1.9 MG/DL (ref 1.6–2.6)
MCH RBC QN AUTO: 28.8 PG (ref 27–31)
MCHC RBC AUTO-ENTMCNC: 31.1 G/DL (ref 32–36)
MCV RBC AUTO: 93 FL (ref 82–98)
MONOCYTES # BLD AUTO: 0.4 K/UL (ref 0.3–1)
MONOCYTES NFR BLD: 10.1 % (ref 4–15)
NEUTROPHILS # BLD AUTO: 2.8 K/UL (ref 1.8–7.7)
NEUTROPHILS NFR BLD: 64.6 % (ref 38–73)
NRBC BLD-RTO: 0 /100 WBC
PLATELET # BLD AUTO: 156 K/UL (ref 150–450)
PMV BLD AUTO: 12.7 FL (ref 9.2–12.9)
POTASSIUM SERPL-SCNC: 4.1 MMOL/L (ref 3.5–5.1)
PROT SERPL-MCNC: 6.9 G/DL (ref 6–8.4)
RBC # BLD AUTO: 5.48 M/UL (ref 4–5.4)
SARS-COV-2 RDRP RESP QL NAA+PROBE: NEGATIVE
SODIUM SERPL-SCNC: 144 MMOL/L (ref 136–145)
TROPONIN I SERPL DL<=0.01 NG/ML-MCNC: 0.01 NG/ML (ref 0–0.03)
WBC # BLD AUTO: 4.26 K/UL (ref 3.9–12.7)

## 2022-02-11 PROCEDURE — 3078F PR MOST RECENT DIASTOLIC BLOOD PRESSURE < 80 MM HG: ICD-10-PCS | Mod: CPTII,S$GLB,, | Performed by: INTERNAL MEDICINE

## 2022-02-11 PROCEDURE — 3075F PR MOST RECENT SYSTOLIC BLOOD PRESS GE 130-139MM HG: ICD-10-PCS | Mod: CPTII,S$GLB,, | Performed by: INTERNAL MEDICINE

## 2022-02-11 PROCEDURE — U0002 COVID-19 LAB TEST NON-CDC: HCPCS | Performed by: PHYSICIAN ASSISTANT

## 2022-02-11 PROCEDURE — 3044F HG A1C LEVEL LT 7.0%: CPT | Mod: CPTII,S$GLB,, | Performed by: INTERNAL MEDICINE

## 2022-02-11 PROCEDURE — 25000003 PHARM REV CODE 250: Performed by: PHYSICIAN ASSISTANT

## 2022-02-11 PROCEDURE — 84484 ASSAY OF TROPONIN QUANT: CPT | Performed by: PHYSICIAN ASSISTANT

## 2022-02-11 PROCEDURE — 99220 PR INITIAL OBSERVATION CARE,LEVL III: CPT | Mod: ,,, | Performed by: PHYSICIAN ASSISTANT

## 2022-02-11 PROCEDURE — 99999 PR PBB SHADOW E&M-EST. PATIENT-LVL III: CPT | Mod: PBBFAC,,, | Performed by: INTERNAL MEDICINE

## 2022-02-11 PROCEDURE — 3075F SYST BP GE 130 - 139MM HG: CPT | Mod: CPTII,S$GLB,, | Performed by: INTERNAL MEDICINE

## 2022-02-11 PROCEDURE — 3008F BODY MASS INDEX DOCD: CPT | Mod: CPTII,S$GLB,, | Performed by: INTERNAL MEDICINE

## 2022-02-11 PROCEDURE — 93010 ELECTROCARDIOGRAM REPORT: CPT | Mod: ,,, | Performed by: INTERNAL MEDICINE

## 2022-02-11 PROCEDURE — 99999 PR PBB SHADOW E&M-EST. PATIENT-LVL III: ICD-10-PCS | Mod: PBBFAC,,, | Performed by: INTERNAL MEDICINE

## 2022-02-11 PROCEDURE — 3078F DIAST BP <80 MM HG: CPT | Mod: CPTII,S$GLB,, | Performed by: INTERNAL MEDICINE

## 2022-02-11 PROCEDURE — 99285 EMERGENCY DEPT VISIT HI MDM: CPT | Mod: CS,,, | Performed by: PHYSICIAN ASSISTANT

## 2022-02-11 PROCEDURE — 99213 OFFICE O/P EST LOW 20 MIN: CPT | Mod: S$GLB,,, | Performed by: INTERNAL MEDICINE

## 2022-02-11 PROCEDURE — 93005 ELECTROCARDIOGRAM TRACING: CPT

## 2022-02-11 PROCEDURE — G0378 HOSPITAL OBSERVATION PER HR: HCPCS

## 2022-02-11 PROCEDURE — 99285 PR EMERGENCY DEPT VISIT,LEVEL V: ICD-10-PCS | Mod: CS,,, | Performed by: PHYSICIAN ASSISTANT

## 2022-02-11 PROCEDURE — 3044F PR MOST RECENT HEMOGLOBIN A1C LEVEL <7.0%: ICD-10-PCS | Mod: CPTII,S$GLB,, | Performed by: INTERNAL MEDICINE

## 2022-02-11 PROCEDURE — 99213 PR OFFICE/OUTPT VISIT, EST, LEVL III, 20-29 MIN: ICD-10-PCS | Mod: S$GLB,,, | Performed by: INTERNAL MEDICINE

## 2022-02-11 PROCEDURE — 83735 ASSAY OF MAGNESIUM: CPT | Performed by: PHYSICIAN ASSISTANT

## 2022-02-11 PROCEDURE — 96372 THER/PROPH/DIAG INJ SC/IM: CPT | Performed by: PHYSICIAN ASSISTANT

## 2022-02-11 PROCEDURE — 96374 THER/PROPH/DIAG INJ IV PUSH: CPT

## 2022-02-11 PROCEDURE — 3008F PR BODY MASS INDEX (BMI) DOCUMENTED: ICD-10-PCS | Mod: CPTII,S$GLB,, | Performed by: INTERNAL MEDICINE

## 2022-02-11 PROCEDURE — 99220 PR INITIAL OBSERVATION CARE,LEVL III: ICD-10-PCS | Mod: ,,, | Performed by: PHYSICIAN ASSISTANT

## 2022-02-11 PROCEDURE — 85025 COMPLETE CBC W/AUTO DIFF WBC: CPT | Performed by: PHYSICIAN ASSISTANT

## 2022-02-11 PROCEDURE — 80053 COMPREHEN METABOLIC PANEL: CPT | Performed by: PHYSICIAN ASSISTANT

## 2022-02-11 PROCEDURE — 63600175 PHARM REV CODE 636 W HCPCS: Performed by: PHYSICIAN ASSISTANT

## 2022-02-11 PROCEDURE — 93010 EKG 12-LEAD: ICD-10-PCS | Mod: ,,, | Performed by: INTERNAL MEDICINE

## 2022-02-11 PROCEDURE — 83880 ASSAY OF NATRIURETIC PEPTIDE: CPT | Performed by: PHYSICIAN ASSISTANT

## 2022-02-11 PROCEDURE — 99285 EMERGENCY DEPT VISIT HI MDM: CPT | Mod: 25

## 2022-02-11 RX ORDER — GLUCAGON 1 MG
1 KIT INJECTION
Status: DISCONTINUED | OUTPATIENT
Start: 2022-02-11 | End: 2022-02-14 | Stop reason: HOSPADM

## 2022-02-11 RX ORDER — IBUPROFEN 200 MG
16 TABLET ORAL
Status: DISCONTINUED | OUTPATIENT
Start: 2022-02-11 | End: 2022-02-14 | Stop reason: HOSPADM

## 2022-02-11 RX ORDER — ENOXAPARIN SODIUM 100 MG/ML
40 INJECTION SUBCUTANEOUS EVERY 24 HOURS
Status: DISCONTINUED | OUTPATIENT
Start: 2022-02-11 | End: 2022-02-14 | Stop reason: HOSPADM

## 2022-02-11 RX ORDER — TALC
6 POWDER (GRAM) TOPICAL NIGHTLY PRN
Status: DISCONTINUED | OUTPATIENT
Start: 2022-02-11 | End: 2022-02-14 | Stop reason: HOSPADM

## 2022-02-11 RX ORDER — FUROSEMIDE 10 MG/ML
40 INJECTION INTRAMUSCULAR; INTRAVENOUS ONCE
Status: COMPLETED | OUTPATIENT
Start: 2022-02-11 | End: 2022-02-11

## 2022-02-11 RX ORDER — POLYETHYLENE GLYCOL 3350 17 G/17G
17 POWDER, FOR SOLUTION ORAL DAILY PRN
Status: DISCONTINUED | OUTPATIENT
Start: 2022-02-11 | End: 2022-02-14 | Stop reason: HOSPADM

## 2022-02-11 RX ORDER — IPRATROPIUM BROMIDE AND ALBUTEROL SULFATE 2.5; .5 MG/3ML; MG/3ML
3 SOLUTION RESPIRATORY (INHALATION) EVERY 4 HOURS PRN
Status: DISCONTINUED | OUTPATIENT
Start: 2022-02-11 | End: 2022-02-14 | Stop reason: HOSPADM

## 2022-02-11 RX ORDER — ACETAMINOPHEN 325 MG/1
650 TABLET ORAL EVERY 4 HOURS PRN
Status: DISCONTINUED | OUTPATIENT
Start: 2022-02-11 | End: 2022-02-14 | Stop reason: HOSPADM

## 2022-02-11 RX ORDER — ONDANSETRON 8 MG/1
8 TABLET, ORALLY DISINTEGRATING ORAL EVERY 8 HOURS PRN
Status: DISCONTINUED | OUTPATIENT
Start: 2022-02-11 | End: 2022-02-14 | Stop reason: HOSPADM

## 2022-02-11 RX ORDER — IBUPROFEN 200 MG
24 TABLET ORAL
Status: DISCONTINUED | OUTPATIENT
Start: 2022-02-11 | End: 2022-02-14 | Stop reason: HOSPADM

## 2022-02-11 RX ORDER — BISACODYL 10 MG
10 SUPPOSITORY, RECTAL RECTAL DAILY PRN
Status: DISCONTINUED | OUTPATIENT
Start: 2022-02-11 | End: 2022-02-14 | Stop reason: HOSPADM

## 2022-02-11 RX ORDER — PROMETHAZINE HYDROCHLORIDE 25 MG/1
25 TABLET ORAL EVERY 6 HOURS PRN
Status: DISCONTINUED | OUTPATIENT
Start: 2022-02-11 | End: 2022-02-14 | Stop reason: HOSPADM

## 2022-02-11 RX ADMIN — ENOXAPARIN SODIUM 40 MG: 40 INJECTION SUBCUTANEOUS at 09:02

## 2022-02-11 RX ADMIN — FUROSEMIDE 40 MG: 10 INJECTION, SOLUTION INTRAMUSCULAR; INTRAVENOUS at 10:02

## 2022-02-11 RX ADMIN — ACETAMINOPHEN 650 MG: 325 TABLET ORAL at 08:02

## 2022-02-11 NOTE — PROGRESS NOTES
Health Maintenance Due   Topic Date Due    HIV Screening  Never done    Shingles Vaccine (1 of 2) Never done    Cervical Cancer Screening  09/29/2019     Triggered LINKS & reconciled immunizations.  Updated Care Everywhere.  Chart was reviewed as part of the PHN Attestation for 2021.

## 2022-02-11 NOTE — PLAN OF CARE
[facility]  Emergency Department  [place] [address]  [phone]       HOME  HEALTH ORDERS    02/11/2022    Admit to Home Health    Patient is homebound due to:  Debility, Cerebral Palsy     Allergies:  Review of patient's allergies indicates:   Allergen Reactions    Tramadol Other (See Comments)     She could not wake up     Augmentin [amoxicillin-pot clavulanate] Diarrhea    Levaquin [levofloxacin] Other (See Comments)     itching    Lipitor [atorvastatin] Other (See Comments)     Muscle pain    Statins-hmg-coa reductase inhibitors Other (See Comments)     Muscle pains    Bactrim [sulfamethoxazole-trimethoprim] Nausea Only       Acitivities: as tolerated    Nursing:   SN to complete comprehensive assessment including routine vital signs. Instruct on disease process and s/s of complications to report to MD. Review/verify medication list sent home with the patient at time of discharge  and instruct patient/caregiver as needed. Frequency may be adjusted depending on start of care date.    Notify MD if SBP > 160 or < 90; DBP > 90 or < 50; HR > 120 or < 50; Temp > 101; Other:     CONSULTS:    PT to evaluate and treat. Evaluate for home safety and equipment needs; Establish/upgrade home exercise program. Perform / instruct on therapeutic exercises, gait training, transfer training, and Range of Motion.    OT to evaluate and treat. Evaluate home environment for safety and equipment needs. Perform/Instruct on transfers, ADL training, ROM, and therapeutic exercises.      MSW to evaluate for community resources    Aide to provide assistance with personal care, ADLs.      Other: Please also repair hospital bed at home. This order refects the needed repair per [facility] DME compliance and the PHN review process        WOUND CARE:  Wound spray or saline for wound cleaning with all dressing changes.    All wounds to be measured with first dressing changes and every week.                 Sushant  [place]  02/11/2022

## 2022-02-11 NOTE — ED PROVIDER NOTES
Encounter Date: 2/11/2022       History     Chief Complaint   Patient presents with    Leg Swelling     No SOB, right leg leg more swollen than left; wheelchair bound, hx CP     The history is provided by the patient and medical records. No  was used.      Naga Benavidez is a 61 y.o. female with medical history of cerebral palsy, wheelchair bound, HTN, HLD, Vit D deficiency presenting to the ED with the chief complaint of leg swelling.     Referred by PCP for acute on chronic lower extremity swelling for the past few weeks (R>L). She has small wounds to her legs that she is followed by wound care for. Reports chronic pain in her knees that she contributes to arthritis. She has been wheelchair bound for several years. She lives in a house alone and her sister comes by every now and then to help. She did have home health, but states her company does not have any available people to assist her at this time. She was given compression stockings in the past, but she is not able to put the stockings on herself. She reports having to sleep sitting up in her motorized wheelchair recently. Reports she has a hospital bed that the controls do not work for. Reports previously using a [employer] lift for transition help, but this is also not working. No blood thinner use or history of CHF.     Denies fever, chest pain, SOB, cough, abdominal pain, vomiting, urinary or bowel movement changes.    Review of patient's allergies indicates:   Allergen Reactions    Tramadol Other (See Comments)     She could not wake up     Augmentin [amoxicillin-pot clavulanate] Diarrhea    Levaquin [levofloxacin] Other (See Comments)     itching    Lipitor [atorvastatin] Other (See Comments)     Muscle pain    Statins-hmg-coa reductase inhibitors Other (See Comments)     Muscle pains    Bactrim [sulfamethoxazole-trimethoprim] Nausea Only     Past Medical History:   Diagnosis Date    Anemia     Arthritis      Asthma     Cerebral palsy     stable    DJD (degenerative joint disease)     Hyperlipidemia     Hypertension     Neuromuscular disorder     Obstructive sleep apnea     Uterine fibroids affecting pregnancy     with adnexal mass     Past Surgical History:   Procedure Laterality Date    FOOT CAPSULE RELEASE W/ PERCUTANEOUS HEEL CORD LENGTHENING, TIBIAL TENDON TRANSFER      HAMSTRING RELEASE       Family History   Problem Relation Age of Onset    Diabetes Mother     Hypertension Mother      Social History     Tobacco Use    Smoking status: Never Smoker    Smokeless tobacco: Never Used   Substance Use Topics    Alcohol use: No    Drug use: No     Review of Systems   Constitutional: Negative for fever.   HENT: Negative for sore throat.    Eyes: Negative for pain.   Respiratory: Negative for shortness of breath.    Cardiovascular: Positive for leg swelling. Negative for chest pain.   Gastrointestinal: Negative for nausea.   Genitourinary: Negative for dysuria.   Musculoskeletal: Negative for back pain.   Skin: Positive for wound. Negative for rash.   Neurological: Negative for weakness.   Hematological: Does not bruise/bleed easily.       Physical Exam     Initial Vitals [02/11/22 1415]   BP Pulse Resp Temp SpO2   130/70 85 18 97.4 °F (36.3 °C) 99 %      MAP       --         Physical Exam    Constitutional: She appears well-developed and well-nourished. She is not diaphoretic. No distress.   HENT:   Head: Normocephalic and atraumatic.   Mouth/Throat: Oropharynx is clear and moist.   Eyes: EOM are normal. Pupils are equal, round, and reactive to light.   Neck: Neck supple.   Normal range of motion.  Cardiovascular: Normal rate, regular rhythm and intact distal pulses.   +3 pitting edema RLE foot to knee  +2 pitting edema LLE foot to knee  Intact DP doppler signals BLE   Pulmonary/Chest: Breath sounds normal. No respiratory distress. She has no wheezes. She has no rales.   Abdominal: Abdomen is soft. She  exhibits no distension. There is no abdominal tenderness. There is no rebound.   Musculoskeletal:         General: Edema present. No tenderness. Normal range of motion.      Cervical back: Normal range of motion and neck supple.     Neurological: She is alert and oriented to person, place, and time. She has normal strength. No cranial nerve deficit or sensory deficit.   Skin: Skin is warm and dry. No rash noted. No erythema.       ED Course   Procedures  Labs Reviewed   CBC W/ AUTO DIFFERENTIAL - Abnormal; Notable for the following components:       Result Value    RBC 5.48 (*)     Hematocrit 50.8 (*)     MCHC 31.1 (*)     All other components within normal limits   COMPREHENSIVE METABOLIC PANEL - Abnormal; Notable for the following components:    Albumin 3.3 (*)     All other components within normal limits   CBC W/ AUTO DIFFERENTIAL - Abnormal; Notable for the following components:    MCHC 30.9 (*)     Platelets 128 (*)     MPV 13.1 (*)     All other components within normal limits   B-TYPE NATRIURETIC PEPTIDE   MAGNESIUM   TROPONIN I   HEMOGLOBIN A1C   SARS-COV-2 RDRP GENE    Narrative:     This test utilizes isothermal nucleic acid amplification   technology to detect the SARS-CoV-2 RdRp nucleic acid segment.   The analytical sensitivity (limit of detection) is 125 genome   equivalents/mL.   A POSITIVE result implies infection with the SARS-CoV-2 virus;   the patient is presumed to be contagious.     A NEGATIVE result means that SARS-CoV-2 nucleic acids are not   present above the limit of detection. A NEGATIVE result should be   treated as presumptive. It does not rule out the possibility of   COVID-19 and should not be the sole basis for treatment decisions.   If COVID-19 is strongly suspected based on clinical and exposure   history, re-testing using an alternate molecular assay should be   considered.   This test is only for use under the Food and Drug   Administration s Emergency Use Authorization (EUA).    Commercial kits are provided by [employer].   Performance characteristics of the EUA have been independently   verified by [facility] Department of   Pathology and Laboratory Medicine.   _________________________________________________________________   The authorized Fact Sheet for Healthcare Providers and the authorized Fact   Sheet for Patients of the ID NOW COVID-19 are available on the FDA   website:     https://www.fda.gov/media/242790/download  https://www.fda.gov/media/298612/download            ECG Results          EKG 12-lead (In process)  Result time 02/12/22 08:40:06    In process by Interface, Lab In [facility] (02/12/22 08:40:06)                 Narrative:    Test Reason : M79.89,    Vent. Rate : 088 BPM     Atrial Rate : 088 BPM     P-R Int : 196 ms          QRS Dur : 068 ms      QT Int : 348 ms       P-R-T Axes : 059 083 074 degrees     QTc Int : 421 ms    Normal sinus rhythm  Low voltage QRS  Nonspecific ST abnormality  Abnormal ECG  When compared with ECG of 12-DEC-2017 12:58,  Criteria for Septal infarct are no longer Present    Referred By: [email or web address]   SELF           Confirmed By:                             Imaging Results          X-Ray Chest AP Portable (Final result)  Result time 02/11/22 17:54:03    Final result by Sushant Heller MD (02/11/22 17:54:03)                 Impression:      No detrimental change or radiographic acute intrathoracic process seen on this single view.      Electronically signed by: Sushant Heller MD  Date:    02/11/2022  Time:    17:54             Narrative:    EXAMINATION:  XR CHEST AP PORTABLE    CLINICAL HISTORY:  Other specified soft tissue disorders    TECHNIQUE:  Single frontal view of the chest was performed.    COMPARISON:  Chest radiograph 12/12/2017    FINDINGS:  Patient is slightly rotated.  No detrimental change.    Cardiomediastinal silhouette is relatively midline and stable without evidence of failure.  Chronic mild nonspecific  elevation of the right hemidiaphragm.  The lungs are otherwise well expanded without large consolidation, pleural effusion or pneumothorax.  No acute osseous process seen.  PA and lateral views can be obtained.                               US Lower Extremity Veins Bilateral (Final result)  Result time 02/11/22 16:55:07    Final result by Sushant Heller MD (02/11/22 16:55:07)                 Impression:      No convincing evidence of deep venous thrombosis in either lower extremity.      Electronically signed by: Sushant Heller MD  Date:    02/11/2022  Time:    16:55             Narrative:    EXAMINATION:  US LOWER EXTREMITY VEINS BILATERAL    CLINICAL HISTORY:  Other specified soft tissue disorders    TECHNIQUE:  Duplex and color flow Doppler and dynamic compression was performed of the bilateral lower extremity veins was performed.    COMPARISON:  Left lower extremity venous upper ultrasound 08/20/2020, bilateral lower extremity venous upper ultrasound 04/05/2008    FINDINGS:  Right thigh veins: The common femoral, femoral, popliteal, upper greater saphenous, and deep femoral veins are patent and free of thrombus. The veins are normally compressible and have normal phasic flow and augmentation response.    Right calf veins: The visualized calf veins are patent.    Left thigh veins: The common femoral, femoral, popliteal, upper greater saphenous, and deep femoral veins are patent and free of thrombus. The veins are normally compressible and have normal phasic flow and augmentation response.    Left calf veins: The visualized calf veins are patent.    Miscellaneous: None                                 Medications   acetaminophen tablet 650 mg (650 mg Oral Given 2/12/22 0817)   polyethylene glycol packet 17 g (has no administration in time range)   bisacodyL suppository 10 mg (has no administration in time range)   ondansetron disintegrating tablet 8 mg (has no administration in time range)   promethazine tablet 25  mg (has no administration in time range)   glucose chewable tablet 16 g (has no administration in time range)   glucose chewable tablet 24 g (has no administration in time range)   glucagon (human recombinant) injection 1 mg (has no administration in time range)   albuterol-ipratropium 2.5 mg-0.5 mg/3 mL nebulizer solution 3 mL (has no administration in time range)   melatonin tablet 6 mg (has no administration in time range)   enoxaparin injection 40 mg (40 mg Subcutaneous Given 2/11/22 2130)   dextrose 10% bolus 125 mL (has no administration in time range)   dextrose 10% bolus 250 mL (has no administration in time range)   amLODIPine tablet 2.5 mg (2.5 mg Oral Given 2/12/22 0816)   furosemide injection 40 mg (40 mg Intravenous Given 2/11/22 2200)     Medical Decision Making:   History:   Old Medical Records: I decided to obtain old medical records.  Old Records Summarized: records from clinic visits and records from previous admission(s).  Independently Interpreted Test(s):   I have ordered and independently interpreted EKG Reading(s) - see summary below       <> Summary of EKG Reading(s): NSR 84 bpm. No STEMI  Clinical Tests:   Lab Tests: Ordered and Reviewed  Radiological Study: Ordered and Reviewed  Medical Tests: Ordered and Reviewed  Other:   I have discussed this case with another health care provider.       <> Summary of the Discussion: Hospital Medicine       APC / Resident Notes:   61 y.o. female with medical history of cerebral palsy, wheelchair bound, HTN, HLD, Vit D deficiency presenting to the ED from IM clinic for evaluation of acute on chronic lower extremity swelling for the past few weeks (R>L). Lives alone and sister comes by for assistance as needed. Wheelchair bound. Difficulty with transitions due to equipment issues and no one from her home health company has been available to come out for assistance.     DDx includes but not limited to dependent edema, venous insufficiency, pressure  ulcerations, cellulitis, new onset CHF, DVT    Laboratory work reviewed. Cardiac markers negative. CXR without large consolidation, pleural effusion, or pneumothorax on my read. Venous US without evidence of DVT. Suspect lower extremity most likely from dependent edema from not being able to elevate her legs. Sister only person who has been able to come to her house who now has a hurt shoulder and can not help her with transitions. Social work was able to setup DME at home, but this likely will not occur till next week. Do not feel outpatient management is appropriate. Patient acknowledges her difficulties at home and does express interest in nursing home placement. Discussed with HM, placed in observation for further management. Patient expresses understanding and agreeable to the plan. I have discussed the care of this patient with my supervising physician.        Attending Attestation:     Physician Attestation Statement for NP/PA:   I discussed this assessment and plan of this patient with the NP/PA, but I did not personally examine the patient. The face to face encounter was performed by the NP/PA.               I discussed the patient's care with Advanced Practice Clinician, but did not see this patient with the APC.  I reviewed their note and agree with the history, physical, assessment, diagnosis, treatment, and observation plan provided by the Advanced Practice Clinician. I did supervise any and all procedures and was present for any critical portion, and was always immediately available for help and as a resource.     Final diagnoses:  [M79.89] Leg swelling  [G80.9] Cerebral palsy, unspecified type  [R60.9] Dependent edema (Primary)  [Z02.9] Discharge planning issues      Bert Caputo DO, [facility]  Emergency Staff Physician   Dept of Emergency Medicine   [facility]  Spectralink: [id number]        Disclaimer: This note has been generated using voice-recognition software. There may be typographical  errors that have been missed during proof-reading.          ED Course as of 02/12/22 1153   Fri Feb 11, 2022   1503 Social work consulted upon ED arrival regarding patient's home equipment issues and for assistance with transitions at home [BA]   1659 Patient brought to U/S prior to laboratory work being drawn [BA]      ED Course User Index  [BA] Sushant Meadows PA-C             Clinical Impression:   Final diagnoses:  [M79.89] Leg swelling  [G80.9] Cerebral palsy, unspecified type  [R60.9] Dependent edema (Primary)  [Z02.9] Discharge planning issues          ED Disposition Condition    Observation               Sushant Meadows PA-C  02/11/22 1929       Bert Caputo DO  02/12/22 1153

## 2022-02-11 NOTE — PROGRESS NOTES
Subjective:       Patient ID: Naga Benavidez is a 61 y.o. female.    Chief Complaint: Follow-up and Foot Swelling    HPIPt with hx of cerebral palsy, wheelchair bound for many years but mobility has continued to worsen over time.  Has intermittent wounds on lower extremities many over achilles scars where she had surgery in past and from banging her legs on her wheelchair.  Has difficulty coming in due to transportation.  Leg swelling has progressed over the last few weeks.  Denies dyspnea. Denies  fever or chills. Also reports lump under her R breast.  Neg MMG 11/2021. Pt used to use her jr lift to get in the bed but has been sleeping in the chair recently.  Review of Systems   Constitutional: Positive for appetite change.   Respiratory: Negative for shortness of breath (PND or orthopnea).    Cardiovascular: Negative for chest pain (arm pain or jaw pain).   Gastrointestinal: Negative for abdominal pain, diarrhea, nausea and vomiting.   Genitourinary: Negative for dysuria.   Neurological: Negative for seizures, syncope and headaches.       Objective:      Physical Exam  Constitutional:       General: She is not in acute distress.     Appearance: She is well-developed.   HENT:      Head: Normocephalic.   Neck:      Thyroid: No thyromegaly.      Vascular: No JVD.   Cardiovascular:      Rate and Rhythm: Normal rate and regular rhythm.      Heart sounds: Normal heart sounds. No murmur heard.  No friction rub. No gallop.    Pulmonary:      Effort: Pulmonary effort is normal.      Breath sounds: Normal breath sounds. No wheezing or rales.      Comments: 3-4 cm new  ?bony mass (old rib fracture with large callus formation vs bone tumor) nontender no erythema  Abdominal:      General: Bowel sounds are normal. There is no distension.      Palpations: Abdomen is soft. There is no mass.      Tenderness: There is no abdominal tenderness. There is no guarding or rebound.   Musculoskeletal:         General: Swelling  (3-4+edema into her thighs paulino) present.      Cervical back: Neck supple.   Lymphadenopathy:      Cervical: No cervical adenopathy.   Skin:     General: Skin is warm and dry.      Comments: 2 small wounds on the front of her R leg - R leg with mild  erythema into thigh (?rubor from edema or cellulitis)    2 small wounds on the back of her L leg - L leg not as swollen as R    Wounds have no purulence, fairly superficial   Neurological:      Mental Status: She is alert and oriented to person, place, and time.      Deep Tendon Reflexes: Reflexes are normal and symmetric.   Psychiatric:         Behavior: Behavior normal.         Thought Content: Thought content normal.         Judgment: Judgment normal.         Assessment:       No diagnosis found.    Plan:   Bilateral lower extremity edema    To ED - need to rule out DVT, cellulitis, new onset heart failure    Pt will be transported by nonemergent ambulance

## 2022-02-12 LAB
BASOPHILS # BLD AUTO: 0.04 K/UL (ref 0–0.2)
BASOPHILS NFR BLD: 0.9 % (ref 0–1.9)
DIFFERENTIAL METHOD: ABNORMAL
EOSINOPHIL # BLD AUTO: 0.1 K/UL (ref 0–0.5)
EOSINOPHIL NFR BLD: 1.7 % (ref 0–8)
ERYTHROCYTE [DISTWIDTH] IN BLOOD BY AUTOMATED COUNT: 13.7 % (ref 11.5–14.5)
ESTIMATED AVG GLUCOSE: 105 MG/DL (ref 68–131)
HBA1C MFR BLD: 5.3 % (ref 4–5.6)
HCT VFR BLD AUTO: 39.2 % (ref 37–48.5)
HGB BLD-MCNC: 12.1 G/DL (ref 12–16)
IMM GRANULOCYTES # BLD AUTO: 0.01 K/UL (ref 0–0.04)
IMM GRANULOCYTES NFR BLD AUTO: 0.2 % (ref 0–0.5)
LYMPHOCYTES # BLD AUTO: 1.1 K/UL (ref 1–4.8)
LYMPHOCYTES NFR BLD: 24.6 % (ref 18–48)
MCH RBC QN AUTO: 28.5 PG (ref 27–31)
MCHC RBC AUTO-ENTMCNC: 30.9 G/DL (ref 32–36)
MCV RBC AUTO: 92 FL (ref 82–98)
MONOCYTES # BLD AUTO: 0.5 K/UL (ref 0.3–1)
MONOCYTES NFR BLD: 10 % (ref 4–15)
NEUTROPHILS # BLD AUTO: 2.9 K/UL (ref 1.8–7.7)
NEUTROPHILS NFR BLD: 62.6 % (ref 38–73)
NRBC BLD-RTO: 0 /100 WBC
PLATELET # BLD AUTO: 128 K/UL (ref 150–450)
PMV BLD AUTO: 13.1 FL (ref 9.2–12.9)
RBC # BLD AUTO: 4.25 M/UL (ref 4–5.4)
WBC # BLD AUTO: 4.59 K/UL (ref 3.9–12.7)

## 2022-02-12 PROCEDURE — 97165 OT EVAL LOW COMPLEX 30 MIN: CPT

## 2022-02-12 PROCEDURE — 97161 PT EVAL LOW COMPLEX 20 MIN: CPT

## 2022-02-12 PROCEDURE — 99226 PR SUBSEQUENT OBSERVATION CARE,LEVEL III: ICD-10-PCS | Mod: ,,, | Performed by: PHYSICIAN ASSISTANT

## 2022-02-12 PROCEDURE — 97530 THERAPEUTIC ACTIVITIES: CPT

## 2022-02-12 PROCEDURE — 25000003 PHARM REV CODE 250: Performed by: PHYSICIAN ASSISTANT

## 2022-02-12 PROCEDURE — G0378 HOSPITAL OBSERVATION PER HR: HCPCS

## 2022-02-12 PROCEDURE — 97535 SELF CARE MNGMENT TRAINING: CPT

## 2022-02-12 PROCEDURE — 85025 COMPLETE CBC W/AUTO DIFF WBC: CPT | Performed by: PHYSICIAN ASSISTANT

## 2022-02-12 PROCEDURE — 83036 HEMOGLOBIN GLYCOSYLATED A1C: CPT | Performed by: PHYSICIAN ASSISTANT

## 2022-02-12 PROCEDURE — 63600175 PHARM REV CODE 636 W HCPCS: Performed by: PHYSICIAN ASSISTANT

## 2022-02-12 PROCEDURE — 99226 PR SUBSEQUENT OBSERVATION CARE,LEVEL III: CPT | Mod: ,,, | Performed by: PHYSICIAN ASSISTANT

## 2022-02-12 PROCEDURE — 96376 TX/PRO/DX INJ SAME DRUG ADON: CPT | Mod: 59

## 2022-02-12 PROCEDURE — 96372 THER/PROPH/DIAG INJ SC/IM: CPT | Performed by: PHYSICIAN ASSISTANT

## 2022-02-12 RX ORDER — FUROSEMIDE 10 MG/ML
40 INJECTION INTRAMUSCULAR; INTRAVENOUS ONCE
Status: COMPLETED | OUTPATIENT
Start: 2022-02-12 | End: 2022-02-12

## 2022-02-12 RX ORDER — AMLODIPINE BESYLATE 2.5 MG/1
2.5 TABLET ORAL 2 TIMES DAILY
Status: DISCONTINUED | OUTPATIENT
Start: 2022-02-12 | End: 2022-02-14 | Stop reason: HOSPADM

## 2022-02-12 RX ADMIN — FUROSEMIDE 40 MG: 10 INJECTION, SOLUTION INTRAMUSCULAR; INTRAVENOUS at 05:02

## 2022-02-12 RX ADMIN — ACETAMINOPHEN 650 MG: 325 TABLET ORAL at 08:02

## 2022-02-12 RX ADMIN — ENOXAPARIN SODIUM 40 MG: 40 INJECTION SUBCUTANEOUS at 05:02

## 2022-02-12 RX ADMIN — AMLODIPINE BESYLATE 2.5 MG: 2.5 TABLET ORAL at 08:02

## 2022-02-12 RX ADMIN — AMLODIPINE BESYLATE 2.5 MG: 2.5 TABLET ORAL at 10:02

## 2022-02-12 NOTE — ED NOTES
Patient identifiers verified and correct for Naga Benavidez    LOC: The patient is awake, alert, and aware of environment. The patient is AOX4 and speaking appropriately.   APPEARANCE: No acute distress noted.   HEENT: WDL, PERRLA  PSYCHOSOCIAL: Patient is calm and cooperative. Denies SI/HI.  SKIN: The skin is warm, dry, color consistent with ethnicity. Multiple open areas to BLEs  RESPIRATORY: Airway is open and patent. Bilateral chest rise and fall. Respiratory rate even and unlabored.  No accessory muscle use noted.  CARDIAC: Patient has a normal rate and rhythm. No complaints of chest pain.  ABDOMEN/GI: Soft, non tender. No distention noted. Denies n/v/d.   URINARY:  Voids independently without difficulty. No complaints of frequency, urgency, burning, or blood in urine.   NEUROLOGIC: Eyes open spontaneously. Speech clear.  Able to follow commands, demonstrating ability to actively and appropriately communicate within context of current conversation. Symmetrical facial muscles. Movement is purposeful. Denies dizziness/lightheadedness.  MUSCULOSKELETAL: +3 pitting edema RLE foot to knee +2 pitting edema LLE foot to knee, Intact DP doppler signals BLE  PERIPHERAL VASCULAR:   Denies numbness and tingling in extremities.

## 2022-02-12 NOTE — PROGRESS NOTES
[facility]  Hospital Medicine  Progress Note    Patient Name: Naga Benavidez  MRN: [id number]  Patient Class: OP- Observation   Admission Date: 2/11/2022  Length of Stay: 0 days  Attending Physician: Macy Tomlinson MD  Primary Care Provider: Kathrin Salazar MD        Subjective:     Principal Problem:Bilateral leg edema        HPI:  Naga Benavidez is a 61 y.o. with a PMHx of cerebral palsy, wheelchair bound, HTN, HLD, anxiety, MDD, CHF (G1DD) [place] [facility] [place] acute on chronic BLE swelling (R>L). PCP was concerned for cellulitis, CHF, DVT, dependent edema so she was sent here for further evaluation. Sister (primary caretaker) at bedside assists with history. Patient's had progressive worsening in chronic bilateral leg swelling for the past few weeks and now her legs have started to weep clear fluid. She has small wounds to her legs (R>L) that she is followed by wound care for. Wounds were nearly healed until a few days ago when her sitter accidentally brushed up against her leg and reopening the wound. Patient has been has been wheelchair bound for several years due to CP. Lately, she's been sleeping in her WC recently and hasn't been elevating her legs like she has been told to do in the past. Also, she was given compression stockings in the past, but she is not able to put the stockings on herself. Her BLE edema has improved significantly since legs elevated in the ED earlier today. Denies fever chills, chest pain, SOB, cough, N/V, abdominal pain, dysuria, constipation, HA or syncope. Patient lives in a house alone and her sister comes by every now and then to help. She did have home health but states her company does not have any available people to assist her at this time.     ED: AFVSS. CBC, CMP largely unremarkable. BNP WNL, CXR without acute findings. Venous U/S negative for DVT. Social work saw patient in ED who placed DME orders for jr lift and home  health.       Overview/Hospital Course:  61 y.o. who was admitted to hospital medicine for lower extremity edema.       Interval History: patient stable, she reports improvement of her edema. Additional lasix dose ordered. TTE pending.     Review of Systems   Constitutional: Negative for activity change, appetite change, chills, fatigue and fever.   HENT: Negative for trouble swallowing and voice change.    Eyes: Negative for photophobia and visual disturbance.   Respiratory: Negative for cough, chest tightness, shortness of breath and wheezing.    Cardiovascular: Positive for leg swelling. Negative for chest pain and palpitations.   Gastrointestinal: Negative for abdominal distention, abdominal pain, anal bleeding, constipation, diarrhea, nausea and vomiting.   Endocrine: Negative for polyphagia and polyuria.   Genitourinary: Negative for decreased urine volume, difficulty urinating, dysuria, flank pain and hematuria.   Musculoskeletal: Positive for gait problem. Negative for arthralgias and back pain.   Skin: Positive for wound. Negative for pallor and rash.   Neurological: Positive for weakness. Negative for dizziness, tremors, syncope, facial asymmetry, speech difficulty and light-headedness.   Psychiatric/Behavioral: Negative for behavioral problems, confusion and decreased concentration.     Objective:     Vital Signs (Most Recent):  Temp: 97.8 °F (36.6 °C) (02/12/22 1622)  Pulse: 88 (02/12/22 1622)  Resp: 16 (02/12/22 1622)  BP: 139/79 (02/12/22 1622)  SpO2: 96 % (02/12/22 1622) Vital Signs (24h Range):  Temp:  [97.6 °F (36.4 °C)-97.8 °F (36.6 °C)] 97.8 °F (36.6 °C)  Pulse:  [81-99] 88  Resp:  [16-18] 16  SpO2:  [96 %-100 %] 96 %  BP: (130-146)/(61-80) 139/79     Weight: 68 kg (150 lb)  Body mass index is 29.29 kg/m².  No intake or output data in the 24 hours ending 02/12/22 1636   Physical Exam  Vitals and nursing note reviewed.   Constitutional:       General: She is not in acute distress.     Appearance:  She is well-developed.   HENT:      Head: Normocephalic and atraumatic.      Mouth/Throat:      Pharynx: No oropharyngeal exudate.   Eyes:      Extraocular Movements: Extraocular movements intact.      Conjunctiva/sclera: Conjunctivae normal.   Cardiovascular:      Rate and Rhythm: Normal rate and regular rhythm.      Heart sounds: Normal heart sounds.   Pulmonary:      Effort: Pulmonary effort is normal. No respiratory distress.      Breath sounds: Normal breath sounds. No wheezing.   Abdominal:      General: Bowel sounds are normal. There is no distension.      Palpations: Abdomen is soft.      Tenderness: There is no abdominal tenderness.   Musculoskeletal:         General: No tenderness. Normal range of motion.      Cervical back: Normal range of motion and neck supple.      Right lower leg: Edema present.      Left lower leg: Edema present.   Lymphadenopathy:      Cervical: No cervical adenopathy.   Skin:     General: Skin is warm and dry.      Capillary Refill: Capillary refill takes less than 2 seconds.      Findings: Erythema (BLE statis dermatitis ) present. No rash.      Comments: Superficial weeping wound to RLE   Neurological:      Mental Status: She is alert and oriented to person, place, and time.      Cranial Nerves: No cranial nerve deficit.      Sensory: No sensory deficit.      Comments: Weakness, contractures, & immobility 2/2 CP at baseline   Psychiatric:         Behavior: Behavior normal.         Thought Content: Thought content normal.         Judgment: Judgment normal.         Significant Labs: All pertinent labs within the past 24 hours have been reviewed.    Significant Imaging: I have reviewed all pertinent imaging results/findings within the past 24 hours.      Assessment/Plan:      * Bilateral leg edema  Varicose veins of leg with complications  - suspect largely from dependent edema a/w venous insufficiency; slight volume overload contributing  - low suspicion for cellulitis given  bilateral symptoms, afebrile w/o leukocytosis  - BLE US without acute DVT  - edema improved significantly with leg elevation in ED  - compression stockings  - aggressively elevate extremities  - wound care consult  - diuresis as below    Acute on chronic diastolic congestive heart failure  Additional dose ordered   - BNP 29, CXR without pulm edema  - no reported SOB  - suspect slight volume overload contributing to AoC BLE edema  - trial 40mg lasix IVP x1  - further diuresis pending response  - check 2D echo  - I/Os, daily weights, fluid restriction  - cardiac diet    Wheelchair bound  - turn q2hr    Moderate episode of recurrent major depressive disorder  - non-compliant w/ home lexapro or remeron  - family requests referral to psych on discharge     Physical debility  - chronic issue exacerbated by increased immobility and worsening BLE edema recently   - PT/OT consulted  - appreciate CM/SW assistance    Hypertension  - continue amlodipine 2.5mg BID    VTE Risk Mitigation (From admission, onward)         Ordered     enoxaparin injection 40 mg  Daily         02/11/22 2012     IP VTE HIGH RISK PATIENT  Once         02/11/22 2012     Place sequential compression device  Until discontinued         02/11/22 2012                Discharge Planning   CHAPO: 2/12/2022     Code Status: Full Code   Is the patient medically ready for discharge?: No    Reason for patient still in hospital (select all that apply): Patient trending condition  Discharge Plan A: Home Health                  Charito Campbell PA-C  Department of Hospital Medicine   [facility] Dept

## 2022-02-12 NOTE — PLAN OF CARE
Problem: Physical Therapy Goal  Goal: Physical Therapy Goal  Description:     Naga Benavidez   MRN: [id number]   Patient seen for PT and OT evaluations. Recommend SNF for continued therapy to improve safety and functional mobility, for caregiver training.     Patient has decreased caregiver support and broken DME: hospital bed, manual w/c. She has a jr lift that belonged to her mother that is also broken. She reports she has a waiver for home assistant for 8hours daily, but has not had an assistance available except occasionally.    Full report to follow.  Thea Andrade, PT 2022    Goals to be met by: 2022     Patient will increase functional independence with mobility by performin. Supine to sit with MInimal Assistance  2. Sit to supine with MInimal Assistance  3. Sit to stand transfer with Minimal Assistance  4. Bed to chair transfer with Minimal Assistance using LRAD  5. Gait  x 5 feet with Minimal Assistance using LRAD.   6. Lower extremity exercise program x15 reps per handout, with assistance as needed    PT EVAL: 2022

## 2022-02-12 NOTE — H&P
[facility] Dept  [place] [facility]  History & Physical    Patient Name: Naga Benavidez  MRN: [id number]  Patient Class: OP- Observation  Admission Date: 2/11/2022  Attending Physician: Macy Tomlinson MD   Primary Care Provider: Kathrin Salazar MD         Patient information was obtained from patient, past medical records and ER records.     Subjective:     Principal Problem:Bilateral leg edema    Chief Complaint:   Chief Complaint   Patient presents with    Leg Swelling     No SOB, right leg leg more swollen than left; wheelchair bound, hx CP        HPI: Naga Benavidez is a 61 y.o. with a PMHx of cerebral palsy, wheelchair bound, HTN, HLD, anxiety, MDD, CHF (G1DD) [place] to [facility] [place] from IM clinic for acute on chronic BLE swelling (R>L). PCP was concerned for cellulitis, CHF, DVT, dependent edema so she was sent here for further evaluation. Sister (primary caretaker) at bedside assists with history. Patient's had progressive worsening in chronic bilateral leg swelling for the past few weeks and now her legs have started to weep clear fluid. She has small wounds to her legs (R>L) that she is followed by wound care for. Wounds were nearly healed until a few days ago when her sitter accidentally brushed up against her leg and reopening the wound. Patient has been has been wheelchair bound for several years due to CP. Lately, she's been sleeping in her WC recently and hasn't been elevating her legs like she has been told to do in the past. Also, she was given compression stockings in the past, but she is not able to put the stockings on herself. Her BLE edema has improved significantly since legs elevated in the ED earlier today. Denies fever chills, chest pain, SOB, cough, N/V, abdominal pain, dysuria, constipation, HA or syncope. Patient lives in a house alone and her sister comes by every now and then to help. She did have home health but states her company does not have any available  people to assist her at this time.     ED: AFVSS. CBC, CMP largely unremarkable. BNP WNL, CXR without acute findings. Venous U/S negative for DVT. Social work saw patient in ED who placed DME orders for jr lift and home health.       Past Medical History:   Diagnosis Date    Anemia     Arthritis     Asthma     Cerebral palsy     stable    DJD (degenerative joint disease)     Hyperlipidemia     Hypertension     Neuromuscular disorder     Obstructive sleep apnea     Uterine fibroids affecting pregnancy     with adnexal mass       Past Surgical History:   Procedure Laterality Date    FOOT CAPSULE RELEASE W/ PERCUTANEOUS HEEL CORD LENGTHENING, TIBIAL TENDON TRANSFER      HAMSTRING RELEASE         Review of patient's allergies indicates:   Allergen Reactions    Tramadol Other (See Comments)     She could not wake up     Augmentin [amoxicillin-pot clavulanate] Diarrhea    Levaquin [levofloxacin] Other (See Comments)     itching    Lipitor [atorvastatin] Other (See Comments)     Muscle pain    Statins-hmg-coa reductase inhibitors Other (See Comments)     Muscle pains    Bactrim [sulfamethoxazole-trimethoprim] Nausea Only       No current facility-administered medications on file prior to encounter.     Current Outpatient Medications on File Prior to Encounter   Medication Sig    acetaminophen (TYLENOL) 325 MG tablet Take 650 mg by mouth 4 (four) times daily as needed for Pain.    amLODIPine (NORVASC) 2.5 MG tablet Take 1 tablet (2.5 mg total) by mouth 2 (two) times daily.    baclofen (LIORESAL) 10 MG tablet TAKE ONE TABLET BY MOUTH AT BEDTIME    blood pressure monitor (IHEALTH EASE) XL arm size (OP) by Other route as needed for High Blood Pressure.    EScitalopram oxalate (LEXAPRO) 5 MG Tab Take 1 tablet (5 mg total) by mouth once daily.    fluconazole (DIFLUCAN) 150 MG Tab Use while you are on antibiotics then may repeat    fluticasone propionate (FLONASE) 50 mcg/actuation nasal spray 2 sprays  (100 mcg total) by Each Nostril route once daily.    miconazole (MICATIN) 2 % cream Apply topically 2 (two) times daily. (Patient taking differently: Apply topically 2 (two) times daily as needed.)    mirtazapine (REMERON) 7.5 MG Tab Take 1 tablet (7.5 mg total) by mouth every evening.    multivitamin capsule Take 1 capsule by mouth once daily.    triamcinolone acetonide 0.1% (KENALOG) 0.1 % cream APPLY TWO TIMES A DAY FOR 10 DAYS    [DISCONTINUED] amLODIPine (NORVASC) 2.5 MG tablet TAKE ONE TABLET BY MOUTH EVERY DAY    [DISCONTINUED] ciprofloxacin HCl (CIPRO) 500 MG tablet Take 1 tablet (500 mg total) by mouth every 12 (twelve) hours.    [DISCONTINUED] nitrofurantoin, macrocrystal-monohydrate, (MACROBID) 100 MG capsule Take 1 capsule (100 mg total) by mouth 2 (two) times daily.     Family History     Problem Relation (Age of Onset)    Diabetes Mother    Hypertension Mother        Tobacco Use    Smoking status: Never Smoker    Smokeless tobacco: Never Used   Substance and Sexual Activity    Alcohol use: No    Drug use: No    Sexual activity: Never     Birth control/protection: None     Review of Systems   Constitutional: Negative for activity change, appetite change, chills, fatigue and fever.   HENT: Negative for trouble swallowing and voice change.    Eyes: Negative for photophobia and visual disturbance.   Respiratory: Negative for cough, chest tightness, shortness of breath and wheezing.    Cardiovascular: Positive for leg swelling. Negative for chest pain and palpitations.   Gastrointestinal: Negative for abdominal distention, abdominal pain, anal bleeding, constipation, diarrhea, nausea and vomiting.   Endocrine: Negative for polyphagia and polyuria.   Genitourinary: Negative for decreased urine volume, difficulty urinating, dysuria, flank pain and hematuria.   Musculoskeletal: Positive for gait problem. Negative for arthralgias and back pain.   Skin: Positive for wound. Negative for pallor and  rash.   Neurological: Positive for weakness. Negative for dizziness, tremors, syncope, facial asymmetry, speech difficulty and light-headedness.   Psychiatric/Behavioral: Negative for behavioral problems, confusion and decreased concentration.     Objective:     Vital Signs (Most Recent):  Temp: 97.6 °F (36.4 °C) (02/11/22 1753)  Pulse: 81 (02/11/22 1753)  Resp: 18 (02/11/22 1753)  BP: (!) 146/67 (02/11/22 1753)  SpO2: 99 % (02/11/22 1415) Vital Signs (24h Range):  Temp:  [97.4 °F (36.3 °C)-97.6 °F (36.4 °C)] 97.6 °F (36.4 °C)  Pulse:  [81-87] 81  Resp:  [18] 18  SpO2:  [99 %] 99 %  BP: (130-146)/(67-78) 146/67     Weight: 68 kg (150 lb)  Body mass index is 29.29 kg/m².    Physical Exam  Vitals and nursing note reviewed.   Constitutional:       General: She is not in acute distress.     Appearance: She is well-developed.   HENT:      Head: Normocephalic and atraumatic.      Mouth/Throat:      Pharynx: No oropharyngeal exudate.   Eyes:      Extraocular Movements: Extraocular movements intact.      Conjunctiva/sclera: Conjunctivae normal.   Cardiovascular:      Rate and Rhythm: Normal rate and regular rhythm.      Heart sounds: Normal heart sounds.   Pulmonary:      Effort: Pulmonary effort is normal. No respiratory distress.      Breath sounds: Normal breath sounds. No wheezing.   Abdominal:      General: Bowel sounds are normal. There is no distension.      Palpations: Abdomen is soft.      Tenderness: There is no abdominal tenderness.   Musculoskeletal:         General: No tenderness. Normal range of motion.      Cervical back: Normal range of motion and neck supple.      Right lower leg: Edema present.      Left lower leg: Edema present.   Lymphadenopathy:      Cervical: No cervical adenopathy.   Skin:     General: Skin is warm and dry.      Capillary Refill: Capillary refill takes less than 2 seconds.      Findings: Erythema (BLE statis dermatitis ) present. No rash.      Comments: Superficial weeping wound to  RLE   Neurological:      Mental Status: She is alert and oriented to person, place, and time.      Cranial Nerves: No cranial nerve deficit.      Sensory: No sensory deficit.      Comments: Weakness, contractures, & immobility 2/2 CP at baseline   Psychiatric:         Behavior: Behavior normal.         Thought Content: Thought content normal.         Judgment: Judgment normal.             Significant Labs:   All pertinent labs within the past 24 hours have been reviewed.  CBC:   Recent Labs   Lab 02/11/22  1709   WBC 4.26   HGB 15.8   HCT 50.8*        CMP:   Recent Labs   Lab 02/11/22  1709      K 4.1      CO2 27   GLU 77   BUN 20   CREATININE 0.6   CALCIUM 9.9   PROT 6.9   ALBUMIN 3.3*   BILITOT 0.7   ALKPHOS 109   AST 34   ALT 31   ANIONGAP 10   EGFRNONAA >60.0       Significant Imaging: I have reviewed all pertinent imaging results/findings within the past 24 hours.    Assessment/Plan:     * Bilateral leg edema  Varicose veins of leg with complications  - suspect largely from dependent edema a/w venous insufficiency; slight volume overload contributing  - low suspicion for cellulitis given bilateral symptoms, afebrile w/o leukocytosis  - BLE US without acute DVT  - edema improved significantly with leg elevation in ED  - compression stockings  - aggressively elevate extremities  - wound care consult  - diuresis as below    Acute on chronic diastolic congestive heart failure  - BNP 29, CXR without pulm edema  - no reported SOB  - suspect slight volume overload contributing to AoC BLE edema  - trial 40mg lasix IVP x1  - further diuresis pending response  - check 2D echo  - I/Os, daily weights, fluid restriction  - cardiac diet    Physical debility  - chronic issue exacerbated by increased immobility and worsening BLE edema recently   - PT/OT consulted  - appreciate CM/SW assistance    Wheelchair bound  - turn q2hr    Moderate episode of recurrent major depressive disorder  - non-compliant w/ home  lexapro or remeron  - family requests referral to psych on discharge     Hypertension  - continue amlodipine 2.5mg BID      VTE Risk Mitigation (From admission, onward)         Ordered     enoxaparin injection 40 mg  Daily         02/11/22 2012     IP VTE HIGH RISK PATIENT  Once         02/11/22 2012     Place sequential compression device  Until discontinued         02/11/22 2012                   Priyanka Butt PA-C  Department of Hospital Medicine   [facility] Dept

## 2022-02-12 NOTE — SUBJECTIVE & OBJECTIVE
Past Medical History:   Diagnosis Date    Anemia     Arthritis     Asthma     Cerebral palsy     stable    DJD (degenerative joint disease)     Hyperlipidemia     Hypertension     Neuromuscular disorder     Obstructive sleep apnea     Uterine fibroids affecting pregnancy     with adnexal mass       Past Surgical History:   Procedure Laterality Date    FOOT CAPSULE RELEASE W/ PERCUTANEOUS HEEL CORD LENGTHENING, TIBIAL TENDON TRANSFER      HAMSTRING RELEASE         Review of patient's allergies indicates:   Allergen Reactions    Tramadol Other (See Comments)     She could not wake up     Augmentin [amoxicillin-pot clavulanate] Diarrhea    Levaquin [levofloxacin] Other (See Comments)     itching    Lipitor [atorvastatin] Other (See Comments)     Muscle pain    Statins-hmg-coa reductase inhibitors Other (See Comments)     Muscle pains    Bactrim [sulfamethoxazole-trimethoprim] Nausea Only       No current facility-administered medications on file prior to encounter.     Current Outpatient Medications on File Prior to Encounter   Medication Sig    acetaminophen (TYLENOL) 325 MG tablet Take 650 mg by mouth 4 (four) times daily as needed for Pain.    amLODIPine (NORVASC) 2.5 MG tablet Take 1 tablet (2.5 mg total) by mouth 2 (two) times daily.    baclofen (LIORESAL) 10 MG tablet TAKE ONE TABLET BY MOUTH AT BEDTIME    blood pressure monitor (IHE[employer] EASE) XL arm size (OP) by Other route as needed for High Blood Pressure.    EScitalopram oxalate (LEXAPRO) 5 MG Tab Take 1 tablet (5 mg total) by mouth once daily.    fluconazole (DIFLUCAN) 150 MG Tab Use while you are on antibiotics then may repeat    fluticasone propionate (FLONASE) 50 mcg/actuation nasal spray 2 sprays (100 mcg total) by Each Nostril route once daily.    miconazole (MICATIN) 2 % cream Apply topically 2 (two) times daily. (Patient taking differently: Apply topically 2 (two) times daily as needed.)    mirtazapine (REMERON) 7.5 MG Tab  Take 1 tablet (7.5 mg total) by mouth every evening.    multivitamin capsule Take 1 capsule by mouth once daily.    triamcinolone acetonide 0.1% (KENALOG) 0.1 % cream APPLY TWO TIMES A DAY FOR 10 DAYS    [DISCONTINUED] amLODIPine (NORVASC) 2.5 MG tablet TAKE ONE TABLET BY MOUTH EVERY DAY    [DISCONTINUED] ciprofloxacin HCl (CIPRO) 500 MG tablet Take 1 tablet (500 mg total) by mouth every 12 (twelve) hours.    [DISCONTINUED] nitrofurantoin, macrocrystal-monohydrate, (MACROBID) 100 MG capsule Take 1 capsule (100 mg total) by mouth 2 (two) times daily.     Family History     Problem Relation (Age of Onset)    Diabetes Mother    Hypertension Mother        Tobacco Use    Smoking status: Never Smoker    Smokeless tobacco: Never Used   Substance and Sexual Activity    Alcohol use: No    Drug use: No    Sexual activity: Never     Birth control/protection: None     Review of Systems   Constitutional: Negative for activity change, appetite change, chills, fatigue and fever.   HENT: Negative for trouble swallowing and voice change.    Eyes: Negative for photophobia and visual disturbance.   Respiratory: Negative for cough, chest tightness, shortness of breath and wheezing.    Cardiovascular: Positive for leg swelling. Negative for chest pain and palpitations.   Gastrointestinal: Negative for abdominal distention, abdominal pain, anal bleeding, constipation, diarrhea, nausea and vomiting.   Endocrine: Negative for polyphagia and polyuria.   Genitourinary: Negative for decreased urine volume, difficulty urinating, dysuria, flank pain and hematuria.   Musculoskeletal: Positive for gait problem. Negative for arthralgias and back pain.   Skin: Positive for wound. Negative for pallor and rash.   Neurological: Positive for weakness. Negative for dizziness, tremors, syncope, facial asymmetry, speech difficulty and light-headedness.   Psychiatric/Behavioral: Negative for behavioral problems, confusion and decreased  concentration.     Objective:     Vital Signs (Most Recent):  Temp: 97.6 °F (36.4 °C) (02/11/22 1753)  Pulse: 81 (02/11/22 1753)  Resp: 18 (02/11/22 1753)  BP: (!) 146/67 (02/11/22 1753)  SpO2: 99 % (02/11/22 1415) Vital Signs (24h Range):  Temp:  [97.4 °F (36.3 °C)-97.6 °F (36.4 °C)] 97.6 °F (36.4 °C)  Pulse:  [81-87] 81  Resp:  [18] 18  SpO2:  [99 %] 99 %  BP: (130-146)/(67-78) 146/67     Weight: 68 kg (150 lb)  Body mass index is 29.29 kg/m².    Physical Exam  Vitals and nursing note reviewed.   Constitutional:       General: She is not in acute distress.     Appearance: She is well-developed.   HENT:      Head: Normocephalic and atraumatic.      Mouth/Throat:      Pharynx: No oropharyngeal exudate.   Eyes:      Extraocular Movements: Extraocular movements intact.      Conjunctiva/sclera: Conjunctivae normal.   Cardiovascular:      Rate and Rhythm: Normal rate and regular rhythm.      Heart sounds: Normal heart sounds.   Pulmonary:      Effort: Pulmonary effort is normal. No respiratory distress.      Breath sounds: Normal breath sounds. No wheezing.   Abdominal:      General: Bowel sounds are normal. There is no distension.      Palpations: Abdomen is soft.      Tenderness: There is no abdominal tenderness.   Musculoskeletal:         General: No tenderness. Normal range of motion.      Cervical back: Normal range of motion and neck supple.      Right lower leg: Edema present.      Left lower leg: Edema present.   Lymphadenopathy:      Cervical: No cervical adenopathy.   Skin:     General: Skin is warm and dry.      Capillary Refill: Capillary refill takes less than 2 seconds.      Findings: Erythema (BLE statis dermatitis ) present. No rash.      Comments: Superficial weeping wound to RLE   Neurological:      Mental Status: She is alert and oriented to person, place, and time.      Cranial Nerves: No cranial nerve deficit.      Sensory: No sensory deficit.      Comments: Weakness, contractures, & immobility 2/2  CP at baseline   Psychiatric:         Behavior: Behavior normal.         Thought Content: Thought content normal.         Judgment: Judgment normal.             Significant Labs:   All pertinent labs within the past 24 hours have been reviewed.  CBC:   Recent Labs   Lab 02/11/22  1709   WBC 4.26   HGB 15.8   HCT 50.8*        CMP:   Recent Labs   Lab 02/11/22  1709      K 4.1      CO2 27   GLU 77   BUN 20   CREATININE 0.6   CALCIUM 9.9   PROT 6.9   ALBUMIN 3.3*   BILITOT 0.7   ALKPHOS 109   AST 34   ALT 31   ANIONGAP 10   EGFRNONAA >60.0       Significant Imaging: I have reviewed all pertinent imaging results/findings within the past 24 hours.

## 2022-02-12 NOTE — PT/OT/SLP EVAL
Occupational Therapy   Evaluation and Treatment    Name: Naga Benavidez  MRN: [id number]  Admitting Diagnosis:  Bilateral leg edema  Recent Surgery: * No surgery found *       Co-eval with PT to have 2 skilled therapists present to safely assess pt's functional mobility.    Recommendations:     Discharge Recommendations: nursing facility, skilled  Discharge Equipment Recommendations:  bedside commode,other (see comments) (DME repair)  Barriers to discharge:  Decreased caregiver support,Inaccessible home environment,Other (Comment) (increased level of assistance required)    Assessment:     Naga Benavidez is a 61 y.o. female with a medical diagnosis of Bilateral leg edema.  Pt was agreeable to participate in EOB activity. However, she requires significant assistance to perform ADLs and functional mobility.  Due to this, her living alone, and her DME at home needing repair, SNF recommended at d/c for maximal pt gains in functional independence and to ensure her safety upon returning home.  She presents with the following. Performance deficits affecting function: weakness,impaired endurance,impaired self care skills,impaired functional mobilty,gait instability,decreased ROM,decreased lower extremity function,decreased upper extremity function,impaired balance,impaired skin,pain.      Rehab Prognosis: Good; patient would benefit from acute skilled OT services to address these deficits and reach maximum level of function.       Plan:     Patient to be seen 3 x/week to address the above listed problems via self-care/home management,therapeutic activities,therapeutic exercises  · Plan of Care Expires: 03/11/22  · Plan of Care Reviewed with: patient    Subjective     Chief Complaint: L heel pain, irritation on her backside due to sitting UIC the past month   Patient/Family Comments/goals: to get stronger and return to PLOF    Occupational Profile:  Living Environment: Pt lives in a 1st floor apartment with ramp  access.  She has a grab bar by the toilet.   Previous level of function: Assistance for dressing and to bathe her backside, but she hasn't had assistance in the past month.  She bathes at the sink in her w/c.  Pt gets around her home using a motorized scooter.  Noted that pt's manual w/c, hospital bed, and jr lift are broken.  Pt hasn't been in bed much for the past month.  She reports difficulty transferring to and from the toilet in the motorized w/c.  Prior to her hospital bed breaking, she could t/f to and from the bed with Mod I via squat pivot.      Roles and Routines: sister  Equipment Used at Home:  hospital bed,lift device,wheelchair,grab bar (manual w/c, jr lift, and hospital bed are broken.  Pt is using a motorized scooter.)  Assistance upon Discharge: Her sister can intermittently assist.  Pt has a waiver for home (A) but nobody has been able to assist her.    Pain/Comfort:  · Pain Rating 1: 5/10  · Location - Side 1: Left  · Location - Orientation 1: generalized  · Location 1: heel  · Pain Addressed 1: Reposition,Distraction,Cessation of Activity  · Pain Rating Post-Intervention 1: other (see comments) (not rated)    Patients cultural, spiritual, Pentecostalism conflicts given the current situation: no    Objective:     Communicated with: nursing and PT prior to session.  Patient found HOB elevated with telemetry,PureWick,peripheral IV upon OT entry to room.    General Precautions: Standard, fall   Orthopedic Precautions:N/A   Braces: N/A  Respiratory Status: Room air    Occupational Performance:    Bed Mobility:    · Patient completed Rolling/Turning to Left with maximal assistance and 2 persons  · Patient completed Scooting/Bridging to HOB while supine with total assistance and 2 persons  · Patient completed Supine to Sit with maximal assistance and 2 persons  · Patient completed Sit to Supine with maximal assistance and 2 persons    Functional Mobility/Transfers:  · Deferred due to pt requiring Max  A for sitting balance due to her posterior lean.      Activities of Daily Living:  · Lower Body Dressing: total assistance to zack non-skid socks while sitting EOB  · Toileting: maximal assistance to adjust and fasten adult brief.      Cognitive/Visual Perceptual:  Cognitive/Psychosocial Skills:     -       Oriented to: Person, Place, Time and Situation   -       Follows Commands/attention:Follows multistep  commands  -       Communication: clear/fluent    Physical Exam:  Postural examination/scapula alignment:    -       Scoliosis  Dominant hand:    -       R-handed  Upper Extremity Range of Motion:     -       Right Upper Extremity: WFL except shoulder flexion - AROM less than 90 degrees, AAROM WFL  -       Left Upper Extremity: WFL except shoulder flexion - AROM less than 90 degrees, AAROM ~90 degrees  Upper Extremity Strength:    -       Right Upper Extremity: WFL except 2+/5 shoulder flexion  -       Left Upper Extremity: WFL except 2+/5 shoulder flexion   Strength:    -       Right Upper Extremity: WFL  -       Left Upper Extremity: WFL  Fine Motor Coordination:    -       Intact  Left hand thumb/finger opposition skills and Right hand thumb/finger opposition skills    AMPA 6 Click ADL:  AMPAC Total Score: 14    Treatment & Education:  Pt edu on role of OT, POC, benefit of performing EOB/OOB activity, and safety when performing bed mobility.  - Self care tasks completed-- as noted above     Education:    Patient left HOB elevated with all lines intact and call button in reach    GOALS:   Multidisciplinary Problems     Occupational Therapy Goals        Problem: Occupational Therapy Goal    Goal Priority Disciplines Outcome Interventions   Occupational Therapy Goal     OT, PT/OT Ongoing, Progressing    Description: Goals to be met by: 2/26/2022     Patient will increase functional independence with ADLs by performing:    UE Dressing with Stand-by Assistance.  Grooming while EOB with Stand-by  Assistance.  Toileting from bedside commode with Minimal Assistance for hygiene and clothing management.   Supine to sit with Minimal Assistance.  Squat pivot transfer to chair/bedside commode with Minimal Assistance.  Toilet transfer to bedside commode with Minimal Assistance.                     History:     Past Medical History:   Diagnosis Date    Anemia     Arthritis     Asthma     Cerebral palsy     stable    DJD (degenerative joint disease)     Hyperlipidemia     Hypertension     Neuromuscular disorder     Obstructive sleep apnea     Uterine fibroids affecting pregnancy     with adnexal mass       Past Surgical History:   Procedure Laterality Date    FOOT CAPSULE RELEASE W/ PERCUTANEOUS HEEL CORD LENGTHENING, TIBIAL TENDON TRANSFER      HAMSTRING RELEASE         Time Tracking:     OT Date of Treatment: 02/12/22  OT Start Time: 0842  OT Stop Time: 0913  OT Total Time (min): 31 min    Billable Minutes:Evaluation 8 min  Self Care/Home Management 8 min  Therapeutic Activity 15 min    2/12/2022

## 2022-02-12 NOTE — PT/OT/SLP EVAL
"Physical Therapy Evaluation    Patient Name:  Naga Benavidez   MRN:  [id number]    Recommendations:     Discharge Recommendations:  nursing facility, skilled   Discharge Equipment Recommendations: bedside commode,other (see comments) (patient DME needs repair)   Barriers to discharge: patient requires increased level of assistance    Assessment:     Naga Benavidez is a 61 y.o. female admitted with a medical diagnosis of Bilateral leg edema.  She presents with the following impairments/functional limitations:  weakness,impaired endurance,impaired functional mobilty,decreased lower extremity function,impaired skin,decreased ROM,impaired balance Patient also has a diagnosis of CP and functions at baseline with a wheelchair. She required max A x2 for bed mobility. She demonstrated ability to sit EOB with max A and increased posterior lean. Transfers and gait were unable to be assessed secondary to BLE weakness.    Rehab Prognosis: Good; patient would benefit from acute skilled PT services to address these deficits and reach maximum level of function.    Recent Surgery: * No surgery found *      Plan:     During this hospitalization, patient to be seen 3 x/week to address the identified rehab impairments via gait training,therapeutic activities,therapeutic exercises and progress toward the following goals:    · Plan of Care Expires:  03/14/22    Subjective     Chief Complaint: "I feel like a need to have a bowel movement since I just ate my breakfast."  Patient/Family Comments/goals: return home to [facility]  Pain/Comfort:  Pain Rating 1: 5/10  Location - Side 1: Left  Location - Orientation 1: generalized  Location 1: heel  Pain Addressed 1: Reposition,Distraction,Cessation of Activity  Pain Rating Post-Intervention 1: 5/10    Patients cultural, spiritual, Jehovah's witness conflicts given the current situation: no    Living Environment:  Patient lives in a first floor apartment with a ramp with her sister, who lives " there intermittently. She reports her sister also has health problems and difficulty with providing all assistance that patient requires. The bathroom has a tub shower combination with grab bars, but patient reports washing up in the sink.  Prior to admission, patients level of function was required assistance. HH was providing assistance, until 1 month ago and she has been sitting in motorized w/c since that time d/t inability to trf independently. Patient has a waiver to continue receiving assistance at this time for 8 hours/day, but unable to receive assistance d/t caregiver shortage. Equipment used at home: wheelchair,hospital bed,lift device,grab bar (manual w/c, hospital bed, and jr lift are currently broken. Patient currently using motorized w/c).  DME owned (not currently used): manual w/c, hospital bed, and jr lift are broken and not currently used..  Upon discharge, patient will have assistance from sister intermittently.    Objective:   Therapy evaluation completed with Occupational Therapist to best establish plan of care for acute setting and to provide skilled treatment.  Communicated with nurse prior to session.  Patient found HOB elevated with telemetryIvon  upon PT entry to room.    General Precautions: Standard, fall   Orthopedic Precautions:N/A   Braces: N/A  Respiratory Status: Room air    Exams:  · Cognitive Exam:  Patient is oriented to Person, Place, Time and Situation  · Gross Motor Coordination:  WFL  · Postural Exam:  Patient presented with the following abnormalities:    · -       Rounded shoulders  · -       Forward head  · -       Left scapula elevated  · -       Scoliosis  · Skin Integrity/Edema:      · -       Skin integrity: Wound covered on posterior distal BLE with gauze and violet wound dressing  · -       Edema: Moderate BLE  · RLE ROM: PROM HF/KF to 90, PROM ankle DF to neutral, genu valgus ntoed  · RLE Strength: grossly 2-/5 overall in BLE  · LLE ROM: PROM HF/KF to  90, PROM ankle DF to neutral, genu valgus noted and internally rotated  · LLE Strength: grossly 2-/5 overall in BLE    Functional Mobility:  · Bed Mobility:     · Supine to Sit: maximal assistance and of 2 persons  · Sit to Supine: maximal assistance and of 2 persons  · Scooting: anteriorly toward EOB in seated, max A   · Transfers:  unable to assess d/t BLE weakness     · Gait: unable to assess d/t BLE weakness  · Balance: sitting: max A with BUE support at EOB, exhibited increased posterior lean    Therapeutic Activities and Exercises:   Patient educated on PT POC, call for assistance, importance of positioning, bed mobility      AM-PAC 6 CLICK MOBILITY  Total Score:10     Patient left HOB elevated with all lines intact, call button in reach and nurse notified.    GOALS:   Multidisciplinary Problems     Physical Therapy Goals        Problem: Physical Therapy Goal    Goal Priority Disciplines Outcome Goal Variances Interventions   Physical Therapy Goal     PT, PT/OT Ongoing, Progressing     Description:     Naga Benavidez   MRN: [id number]   Patient seen for PT and OT evaluations. Recommend SNF for continued therapy to improve safety and functional mobility, for caregiver training.     Patient has decreased caregiver support and broken DME: hospital bed, manual w/c. She has a jr lift that belonged to her mother that is also broken. She reports she has a waiver for home assistant for 8hours daily, but has not had an assistance available except occasionally.    Full report to follow.  Thea Andrade, PT 2022    Goals to be met by: 2022     Patient will increase functional independence with mobility by performin. Supine to sit with MInimal Assistance  2. Sit to supine with MInimal Assistance  3. Sit to stand transfer with Minimal Assistance  4. Bed to chair transfer with Minimal Assistance using LRAD  5. Gait  x 5 feet with Minimal Assistance using LRAD.   6. Lower extremity exercise  program x15 reps per handout, with assistance as needed                                 History:     Past Medical History:   Diagnosis Date    Anemia     Arthritis     Asthma     Cerebral palsy     stable    DJD (degenerative joint disease)     Hyperlipidemia     Hypertension     Neuromuscular disorder     Obstructive sleep apnea     Uterine fibroids affecting pregnancy     with adnexal mass       Past Surgical History:   Procedure Laterality Date    FOOT CAPSULE RELEASE W/ PERCUTANEOUS HEEL CORD LENGTHENING, TIBIAL TENDON TRANSFER      HAMSTRING RELEASE         Time Tracking:     PT Received On: 02/12/22  PT Start Time: 0843     PT Stop Time: 0915  PT Total Time (min): 32 min     Billable Minutes: Evaluation 10 and Therapeutic Activity 15      02/12/2022

## 2022-02-12 NOTE — PLAN OF CARE
Problem: Physical Therapy Goal  Goal: Physical Therapy Goal  Outcome: Ongoing, Progressing     Problem: Physical Therapy Goal  Goal: Physical Therapy Goal  Description:     Naga Benavidez   MRN: [id number]   Patient seen for PT and OT evaluations. Recommend SNF for continued therapy to improve safety and functional mobility, for caregiver training.     Patient has decreased caregiver support and broken DME: hospital bed, manual w/c. She has a jr lift that belonged to her mother that is also broken. She reports she has a waiver for home assistant for 8hours daily, but has not had an assistance available except occasionally.    Full report to follow.  Thea Andrade, PT 2/12/2022

## 2022-02-12 NOTE — HOSPITAL COURSE
61 y.o. who was admitted to hospital medicine for lower extremity edema, suspect dependent in setting of functional quadriplegia. TTE was obtained as well which demonstrated stable cardiac function. IV diuresis was administered throughout hospital course with appropriate response. She was evaluated by PTOT who recommended SNF however she declined and requested HH instead. Discharged home in stable condition with HH services.

## 2022-02-12 NOTE — ASSESSMENT & PLAN NOTE
Varicose veins of leg with complications  - suspect largely from dependent edema a/w venous insufficiency; slight volume overload contributing  - low suspicion for cellulitis given bilateral symptoms, afebrile w/o leukocytosis  - BLE US without acute DVT  - edema improved significantly with leg elevation in ED  - compression stockings  - aggressively elevate extremities  - wound care consult  - diuresis as below

## 2022-02-12 NOTE — ASSESSMENT & PLAN NOTE
- chronic issue exacerbated by increased immobility and worsening BLE edema recently   - PT/OT consulted  - appreciate CM/SW assistance

## 2022-02-12 NOTE — HPI
Naga Benavidez is a 61 y.o. with a PMHx of cerebral palsy, wheelchair bound, HTN, HLD, anxiety, MDD, CHF (G1DD) presents to [facility] from  clinic for acute on chronic BLE swelling (R>L). PCP was concerned for cellulitis, CHF, DVT, dependent edema so she was sent here for further evaluation. Sister (primary caretaker) at bedside assists with history. Patient's had progressive worsening in chronic bilateral leg swelling for the past few weeks and now her legs have started to weep clear fluid. She has small wounds to her legs (R>L) that she is followed by wound care for. Wounds were nearly healed until a few days ago when her sitter accidentally brushed up against her leg and reopening the wound. Patient has been has been wheelchair bound for several years due to CP. Lately, she's been sleeping in her WC recently and hasn't been elevating her legs like she has been told to do in the past. Also, she was given compression stockings in the past, but she is not able to put the stockings on herself. Her BLE edema has improved significantly since legs elevated in the ED earlier today. Denies fever chills, chest pain, SOB, cough, N/V, abdominal pain, dysuria, constipation, HA or syncope. Patient lives in a house alone and her sister comes by every now and then to help. She did have home health but states her company does not have any available people to assist her at this time.     ED: AFVSS. CBC, CMP largely unremarkable. BNP WNL, CXR without acute findings. Venous U/S negative for DVT. Social work saw patient in ED who placed DME orders for jr lift and home health.

## 2022-02-12 NOTE — PLAN OF CARE
Problem: Physical Therapy Goal    Description: Patient seen for PT and OT evaluations. Full report to follow.     Recommend ROLLING [facility]ALKER and referral to HOME HEALTH OT and PT for discharge home.     Patient ambulated w/ RW and  feet; she ascended and descended 2 steps w/ handrail and CGA.  Thea Andrade, PT 2/12/2022    PT evaluation. Full report to follow.   Thea Andrade, PT 2/12/2022

## 2022-02-12 NOTE — ASSESSMENT & PLAN NOTE
Additional dose ordered   - BNP 29, CXR without pulm edema  - no reported SOB  - suspect slight volume overload contributing to AoC BLE edema  - trial 40mg lasix IVP x1  - further diuresis pending response  - check 2D echo  - I/Os, daily weights, fluid restriction  - cardiac diet

## 2022-02-12 NOTE — NURSING TRANSFER
Nursing Transfer Note      2/12/2022     Reason patient is being transferred: Patient has a room 7074    Transfer  7074 from [facility]    Transfer via Bed    Transfer with NA    Transported by Transport    Medicines sent: No    Any special needs or follow-up needed: NA    Chart send with patient: Yes    Notified:NIVIA Zamora    Patient reassessed at:2/12     Waiting on Tele

## 2022-02-12 NOTE — PLAN OF CARE
Problem: Occupational Therapy Goal  Goal: Occupational Therapy Goal  Description: Goals to be met by: 2/26/2022     Patient will increase functional independence with ADLs by performing:    UE Dressing with Stand-by Assistance.  Grooming while EOB with Stand-by Assistance.  Toileting from bedside commode with Minimal Assistance for hygiene and clothing management.   Supine to sit with Minimal Assistance.  Squat pivot transfer to chair/bedside commode with Minimal Assistance.  Toilet transfer to bedside commode with Minimal Assistance.    Outcome: Ongoing, Progressing     Pt evaluated and OT goals established.     MI

## 2022-02-12 NOTE — PLAN OF CARE
D/c planning:  Home health orders placed by ER team  DME for home in epic orders ([employer])  Bed repair order on home health orders also    Now at 7 p, ER team may admit Ms Benavidez.  Await dispo from ER team and Dr Howe to review  Case mgt to assist with needs and will fwd HH orders and Dme orders on day of discharge.  Will follow as needed    Addendum:  PT, OT and wound care to follow, Obs admit noted     [facility] - [facility] Dept  Initial Discharge Assessment       Primary Care Provider: Kathrin Salazar MD    Admission Diagnosis: No admission diagnoses are documented for this encounter.    Admission Date: 2/11/2022  Expected Discharge Date:           Payor: [employer] MEDICARE / Plan: [employer] / Product Type: Medicare Advantage /     Extended Emergency Contact Information  Primary Emergency Contact: Nadege Zhu  Address: unknown   [facility]  Home Phone: [phone]  Work Phone: [phone]  Mobile Phone: [phone]  Relation: Sister    Discharge Plan A: (P) Home Health  Discharge Plan B: (P) Skilled Nursing Facility      [facility] - [address] [address]  Phone: [phone] Fax: [phone]    [facility] [address] [facility] [address]  Phone: [phone] Fax: [phone]      Initial Assessment (most recent)       Adult Discharge Assessment - 02/11/22 1843          Discharge Assessment    Assessment Type Discharge Planning Assessment (P)      Confirmed/corrected address, phone number and insurance Yes (P)      Confirmed Demographics Correct on Facesheet (P)      Source of Information patient;health record (P)      Communicated [facility] with patient/caregiver Yes (P)      Lives With alone (P)      Do you expect to return to your current living situation? Yes (P)      Prior to hospitilization cognitive status: Alert/Oriented (P)      Current cognitive  status: Alert/Oriented (P)      Walking or Climbing Stairs Difficulty ambulation difficulty, requires equipment (P)      Equipment Currently Used at Home bedside commode;wheelchair;hospital bed;lift device (P)      How do you get to doctors appointments? health plan transportation (P)      Discharge Plan A Home Health (P)      Discharge Plan B Skilled Nursing Facility (P)      DME Needed Upon Discharge  lift device (P)      Discharge Plan discussed with: Patient (P)

## 2022-02-12 NOTE — SUBJECTIVE & OBJECTIVE
Interval History: patient stable, she reports improvement of her edema. Additional lasix dose ordered. TTE pending.     Review of Systems   Constitutional: Negative for activity change, appetite change, chills, fatigue and fever.   HENT: Negative for trouble swallowing and voice change.    Eyes: Negative for photophobia and visual disturbance.   Respiratory: Negative for cough, chest tightness, shortness of breath and wheezing.    Cardiovascular: Positive for leg swelling. Negative for chest pain and palpitations.   Gastrointestinal: Negative for abdominal distention, abdominal pain, anal bleeding, constipation, diarrhea, nausea and vomiting.   Endocrine: Negative for polyphagia and polyuria.   Genitourinary: Negative for decreased urine volume, difficulty urinating, dysuria, flank pain and hematuria.   Musculoskeletal: Positive for gait problem. Negative for arthralgias and back pain.   Skin: Positive for wound. Negative for pallor and rash.   Neurological: Positive for weakness. Negative for dizziness, tremors, syncope, facial asymmetry, speech difficulty and light-headedness.   Psychiatric/Behavioral: Negative for behavioral problems, confusion and decreased concentration.     Objective:     Vital Signs (Most Recent):  Temp: 97.8 °F (36.6 °C) (02/12/22 1622)  Pulse: 88 (02/12/22 1622)  Resp: 16 (02/12/22 1622)  BP: 139/79 (02/12/22 1622)  SpO2: 96 % (02/12/22 1622) Vital Signs (24h Range):  Temp:  [97.6 °F (36.4 °C)-97.8 °F (36.6 °C)] 97.8 °F (36.6 °C)  Pulse:  [81-99] 88  Resp:  [16-18] 16  SpO2:  [96 %-100 %] 96 %  BP: (130-146)/(61-80) 139/79     Weight: 68 kg (150 lb)  Body mass index is 29.29 kg/m².  No intake or output data in the 24 hours ending 02/12/22 1636   Physical Exam  Vitals and nursing note reviewed.   Constitutional:       General: She is not in acute distress.     Appearance: She is well-developed.   HENT:      Head: Normocephalic and atraumatic.      Mouth/Throat:      Pharynx: No oropharyngeal  exudate.   Eyes:      Extraocular Movements: Extraocular movements intact.      Conjunctiva/sclera: Conjunctivae normal.   Cardiovascular:      Rate and Rhythm: Normal rate and regular rhythm.      Heart sounds: Normal heart sounds.   Pulmonary:      Effort: Pulmonary effort is normal. No respiratory distress.      Breath sounds: Normal breath sounds. No wheezing.   Abdominal:      General: Bowel sounds are normal. There is no distension.      Palpations: Abdomen is soft.      Tenderness: There is no abdominal tenderness.   Musculoskeletal:         General: No tenderness. Normal range of motion.      Cervical back: Normal range of motion and neck supple.      Right lower leg: Edema present.      Left lower leg: Edema present.   Lymphadenopathy:      Cervical: No cervical adenopathy.   Skin:     General: Skin is warm and dry.      Capillary Refill: Capillary refill takes less than 2 seconds.      Findings: Erythema (BLE statis dermatitis ) present. No rash.      Comments: Superficial weeping wound to RLE   Neurological:      Mental Status: She is alert and oriented to person, place, and time.      Cranial Nerves: No cranial nerve deficit.      Sensory: No sensory deficit.      Comments: Weakness, contractures, & immobility 2/2 CP at baseline   Psychiatric:         Behavior: Behavior normal.         Thought Content: Thought content normal.         Judgment: Judgment normal.         Significant Labs: All pertinent labs within the past 24 hours have been reviewed.    Significant Imaging: I have reviewed all pertinent imaging results/findings within the past 24 hours.

## 2022-02-12 NOTE — ASSESSMENT & PLAN NOTE
- BNP 29, CXR without pulm edema  - no reported SOB  - suspect slight volume overload contributing to AoC BLE edema  - trial 40mg lasix IVP x1  - further diuresis pending response  - check 2D echo  - I/Os, daily weights, fluid restriction  - cardiac diet

## 2022-02-13 LAB
ANION GAP SERPL CALC-SCNC: 9 MMOL/L (ref 8–16)
ASCENDING AORTA: 2.07 CM
AV INDEX (PROSTH): 0.78
AV MEAN GRADIENT: 3 MMHG
AV PEAK GRADIENT: 4 MMHG
AV VALVE AREA: 2.59 CM2
AV VELOCITY RATIO: 0.77
BSA FOR ECHO PROCEDURE: 1.71 M2
BUN SERPL-MCNC: 20 MG/DL (ref 8–23)
CALCIUM SERPL-MCNC: 9.2 MG/DL (ref 8.7–10.5)
CHLORIDE SERPL-SCNC: 105 MMOL/L (ref 95–110)
CO2 SERPL-SCNC: 26 MMOL/L (ref 23–29)
CREAT SERPL-MCNC: 0.6 MG/DL (ref 0.5–1.4)
CV ECHO LV RWT: 0.43 CM
DOP CALC AO PEAK VEL: 1.04 M/S
DOP CALC AO VTI: 17.96 CM
DOP CALC LVOT AREA: 3.3 CM2
DOP CALC LVOT DIAMETER: 2.05 CM
DOP CALC LVOT PEAK VEL: 0.8 M/S
DOP CALC LVOT STROKE VOLUME: 46.48 CM3
DOP CALCLVOT PEAK VEL VTI: 14.09 CM
E WAVE DECELERATION TIME: 108.72 MSEC
E/A RATIO: 0.7
E/E' RATIO: 8.62 M/S
ECHO LV POSTERIOR WALL: 0.75 CM (ref 0.6–1.1)
EJECTION FRACTION: 65 %
EST. GFR  (AFRICAN AMERICAN): >60 ML/MIN/1.73 M^2
EST. GFR  (NON AFRICAN AMERICAN): >60 ML/MIN/1.73 M^2
FRACTIONAL SHORTENING: 31 % (ref 28–44)
GLUCOSE SERPL-MCNC: 83 MG/DL (ref 70–110)
INTERVENTRICULAR SEPTUM: 0.7 CM (ref 0.6–1.1)
IVRT: 117.98 MSEC
LA MAJOR: 5.01 CM
LA MINOR: 4.88 CM
LA WIDTH: 3.67 CM
LEFT ATRIUM SIZE: 2.18 CM
LEFT ATRIUM VOLUME INDEX MOD: 17.5 ML/M2
LEFT ATRIUM VOLUME INDEX: 20.3 ML/M2
LEFT ATRIUM VOLUME MOD: 29.06 CM3
LEFT ATRIUM VOLUME: 33.62 CM3
LEFT INTERNAL DIMENSION IN SYSTOLE: 2.41 CM (ref 2.1–4)
LEFT VENTRICLE DIASTOLIC VOLUME INDEX: 29.99 ML/M2
LEFT VENTRICLE DIASTOLIC VOLUME: 49.78 ML
LEFT VENTRICLE MASS INDEX: 39 G/M2
LEFT VENTRICLE SYSTOLIC VOLUME INDEX: 12.3 ML/M2
LEFT VENTRICLE SYSTOLIC VOLUME: 20.43 ML
LEFT VENTRICULAR INTERNAL DIMENSION IN DIASTOLE: 3.47 CM (ref 3.5–6)
LEFT VENTRICULAR MASS: 64.92 G
LV LATERAL E/E' RATIO: 8 M/S
LV SEPTAL E/E' RATIO: 9.33 M/S
MV PEAK A VEL: 0.8 M/S
MV PEAK E VEL: 0.56 M/S
MV STENOSIS PRESSURE HALF TIME: 31.53 MS
MV VALVE AREA P 1/2 METHOD: 6.98 CM2
PISA TR MAX VEL: 2.71 M/S
POTASSIUM SERPL-SCNC: 3.6 MMOL/L (ref 3.5–5.1)
RA MAJOR: 4.07 CM
RA PRESSURE: 3 MMHG
RA WIDTH: 3.57 CM
RV TISSUE DOPPLER FREE WALL SYSTOLIC VELOCITY 1 (APICAL 4 CHAMBER VIEW): 16.06 CM/S
SINUS: 2.24 CM
SODIUM SERPL-SCNC: 140 MMOL/L (ref 136–145)
STJ: 1.86 CM
TDI LATERAL: 0.07 M/S
TDI SEPTAL: 0.06 M/S
TDI: 0.07 M/S
TR MAX PG: 29 MMHG
TRICUSPID ANNULAR PLANE SYSTOLIC EXCURSION: 2.11 CM
TV REST PULMONARY ARTERY PRESSURE: 32 MMHG

## 2022-02-13 PROCEDURE — 80048 BASIC METABOLIC PNL TOTAL CA: CPT | Performed by: PHYSICIAN ASSISTANT

## 2022-02-13 PROCEDURE — 36415 COLL VENOUS BLD VENIPUNCTURE: CPT | Performed by: PHYSICIAN ASSISTANT

## 2022-02-13 PROCEDURE — 99226 PR SUBSEQUENT OBSERVATION CARE,LEVEL III: CPT | Mod: ,,, | Performed by: PHYSICIAN ASSISTANT

## 2022-02-13 PROCEDURE — 96374 THER/PROPH/DIAG INJ IV PUSH: CPT | Mod: 59

## 2022-02-13 PROCEDURE — 99226 PR SUBSEQUENT OBSERVATION CARE,LEVEL III: ICD-10-PCS | Mod: ,,, | Performed by: PHYSICIAN ASSISTANT

## 2022-02-13 PROCEDURE — G0378 HOSPITAL OBSERVATION PER HR: HCPCS

## 2022-02-13 PROCEDURE — 63600175 PHARM REV CODE 636 W HCPCS: Performed by: PHYSICIAN ASSISTANT

## 2022-02-13 PROCEDURE — 94761 N-INVAS EAR/PLS OXIMETRY MLT: CPT

## 2022-02-13 PROCEDURE — 96372 THER/PROPH/DIAG INJ SC/IM: CPT | Performed by: PHYSICIAN ASSISTANT

## 2022-02-13 PROCEDURE — 25000003 PHARM REV CODE 250: Performed by: PHYSICIAN ASSISTANT

## 2022-02-13 RX ORDER — FUROSEMIDE 20 MG/1
TABLET ORAL
Qty: 30 TABLET | Refills: 0 | Status: SHIPPED | OUTPATIENT
Start: 2022-02-13 | End: 2023-04-06 | Stop reason: SDUPTHER

## 2022-02-13 RX ORDER — FUROSEMIDE 10 MG/ML
40 INJECTION INTRAMUSCULAR; INTRAVENOUS ONCE
Status: COMPLETED | OUTPATIENT
Start: 2022-02-13 | End: 2022-02-13

## 2022-02-13 RX ADMIN — ACETAMINOPHEN 325 MG: 325 TABLET ORAL at 09:02

## 2022-02-13 RX ADMIN — FUROSEMIDE 40 MG: 40 INJECTION, SOLUTION INTRAMUSCULAR; INTRAVENOUS at 01:02

## 2022-02-13 RX ADMIN — AMLODIPINE BESYLATE 2.5 MG: 2.5 TABLET ORAL at 09:02

## 2022-02-13 RX ADMIN — ENOXAPARIN SODIUM 40 MG: 40 INJECTION SUBCUTANEOUS at 05:02

## 2022-02-13 RX ADMIN — ACETAMINOPHEN 650 MG: 325 TABLET ORAL at 09:02

## 2022-02-13 RX ADMIN — ACETAMINOPHEN 325 MG: 325 TABLET ORAL at 01:02

## 2022-02-13 NOTE — PLAN OF CARE
Care provided to patient as per POC and as charted.  PT. Encouraged to call when she needs anything especially if she is wet or soiled.  Pt. Expresses concern about bothering people or getting staff in trouble.  Reassured patient that her care is priority and she should call for any assistance she needs.  No acute events overnight.   Problem: Adult Inpatient Plan of Care  Goal: Plan of Care Review  Outcome: Ongoing, Progressing  Goal: Patient-Specific Goal (Individualized)  Outcome: Ongoing, Progressing  Goal: Absence of Hospital-Acquired Illness or Injury  Outcome: Ongoing, Progressing  Goal: Optimal Comfort and Wellbeing  Outcome: Ongoing, Progressing  Goal: Readiness for Transition of Care  Outcome: Ongoing, Progressing     Problem: Infection  Goal: Absence of Infection Signs and Symptoms  Outcome: Ongoing, Progressing     Problem: Skin Injury Risk Increased  Goal: Skin Health and Integrity  Outcome: Ongoing, Progressing     Problem: Impaired Wound Healing  Goal: Optimal Wound Healing  Outcome: Ongoing, Progressing     Problem: Fall Injury Risk  Goal: Absence of Fall and Fall-Related Injury  Outcome: Ongoing, Progressing

## 2022-02-13 NOTE — NURSING
During initial assessment pt. Requested repositioning on bedpan and stated she was unable to go because it was crooked.  Advised patient that it was reported she was on it for a while and she insisted she still needed to be on it.  Pt. Repositioned.

## 2022-02-13 NOTE — SUBJECTIVE & OBJECTIVE
Interval History: NAEON. Patient resting in bed, comfortable. Unsure of output as purewick malfunctioned. Lower extremity edema is improving, will give additional IV dose and continue to monitor. Appreciate wound care recs prior to discharge.    Review of Systems   Constitutional: Negative for activity change, appetite change, chills, fatigue and fever.   HENT: Negative for trouble swallowing and voice change.    Eyes: Negative for photophobia and visual disturbance.   Respiratory: Negative for cough, chest tightness, shortness of breath and wheezing.    Cardiovascular: Positive for leg swelling. Negative for chest pain and palpitations.   Gastrointestinal: Negative for abdominal distention, abdominal pain, anal bleeding, constipation, diarrhea, nausea and vomiting.   Endocrine: Negative for polyphagia and polyuria.   Genitourinary: Negative for decreased urine volume, difficulty urinating, dysuria, flank pain and hematuria.   Musculoskeletal: Positive for gait problem. Negative for arthralgias and back pain.   Skin: Positive for wound. Negative for pallor and rash.   Neurological: Positive for weakness. Negative for dizziness, tremors, syncope, facial asymmetry, speech difficulty and light-headedness.   Psychiatric/Behavioral: Negative for behavioral problems, confusion and decreased concentration.     Objective:     Vital Signs (Most Recent):  Temp: 97.8 °F (36.6 °C) (02/13/22 0757)  Pulse: 91 (02/13/22 0757)  Resp: 18 (02/13/22 0757)  BP: 112/62 (02/13/22 0757)  SpO2: 97 % (02/13/22 0757) Vital Signs (24h Range):  Temp:  [97.8 °F (36.6 °C)-98.4 °F (36.9 °C)] 97.8 °F (36.6 °C)  Pulse:  [] 91  Resp:  [14-18] 18  SpO2:  [96 %-99 %] 97 %  BP: (112-139)/(62-84) 112/62     Weight: 68.9 kg (152 lb)  Body mass index is 29.69 kg/m².    Intake/Output Summary (Last 24 hours) at 2/13/2022 1212  Last data filed at 2/12/2022 1836  Gross per 24 hour   Intake 180 ml   Output --   Net 180 ml      Physical Exam  Vitals and  nursing note reviewed.   Constitutional:       General: She is not in acute distress.     Appearance: She is well-developed.   HENT:      Head: Normocephalic and atraumatic.      Mouth/Throat:      Pharynx: No oropharyngeal exudate.   Eyes:      Extraocular Movements: Extraocular movements intact.      Conjunctiva/sclera: Conjunctivae normal.   Cardiovascular:      Rate and Rhythm: Normal rate and regular rhythm.      Heart sounds: Normal heart sounds.   Pulmonary:      Effort: Pulmonary effort is normal. No respiratory distress.      Breath sounds: Normal breath sounds. No wheezing.   Abdominal:      General: Bowel sounds are normal. There is no distension.      Palpations: Abdomen is soft.      Tenderness: There is no abdominal tenderness.   Musculoskeletal:         General: No tenderness. Normal range of motion.      Cervical back: Normal range of motion and neck supple.      Right lower leg: Edema present.      Left lower leg: Edema present.   Lymphadenopathy:      Cervical: No cervical adenopathy.   Skin:     General: Skin is warm and dry.      Capillary Refill: Capillary refill takes less than 2 seconds.      Findings: Erythema (BLE statis dermatitis ) present. No rash.      Comments: Superficial weeping wound to RLE   Neurological:      Mental Status: She is alert and oriented to person, place, and time.      Cranial Nerves: No cranial nerve deficit.      Sensory: No sensory deficit.      Comments: Weakness, contractures, & immobility 2/2 CP at baseline   Psychiatric:         Behavior: Behavior normal.         Thought Content: Thought content normal.         Judgment: Judgment normal.         Significant Labs: All pertinent labs within the past 24 hours have been reviewed.    Significant Imaging: I have reviewed all pertinent imaging results/findings within the past 24 hours.

## 2022-02-13 NOTE — PLAN OF CARE
Problem: Adult Inpatient Plan of Care  Goal: Plan of Care Review  Outcome: Ongoing, Progressing  Goal: Patient-Specific Goal (Individualized)  Outcome: Ongoing, Progressing  Goal: Absence of Hospital-Acquired Illness or Injury  Outcome: Ongoing, Progressing  Goal: Optimal Comfort and Wellbeing  Outcome: Ongoing, Progressing  Goal: Readiness for Transition of Care  Outcome: Ongoing, Progressing     Pt arrived to floor from ED. Pt transferred from stretcher to hosp bed with assistance x2. Pt AAOx4 but afraid of med administration and ask about medications prior to administration. Pt is non ambulatory and has been for a few years due to dx of cerebral palsy but able to assist with turning. Pt incontinent of B/B at times but voices need to use the bed pan. Purewick not intact upon transfer but changed after assessment. Pt wears glasses, uses cellphone to call sister often, and able to make needs known. Con 2-3+ pitting edema in BLE, both hips and 1+ pitting edema noted in both feet. Wounds noted to bilateral lower shins and old healed area noted to buttocks. Contractures noted to BLE as well as foot drop. Pt states should would like to try OT and PT for assistance with transfers to wheelchair, toilet, and BSC prior to leaving hospital. Oriented to room, call light, bed function, and staff. Call light within reach. Will continue to monitor.

## 2022-02-13 NOTE — PLAN OF CARE
[facility] - Tele[facility] Stepdown ([place] [address])      HOME HEALTH ORDERS  FACE TO FACE ENCOUNTER    Patient Name: Naga Benavidez  YOB: 1960    PCP: Kathrin Salazar MD   PCP Address: [address] / [place] [address]  PCP Phone Number: [phone]  PCP Fax: [phone]    Encounter Date: 2/11/22    Admit to Home Health    Diagnoses:  Active Hospital Problems    Diagnosis  POA    *Bilateral leg edema [R60.0]  Yes    Wheelchair bound [Z99.3]  Not Applicable    Acute on chronic diastolic congestive heart failure [I50.33]  Yes    Moderate episode of recurrent major depressive disorder [F33.1]  Yes    Mixed stress and urge urinary incontinence [N39.46]  Yes    Physical debility [R53.81]  Yes    Varicose veins of leg with complications [I83.899]  Yes    Hypertension [I10]  Yes      Resolved Hospital Problems   No resolved problems to display.       Follow Up Appointments:  No future appointments.    Allergies:  Review of patient's allergies indicates:   Allergen Reactions    Tramadol Other (See Comments)     She could not wake up     Augmentin [amoxicillin-pot clavulanate] Diarrhea    Levaquin [levofloxacin] Other (See Comments)     itching    Lipitor [atorvastatin] Other (See Comments)     Muscle pain    Statins-hmg-coa reductase inhibitors Other (See Comments)     Muscle pains    Bactrim [sulfamethoxazole-trimethoprim] Nausea Only       Medications: Review discharge medications with patient and family and provide education.    Current Facility-Administered Medications   Medication Dose Route Frequency Provider Last Rate Last Admin    acetaminophen tablet 650 mg  650 mg Oral Q4H PRN Priyanka Butt PA-C   325 mg at 02/13/22 0131    albuterol-ipratropium 2.5 mg-0.5 mg/3 mL nebulizer solution 3 mL  3 mL Nebulization Q4H PRN Priyanka Butt PA-C        amLODIPine tablet 2.5 mg  2.5 mg Oral BID Priyanka Butt PA-C   2.5 mg at 02/12/22 2204    bisacodyL  suppository 10 mg  10 mg Rectal Daily PRN Priyanka Butt PA-C        dextrose 10% bolus 125 mL  12.5 g Intravenous PRN Priyanka Butt PA-C        dextrose 10% bolus 250 mL  25 g Intravenous PRN Priyanka Butt PA-C        enoxaparin injection 40 mg  40 mg Subcutaneous Daily Priyanka Butt PA-C   40 mg at 02/12/22 1736    glucagon (human recombinant) injection 1 mg  1 mg Intramuscular PRN Priyanka Butt PA-C        glucose chewable tablet 16 g  16 g Oral PRN Priyanka Butt PA-C        glucose chewable tablet 24 g  24 g Oral PRN Priyanka Butt PA-C        melatonin tablet 6 mg  6 mg Oral Nightly PRN Priyanka Butt PA-C        ondansetron disintegrating tablet 8 mg  8 mg Oral Q8H PRN Priyanka Butt PA-C        polyethylene glycol packet 17 g  17 g Oral Daily PRN Priyanka Butt PA-C        promethazine tablet 25 mg  25 mg Oral Q6H PRN Priyanka Butt PA-C         Current Discharge Medication List      START taking these medications    Details   furosemide (LASIX) 20 MG tablet If wt gain 2-3 lbs x 2-5d, take 20mg daily. If no improvement, call MD  Qty: 30 tablet, Refills: 0         CONTINUE these medications which have NOT CHANGED    Details   acetaminophen (TYLENOL) 325 MG tablet Take 650 mg by mouth 4 (four) times daily as needed for Pain.      amLODIPine (NORVASC) 2.5 MG tablet Take 1 tablet (2.5 mg total) by mouth 2 (two) times daily.  Qty: 180 tablet, Refills: 3    Comments: .      baclofen (LIORESAL) 10 MG tablet TAKE ONE TABLET BY MOUTH AT BEDTIME  Qty: 90 tablet, Refills: 1      blood pressure monitor ([employer] EASE) XL arm size (OP) by Other route as needed for High Blood Pressure.  Qty: 1 each, Refills: 0    Comments: ^[email or web address]^2020-12-12T15:09:11Z^NNDA^NNDT^      EScitalopram oxalate (LEXAPRO) 5 MG Tab Take 1 tablet (5 mg total) by mouth once daily.  Qty: 30 tablet, Refills: 2    Associated Diagnoses: Adjustment disorder with  mixed anxiety and depressed mood      fluconazole (DIFLUCAN) 150 MG Tab Use while you are on antibiotics then may repeat  Qty: 1 tablet, Refills: 1      fluticasone propionate (FLONASE) 50 mcg/actuation nasal spray 2 sprays (100 mcg total) by Each Nostril route once daily.  Qty: 16 g, Refills: 6      miconazole (MICATIN) 2 % cream Apply topically 2 (two) times daily.  Refills: 0      mirtazapine (REMERON) 7.5 MG Tab Take 1 tablet (7.5 mg total) by mouth every evening.  Qty: 30 tablet, Refills: 6      multivitamin capsule Take 1 capsule by mouth once daily.      triamcinolone acetonide 0.1% (KENALOG) 0.1 % cream APPLY TWO TIMES A DAY FOR 10 DAYS  Qty: 80 g, Refills: 0         STOP taking these medications       ciprofloxacin HCl (CIPRO) 500 MG tablet Comments:   Reason for Stopping:         nitrofurantoin, macrocrystal-monohydrate, (MACROBID) 100 MG capsule Comments:   Reason for Stopping:                 I have seen and examined this patient within the last 30 days. My clinical findings that support the need for the home health skilled services and home bound status are the following:no   Requiring assistive device to leave home due to unsteady gait caused by  cerebral palsy.     Diet:   cardiac diet and 2 gram sodium diet    Labs:  SN to perform labs:  BMP in 1 week and Report Lab results to PCP.    Referrals/ Consults  Physical Therapy to evaluate and treat. Evaluate for home safety and equipment needs; Establish/upgrade home exercise program. Perform / instruct on therapeutic exercises, gait training, transfer training, and Range of Motion.  Occupational Therapy to evaluate and treat. Evaluate home environment for safety and equipment needs. Perform/Instruct on transfers, ADL training, ROM, and therapeutic exercises.   to evaluate for community resources/long-range planning.  Aide to provide assistance with personal care, ADLs, and vital signs.    Activities:   activity as tolerated    Nursing:    Agency to admit patient within 24 hours of hospital discharge unless specified on physician order or at patient request    SN to complete comprehensive assessment including routine vital signs. Instruct on disease process and s/s of complications to report to MD. Review/verify medication list sent home with the patient at time of discharge  and instruct patient/caregiver as needed. Frequency may be adjusted depending on start of care date.     Skilled nurse to perform up to 3 visits PRN for symptoms related to diagnosis    Notify MD if SBP > 160 or < 90; DBP > 90 or < 50; HR > 120 or < 50; Temp > 101; O2 < 88%    Ok to schedule additional visits based on staff availability and patient request on consecutive days within the home health episode.    When multiple disciplines ordered:    Start of Care occurs on Sunday - Wednesday schedule remaining discipline evaluations as ordered on separate consecutive days following the start of care.    Thursday SOC -schedule subsequent evaluations Friday and Monday the following week.     Friday - Saturday SOC - schedule subsequent discipline evaluations on consecutive days starting Monday of the following week.    For all post-discharge communication and subsequent orders please contact patient's primary care physician. If unable to reach primary care physician or do not receive response within 30 minutes, please contact PCP for clinical staff order clarification    Miscellaneous   Routine Skin for Bedridden Patients: Instruct patient/caregiver to apply moisture barrier cream to all skin folds and wet areas in perineal area daily and after baths and all bowel movements.    Heart Failure Home Health Instructions:     SN to instruct on the following:    Instruct on the definition of CHF.   Instruct on the signs/sympoms of CHF to be reported.   Instruct on and monitor daily weights.   Instruct on factors that cause exacerbation.   Instruct on action, dose, schedule, and side  effects of medications.   Instruct on diet as prescribed.   Instruct on activity allowed.   Instruct on life-style modifications for life long management of CHF   SN to assess compliance with daily weights, diet, medications, fluid retention,    safety precautions, activities permitted and life-style modifications.   Additional 1-2 SN visits per week as needed for signs and symptoms     of CHF exacerbation.    For Weight Gain > 2-3 lbs in 1 day or 4-6 lbs over 1 week:     Take lasix 20MG (oral diuretic) dose for 5 days temporarily     Obtain BMP lab test in 3 days      If weight does not decrease by 3 lbs after 5 days of increased diuretic usage:   Notify PCP    Home Health Aide:  Nursing Weekly, Physical Therapy Three times weekly, Occupational Therapy Three times weekly, Medical Social Work Weekly and Home Health Aide Monday, Wednesday and Friday    Wound Care Orders  yes:  Pressure Ulcer(s) Stage II :   Location: R posterior, L posterior,   Apply Miconzazole:  Barrier ointment                       Frequency:  Daily                             If incontinent of stool or urine, apply thin layer Barrier cream                   twice daily and PRN to wound         Pressure relief measure:  for pressure redistribution         I certify that this patient is confined to her home and needs intermittent skilled nursing care, physical therapy and occupational therapy.

## 2022-02-13 NOTE — PROGRESS NOTES
[facility] [id number]  [facility] Medicine  Progress Note    Patient Name: Naga Benavidez  MRN: [id number]  Patient Class: OP- Observation   Admission Date: 2/11/2022  Length of Stay: 0 days  Attending Physician: Macy Tomlinson MD  Primary Care Provider: Kathrin Salazar MD        Subjective:     Principal Problem:Bilateral leg edema        HPI:  Naga Benavidez is a 61 y.o. with a PMHx of cerebral palsy, wheelchair bound, HTN, HLD, anxiety, MDD, CHF (G1DD) [place] [facility] [place] acute on chronic BLE swelling (R>L). PCP was concerned for cellulitis, CHF, DVT, dependent edema so she was sent here for further evaluation. Sister (primary caretaker) at bedside assists with history. Patient's had progressive worsening in chronic bilateral leg swelling for the past few weeks and now her legs have started to weep clear fluid. She has small wounds to her legs (R>L) that she is followed by wound care for. Wounds were nearly healed until a few days ago when her sitter accidentally brushed up against her leg and reopening the wound. Patient has been has been wheelchair bound for several years due to CP. Lately, she's been sleeping in her WC recently and hasn't been elevating her legs like she has been told to do in the past. Also, she was given compression stockings in the past, but she is not able to put the stockings on herself. Her BLE edema has improved significantly since legs elevated in the ED earlier today. Denies fever chills, chest pain, SOB, cough, N/V, abdominal pain, dysuria, constipation, HA or syncope. Patient lives in a house alone and her sister comes by every now and then to help. She did have home health but states her company does not have any available people to assist her at this time.     ED: AFVSS. CBC, CMP largely unremarkable. BNP WNL, CXR without acute findings. Venous U/S negative for DVT. Social work saw patient in ED who placed DME orders for jr  lift and home health.       Overview/Hospital Course:  61 y.o. who was admitted to hospital medicine for lower extremity edema, suspect dependent in setting of functional quadriplegia. TTE was obtained as well which demonstrated stable cardiac function. IV diuresis was administered throughout hospital course with appropriate response. She was evaluated by PTOT who recommended SNF however she declined and requested HH instead. Patient to discharge home with HH services when medically ready.       Interval History: NAEON. Patient resting in bed, comfortable. Unsure of output as purewick malfunctioned. Lower extremity edema is improving, will give additional IV dose and continue to monitor. Appreciate wound care recs prior to discharge.    Review of Systems   Constitutional: Negative for activity change, appetite change, chills, fatigue and fever.   HENT: Negative for trouble swallowing and voice change.    Eyes: Negative for photophobia and visual disturbance.   Respiratory: Negative for cough, chest tightness, shortness of breath and wheezing.    Cardiovascular: Positive for leg swelling. Negative for chest pain and palpitations.   Gastrointestinal: Negative for abdominal distention, abdominal pain, anal bleeding, constipation, diarrhea, nausea and vomiting.   Endocrine: Negative for polyphagia and polyuria.   Genitourinary: Negative for decreased urine volume, difficulty urinating, dysuria, flank pain and hematuria.   Musculoskeletal: Positive for gait problem. Negative for arthralgias and back pain.   Skin: Positive for wound. Negative for pallor and rash.   Neurological: Positive for weakness. Negative for dizziness, tremors, syncope, facial asymmetry, speech difficulty and light-headedness.   Psychiatric/Behavioral: Negative for behavioral problems, confusion and decreased concentration.     Objective:     Vital Signs (Most Recent):  Temp: 97.8 °F (36.6 °C) (02/13/22 0757)  Pulse: 91 (02/13/22 0757)  Resp: 18  (02/13/22 0757)  BP: 112/62 (02/13/22 0757)  SpO2: 97 % (02/13/22 0757) Vital Signs (24h Range):  Temp:  [97.8 °F (36.6 °C)-98.4 °F (36.9 °C)] 97.8 °F (36.6 °C)  Pulse:  [] 91  Resp:  [14-18] 18  SpO2:  [96 %-99 %] 97 %  BP: (112-139)/(62-84) 112/62     Weight: 68.9 kg (152 lb)  Body mass index is 29.69 kg/m².    Intake/Output Summary (Last 24 hours) at 2/13/2022 1212  Last data filed at 2/12/2022 1836  Gross per 24 hour   Intake 180 ml   Output --   Net 180 ml      Physical Exam  Vitals and nursing note reviewed.   Constitutional:       General: She is not in acute distress.     Appearance: She is well-developed.   HENT:      Head: Normocephalic and atraumatic.      Mouth/Throat:      Pharynx: No oropharyngeal exudate.   Eyes:      Extraocular Movements: Extraocular movements intact.      Conjunctiva/sclera: Conjunctivae normal.   Cardiovascular:      Rate and Rhythm: Normal rate and regular rhythm.      Heart sounds: Normal heart sounds.   Pulmonary:      Effort: Pulmonary effort is normal. No respiratory distress.      Breath sounds: Normal breath sounds. No wheezing.   Abdominal:      General: Bowel sounds are normal. There is no distension.      Palpations: Abdomen is soft.      Tenderness: There is no abdominal tenderness.   Musculoskeletal:         General: No tenderness. Normal range of motion.      Cervical back: Normal range of motion and neck supple.      Right lower leg: Edema present.      Left lower leg: Edema present.   Lymphadenopathy:      Cervical: No cervical adenopathy.   Skin:     General: Skin is warm and dry.      Capillary Refill: Capillary refill takes less than 2 seconds.      Findings: Erythema (BLE statis dermatitis ) present. No rash.      Comments: Superficial weeping wound to RLE   Neurological:      Mental Status: She is alert and oriented to person, place, and time.      Cranial Nerves: No cranial nerve deficit.      Sensory: No sensory deficit.      Comments: Weakness,  contractures, & immobility 2/2 CP at baseline   Psychiatric:         Behavior: Behavior normal.         Thought Content: Thought content normal.         Judgment: Judgment normal.         Significant Labs: All pertinent labs within the past 24 hours have been reviewed.    Significant Imaging: I have reviewed all pertinent imaging results/findings within the past 24 hours.      Assessment/Plan:      * Bilateral leg edema  Varicose veins of leg with complications  - suspect largely from dependent edema a/w venous insufficiency; slight volume overload contributing  - low suspicion for cellulitis given bilateral symptoms, afebrile w/o leukocytosis  - BLE US without acute DVT  - edema improved significantly with leg elevation in ED  - compression stockings  - aggressively elevate extremities  - wound care consult  - diuresis as below    Acute on chronic diastolic congestive heart failure  Additional dose ordered   - BNP 29, CXR without pulm edema  - no reported SOB  - suspect slight volume overload contributing to AoC BLE edema  - trial 40mg lasix IVP x1  - further diuresis pending response  - check 2D echo  - I/Os, daily weights, fluid restriction  - cardiac diet    Wheelchair bound  - turn q2hr    Moderate episode of recurrent major depressive disorder  - non-compliant w/ home lexapro or remeron  - family requests referral to psych on discharge     Physical debility  - chronic issue exacerbated by increased immobility and worsening BLE edema recently   - PT/OT consulted  - appreciate CM/SW assistance    Hypertension  - continue amlodipine 2.5mg BID    VTE Risk Mitigation (From admission, onward)         Ordered     enoxaparin injection 40 mg  Daily         02/11/22 2012     IP VTE HIGH RISK PATIENT  Once         02/11/22 2012     Place sequential compression device  Until discontinued         02/11/22 2012                Discharge Planning   CHAPO: 2/13/2022     Code Status: Full Code   Is the patient medically ready for  discharge?: No    Reason for patient still in hospital (select all that apply): Treatment and Consult recommendations  Discharge Plan A: Home Health                  Charito Campbell PA-C  Department of Hospital Medicine   [facility] - Telemetry Stepdown ([place])

## 2022-02-13 NOTE — NURSING
"Staff reports they attempted to assist patient off of bedpan and she denied.  Pt. Family called stating pt. Has not been seen for hours.  When pt. Taken off bedpan pt. Stated that somebody told her she could not "have the stick she had to stay on the bedpan".  Further conversation revealed she was talking about the external catheter for urine.  Pt. Voided large amount of yellow urine that soiled protective pad in bedpan and under bedpan. PT. With stool visible in rectum.  Pt. Stated she can have a bowel movement better "on the pad"  if she lays on her side.  Pt refused sidelying position as she said she was hungry and wanted to eat dinner.   Pt further stated that she did not have an IV because it did not work so the nurse was giving her shots in her but.  Pt. Has no medication ordered IM.  PT. Appears oriented x4 with some intermittent confusion.  Assured patient that if she calls staff will be in to assist with anything she needs.  Will continue to monitor.   "

## 2022-02-13 NOTE — NURSING
Pt called to have pulse ox monitor removed.  Went to remove the pulse ox.  Found patient on the bedpan.  Pt stated she was wet.  Asked pt if she was ready to come off the bedpan, pt said she wasn't ready at this time and need gloves to be able to help move the bowel movement.  Asked pt to call when she was ready to get off the bedpan.

## 2022-02-14 VITALS
WEIGHT: 152 LBS | RESPIRATION RATE: 16 BRPM | TEMPERATURE: 98 F | HEART RATE: 87 BPM | SYSTOLIC BLOOD PRESSURE: 145 MMHG | DIASTOLIC BLOOD PRESSURE: 72 MMHG | HEIGHT: 60 IN | OXYGEN SATURATION: 99 % | BODY MASS INDEX: 29.84 KG/M2

## 2022-02-14 LAB
ANION GAP SERPL CALC-SCNC: 9 MMOL/L (ref 8–16)
BUN SERPL-MCNC: 19 MG/DL (ref 8–23)
CALCIUM SERPL-MCNC: 9.4 MG/DL (ref 8.7–10.5)
CHLORIDE SERPL-SCNC: 105 MMOL/L (ref 95–110)
CO2 SERPL-SCNC: 27 MMOL/L (ref 23–29)
CREAT SERPL-MCNC: 0.7 MG/DL (ref 0.5–1.4)
EST. GFR  (AFRICAN AMERICAN): >60 ML/MIN/1.73 M^2
EST. GFR  (NON AFRICAN AMERICAN): >60 ML/MIN/1.73 M^2
GLUCOSE SERPL-MCNC: 109 MG/DL (ref 70–110)
POTASSIUM SERPL-SCNC: 3.5 MMOL/L (ref 3.5–5.1)
SODIUM SERPL-SCNC: 141 MMOL/L (ref 136–145)

## 2022-02-14 PROCEDURE — 99217 PR OBSERVATION CARE DISCHARGE: CPT | Mod: ,,, | Performed by: PHYSICIAN ASSISTANT

## 2022-02-14 PROCEDURE — 99217 PR OBSERVATION CARE DISCHARGE: ICD-10-PCS | Mod: ,,, | Performed by: PHYSICIAN ASSISTANT

## 2022-02-14 PROCEDURE — 25000003 PHARM REV CODE 250: Performed by: PHYSICIAN ASSISTANT

## 2022-02-14 PROCEDURE — 97535 SELF CARE MNGMENT TRAINING: CPT

## 2022-02-14 PROCEDURE — G0378 HOSPITAL OBSERVATION PER HR: HCPCS

## 2022-02-14 PROCEDURE — 80048 BASIC METABOLIC PNL TOTAL CA: CPT | Performed by: PHYSICIAN ASSISTANT

## 2022-02-14 PROCEDURE — 97530 THERAPEUTIC ACTIVITIES: CPT

## 2022-02-14 PROCEDURE — 36415 COLL VENOUS BLD VENIPUNCTURE: CPT | Performed by: PHYSICIAN ASSISTANT

## 2022-02-14 RX ORDER — HYDROCHLOROTHIAZIDE 12.5 MG/1
12.5 TABLET ORAL DAILY
Status: DISCONTINUED | OUTPATIENT
Start: 2022-02-14 | End: 2022-02-14

## 2022-02-14 RX ORDER — POTASSIUM CHLORIDE 20 MEQ/1
40 TABLET, EXTENDED RELEASE ORAL ONCE
Status: COMPLETED | OUTPATIENT
Start: 2022-02-14 | End: 2022-02-14

## 2022-02-14 RX ORDER — FUROSEMIDE 20 MG/1
20 TABLET ORAL DAILY
Status: DISCONTINUED | OUTPATIENT
Start: 2022-02-14 | End: 2022-02-14 | Stop reason: HOSPADM

## 2022-02-14 RX ADMIN — POTASSIUM CHLORIDE 40 MEQ: 1500 TABLET, EXTENDED RELEASE ORAL at 01:02

## 2022-02-14 RX ADMIN — AMLODIPINE BESYLATE 2.5 MG: 2.5 TABLET ORAL at 09:02

## 2022-02-14 NOTE — PLAN OF CARE
02/14/22 1206   Post-Acute Status   Post-Acute Authorization Home Health   Home Health Status Referrals Sent     Home health auth request and assignment sent to SHALA Erickson LMSW  [facility]  [id number]

## 2022-02-14 NOTE — PT/OT/SLP PROGRESS
Occupational Therapy   Treatment    Name: Naga Benavidez  MRN: [id number]  Admitting Diagnosis:  Bilateral leg edema       Recommendations:     Discharge Recommendations: nursing facility, skilled  Discharge Equipment Recommendations:  bedside commode (DME repair)  Barriers to discharge:  Decreased caregiver support,Inaccessible home environment (Increased level of assistance needed)    Assessment:     Naga Benavidez is a 61 y.o. female with a medical diagnosis of Bilateral leg edema. Performance deficits affecting function are weakness,impaired endurance,impaired self care skills,impaired functional mobilty,gait instability,decreased upper extremity function,impaired balance,decreased lower extremity function,pain. Patient would benefit from continued skilled acute OT 3x/wk to improve functional mobility, increase independence with ADLs, and address established goals. Recommending SNF once medically appropriate for discharge to increase maximal independence, reduce burden of care, and ensure safety.     Rehab Prognosis:  Good; patient would benefit from acute skilled OT services to address these deficits and reach maximum level of function.       Plan:     Patient to be seen 3 x/week to address the above listed problems via self-care/home management,therapeutic activities,therapeutic exercises  · Plan of Care Expires: 03/11/22  · Plan of Care Reviewed with: patient    Subjective     Pain/Comfort:  · Pain Rating 1: 5/10  · Location - Side 1: Left  · Location - Orientation 1: generalized  · Location 1: heel  · Pain Addressed 1: Reposition,Distraction  · Pain Rating Post-Intervention 1:  (patient did not rate)    Objective:     Communicated with: NSG prior to session.  Patient found HOB elevated with telemetry,PureWick,peripheral IV upon OT entry to room.    General Precautions: Standard, fall   Orthopedic Precautions:N/A   Braces: N/A  Respiratory Status: Room air     Occupational Performance:     Bed  Mobility:    · Patient completed Supine to Sit with stand by assistance using handrail and HOB elevated  · Patient completed Sit to Supine with moderate assistance with HOB flat and no handrail     Functional Mobility/Transfers:  · Patient stood with PT, while this therapist was not in room obtaining oral care items needed for task (not charging spent during this time). See PT note as needed. PT reports patient was mod A for sit<>stand though. This therapist did not see patient perform task.      Activities of Daily Living:  · Grooming: stand by assistance oral care and washing face only sitting EOB    [facility] 6 Click ADL: 14    Treatment & Education:  Role of OT and POC  ADL retraining  Functional mobility training  Safety    Co-treatment performed due to patient's multiple deficits requiring two skilled therapists to appropriately and safely assess patient's strength and endurance while facilitating functional tasks in addition to accommodating for patient's activity tolerance.     Patient left HOB elevated with call button in reachEducation:   and all needs met.     GOALS:   Multidisciplinary Problems     Occupational Therapy Goals        Problem: Occupational Therapy Goal    Goal Priority Disciplines Outcome Interventions   Occupational Therapy Goal     OT, PT/OT Ongoing, Progressing    Description: Goals to be met by: 2/26/2022     Patient will increase functional independence with ADLs by performing:    UE Dressing with Stand-by Assistance.  Grooming while EOB with Stand-by Assistance.  Toileting from bedside commode with Minimal Assistance for hygiene and clothing management.   Supine to sit with Minimal Assistance.  Squat pivot transfer to chair/bedside commode with Minimal Assistance.  Toilet transfer to bedside commode with Minimal Assistance.                     Time Tracking:     OT Date of Treatment: 02/14/22  OT Start Time: 1146  OT Stop Time: 1210  OT Total Time (min): 24 min         Billable  Minutes:Self Care/Home Management 18               2/14/2022

## 2022-02-14 NOTE — PLAN OF CARE
Problem: Adult Inpatient Plan of Care  Goal: Plan of Care Review  Outcome: Ongoing, Progressing  Goal: Patient-Specific Goal (Individualized)  Outcome: Ongoing, Progressing  Goal: Absence of Hospital-Acquired Illness or Injury  Outcome: Ongoing, Progressing  Goal: Optimal Comfort and Wellbeing  Outcome: Ongoing, Progressing  Goal: Readiness for Transition of Care  Outcome: Ongoing, Progressing     Pt resting quietly in room with eyes open watching football game on TV. AAOx4. No behavior problems noted this shift. UO documented in flowsheets. Requires max assistance with turning and total assistance with other ADLs except feeding. Pt has dx of cerebral palsy which causes her body not to be aligned. Foot of bed elevated with pillows for support. At 0750, RN in room to assess v/s and hand pt a warm face towel to wash face as asked by pt. 0819, c/o left heel and shoulder pain/discomfort 5/10; no c/o n/v; toothbrush and toothpaste given as asked by pt. 0845, ice, water, cups, and breakfast given to pt by technician and dietary. 0945 meds administered with technician and charge nurse present. PA visit. Discharged d/c'd. Dietary and technician in room. 1300 wound care provided with dressing change and v/s taken. Purewick container emptied by technician x3. See I & O. 1605 v/s taken; sister in room with pt. Call light within reach. SR up x2. Will cont to monitor.

## 2022-02-14 NOTE — PT/OT/SLP PROGRESS
Physical Therapy Treatment    Patient Name:  Naga Benavidez   MRN:  [id number]  Co-evaluation/treatment performed due to patient's multiple deficits requiring two skilled therapists to appropriately and safely assess patient's strength and endurance while facilitating functional tasks in addition to accommodating for patient's activity tolerance.     Recommendations:     Discharge Recommendations:  nursing facility, skilled   Discharge Equipment Recommendations: bedside commode,other (see comments) (patient DME needs repair)   Barriers to discharge: Decreased caregiver support    Assessment:     Naga Benavidez is a 61 y.o. female admitted with a medical diagnosis of Bilateral leg edema.  She presents with the following impairments/functional limitations:  weakness,impaired endurance,impaired self care skills,impaired functional mobilty,impaired balance,gait instability,impaired cardiopulmonary response to activity,impaired skin,decreased lower extremity function . Pt stated she is moving better d.t less fluid in B L/E's. Pt was able to complete bed mobility with CGA/Mod A and T/F sit<>stand with Mod A/HHA. Pt will benefit from continued snf rehab to help pt gain (I) prior to d/c home.    Rehab Prognosis: Good; patient would benefit from acute skilled PT services to address these deficits and reach maximum level of function.    Recent Surgery: * No surgery found *      Plan:     During this hospitalization, patient to be seen 3 x/week to address the identified rehab impairments via therapeutic activities,therapeutic exercises,neuromuscular re-education and progress toward the following goals:    · Plan of Care Expires:  03/14/22    Subjective     Chief Complaint: B l/E swelling  Patient/Family Comments/goals: to d/c home  Pain/Comfort:  · Pain Rating 1: 0/10  · Pain Rating Post-Intervention 1: 0/10      Objective:     Communicated with pt's nurse prior to session.  Patient found supine with  telemetry,PureWick,peripheral IV upon PT entry to room.     General Precautions: Standard, fall   Orthopedic Precautions:N/A   Braces: N/A  Respiratory Status: Room air     Functional Mobility:  · Bed Mobility:     · Scooting to the HOB: total assistance and of 2 persons  · Supine to Sit: contact guard assistance  · Sit to Supine: moderate assistance for B l/E elevation onto bed d/t B l/e weakness and mild swelling.  · Transfers:     · Sit to Stand from bed x 2trials:  moderate assistance with hand-held assist  · Balance: CGA/SBA for dynamic sitting during ADL activity with OT; Mod A for static standing with HHA briefly for ~ 10secsx 2 trials      AM-PAC 6 CLICK MOBILITY  Turning over in bed (including adjusting bedclothes, sheets and blankets)?: 3  Sitting down on and standing up from a chair with arms (e.g., wheelchair, bedside commode, etc.): 2  Moving from lying on back to sitting on the side of the bed?: 3  Moving to and from a bed to a chair (including a wheelchair)?: 2  Need to walk in hospital room?: 1  Climbing 3-5 steps with a railing?: 1  Basic Mobility Total Score: 12     Education:   Patient provided with daily orientation and goals of this PT session.   Patient educated to transfer to bedside chair/bedside commode/bathroom with RN/PCT present.    Patient educated on Fall risk, home safety and plan of care by explanation.    Patient Verbalized Understanding.   Time provided for therapeutic counseling and discussion of current health disposition. All questions answered to satisfaction, within scope of PT practice; voiced no other concerns. White board updated in patient's room, RN notified of session.      Patient left HOB elevated with all lines intact, call button in reach and lunch tray set up in front of pt...    GOALS:   Multidisciplinary Problems     Physical Therapy Goals        Problem: Physical Therapy Goal    Goal Priority Disciplines Outcome Goal Variances Interventions   Physical Therapy  Goal     PT, PT/OT Ongoing, Progressing     Description:     Naga Benavidez   MRN: [id number]   Patient seen for PT and OT evaluations. Recommend SNF for continued therapy to improve safety and functional mobility, for caregiver training.     Patient has decreased caregiver support and broken DME: hospital bed, manual w/c. She has a jr lift that belonged to her mother that is also broken. She reports she has a waiver for home assistant for 8hours daily, but has not had an assistance available except occasionally.    Full report to follow.  Thea Andrade, PT 2022    Goals to be met by: 2022     Patient will increase functional independence with mobility by performin. Supine to sit with MInimal Assistance  2. Sit to supine with MInimal Assistance  3. Sit to stand transfer with Minimal Assistance  4. Bed to chair transfer with Minimal Assistance using LRAD  5. Gait  x 5 feet with Minimal Assistance using LRAD.   6. Lower extremity exercise program x15 reps per handout, with assistance as needed                                 Time Tracking:     PT Received On: 22  PT Start Time: 1145     PT Stop Time: 1209  PT Total Time (min): 24 min     Billable Minutes: Therapeutic Activity 24mins    Treatment Type: Treatment  PT/PTA: PT     PTA Visit Number: 0     2022

## 2022-02-14 NOTE — PLAN OF CARE
Care provided to patient as per POC and as charted.  No acute events overnight.  Problem: Adult Inpatient Plan of Care  Goal: Plan of Care Review  Outcome: Ongoing, Progressing  Goal: Patient-Specific Goal (Individualized)  Outcome: Ongoing, Progressing  Goal: Absence of Hospital-Acquired Illness or Injury  Outcome: Ongoing, Progressing  Goal: Optimal Comfort and Wellbeing  Outcome: Ongoing, Progressing  Goal: Readiness for Transition of Care  Outcome: Ongoing, Progressing     Problem: Infection  Goal: Absence of Infection Signs and Symptoms  Outcome: Ongoing, Progressing     Problem: Skin Injury Risk Increased  Goal: Skin Health and Integrity  Outcome: Ongoing, Progressing     Problem: Impaired Wound Healing  Goal: Optimal Wound Healing  Outcome: Ongoing, Progressing     Problem: Fall Injury Risk  Goal: Absence of Fall and Fall-Related Injury  Outcome: Ongoing, Progressing

## 2022-02-14 NOTE — PLAN OF CARE
Pt has been set up with [facility] [employer] [employer] for transport home, LOS spoke with Abdoulaye, confirmation number [id number].    SW notified Pt's nurse.     KULDIP Erickson, DARLYN  [facility]  [id number]

## 2022-02-14 NOTE — DISCHARGE SUMMARY
[facility] - Telemetry Stepdown ([place] [id number])  [facility] Medicine  Discharge Summary      Patient Name: Naga Benavidez  MRN: [id number]  Patient Class: OP- Observation  Admission Date: 2/11/2022  Hospital Length of Stay: 0 days  Discharge Date and Time: 2/14/2022  6:30 AM  Attending Physician: Macy Tomlinson MD   Discharging Provider: Chariot Campbell PA-C  Primary Care Provider: Kathrin Salazar MD  Hospital Medicine Team: [facility] HOSP MED Y Charito Campbell PA-C    HPI:   Naga Benavidez is a 61 y.o. with a PMHx of cerebral palsy, wheelchair bound, HTN, HLD, anxiety, MDD, CHF (G1DD) presents to [facility] from IM clinic for acute on chronic BLE swelling (R>L). PCP was concerned for cellulitis, CHF, DVT, dependent edema so she was sent here for further evaluation. Sister (primary caretaker) at bedside assists with history. Patient's had progressive worsening in chronic bilateral leg swelling for the past few weeks and now her legs have started to weep clear fluid. She has small wounds to her legs (R>L) that she is followed by wound care for. Wounds were nearly healed until a few days ago when her sitter accidentally brushed up against her leg and reopening the wound. Patient has been has been wheelchair bound for several years due to CP. Lately, she's been sleeping in her WC recently and hasn't been elevating her legs like she has been told to do in the past. Also, she was given compression stockings in the past, but she is not able to put the stockings on herself. Her BLE edema has improved significantly since legs elevated in the ED earlier today. Denies fever chills, chest pain, SOB, cough, N/V, abdominal pain, dysuria, constipation, HA or syncope. Patient lives in a house alone and her sister comes by every now and then to help. She did have home health but states her company does not have any available people to assist her at this time.     ED: AFVSS. CBC, CMP largely unremarkable. BNP WNL, CXR without  acute findings. Venous U/S negative for DVT. Social work saw patient in ED who placed DME orders for jr lift and home health.       * No surgery found *      Hospital Course:   61 y.o. who was admitted to hospital medicine for lower extremity edema, suspect dependent in setting of functional quadriplegia. TTE was obtained as well which demonstrated stable cardiac function. IV diuresis was administered throughout hospital course with appropriate response. She was evaluated by PTOT who recommended SNF however she declined and requested HH instead. Discharged home in stable condition with HH services.        Goals of Care Treatment Preferences:  Code Status: Full Code      Consults:   Consults (From admission, onward)          Status Ordering Provider     Inpatient consult to Social Work  Once        Provider:  (Not yet assigned)    CHELY Laguerre            No new Assessment & Plan notes have been filed under this hospital service since the last note was generated.  Service: Hospital Medicine    Final Active Diagnoses:    Diagnosis Date Noted POA    PRINCIPAL PROBLEM:  Bilateral leg edema [R60.0] 04/03/2014 Yes    Wheelchair bound [Z99.3] 02/11/2022 Not Applicable    Acute on chronic diastolic congestive heart failure [I50.33] 02/11/2022 Yes    Moderate episode of recurrent major depressive disorder [F33.1] 12/13/2021 Yes    Mixed stress and urge urinary incontinence [N39.46] 02/01/2018 Yes    Physical debility [R53.81] 12/12/2016 Yes    Varicose veins of leg with complications [I83.899] 04/03/2014 Yes    Hypertension [I10]  Yes      Problems Resolved During this Admission:       Discharged Condition: stable    Disposition: Home-Health Care Svc    Follow Up:   Follow-up Information       [facility].    Specialty: DME Provider  Contact information:  [address] [place] [id number] [phone]                           Patient Instructions:      LIFT DEVICE FOR HOME USE      Order Specific Question Answer Comments   Height: 5 ft    Weight: 150 lbs    Does patient have medical equipment at home? hospital bed manual w/c, jr lift, and hospital bed are broken.  Pt is using a motorized scooter.   Does patient have medical equipment at home? lift device manual w/c, jr lift, and hospital bed are broken.  Pt is using a motorized scooter.   Does patient have medical equipment at home? wheelchair manual w/c, jr lift, and hospital bed are broken.  Pt is using a motorized scooter.   Does patient have medical equipment at home? grab bar manual w/c, jr lift, and hospital bed are broken.  Pt is using a motorized scooter.   Length of need (1-99 months): 99    Vendor: [facility]    Expected Date of Delivery: 2/16/2022      Ambulatory referral/consult to Wound Clinic   Standing Status: Future   Referral Priority: Urgent Referral Type: Consultation   Referral Reason: Specialty Services Required   Requested Specialty: Wound Care   Number of Visits Requested: 1     [facility]   Standing Status: Future   Referral Priority: Routine Referral Type: Consultation   Referral Reason: Specialty Services Required   Number of Visits Requested: 1     Diet Cardiac     Diet Cardiac     Activity as tolerated     Activity as tolerated       Significant Diagnostic Studies: Labs: All labs within the past 24 hours have been reviewed    Pending Diagnostic Studies:       None           Medications:  Reconciled Home Medications:      Medication List        START taking these medications      furosemide 20 MG tablet  Commonly known as: LASIX  If wt gain 2-3 lbs x 2-5d, take 20mg daily. If no improvement, call MD            CHANGE how you take these medications      amLODIPine 2.5 MG tablet  Commonly known as: NORVASC  Take 1 tablet (2.5 mg total) by mouth 2 (two) times daily.  What changed: Another medication with the same name was removed. Continue  taking this medication, and follow the directions you see here.     miconazole 2 % cream  Commonly known as: MICATIN  Apply topically 2 (two) times daily.  What changed:   when to take this  reasons to take this            CONTINUE taking these medications      acetaminophen 325 MG tablet  Commonly known as: TYLENOL  Take 650 mg by mouth 4 (four) times daily as needed for Pain.     baclofen 10 MG tablet  Commonly known as: LIORESAL  TAKE ONE TABLET BY MOUTH AT BEDTIME     blood pressure monitor XL arm size (OP)  Commonly known as: [employer] EASE  by Other route as needed for High Blood Pressure.     EScitalopram oxalate 5 MG Tab  Commonly known as: LEXAPRO  Take 1 tablet (5 mg total) by mouth once daily.     fluconazole 150 MG Tab  Commonly known as: DIFLUCAN  Use while you are on antibiotics then may repeat     fluticasone propionate 50 mcg/actuation nasal spray  Commonly known as: FLONASE  2 sprays (100 mcg total) by Each Nostril route once daily.     mirtazapine 7.5 MG Tab  Commonly known as: REMERON  Take 1 tablet (7.5 mg total) by mouth every evening.     multivitamin capsule  Take 1 capsule by mouth once daily.     triamcinolone acetonide 0.1% 0.1 % cream  Commonly known as: KENALOG  APPLY TWO TIMES A DAY FOR 10 DAYS            STOP taking these medications      ciprofloxacin HCl 500 MG tablet  Commonly known as: CIPRO     nitrofurantoin (macrocrystal-monohydrate) 100 MG capsule  Commonly known as: MACROBID              Indwelling Lines/Drains at time of discharge:   Lines/Drains/Airways       Drain              Female External Urinary Catheter 02/13/22 1700 <1 day                    Time spent on the discharge of patient: 30 minutes         Charito Campbell PA-C  Department of Hospital Medicine  [facility] - Telemetry Stepdown ([place])

## 2022-02-15 NOTE — PLAN OF CARE
Discharge instructions, diagnosis, medications, and follow up discussed with patient. Patient verbalized understanding. All questions and concerns answered. No needs expressed at the time. Pt is awake, alert and oriented with no acute distress noted. Respirations even and unlabored. Pt escorted of the floor with RN x1 via personal wheelchair. Pt transported by [facility] transport.     Problem: Adult Inpatient Plan of Care  Goal: Plan of Care Review  Outcome: Met  Goal: Patient-Specific Goal (Individualized)  Outcome: Met  Goal: Absence of Hospital-Acquired Illness or Injury  Outcome: Met  Goal: Optimal Comfort and Wellbeing  Outcome: Met  Goal: Readiness for Transition of Care  Outcome: Met     Problem: Infection  Goal: Absence of Infection Signs and Symptoms  Outcome: Met     Problem: Skin Injury Risk Increased  Goal: Skin Health and Integrity  Outcome: Met     Problem: Impaired Wound Healing  Goal: Optimal Wound Healing  Outcome: Met     Problem: Fall Injury Risk  Goal: Absence of Fall and Fall-Related Injury  Outcome: Met

## 2022-02-18 ENCOUNTER — PES CALL (OUTPATIENT)
Dept: HOME HEALTH SERVICES | Facility: CLINIC | Age: 62
End: 2022-02-18
Payer: MEDICARE

## 2022-02-23 ENCOUNTER — DOCUMENT SCAN (OUTPATIENT)
Dept: HOME HEALTH SERVICES | Facility: HOSPITAL | Age: 62
End: 2022-02-23
Payer: MEDICARE

## 2022-02-25 ENCOUNTER — OFFICE VISIT (OUTPATIENT)
Dept: INTERNAL MEDICINE | Facility: CLINIC | Age: 62
End: 2022-02-25
Payer: MEDICARE

## 2022-02-25 VITALS
HEART RATE: 75 BPM | DIASTOLIC BLOOD PRESSURE: 60 MMHG | HEIGHT: 61 IN | OXYGEN SATURATION: 98 % | SYSTOLIC BLOOD PRESSURE: 126 MMHG | BODY MASS INDEX: 28.72 KG/M2

## 2022-02-25 DIAGNOSIS — M89.8X9 BONY PROMINENCE: Primary | ICD-10-CM

## 2022-02-25 DIAGNOSIS — L60.2 THICKENED NAILS: ICD-10-CM

## 2022-02-25 DIAGNOSIS — I10 HYPERTENSION, UNSPECIFIED TYPE: ICD-10-CM

## 2022-02-25 DIAGNOSIS — F33.9 EPISODE OF RECURRENT MAJOR DEPRESSIVE DISORDER, UNSPECIFIED DEPRESSION EPISODE SEVERITY: ICD-10-CM

## 2022-02-25 PROCEDURE — 3074F SYST BP LT 130 MM HG: CPT | Mod: CPTII,S$GLB,, | Performed by: INTERNAL MEDICINE

## 2022-02-25 PROCEDURE — 3044F HG A1C LEVEL LT 7.0%: CPT | Mod: CPTII,S$GLB,, | Performed by: INTERNAL MEDICINE

## 2022-02-25 PROCEDURE — 99999 PR PBB SHADOW E&M-EST. PATIENT-LVL V: ICD-10-PCS | Mod: PBBFAC,,, | Performed by: INTERNAL MEDICINE

## 2022-02-25 PROCEDURE — 99214 PR OFFICE/OUTPT VISIT, EST, LEVL IV, 30-39 MIN: ICD-10-PCS | Mod: S$GLB,,, | Performed by: INTERNAL MEDICINE

## 2022-02-25 PROCEDURE — 3008F BODY MASS INDEX DOCD: CPT | Mod: CPTII,S$GLB,, | Performed by: INTERNAL MEDICINE

## 2022-02-25 PROCEDURE — 99214 OFFICE O/P EST MOD 30 MIN: CPT | Mod: S$GLB,,, | Performed by: INTERNAL MEDICINE

## 2022-02-25 PROCEDURE — 3008F PR BODY MASS INDEX (BMI) DOCUMENTED: ICD-10-PCS | Mod: CPTII,S$GLB,, | Performed by: INTERNAL MEDICINE

## 2022-02-25 PROCEDURE — 1159F MED LIST DOCD IN RCRD: CPT | Mod: CPTII,S$GLB,, | Performed by: INTERNAL MEDICINE

## 2022-02-25 PROCEDURE — 3078F PR MOST RECENT DIASTOLIC BLOOD PRESSURE < 80 MM HG: ICD-10-PCS | Mod: CPTII,S$GLB,, | Performed by: INTERNAL MEDICINE

## 2022-02-25 PROCEDURE — 3078F DIAST BP <80 MM HG: CPT | Mod: CPTII,S$GLB,, | Performed by: INTERNAL MEDICINE

## 2022-02-25 PROCEDURE — 99999 PR PBB SHADOW E&M-EST. PATIENT-LVL V: CPT | Mod: PBBFAC,,, | Performed by: INTERNAL MEDICINE

## 2022-02-25 PROCEDURE — 1159F PR MEDICATION LIST DOCUMENTED IN MEDICAL RECORD: ICD-10-PCS | Mod: CPTII,S$GLB,, | Performed by: INTERNAL MEDICINE

## 2022-02-25 PROCEDURE — 3074F PR MOST RECENT SYSTOLIC BLOOD PRESSURE < 130 MM HG: ICD-10-PCS | Mod: CPTII,S$GLB,, | Performed by: INTERNAL MEDICINE

## 2022-02-25 PROCEDURE — 3044F PR MOST RECENT HEMOGLOBIN A1C LEVEL <7.0%: ICD-10-PCS | Mod: CPTII,S$GLB,, | Performed by: INTERNAL MEDICINE

## 2022-02-25 NOTE — PROGRESS NOTES
Subjective:       Patient ID: Naga Benavidez is a 61 y.o. female.    Chief Complaint: Hospital Follow Up    HPIPt is doing better - legs are less swollen - she is getting PT.  Has bony prominence under R breast.   Review of Systems   Respiratory: Negative for shortness of breath (PND or orthopnea).    Cardiovascular: Negative for chest pain (arm pain or jaw pain).   Gastrointestinal: Negative for abdominal pain, diarrhea, nausea and vomiting.   Genitourinary: Negative for dysuria.   Neurological: Negative for seizures, syncope and headaches.       Objective:      Physical Exam  Constitutional:       General: She is not in acute distress.     Appearance: She is well-developed.   HENT:      Head: Normocephalic.   Eyes:      Pupils: Pupils are equal, round, and reactive to light.   Neck:      Thyroid: No thyromegaly.      Vascular: No JVD.   Cardiovascular:      Rate and Rhythm: Normal rate and regular rhythm.      Heart sounds: Normal heart sounds. No murmur heard.    No friction rub. No gallop.   Pulmonary:      Effort: Pulmonary effort is normal.      Breath sounds: Normal breath sounds. No wheezing or rales.   Abdominal:      General: Bowel sounds are normal. There is no distension.      Palpations: Abdomen is soft. There is no mass.      Tenderness: There is no abdominal tenderness. There is no guarding or rebound.   Musculoskeletal:      Cervical back: Neck supple.   Lymphadenopathy:      Cervical: No cervical adenopathy.   Skin:     General: Skin is warm and dry.   Neurological:      Mental Status: She is alert and oriented to person, place, and time.      Deep Tendon Reflexes: Reflexes are normal and symmetric.   Psychiatric:         Behavior: Behavior normal.         Thought Content: Thought content normal.         Judgment: Judgment normal.       BOny prominence under R breast possibly bone as her anatomy is distorted by extreme scoliosis  Assessment:       1. Bony prominence    2. Episode of recurrent  major depressive disorder, unspecified depression episode severity    3. Thickened nails    4. Hypertension, unspecified type        Plan:   Bony prominence  -     CT Chest Without Contrast; Future; Expected date: 02/25/2022    Episode of recurrent major depressive disorder, unspecified depression episode severity  -     Ambulatory referral/consult to Psychiatry; Future; Expected date: 03/04/2022    Thickened nails  -     Ambulatory referral/consult to Podiatry; Future; Expected date: 03/04/2022    Hypertension, unspecified type  -     Hypertension Digital Medicine (HDMP) Enrollment Order

## 2022-02-28 ENCOUNTER — DOCUMENT SCAN (OUTPATIENT)
Dept: HOME HEALTH SERVICES | Facility: HOSPITAL | Age: 62
End: 2022-02-28
Payer: MEDICARE

## 2022-03-11 ENCOUNTER — CARE AT HOME (OUTPATIENT)
Dept: HOME HEALTH SERVICES | Facility: CLINIC | Age: 62
End: 2022-03-11
Payer: MEDICARE

## 2022-03-11 DIAGNOSIS — Z99.3 WHEELCHAIR BOUND: ICD-10-CM

## 2022-03-11 DIAGNOSIS — R60.9 DEPENDENT EDEMA: ICD-10-CM

## 2022-03-11 DIAGNOSIS — I10 HYPERTENSION, UNSPECIFIED TYPE: Primary | ICD-10-CM

## 2022-03-11 DIAGNOSIS — G80.9 CEREBRAL PALSY, UNSPECIFIED TYPE: ICD-10-CM

## 2022-03-11 PROCEDURE — 99497 ADVNCD CARE PLAN 30 MIN: CPT | Mod: S$GLB,,, | Performed by: NURSE PRACTITIONER

## 2022-03-11 PROCEDURE — 99350 PR HOME VISIT,ESTAB PATIENT,LEVEL IV: ICD-10-PCS | Mod: S$GLB,,, | Performed by: NURSE PRACTITIONER

## 2022-03-11 PROCEDURE — 99350 HOME/RES VST EST HIGH MDM 60: CPT | Mod: S$GLB,,, | Performed by: NURSE PRACTITIONER

## 2022-03-11 PROCEDURE — 99497 PR ADVNCD CARE PLAN 30 MIN: ICD-10-PCS | Mod: S$GLB,,, | Performed by: NURSE PRACTITIONER

## 2022-03-15 ENCOUNTER — DOCUMENT SCAN (OUTPATIENT)
Dept: HOME HEALTH SERVICES | Facility: HOSPITAL | Age: 62
End: 2022-03-15
Payer: MEDICARE

## 2022-03-16 VITALS
TEMPERATURE: 98 F | SYSTOLIC BLOOD PRESSURE: 108 MMHG | HEART RATE: 84 BPM | RESPIRATION RATE: 16 BRPM | OXYGEN SATURATION: 100 % | DIASTOLIC BLOOD PRESSURE: 65 MMHG

## 2022-03-16 NOTE — PATIENT INSTRUCTIONS
Instructions:  - [facility] Nurse Practitioner to schedule home follow-up visit with patient in 8 weeks.  - Continue all medications, treatments and therapies as ordered.   - Follow all instructions, recommendations as discussed.  - Maintain Safety Precautions at all times.  - Attend all medical appointments as scheduled.  - For worsening symptoms: call Primary Care Physician or Nurse Practitioner.  - For emergencies, call 911 or immediately report to the nearest emergency room.  - Limit Risks of environmental exposure to coronavirus/COVID-19 as discussed including: social distancing, hand hygiene, and limiting departures from the home for necessities only.

## 2022-03-16 NOTE — PROGRESS NOTES
"[facility]  Medical Home Visit    Visit Date: 3/11/2022  Encounter Provider: Franky Johnston NP  PCP:  Kathrin Salazar MD    Subjective:      Patient ID: Naga Benavidez is a 61 y.o. female.    Consult Requested By:  Charito Campbell  Reason for Consult:  Medical visit    Naga is being seen at home due to  physical debility that presents a taxing effort to leave the home, to mitigate high risk of hospital readmission or due to the limited availability of reliable or safe options for transportation to the point of access to the provider. The visit meets the criteria for medical necessity as defined by CMS as "health-care services needed to prevent, diagnose, or treat an illness, injury, condition, disease, or its symptoms and that meet accepted standards of medicine."  Prior to treatment on this visit the chart was reviewed and patient consent was obtained.     Chief Complaint: No chief complaint on file.  Naga Benavidez is a 60 y/o female with a PMHx of cerebral palsy, wheelchair bound, HTN, HLD, anxiety, MDD, CHF (G1DD). The patient is being seen in the home in conjunction with her PCP as it is difficult for her to transport physically to [facility]. She currently lives in an apt with her sister who is her main caregiver along with a sitter who comes most days. Patient has never been  and has no children. She previously worked in [employer]. Current hobbies are limited to watching TV.    With this visit today patient is found sitting up in her power wc. She is unable to ambulate. Patient is AAOx3, able to verify her name and , and provide most history along with her medical record. She is currently being seen by [facility] for wound care and [facility]. Denies history of drinking or smoking. She is a maximum assist. Endorses eating x 3 meals per day, remains incontinent of stool and urine, and reports an adequate sleep pattern. Reports med compliance. No additional needs at this " time. All of my information was given to the patient and family if any questions or concerns arise.     VSS. Denies fever, chest pain, shortness of breath, nausea, vomiting, diarrhea. Risks of environmental exposure to coronavirus discussed including: social distancing, hand hygiene, and limiting departures from the home for necessities only.  Reports understanding and willingness to comply.  All hospital discharge orders reviewed and being followed, all medications reconciled and reviewed, patient and family verbalized understanding. No other needs identified at this time.      I initiated the process of advance care planning today and explained the importance of this process to the patient and family.  I introduced the concept of advance directives to the patient, as well. The patient has not previously appointed a HCPOA. Then the patient received detailed information about the importance of designating a Health Care Power of  (HCPOA). The patient was also instructed to communicate with this person about their wishes for future healthcare, should patient become sick and lose decision-making capacity. We spoke about ACP for 30 minutes.    Attestation: Screening criteria to assess the level of the patient's risk for infection with COVID-19 as recommended by the CDC at the time of the above documented home visit concluded appropriateness to proceed. Universal precautions were maintained at all times, including provider use of 60% alcohol gel hand  immediately prior to entry and upon departing the patient's home.      Review of Systems   Constitutional: Negative for activity change and appetite change.   HENT: Negative for congestion and dental problem.    Eyes: Negative for discharge and itching.   Respiratory: Negative for choking and chest tightness.    Cardiovascular: Negative for chest pain and palpitations.   Gastrointestinal: Negative for rectal pain and vomiting.   Endocrine: Negative for cold  "intolerance and heat intolerance.   Genitourinary: Negative for enuresis and flank pain.   Musculoskeletal: Positive for gait problem. Negative for myalgias and neck pain.   Skin: Negative for color change and wound.   Allergic/Immunologic: Negative for environmental allergies and food allergies.   Neurological: Negative for tremors and syncope.   Hematological: Does not bruise/bleed easily.   Psychiatric/Behavioral: Negative for decreased concentration and dysphoric mood.       Assessments:  · Environmental: clean, apt  · Functional Status: partial to max assist  · Safety: mod fr 2/2 wc bound  · Nutritional: adequate per patient  · Home Health/DME/Supplies: power wc, hospital bed, NC with oxygen    Objective:   Physical Exam  Vitals reviewed.   Constitutional:       Appearance: She is well-developed.   HENT:      Head: Normocephalic and atraumatic.   Eyes:      Pupils: Pupils are equal, round, and reactive to light.   Cardiovascular:      Rate and Rhythm: Normal rate.   Pulmonary:      Effort: Pulmonary effort is normal.      Breath sounds: Normal breath sounds.   Abdominal:      General: Bowel sounds are normal.      Palpations: Abdomen is soft.   Musculoskeletal:         General: Normal range of motion.      Cervical back: Normal range of motion and neck supple.      Right lower le+ Edema present.      Left lower le+ Edema present.      Comments: Cerebral palsy. Limited ROM   Skin:     General: Skin is warm and dry.      Comments: Verbalizes wound to coccyx "healed". Refusing turn for assessment.   Neurological:      Mental Status: She is alert and oriented to person, place, and time. Mental status is at baseline.      GCS: GCS eye subscore is 4. GCS verbal subscore is 5. GCS motor subscore is 6.         Vitals:    22 1126   BP: 108/65   Pulse: 84   Resp: 16   Temp: 98.1 °F (36.7 °C)   SpO2: 100%   PainSc: 0-No pain     There is no height or weight on file to calculate BMI.    Assessment:     1. " Hypertension, unspecified type    2. Cerebral palsy, unspecified type    3. Dependent edema    4. Wheelchair bound        Plan:     Ethical / Legal: Advance Care Planning   · Surrogate decision maker:  Name Ayde Zhu, Relationship: sister  · Code Status: undecided  · [place]:  None on file  · Other advance directive:  None on file, Capacity to make medical decisions:  full, Conflict none       Diagnoses and all orders for this visit:    Hypertension, unspecified type  --compliant with antihypertensives  --normotensive at visit    Cerebral palsy, unspecified type  -     Ambulatory referral/consult to [facility]  -home health active with sitter and sister main caregiver  -WC bound, power WC in home    Dependent edema  -     Ambulatory referral/consult to [facility]  --advised to elevate legs  --compliant with diuretic    Wheelchair bound  --educated on turn q2h and skin hygiene to avoid breakdown      Were controlled substances prescribed?  No    Follow Up Appointments:   Future Appointments   Date Time Provider Department Center   3/18/2022  1:30 PM [facility]CT[facility] 500 LB LIMIT [facility]       Signature:  JUSTIN Patel  [facility]    Total face-to-face time was 90 minutes, >50% of this was spent on counseling and coordination of care. The following issues were discussed: primary and secondary diagnoses, co-morbidities, prescribed medications, treatment modalities, importance of compliance with medical advice, and directives for follow-up care.

## 2022-03-17 ENCOUNTER — DOCUMENT SCAN (OUTPATIENT)
Dept: HOME HEALTH SERVICES | Facility: HOSPITAL | Age: 62
End: 2022-03-17
Payer: MEDICARE

## 2022-03-28 ENCOUNTER — DOCUMENT SCAN (OUTPATIENT)
Dept: HOME HEALTH SERVICES | Facility: HOSPITAL | Age: 62
End: 2022-03-28
Payer: MEDICARE

## 2022-04-04 ENCOUNTER — HOSPITAL ENCOUNTER (OUTPATIENT)
Dept: RADIOLOGY | Facility: HOSPITAL | Age: 62
Discharge: HOME | End: 2022-04-04
Attending: INTERNAL MEDICINE
Payer: MEDICARE

## 2022-04-04 ENCOUNTER — PATIENT MESSAGE (OUTPATIENT)
Dept: PHARMACY | Facility: CLINIC | Age: 62
End: 2022-04-04
Payer: MEDICARE

## 2022-04-04 DIAGNOSIS — M89.8X9 BONY PROMINENCE: ICD-10-CM

## 2022-04-04 PROCEDURE — 71250 CT THORAX DX C-: CPT | Mod: 26,,, | Performed by: RADIOLOGY

## 2022-04-04 PROCEDURE — 71250 CT CHEST WITHOUT CONTRAST: ICD-10-PCS | Mod: 26,,, | Performed by: RADIOLOGY

## 2022-04-04 PROCEDURE — 71250 CT THORAX DX C-: CPT | Mod: TC

## 2022-04-05 ENCOUNTER — TELEPHONE (OUTPATIENT)
Dept: INTERNAL MEDICINE | Facility: CLINIC | Age: 62
End: 2022-04-05
Payer: MEDICARE

## 2022-04-05 DIAGNOSIS — E04.1 THYROID NODULE: Primary | ICD-10-CM

## 2022-04-05 NOTE — PROGRESS NOTES
Hi Ms. Benavidez - The area under your breast is just a bone on an angle either due to prior trauma or scoliosis. No tumor or cancer.  The only new thing is a nodule on your thyroid.  I am setting you up for a thyroid ultrasound.

## 2022-04-08 ENCOUNTER — PES CALL (OUTPATIENT)
Dept: ADMINISTRATIVE | Facility: CLINIC | Age: 62
End: 2022-04-08
Payer: MEDICARE

## 2022-04-11 ENCOUNTER — HOSPITAL ENCOUNTER (OUTPATIENT)
Dept: RADIOLOGY | Facility: HOSPITAL | Age: 62
Discharge: HOME | End: 2022-04-11
Attending: INTERNAL MEDICINE
Payer: MEDICARE

## 2022-04-11 DIAGNOSIS — E04.1 THYROID NODULE: ICD-10-CM

## 2022-04-11 PROCEDURE — 76536 US EXAM OF HEAD AND NECK: CPT | Mod: TC

## 2022-04-11 PROCEDURE — 76536 US SOFT TISSUE HEAD NECK THYROID: ICD-10-PCS | Mod: 26,,, | Performed by: RADIOLOGY

## 2022-04-11 PROCEDURE — 76536 US EXAM OF HEAD AND NECK: CPT | Mod: 26,,, | Performed by: RADIOLOGY

## 2022-04-21 ENCOUNTER — OFFICE VISIT (OUTPATIENT)
Dept: PODIATRY | Facility: CLINIC | Age: 62
End: 2022-04-21
Payer: MEDICARE

## 2022-04-21 ENCOUNTER — OFFICE VISIT (OUTPATIENT)
Dept: PSYCHIATRY | Facility: CLINIC | Age: 62
End: 2022-04-21
Payer: COMMERCIAL

## 2022-04-21 ENCOUNTER — PATIENT MESSAGE (OUTPATIENT)
Dept: PSYCHIATRY | Facility: CLINIC | Age: 62
End: 2022-04-21
Payer: MEDICARE

## 2022-04-21 VITALS
WEIGHT: 151.69 LBS | HEART RATE: 81 BPM | SYSTOLIC BLOOD PRESSURE: 112 MMHG | DIASTOLIC BLOOD PRESSURE: 74 MMHG | BODY MASS INDEX: 28.66 KG/M2

## 2022-04-21 VITALS
DIASTOLIC BLOOD PRESSURE: 82 MMHG | WEIGHT: 151.56 LBS | BODY MASS INDEX: 28.64 KG/M2 | HEART RATE: 73 BPM | SYSTOLIC BLOOD PRESSURE: 134 MMHG

## 2022-04-21 DIAGNOSIS — B35.3 TINEA PEDIS OF BOTH FEET: ICD-10-CM

## 2022-04-21 DIAGNOSIS — F33.1 MODERATE EPISODE OF RECURRENT MAJOR DEPRESSIVE DISORDER: ICD-10-CM

## 2022-04-21 DIAGNOSIS — G80.9 CEREBRAL PALSY, UNSPECIFIED TYPE: Primary | ICD-10-CM

## 2022-04-21 DIAGNOSIS — R53.81 PHYSICAL DEBILITY: ICD-10-CM

## 2022-04-21 DIAGNOSIS — F43.23 ADJUSTMENT DISORDER WITH MIXED ANXIETY AND DEPRESSED MOOD: Primary | ICD-10-CM

## 2022-04-21 DIAGNOSIS — L60.2 THICKENED NAILS: ICD-10-CM

## 2022-04-21 PROCEDURE — 3075F SYST BP GE 130 - 139MM HG: CPT | Mod: CPTII,S$GLB,, | Performed by: NURSE PRACTITIONER

## 2022-04-21 PROCEDURE — 1160F RVW MEDS BY RX/DR IN RCRD: CPT | Mod: CPTII,S$GLB,, | Performed by: NURSE PRACTITIONER

## 2022-04-21 PROCEDURE — 99999 PR PBB SHADOW E&M-EST. PATIENT-LVL III: ICD-10-PCS | Mod: PBBFAC,,, | Performed by: PODIATRIST

## 2022-04-21 PROCEDURE — 3074F SYST BP LT 130 MM HG: CPT | Mod: CPTII,S$GLB,, | Performed by: PODIATRIST

## 2022-04-21 PROCEDURE — 3079F PR MOST RECENT DIASTOLIC BLOOD PRESSURE 80-89 MM HG: ICD-10-PCS | Mod: CPTII,S$GLB,, | Performed by: NURSE PRACTITIONER

## 2022-04-21 PROCEDURE — 3078F PR MOST RECENT DIASTOLIC BLOOD PRESSURE < 80 MM HG: ICD-10-PCS | Mod: CPTII,S$GLB,, | Performed by: PODIATRIST

## 2022-04-21 PROCEDURE — 1159F MED LIST DOCD IN RCRD: CPT | Mod: CPTII,S$GLB,, | Performed by: NURSE PRACTITIONER

## 2022-04-21 PROCEDURE — 3008F BODY MASS INDEX DOCD: CPT | Mod: CPTII,S$GLB,, | Performed by: PODIATRIST

## 2022-04-21 PROCEDURE — 3074F PR MOST RECENT SYSTOLIC BLOOD PRESSURE < 130 MM HG: ICD-10-PCS | Mod: CPTII,S$GLB,, | Performed by: PODIATRIST

## 2022-04-21 PROCEDURE — 3008F PR BODY MASS INDEX (BMI) DOCUMENTED: ICD-10-PCS | Mod: CPTII,S$GLB,, | Performed by: NURSE PRACTITIONER

## 2022-04-21 PROCEDURE — 99499 UNLISTED E&M SERVICE: CPT | Mod: S$GLB,,, | Performed by: PODIATRIST

## 2022-04-21 PROCEDURE — 1159F MED LIST DOCD IN RCRD: CPT | Mod: CPTII,S$GLB,, | Performed by: PODIATRIST

## 2022-04-21 PROCEDURE — 1159F PR MEDICATION LIST DOCUMENTED IN MEDICAL RECORD: ICD-10-PCS | Mod: CPTII,S$GLB,, | Performed by: PODIATRIST

## 2022-04-21 PROCEDURE — 3079F DIAST BP 80-89 MM HG: CPT | Mod: CPTII,S$GLB,, | Performed by: NURSE PRACTITIONER

## 2022-04-21 PROCEDURE — 99499 RISK ADDL DX/OHS AUDIT: ICD-10-PCS | Mod: S$GLB,,, | Performed by: PODIATRIST

## 2022-04-21 PROCEDURE — 3008F PR BODY MASS INDEX (BMI) DOCUMENTED: ICD-10-PCS | Mod: CPTII,S$GLB,, | Performed by: PODIATRIST

## 2022-04-21 PROCEDURE — 99999 PR PBB SHADOW E&M-EST. PATIENT-LVL III: CPT | Mod: PBBFAC,,, | Performed by: PODIATRIST

## 2022-04-21 PROCEDURE — 3044F PR MOST RECENT HEMOGLOBIN A1C LEVEL <7.0%: ICD-10-PCS | Mod: CPTII,S$GLB,, | Performed by: PODIATRIST

## 2022-04-21 PROCEDURE — 3044F HG A1C LEVEL LT 7.0%: CPT | Mod: CPTII,S$GLB,, | Performed by: PODIATRIST

## 2022-04-21 PROCEDURE — 99214 PR OFFICE/OUTPT VISIT, EST, LEVL IV, 30-39 MIN: ICD-10-PCS | Mod: S$GLB,,, | Performed by: NURSE PRACTITIONER

## 2022-04-21 PROCEDURE — 99214 OFFICE O/P EST MOD 30 MIN: CPT | Mod: S$GLB,,, | Performed by: NURSE PRACTITIONER

## 2022-04-21 PROCEDURE — 3075F PR MOST RECENT SYSTOLIC BLOOD PRESS GE 130-139MM HG: ICD-10-PCS | Mod: CPTII,S$GLB,, | Performed by: NURSE PRACTITIONER

## 2022-04-21 PROCEDURE — 99214 PR OFFICE/OUTPT VISIT, EST, LEVL IV, 30-39 MIN: ICD-10-PCS | Mod: S$GLB,,, | Performed by: PODIATRIST

## 2022-04-21 PROCEDURE — 3044F HG A1C LEVEL LT 7.0%: CPT | Mod: CPTII,S$GLB,, | Performed by: NURSE PRACTITIONER

## 2022-04-21 PROCEDURE — 1160F PR REVIEW ALL MEDS BY PRESCRIBER/CLIN PHARMACIST DOCUMENTED: ICD-10-PCS | Mod: CPTII,S$GLB,, | Performed by: NURSE PRACTITIONER

## 2022-04-21 PROCEDURE — 99999 PR PBB SHADOW E&M-EST. PATIENT-LVL III: ICD-10-PCS | Mod: PBBFAC,,, | Performed by: NURSE PRACTITIONER

## 2022-04-21 PROCEDURE — 1159F PR MEDICATION LIST DOCUMENTED IN MEDICAL RECORD: ICD-10-PCS | Mod: CPTII,S$GLB,, | Performed by: NURSE PRACTITIONER

## 2022-04-21 PROCEDURE — 99214 OFFICE O/P EST MOD 30 MIN: CPT | Mod: S$GLB,,, | Performed by: PODIATRIST

## 2022-04-21 PROCEDURE — 99999 PR PBB SHADOW E&M-EST. PATIENT-LVL III: CPT | Mod: PBBFAC,,, | Performed by: NURSE PRACTITIONER

## 2022-04-21 PROCEDURE — 3078F DIAST BP <80 MM HG: CPT | Mod: CPTII,S$GLB,, | Performed by: PODIATRIST

## 2022-04-21 PROCEDURE — 3008F BODY MASS INDEX DOCD: CPT | Mod: CPTII,S$GLB,, | Performed by: NURSE PRACTITIONER

## 2022-04-21 PROCEDURE — 3044F PR MOST RECENT HEMOGLOBIN A1C LEVEL <7.0%: ICD-10-PCS | Mod: CPTII,S$GLB,, | Performed by: NURSE PRACTITIONER

## 2022-04-21 RX ORDER — KETOCONAZOLE 20 MG/G
CREAM TOPICAL DAILY
Qty: 60 G | Refills: 3 | Status: ON HOLD | OUTPATIENT
Start: 2022-04-21 | End: 2022-10-27 | Stop reason: HOSPADM

## 2022-04-21 NOTE — PROGRESS NOTES
"Outpatient Psychiatry Follow-Up Visit (MD/NP)    4/21/2022    Clinical Status of Patient:  Outpatient (Ambulatory)    Chief Complaint:  Naga Benavidez is a 61 y.o. female who presents today for follow-up of depression, anxiety and adjustment problems.  Met with patient.      Interval History and Content of Current Session:  Interim Events/Subjective Report/Content of Current Session:   Naga Benavidez checked in early for her appointment. At initial visit we started Lexapro 5 mg. Today she reports "I haven't been taking it." Made her feel "out of it" after a week or two on it. She notes that things from her childhood are coming back up, still adjusting to not having the same care her mother provided her her whole life, then effects of [place] on housing. "I don't take change well." Scared to be on her won - sister may be moving out. She describes heighten anxiety, irritability, impatience, ruminating, racing thoughts, emotional eating/over eating/using food as comfort. Anticipatory anxiety. She does not desire to retry medication but rather wants therapy to work through things and develop coping skills. Showed patient multiple therapist under her insurance per [employer]. She also expressed concern for dementia due to increased forgetfulness but declined referall for cognitive testing at this time - desires to focus on establishing a therapist first. Denies SI/HI/AVH. No objective s/sx of psychosis or joe. Patient verbalized motivation for compliance with medications and all other elements of treatment plan.       Review of Systems   · PSYCHIATRIC: Pertinant items are noted in the narrative.  · CONSTITUTIONAL: No weight gain or loss.   · MUSCULOSKELETAL: No pain or stiffness of the joints.  · RESPIRATORY: No shortness of breath.  · GASTROINTESTINAL: No nausea, vomiting, pain, constipation or diarrhea.    Past Medical, Family and Social History: The patient's past medical, family and social history " have been reviewed and updated as appropriate within the electronic medical record - see encounter notes.    Compliance: no    Side effects: see above    Risk Parameters:  Patient reports no suicidal ideation  Patient reports no homicidal ideation  Patient reports no self-injurious behavior  Patient reports no violent behavior    Exam (detailed: at least 9 elements; comprehensive: all 15 elements)   Constitutional  Vitals:  Most recent vital signs, dated less than 90 days prior to this appointment, were reviewed.   Vitals:    04/21/22 0940   BP: 134/82   Pulse: 73   Weight: 68.8 kg (151 lb 9.1 oz)        General:  unremarkable, age appropriate     Musculoskeletal  Muscle Strength/Tone:  not examined   Gait & Station:  in wheelchair     Psychiatric  Speech:  no latency; no press   Mood & Affect:  anxious  congruent and appropriate   Thought Process:  normal and logical   Associations:  intact   Thought Content:  normal, no suicidality, no homicidality, delusions, or paranoia   Insight:  intact   Judgement: behavior is adequate to circumstances   Orientation:  grossly intact   Memory: intact for content of interview   Language: grossly intact   Attention Span & Concentration:  able to focus   Fund of Knowledge:  intact and appropriate to age and level of education     Assessment and Diagnosis   Status/Progress: Based on the examination today, the patient's problem(s) is/are inadequately controlled.  New problems have not been presented today.   Co-morbidities, Diagnostic uncertainty and Lack of compliance are not complicating management of the primary condition.  There are no active rule-out diagnoses for this patient at this time.     General Impression: Naga Benavidez, a 61 y.o.  female, presenting for follow-up visit. Presents with issues related to adjustment disorder in the setting of loss of mother and complicated health (cerbreal palsy) Presents 4/21/22 - did not tolerate Lexapro - desires therapy       ICD-10-CM ICD-9-CM   1. Adjustment disorder with mixed anxiety and depressed mood  F43.23 309.28   2. Moderate episode of recurrent major depressive disorder  F33.1 296.32       Intervention/Counseling/Treatment Plan   · Medication Management: Continue current medications.   · Stopped Lexapro 5 mg daily   · ECG QTc 408 in 12/2017   · The risks and benefits of medication were discussed with the patient.  · Discussed diagnosis, risks and benefits of proposed treatment above vs alternative treatments vs no treatment, and potential side effects of these treatments. The patient expresses understanding of the above and displays the capacity to agree with this treatment given said understanding. Patient also agrees that, currently, the benefits outweigh the risks and would like to pursue treatment at this time.  · Discussed inherent unpredictability of medications in each individual.   · Encouraged Patient to keep future appointments.   · Take medications as prescribed and abstain from substance abuse.   · In the event of an emergency patient was advised to go to the emergency room      Return to Clinic: as needed    Amrita Handy DNP-BC PMHNP  [facility]

## 2022-04-28 NOTE — PROGRESS NOTES
Subjective:      Patient ID: Naga Benavidez is a 61 y.o. female.    Chief Complaint: Nail Care and Heel Pain    Naga is a 61 y.o. female who presents to the clinic complaining of thick and discolored toenails on both feet. Naga is inquiring about treatment options.      Review of Systems   Constitutional: Negative for chills, decreased appetite and fever.   Cardiovascular: Negative for leg swelling.   Skin: Positive for dry skin and nail changes.   Musculoskeletal: Positive for muscle weakness, myalgias and stiffness. Negative for arthritis, joint pain, joint swelling and neck pain.   Gastrointestinal: Negative for nausea and vomiting.   Neurological: Positive for loss of balance, paresthesias and sensory change. Negative for numbness.           Objective:       Vitals:    04/21/22 1323   BP: 112/74   Pulse: 81   Weight: 68.8 kg (151 lb 10.8 oz)   PainSc: 0-No pain        Physical Exam  Vitals reviewed.   Constitutional:       Appearance: She is well-developed.   Cardiovascular:      Pulses:           Dorsalis pedis pulses are 2+ on the right side and 2+ on the left side.        Posterior tibial pulses are 2+ on the right side and 2+ on the left side.   Musculoskeletal:         General: No tenderness.      Right ankle: Normal.      Left ankle: Normal.      Right foot: No swelling, deformity or crepitus.      Left foot: No swelling, deformity or crepitus.      Comments:          Lymphadenopathy:      Comments: No palpable lymph nodes   Skin:     General: Skin is warm and dry.      Findings: No erythema or rash.      Nails: There is no clubbing.      Comments: Scaling dryness in a moccasin distribution is noted to the bilateral lower extremities with associated erythema.    Thickened discolored nails w/ subungual debris X 6     Neurological:      Mental Status: She is alert and oriented to person, place, and time.   Psychiatric:         Behavior: Behavior normal.               Assessment:       Encounter  Diagnoses   Name Primary?    Thickened nails     Cerebral palsy, unspecified type Yes    Physical debility     Tinea pedis of both feet          Plan:       Naga was seen today for nail care and heel pain.    Diagnoses and all orders for this visit:    Cerebral palsy, unspecified type    Thickened nails  -     Ambulatory referral/consult to Podiatry    Physical debility    Tinea pedis of both feet    Other orders  -     ketoconazole (NIZORAL) 2 % cream; Apply topically once daily.      I counseled the patient on her conditions, their implications and medical management.      Feet are stable   Mild tinea and nail dystrophy   Currently caretaker manages foot care--> continue current foot mgmt pratices   Mild tinea b/l   Advised the patient that fungus likes warm, dark, and moist environments such as bathrooms, gyms, and pools. Advised to spray shower, shower mat , and any shoes w/ Lysol periodically    .

## 2022-05-10 ENCOUNTER — PATIENT MESSAGE (OUTPATIENT)
Dept: PSYCHIATRY | Facility: CLINIC | Age: 62
End: 2022-05-10
Payer: MEDICARE

## 2022-05-18 ENCOUNTER — PATIENT OUTREACH (OUTPATIENT)
Dept: ADMINISTRATIVE | Facility: HOSPITAL | Age: 62
End: 2022-05-18
Payer: MEDICARE

## 2022-05-18 NOTE — PROGRESS NOTES
Health Maintenance Due   Topic Date Due    HIV Screening  Never done    Cervical Cancer Screening  09/29/2019    COVID-19 Vaccine (4 - Booster for Moderna series) 03/17/2022     Triggered LINKS. Unable to update Care Everywhere. Checked for outside lab results in [facility]; no results found. Chart review completed as part of PHN attestation.

## 2022-05-27 ENCOUNTER — CARE AT HOME (OUTPATIENT)
Dept: HOME HEALTH SERVICES | Facility: CLINIC | Age: 62
End: 2022-05-27
Payer: MEDICARE

## 2022-05-27 DIAGNOSIS — G80.9 CEREBRAL PALSY, UNSPECIFIED TYPE: Primary | ICD-10-CM

## 2022-05-27 PROCEDURE — 99350 HOME/RES VST EST HIGH MDM 60: CPT | Mod: S$GLB,,, | Performed by: NURSE PRACTITIONER

## 2022-05-27 PROCEDURE — 99350 PR HOME VISIT,ESTAB PATIENT,LEVEL IV: ICD-10-PCS | Mod: S$GLB,,, | Performed by: NURSE PRACTITIONER

## 2022-05-30 ENCOUNTER — PATIENT MESSAGE (OUTPATIENT)
Dept: ADMINISTRATIVE | Facility: HOSPITAL | Age: 62
End: 2022-05-30
Payer: MEDICARE

## 2022-05-30 VITALS
DIASTOLIC BLOOD PRESSURE: 63 MMHG | RESPIRATION RATE: 15 BRPM | HEART RATE: 73 BPM | SYSTOLIC BLOOD PRESSURE: 105 MMHG | TEMPERATURE: 97 F | OXYGEN SATURATION: 99 %

## 2022-05-30 NOTE — PROGRESS NOTES
"[facility]  Medical Home Visit    Visit Date: 2022  Encounter Provider: Franky Johnston NP  PCP:  Kathrin Salazar MD    Subjective:      Patient ID: Naga Benavidez is a 62 y.o. female.    Consult Requested By:  No ref. provider found  Reason for Consult:  Medical visit    Naga is being seen at home due to  physical debility that presents a taxing effort to leave the home, to mitigate high risk of hospital readmission or due to the limited availability of reliable or safe options for transportation to the point of access to the provider. The visit meets the criteria for medical necessity as defined by CMS as "health-care services needed to prevent, diagnose, or treat an illness, injury, condition, disease, or its symptoms and that meet accepted standards of medicine."  Prior to treatment on this visit the chart was reviewed and patient consent was obtained.     Chief Complaint: Establish Care and Follow-up  Naga Benavidez is a 62 y/o female with a PMHx of cerebral palsy, wheelchair bound, HTN, HLD, anxiety, MDD, CHF (G1DD). The patient is being seen in the home in conjunction with her PCP as it is difficult for her to transport physically to [facility]. She currently lives in an apt with her sister who is her main caregiver along with a sitter who comes most days. Patient has never been  and has no children. She previously worked in [employer]. Current hobbies are limited to watching TV.    With this visit today patient is found sitting up in her power wc. She is unable to ambulate. Patient is AAOx3, able to verify her name and , and provide most history along with her medical record.  has completed their home visit. Denies history of drinking or smoking. She is a maximum assist. Endorses eating x 3 meals per day, remains incontinent of stool and urine, and reports an adequate sleep pattern. Reports med compliance.    When discussing medications and overall care within the home, it appears " that the patient could use much more additional help.  I spoke with the patient and her sister who lives with her, and I will now place orders for case management to give him information on a long-term assisted living facility versus a nursing home for the patient.  Plan to follow-up on next visit.  No additional needs at this time. All of my information was given to the patient and family if any questions or concerns arise.     VSS. Denies fever, chest pain, shortness of breath, nausea, vomiting, diarrhea. Risks of environmental exposure to coronavirus discussed including: social distancing, hand hygiene, and limiting departures from the home for necessities only.  Reports understanding and willingness to comply.  All hospital discharge orders reviewed and being followed, all medications reconciled and reviewed, patient and family verbalized understanding. No other needs identified at this time.      I initiated the process of advance care planning today and explained the importance of this process to the patient and family.  I introduced the concept of advance directives to the patient, as well. The patient has not previously appointed a HCPOA. Then the patient received detailed information about the importance of designating a Health Care Power of  (HCPOA). The patient was also instructed to communicate with this person about their wishes for future healthcare, should patient become sick and lose decision-making capacity. We spoke about ACP for 30 minutes.    Attestation: Screening criteria to assess the level of the patient's risk for infection with COVID-19 as recommended by the CDC at the time of the above documented home visit concluded appropriateness to proceed. Universal precautions were maintained at all times, including provider use of 60% alcohol gel hand  immediately prior to entry and upon departing the patient's home.      Review of Systems   Constitutional: Negative for activity  change and appetite change.   HENT: Negative for congestion and dental problem.    Eyes: Negative for discharge and itching.   Respiratory: Negative for choking and chest tightness.    Cardiovascular: Negative for chest pain and palpitations.   Gastrointestinal: Negative for rectal pain and vomiting.   Endocrine: Negative for cold intolerance and heat intolerance.   Genitourinary: Negative for enuresis and flank pain.   Musculoskeletal: Positive for gait problem. Negative for myalgias and neck pain.   Skin: Negative for color change and wound.   Allergic/Immunologic: Negative for environmental allergies and food allergies.   Neurological: Negative for tremors and syncope.   Hematological: Does not bruise/bleed easily.   Psychiatric/Behavioral: Negative for decreased concentration and dysphoric mood.       Assessments:  · Environmental: clean, apt  · Functional Status: partial to max assist  · Safety: mod fr 2/2 wc bound  · Nutritional: adequate per patient  · Home Health/DME/Supplies: power wc, hospital bed, NC with oxygen    Objective:   Physical Exam  Vitals reviewed.   Constitutional:       Appearance: She is well-developed.   HENT:      Head: Normocephalic and atraumatic.   Eyes:      Pupils: Pupils are equal, round, and reactive to light.   Cardiovascular:      Rate and Rhythm: Normal rate.   Pulmonary:      Effort: Pulmonary effort is normal.      Breath sounds: Normal breath sounds.   Abdominal:      General: Bowel sounds are normal.      Palpations: Abdomen is soft.   Musculoskeletal:         General: Normal range of motion.      Cervical back: Normal range of motion and neck supple.      Right lower le+ Edema present.      Left lower le+ Edema present.      Comments: Cerebral palsy. Limited ROM   Skin:     General: Skin is warm and dry.      Comments: Wound to coccyx healed.   Neurological:      Mental Status: She is alert and oriented to person, place, and time. Mental status is at baseline.       GCS: GCS eye subscore is 4. GCS verbal subscore is 5. GCS motor subscore is 6.         Vitals:    05/30/22 1118   BP: 105/63   Pulse: 73   Resp: 15   Temp: 97.4 °F (36.3 °C)   SpO2: 99%   PainSc: 0-No pain     There is no height or weight on file to calculate BMI.    Assessment:     1. Cerebral palsy, unspecified type        Plan:     Ethical / Legal: Advance Care Planning   · Surrogate decision maker:  Name Ayde Zhu, Relationship: sister  · Code Status: undecided  · [place]:  None on file  · Other advance directive:  None on file, Capacity to make medical decisions:  full, Conflict none       Diagnoses and all orders for this visit:    Hypertension, unspecified type  --compliant with antihypertensives  --normotensive at visit    Cerebral palsy, unspecified type  --HH completed visit  -WC bound, power WC in home    Dependent edema  --advised to elevate legs  --compliant with diuretic prn; instructed to take her Lasix once a day for 3 days and elevate her legs.    Wheelchair bound  --educated on turn q2h and skin hygiene to avoid breakdown      Were controlled substances prescribed?  No    Follow Up Appointments:   Future Appointments   Date Time Provider Department Center   8/25/2022 11:15 AM [facility]       Signature:  Rikki Judice, AG-ACNP [facility]    Total face-to-face time was 90 minutes, >50% of this was spent on counseling and coordination of care. The following issues were discussed: primary and secondary diagnoses, co-morbidities, prescribed medications, treatment modalities, importance of compliance with medical advice, and directives for follow-up care.

## 2022-06-03 ENCOUNTER — PATIENT OUTREACH (OUTPATIENT)
Dept: ADMINISTRATIVE | Facility: OTHER | Age: 62
End: 2022-06-03
Payer: MEDICARE

## 2022-06-03 NOTE — PROGRESS NOTES
CHW - Initial Contact    Klarissa Rollins, Community Health Worker completed the Social Determinant of Health questionnaire with patient via telephone today.    Pt identified barriers of most importance are: Prepared meals and transportation.  Ms. Benavidez stated that the Lift is reliable and ADA complaint; however, it is causing her financial  strain.     She stated that [employer] through [employer] is not reliable and her wheel chair can not fit into the van.  Pt also reported that [employer] is more reliable than [employer] transportation but they sometimes send a van that can not accommodate her wheel chair.  She is seeking financial assistance to assist with transportation and prepared meals because it is difficult for her to cook.  Ms. Benavidez does have the Choice Waiver but she does not have a worker.  [facility], her agency is looking for a worker.   Referrals to community agencies completed with patient/caregiver consent outside of [facility] include: None   Referrals were put through [facility] - yes:   Other information discussed the patient needs / wants help with: None   Follow-up Outreach - Due: 6/10/2022

## 2022-06-09 ENCOUNTER — PATIENT OUTREACH (OUTPATIENT)
Dept: ADMINISTRATIVE | Facility: OTHER | Age: 62
End: 2022-06-09
Payer: MEDICARE

## 2022-06-09 NOTE — PROGRESS NOTES
CHW - Follow Up    Klarissa Rollins, Community Health Worker completed a follow up visit with patient via telephone today.  Pt/Caregiver reported: Ms. Benavidez reported [facility]. Ms. Benavidez requested that her name and number be removed from the [facility].  CHW removed Pt's contact information from the [facility].   Community Health Worker provided: CHW provided Pt with the telephone number for the [place] [facility].    Follow-up Outreach - Due: 6/15/2022

## 2022-06-14 ENCOUNTER — PATIENT OUTREACH (OUTPATIENT)
Dept: ADMINISTRATIVE | Facility: OTHER | Age: 62
End: 2022-06-14
Payer: MEDICARE

## 2022-06-15 NOTE — PROGRESS NOTES
CHW - Follow Up    Klarissa Rollins, Community Health Worker completed a follow up visit with patient via telephone today.  Pt/Caregiver reported: Ms. Benavidez stated that she does not know if she wants prepared meals or not.  She asked CHW to contact her in one week.   Community Health Worker provided: CHW reminded Pt that her information has been removed from [employer].   Follow-up Outreach - Due: 6/22/2022

## 2022-06-17 ENCOUNTER — TELEPHONE (OUTPATIENT)
Dept: INTERNAL MEDICINE | Facility: CLINIC | Age: 62
End: 2022-06-17
Payer: MEDICARE

## 2022-06-17 ENCOUNTER — PATIENT MESSAGE (OUTPATIENT)
Dept: INTERNAL MEDICINE | Facility: CLINIC | Age: 62
End: 2022-06-17
Payer: MEDICARE

## 2022-06-21 ENCOUNTER — HOSPITAL ENCOUNTER (EMERGENCY)
Facility: OTHER | Age: 62
Discharge: HOME | End: 2022-06-21
Attending: EMERGENCY MEDICINE
Payer: MEDICARE

## 2022-06-21 VITALS
SYSTOLIC BLOOD PRESSURE: 118 MMHG | TEMPERATURE: 98 F | HEART RATE: 69 BPM | BODY MASS INDEX: 28.51 KG/M2 | DIASTOLIC BLOOD PRESSURE: 55 MMHG | HEIGHT: 61 IN | OXYGEN SATURATION: 100 % | WEIGHT: 151 LBS | RESPIRATION RATE: 16 BRPM

## 2022-06-21 DIAGNOSIS — S81.812A LEG LACERATION, LEFT, INITIAL ENCOUNTER: Primary | ICD-10-CM

## 2022-06-21 PROCEDURE — 12004 RPR S/N/AX/GEN/TRK7.6-12.5CM: CPT

## 2022-06-21 PROCEDURE — 99282 EMERGENCY DEPT VISIT SF MDM: CPT | Mod: 25

## 2022-06-21 PROCEDURE — 25000003 PHARM REV CODE 250: Performed by: EMERGENCY MEDICINE

## 2022-06-21 RX ORDER — LIDOCAINE HYDROCHLORIDE AND EPINEPHRINE 20; 10 MG/ML; UG/ML
10 INJECTION, SOLUTION INFILTRATION; PERINEURAL
Status: COMPLETED | OUTPATIENT
Start: 2022-06-21 | End: 2022-06-21

## 2022-06-21 RX ORDER — ACETAMINOPHEN 500 MG
1000 TABLET ORAL
Status: COMPLETED | OUTPATIENT
Start: 2022-06-21 | End: 2022-06-21

## 2022-06-21 RX ADMIN — BACITRACIN ZINC, NEOMYCIN, POLYMYXIN B 1 EACH: 400; 3.5; 5 OINTMENT TOPICAL at 05:06

## 2022-06-21 RX ADMIN — LIDOCAINE HYDROCHLORIDE AND EPINEPHRINE 10 ML: 20; 10 INJECTION, SOLUTION INFILTRATION; PERINEURAL at 05:06

## 2022-06-21 RX ADMIN — ACETAMINOPHEN 1000 MG: 500 TABLET ORAL at 05:06

## 2022-06-21 NOTE — ED PROVIDER NOTES
Encounter Date: 6/21/2022    SCRIBE #1 NOTE: Esha NICHOLS am scribing for, and in the presence of, Yamilet Fernandez MD.       History     Chief Complaint   Patient presents with    Laceration     Left leg lac from w/c around 0305       Time seen by provider: 4:24 AM    This is a 62 y.o. female who presents via EMS with complaint of left lower extremity laceration onset one hour ago. The patient reports running into her dresser while on her motorized wheel chair which caused a metal piece of her chair to jam into her left calf. Associated symptoms include left lower extremity edema and swelling. Of note the patient is taking Laxis and reports her last tetanus shot was less than five years ago. This is the extent of the patient's complaints at this time.    The history is provided by the patient.     Review of patient's allergies indicates:   Allergen Reactions    Tramadol Other (See Comments)     She could not wake up     Augmentin [amoxicillin-pot clavulanate] Diarrhea    Levaquin [levofloxacin] Other (See Comments)     itching    Lipitor [atorvastatin] Other (See Comments)     Muscle pain    Statins-hmg-coa reductase inhibitors Other (See Comments)     Muscle pains    Bactrim [sulfamethoxazole-trimethoprim] Nausea Only     Past Medical History:   Diagnosis Date    Anemia     Arthritis     Asthma     Cerebral palsy     stable    DJD (degenerative joint disease)     Hyperlipidemia     Hypertension     Neuromuscular disorder     Obstructive sleep apnea     Uterine fibroids affecting pregnancy     with adnexal mass     Past Surgical History:   Procedure Laterality Date    FOOT CAPSULE RELEASE W/ PERCUTANEOUS HEEL CORD LENGTHENING, TIBIAL TENDON TRANSFER      HAMSTRING RELEASE       Family History   Problem Relation Age of Onset    Diabetes Mother     Hypertension Mother      Social History     Tobacco Use    Smoking status: Never Smoker    Smokeless tobacco: Never Used   Substance Use Topics     Alcohol use: No    Drug use: No     Review of Systems   Constitutional: Negative for activity change, fatigue and fever.   HENT: Negative for congestion and sore throat.    Respiratory: Negative for cough and shortness of breath.    Cardiovascular: Negative for chest pain.   Gastrointestinal: Negative for abdominal pain, diarrhea, nausea and vomiting.   Genitourinary: Negative for difficulty urinating and dysuria.   Musculoskeletal: Negative for back pain and myalgias.        Positive for lower extremity pain and edema.   Skin: Negative for rash and wound.        Positive for laceration to left calf.   Neurological: Negative for dizziness, weakness and headaches.   Psychiatric/Behavioral: Negative for decreased concentration and dysphoric mood.   All other systems reviewed and are negative.      Physical Exam     Initial Vitals [06/21/22 0411]   BP Pulse Resp Temp SpO2   138/80 80 16 98.2 °F (36.8 °C) 99 %      MAP       --         Physical Exam    Nursing note and vitals reviewed.  Constitutional: She appears well-developed and well-nourished. She does not have a sickly appearance. No distress.   HENT:   Head: Normocephalic and atraumatic.   Right Ear: External ear normal.   Left Ear: External ear normal.   Eyes: Conjunctivae, EOM and lids are normal. Right eye exhibits no discharge. Left eye exhibits no discharge. Right conjunctiva is not injected. Right conjunctiva has no hemorrhage. Left conjunctiva is not injected. Left conjunctiva has no hemorrhage. No scleral icterus.   Neck: Phonation normal. No stridor present. No tracheal deviation present.   Normal range of motion.  Cardiovascular: Normal rate, regular rhythm and normal heart sounds. Exam reveals no friction rub.    No murmur heard.  Pulses:       Dorsalis pedis pulses are 2+ on the right side and 1+ on the left side.   Pulmonary/Chest: Breath sounds normal. No respiratory distress.   Musculoskeletal:      Cervical back: Normal range of motion.      Left  lower leg: No lacerations.      Comments: 6 cm U-shaped laceration to left lower extremity. 2+ pitting edema.     Neurological: She is alert and oriented to person, place, and time. She has normal strength. GCS eye subscore is 4. GCS verbal subscore is 5. GCS motor subscore is 6.   Skin: Skin is warm.   Psychiatric: She has a normal mood and affect. Her speech is normal and behavior is normal. Judgment and thought content normal. Cognition and memory are normal.         ED Course   Lac Repair    Date/Time: 6/21/2022 4:44 AM  Performed by: Yamilet Fernandez MD  Authorized by: Yamilet Fernandez MD     Consent:     Consent obtained:  Verbal    Consent given by:  Patient    Risks, benefits, and alternatives were discussed: yes    Laceration details:     Length (cm):  9    Depth (mm):  5  Exploration:     Hemostasis achieved with:  Epinephrine    Contaminated: no    Treatment:     Amount of cleaning:  Extensive    Irrigation solution:  Sterile saline    Irrigation method:  Pressure wash    Debridement:  None    Undermining:  None  Skin repair:     Repair method:  Sutures    Suture size:  4-0    Suture material:  Prolene    Number of sutures:  14  Repair type:     Repair type:  Simple  Post-procedure details:     Dressing:  Antibiotic ointment and bulky dressing    Procedure completion:  Tolerated well, no immediate complications      Labs Reviewed - No data to display       Imaging Results    None          Medications   neomycin-bacitracnZn-polymyxnB packet (1 each Topical (Top) Given 6/21/22 0541)   acetaminophen tablet 1,000 mg (1,000 mg Oral Given 6/21/22 0541)   LIDOcaine 2%/EPINEPHrine 1:100,000 injection 10 mL (10 mLs Intradermal Given 6/21/22 0541)     Medical Decision Making:   History:   Old Medical Records: I decided to obtain old medical records.          Scribe Attestation:   Scribe #1: I performed the above scribed service and the documentation accurately describes the services I performed. I attest to the accuracy  of the note.               Physician Attestation for Scribe: I, Yamilet Fernandez  , reviewed documentation as scribed in my presence, which is both accurate and complete.  Clinical Impression:   Final diagnoses:  [O29.684H] Leg laceration, left, initial encounter (Primary)          ED Disposition Condition    Discharge Stable        ED Prescriptions     None        Follow-up Information     Follow up With Specialties Details Why Contact Info    [facility] - [facility] Dept Emergency Medicine Go in 12 days For suture removal [address] [facility] [id number] [phone]           Yamilet Fernandez MD  06/22/22 0105

## 2022-06-21 NOTE — ED TRIAGE NOTES
Patient presents with laceration to posterior of LLE; pt states she ran into her counter in her motorized chair. AAOx4.

## 2022-06-23 ENCOUNTER — PATIENT OUTREACH (OUTPATIENT)
Dept: ADMINISTRATIVE | Facility: OTHER | Age: 62
End: 2022-06-23
Payer: MEDICARE

## 2022-06-23 NOTE — PROGRESS NOTES
CHW - Follow Up    Klarissa Rollins, Community Health Worker contacted Ms. Benavidez for a follow up visit  via telephone today.  CHW left a voice mail message on her telephone.   Follow-up Outreach - Due: 6/30/2022

## 2022-06-24 ENCOUNTER — TELEPHONE (OUTPATIENT)
Dept: INTERNAL MEDICINE | Facility: CLINIC | Age: 62
End: 2022-06-24

## 2022-06-30 ENCOUNTER — PATIENT OUTREACH (OUTPATIENT)
Dept: ADMINISTRATIVE | Facility: OTHER | Age: 62
End: 2022-06-30
Payer: MEDICARE

## 2022-06-30 NOTE — PROGRESS NOTES
CHW - Case Closure    Klarissa Rollins, Community Health Worker spoke to patient via telephone today.   Pt/Caregiver reported: Ms. Benavidez stated at this time all of her needs are met and she does not need any assistance.   Pt denied any additional needs at this time and agrees with episode closure at this time.  Provided patient with Community Health Worker's contact information and encouraged him/her to contact this Community Health Worker if additional needs arise.

## 2022-06-30 NOTE — PROGRESS NOTES
"Established Patient - Audio Only Telehealth Visit     The patient location is: home  The chief complaint leading to consultation is: medical visit  Visit type: Virtual visit with audio only (telephone)  Total time spent with patient: 12 minutes       The reason for the audio only service rather than synchronous audio and video virtual visit was related to technical difficulties or patient preference/necessity.     Each patient to whom I provide medical services by telemedicine is:  (1) informed of the relationship between the physician and patient and the respective role of any other health care provider with respect to management of the patient; and (2) notified that they may decline to receive medical services by telemedicine and may withdraw from such care at any time. Patient verbally consented to receive this service via voice-only telephone call.       HPI: Naga Benavidez is a 60 y/o female with a PMHx of cerebral palsy, wheelchair bound, HTN, HLD, anxiety, MDD, CHF (G1DD). The patient is being seen in the home in conjunction with her PCP as it is difficult for her to transport physically to [facility]. She currently lives in an apt with her sister who is her main caregiver along with a sitter who comes most days. Patient has never been  and has no children. She previously worked in [employer]. Current hobbies are limited to watching TV.     Assessment and plan:  Patient being seen over the phone. Explains that she is seeing Dr. Salazar tomorrow. She says she received a "puncture wound" but "does not want to go into" how she received it. Dr. Salazar with plans to remove the stitches. She does not feel as though she wants to make a decision regarding long term placement. Will continue to revisit. Reports adequate appetite, normal BMs, and regular sleep pattern.     ANA Patel-ACNP [facility]       This service was not originating from a related E/M service provided within the previous 7 days " nor will  to an E/M service or procedure within the next 24 hours or my soonest available appointment.  Prevailing standard of care was able to be met in this audio-only visit.

## 2022-07-01 ENCOUNTER — CARE AT HOME (OUTPATIENT)
Dept: HOME HEALTH SERVICES | Facility: CLINIC | Age: 62
End: 2022-07-01
Payer: MEDICARE

## 2022-07-01 ENCOUNTER — OFFICE VISIT (OUTPATIENT)
Dept: INTERNAL MEDICINE | Facility: CLINIC | Age: 62
End: 2022-07-01
Payer: MEDICARE

## 2022-07-01 VITALS
HEART RATE: 70 BPM | DIASTOLIC BLOOD PRESSURE: 88 MMHG | HEIGHT: 61 IN | BODY MASS INDEX: 28.53 KG/M2 | OXYGEN SATURATION: 98 % | SYSTOLIC BLOOD PRESSURE: 156 MMHG

## 2022-07-01 DIAGNOSIS — S81.812D LACERATION OF LEFT LOWER EXTREMITY, SUBSEQUENT ENCOUNTER: Primary | ICD-10-CM

## 2022-07-01 DIAGNOSIS — G80.9 CEREBRAL PALSY, UNSPECIFIED TYPE: Primary | ICD-10-CM

## 2022-07-01 PROCEDURE — 1159F MED LIST DOCD IN RCRD: CPT | Mod: CPTII,S$GLB,, | Performed by: INTERNAL MEDICINE

## 2022-07-01 PROCEDURE — 99214 OFFICE O/P EST MOD 30 MIN: CPT | Mod: 25,S$GLB,, | Performed by: INTERNAL MEDICINE

## 2022-07-01 PROCEDURE — 3077F PR MOST RECENT SYSTOLIC BLOOD PRESSURE >= 140 MM HG: ICD-10-PCS | Mod: CPTII,S$GLB,, | Performed by: INTERNAL MEDICINE

## 2022-07-01 PROCEDURE — 90471 IMMUNIZATION ADMIN: CPT | Mod: 59,S$GLB,, | Performed by: INTERNAL MEDICINE

## 2022-07-01 PROCEDURE — 3044F HG A1C LEVEL LT 7.0%: CPT | Mod: CPTII,S$GLB,, | Performed by: INTERNAL MEDICINE

## 2022-07-01 PROCEDURE — 99999 PR PBB SHADOW E&M-EST. PATIENT-LVL IV: ICD-10-PCS | Mod: PBBFAC,,, | Performed by: INTERNAL MEDICINE

## 2022-07-01 PROCEDURE — 3079F DIAST BP 80-89 MM HG: CPT | Mod: CPTII,S$GLB,, | Performed by: INTERNAL MEDICINE

## 2022-07-01 PROCEDURE — 90714 TD VACCINE GREATER THAN OR EQUAL TO 7YO PRESERVATIVE FREE IM: ICD-10-PCS | Mod: S$GLB,,, | Performed by: INTERNAL MEDICINE

## 2022-07-01 PROCEDURE — 96372 THER/PROPH/DIAG INJ SC/IM: CPT | Mod: S$GLB,,, | Performed by: INTERNAL MEDICINE

## 2022-07-01 PROCEDURE — 1159F PR MEDICATION LIST DOCUMENTED IN MEDICAL RECORD: ICD-10-PCS | Mod: CPTII,S$GLB,, | Performed by: INTERNAL MEDICINE

## 2022-07-01 PROCEDURE — 3008F BODY MASS INDEX DOCD: CPT | Mod: CPTII,S$GLB,, | Performed by: INTERNAL MEDICINE

## 2022-07-01 PROCEDURE — 99214 PR OFFICE/OUTPT VISIT, EST, LEVL IV, 30-39 MIN: ICD-10-PCS | Mod: 25,S$GLB,, | Performed by: INTERNAL MEDICINE

## 2022-07-01 PROCEDURE — 3044F PR MOST RECENT HEMOGLOBIN A1C LEVEL <7.0%: ICD-10-PCS | Mod: CPTII,S$GLB,, | Performed by: INTERNAL MEDICINE

## 2022-07-01 PROCEDURE — 90714 TD VACC NO PRESV 7 YRS+ IM: CPT | Mod: S$GLB,,, | Performed by: INTERNAL MEDICINE

## 2022-07-01 PROCEDURE — 3079F PR MOST RECENT DIASTOLIC BLOOD PRESSURE 80-89 MM HG: ICD-10-PCS | Mod: CPTII,S$GLB,, | Performed by: INTERNAL MEDICINE

## 2022-07-01 PROCEDURE — 99999 PR PBB SHADOW E&M-EST. PATIENT-LVL IV: CPT | Mod: PBBFAC,,, | Performed by: INTERNAL MEDICINE

## 2022-07-01 PROCEDURE — 3008F PR BODY MASS INDEX (BMI) DOCUMENTED: ICD-10-PCS | Mod: CPTII,S$GLB,, | Performed by: INTERNAL MEDICINE

## 2022-07-01 PROCEDURE — 99442 PR PHYSICIAN TELEPHONE EVALUATION 11-20 MIN: CPT | Mod: 95,,, | Performed by: NURSE PRACTITIONER

## 2022-07-01 PROCEDURE — 96372 PR INJECTION,THERAP/PROPH/DIAG2ST, IM OR SUBCUT: ICD-10-PCS | Mod: S$GLB,,, | Performed by: INTERNAL MEDICINE

## 2022-07-01 PROCEDURE — 3077F SYST BP >= 140 MM HG: CPT | Mod: CPTII,S$GLB,, | Performed by: INTERNAL MEDICINE

## 2022-07-01 PROCEDURE — 90471 TD VACCINE GREATER THAN OR EQUAL TO 7YO PRESERVATIVE FREE IM: ICD-10-PCS | Mod: 59,S$GLB,, | Performed by: INTERNAL MEDICINE

## 2022-07-01 PROCEDURE — 99442 PR PHYSICIAN TELEPHONE EVALUATION 11-20 MIN: ICD-10-PCS | Mod: 95,,, | Performed by: NURSE PRACTITIONER

## 2022-07-01 RX ORDER — CEPHALEXIN 500 MG/1
500 CAPSULE ORAL EVERY 8 HOURS
Qty: 30 CAPSULE | Refills: 0 | Status: SHIPPED | OUTPATIENT
Start: 2022-07-01 | End: 2022-08-11

## 2022-07-01 NOTE — PROGRESS NOTES
Subjective:       Patient ID: Naga Benavidez is a 62 y.o. female.    Chief Complaint: Suture / Staple Removal    HPIPt with laceration to left calf that occurred on her wheelchair 10 days ago  Here for suture removal.  No fever or chills.  Sometimes does not sleep in the bed - leg is swollen.    Review of Systems   Respiratory: Negative for shortness of breath (PND or orthopnea).    Cardiovascular: Negative for chest pain (arm pain or jaw pain).   Gastrointestinal: Negative for abdominal pain, diarrhea, nausea and vomiting.   Genitourinary: Negative for dysuria.   Neurological: Negative for seizures, syncope and headaches.       Objective:      Physical Exam  Constitutional:       General: She is not in acute distress.     Appearance: She is well-developed.   HENT:      Head: Normocephalic.   Neck:      Thyroid: No thyromegaly.      Vascular: No JVD.   Cardiovascular:      Rate and Rhythm: Normal rate and regular rhythm.      Heart sounds: Normal heart sounds. No murmur heard.    No friction rub. No gallop.   Pulmonary:      Effort: Pulmonary effort is normal.      Breath sounds: Normal breath sounds. No wheezing or rales.   Abdominal:      General: Bowel sounds are normal. There is no distension.      Palpations: Abdomen is soft. There is no mass.      Tenderness: There is no abdominal tenderness. There is no guarding or rebound.   Musculoskeletal:      Cervical back: Neck supple.   Lymphadenopathy:      Cervical: No cervical adenopathy.   Skin:     General: Skin is warm and dry.      Comments: 14 sutures removed - very difficult as leg was very swollen - some tissue macerated not completely healed but sutures would be worse to remove if we wait.      Area cleaned and steri strips placed then telfa and tegaderm - wrapped with ACE.   Neurological:      Mental Status: She is alert and oriented to person, place, and time.      Deep Tendon Reflexes: Reflexes are normal and symmetric.   Psychiatric:          Behavior: Behavior normal.         Thought Content: Thought content normal.         Judgment: Judgment normal.         Assessment:       1. Laceration of left lower extremity, subsequent encounter        Plan:   Laceration of left lower extremity, subsequent encounter  -     Ambulatory referral/consult to Home Health; Future; Expected date: 07/02/2022    Other orders  -     (In Office Administered) Td Vaccine - Preservative Free  -     cefTRIAXone (ROCEPHIN) 1 g in LIDOcaine HCL 10 mg/ml (1%) IM only syringe  -     cephALEXin (KEFLEX) 500 MG capsule; Take 1 capsule (500 mg total) by mouth every 8 (eight) hours.  Dispense: 30 capsule; Refill: 0

## 2022-07-06 ENCOUNTER — TELEPHONE (OUTPATIENT)
Dept: INTERNAL MEDICINE | Facility: CLINIC | Age: 62
End: 2022-07-06
Payer: MEDICARE

## 2022-07-06 NOTE — TELEPHONE ENCOUNTER
Pt stated that she stop taking Cephalexin (Keflex) every 8 hours due side effects. Pt would like to know if she should try a new RX or can she continue once a day.     Please advise

## 2022-07-06 NOTE — TELEPHONE ENCOUNTER
----- Message from Cailin Paul sent at 7/6/2022  1:04 PM CDT -----  Contact: pt [phone]  Stated that wound care is supposed to be at her home. Requesting a call    Please call and advise.    Thank You

## 2022-07-11 ENCOUNTER — PATIENT MESSAGE (OUTPATIENT)
Dept: ADMINISTRATIVE | Facility: HOSPITAL | Age: 62
End: 2022-07-11
Payer: MEDICARE

## 2022-07-12 ENCOUNTER — OFFICE VISIT (OUTPATIENT)
Dept: INTERNAL MEDICINE | Facility: CLINIC | Age: 62
End: 2022-07-12
Payer: MEDICARE

## 2022-07-12 VITALS
DIASTOLIC BLOOD PRESSURE: 90 MMHG | HEIGHT: 61 IN | SYSTOLIC BLOOD PRESSURE: 162 MMHG | BODY MASS INDEX: 28.53 KG/M2 | OXYGEN SATURATION: 99 % | HEART RATE: 77 BPM

## 2022-07-12 DIAGNOSIS — T81.30XA WOUND DEHISCENCE: Primary | ICD-10-CM

## 2022-07-12 PROCEDURE — 3044F PR MOST RECENT HEMOGLOBIN A1C LEVEL <7.0%: ICD-10-PCS | Mod: CPTII,S$GLB,, | Performed by: INTERNAL MEDICINE

## 2022-07-12 PROCEDURE — 3008F BODY MASS INDEX DOCD: CPT | Mod: CPTII,S$GLB,, | Performed by: INTERNAL MEDICINE

## 2022-07-12 PROCEDURE — 3080F DIAST BP >= 90 MM HG: CPT | Mod: CPTII,S$GLB,, | Performed by: INTERNAL MEDICINE

## 2022-07-12 PROCEDURE — 3077F PR MOST RECENT SYSTOLIC BLOOD PRESSURE >= 140 MM HG: ICD-10-PCS | Mod: CPTII,S$GLB,, | Performed by: INTERNAL MEDICINE

## 2022-07-12 PROCEDURE — 3080F PR MOST RECENT DIASTOLIC BLOOD PRESSURE >= 90 MM HG: ICD-10-PCS | Mod: CPTII,S$GLB,, | Performed by: INTERNAL MEDICINE

## 2022-07-12 PROCEDURE — 3044F HG A1C LEVEL LT 7.0%: CPT | Mod: CPTII,S$GLB,, | Performed by: INTERNAL MEDICINE

## 2022-07-12 PROCEDURE — 3008F PR BODY MASS INDEX (BMI) DOCUMENTED: ICD-10-PCS | Mod: CPTII,S$GLB,, | Performed by: INTERNAL MEDICINE

## 2022-07-12 PROCEDURE — 99213 PR OFFICE/OUTPT VISIT, EST, LEVL III, 20-29 MIN: ICD-10-PCS | Mod: S$GLB,,, | Performed by: INTERNAL MEDICINE

## 2022-07-12 PROCEDURE — 99999 PR PBB SHADOW E&M-EST. PATIENT-LVL III: ICD-10-PCS | Mod: PBBFAC,,, | Performed by: INTERNAL MEDICINE

## 2022-07-12 PROCEDURE — 3077F SYST BP >= 140 MM HG: CPT | Mod: CPTII,S$GLB,, | Performed by: INTERNAL MEDICINE

## 2022-07-12 PROCEDURE — 99999 PR PBB SHADOW E&M-EST. PATIENT-LVL III: CPT | Mod: PBBFAC,,, | Performed by: INTERNAL MEDICINE

## 2022-07-12 PROCEDURE — 99213 OFFICE O/P EST LOW 20 MIN: CPT | Mod: S$GLB,,, | Performed by: INTERNAL MEDICINE

## 2022-07-12 NOTE — PROGRESS NOTES
Subjective:       Patient ID: Naga Benavidez is a 62 y.o. female.    Chief Complaint: Follow-up    HPIPt returns for evaluation of posterior leg wound.  No fever or chills.   Review of Systems   Respiratory: Negative for shortness of breath (PND or orthopnea).    Cardiovascular: Negative for chest pain (arm pain or jaw pain).   Gastrointestinal: Negative for abdominal pain, diarrhea, nausea and vomiting.   Genitourinary: Negative for dysuria.   Neurological: Negative for seizures, syncope and headaches.       Objective:      Physical Exam  Constitutional:       General: She is not in acute distress.     Appearance: She is well-developed.   HENT:      Head: Normocephalic.   Neck:      Thyroid: No thyromegaly.      Vascular: No JVD.   Cardiovascular:      Rate and Rhythm: Normal rate and regular rhythm.      Heart sounds: Normal heart sounds. No murmur heard.    No friction rub. No gallop.   Pulmonary:      Effort: Pulmonary effort is normal.      Breath sounds: Normal breath sounds. No wheezing or rales.   Abdominal:      General: Bowel sounds are normal. There is no distension.      Palpations: Abdomen is soft. There is no mass.      Tenderness: There is no abdominal tenderness. There is no guarding or rebound.   Musculoskeletal:      Cervical back: Neck supple.   Lymphadenopathy:      Cervical: No cervical adenopathy.   Skin:     General: Skin is warm and dry.   Neurological:      Mental Status: She is alert and oriented to person, place, and time.      Deep Tendon Reflexes: Reflexes are normal and symmetric.   Psychiatric:         Behavior: Behavior normal.         Thought Content: Thought content normal.         Judgment: Judgment normal.       Leg is less swollen and red - wound closing by secondary intention  Assessment:       1. Wound dehiscence        Plan:   Wound dehiscence  -     Ambulatory referral/consult to Home Health; Future; Expected date: 07/13/2022    Needs daily wound care

## 2022-07-16 PROCEDURE — G0180 MD CERTIFICATION HHA PATIENT: HCPCS | Mod: ,,, | Performed by: INTERNAL MEDICINE

## 2022-07-16 PROCEDURE — G0180 PR HOME HEALTH MD CERTIFICATION: ICD-10-PCS | Mod: ,,, | Performed by: INTERNAL MEDICINE

## 2022-07-22 ENCOUNTER — CARE AT HOME (OUTPATIENT)
Dept: HOME HEALTH SERVICES | Facility: CLINIC | Age: 62
End: 2022-07-22
Payer: MEDICARE

## 2022-07-22 DIAGNOSIS — G80.9 CEREBRAL PALSY, UNSPECIFIED TYPE: Primary | ICD-10-CM

## 2022-07-22 PROCEDURE — 99350 HOME/RES VST EST HIGH MDM 60: CPT | Mod: S$GLB,,, | Performed by: NURSE PRACTITIONER

## 2022-07-22 PROCEDURE — 99497 ADVNCD CARE PLAN 30 MIN: CPT | Mod: S$GLB,,, | Performed by: NURSE PRACTITIONER

## 2022-07-22 PROCEDURE — 99350 PR HOME VISIT,ESTAB PATIENT,LEVEL IV: ICD-10-PCS | Mod: S$GLB,,, | Performed by: NURSE PRACTITIONER

## 2022-07-22 PROCEDURE — 99497 PR ADVNCD CARE PLAN 30 MIN: ICD-10-PCS | Mod: S$GLB,,, | Performed by: NURSE PRACTITIONER

## 2022-07-25 VITALS
OXYGEN SATURATION: 100 % | HEART RATE: 74 BPM | TEMPERATURE: 98 F | DIASTOLIC BLOOD PRESSURE: 60 MMHG | SYSTOLIC BLOOD PRESSURE: 115 MMHG | RESPIRATION RATE: 14 BRPM

## 2022-07-25 NOTE — PATIENT INSTRUCTIONS
Instructions:  - [facility] Practitioner to schedule home follow-up visit with patient in 6 weeks.  - Continue all medications, treatments and therapies as ordered.   - Follow all instructions, recommendations as discussed.  - Maintain Safety Precautions at all times.  - Attend all medical appointments as scheduled.  - For worsening symptoms: call Primary Care Physician or Nurse Practitioner.  - For emergencies, call 911 or immediately report to the nearest emergency room.  - Limit Risks of environmental exposure to coronavirus/COVID-19 as discussed including: social distancing, hand hygiene, and limiting departures from the home for necessities only.

## 2022-07-25 NOTE — PROGRESS NOTES
"[facility]  Medical Home Visit    Visit Date: 2022  Encounter Provider: Franky Johnston NP  PCP:  Kathrin Salazar MD    Subjective:      Patient ID: Naga Benavidez is a 62 y.o. female.    Consult Requested By:  No ref. provider found  Reason for Consult:  Medical visit    Naga is being seen at home due to  physical debility that presents a taxing effort to leave the home, to mitigate high risk of hospital readmission or due to the limited availability of reliable or safe options for transportation to the point of access to the provider. The visit meets the criteria for medical necessity as defined by CMS as "health-care services needed to prevent, diagnose, or treat an illness, injury, condition, disease, or its symptoms and that meet accepted standards of medicine."  Prior to treatment on this visit the chart was reviewed and patient consent was obtained.     Chief Complaint: Establish Care and Follow-up  Naga Beanvidez is a 60 y/o female with a PMHx of cerebral palsy, wheelchair bound, HTN, HLD, anxiety, MDD, CHF (G1DD). The patient is being seen in the home in conjunction with her PCP as it is difficult for her to transport physically to [facility]". She currently lives in an apt with her sister who is her main caregiver along with a sitter who comes most days. Patient has never been  and has no children. She previously worked in [employer]. Current hobbies are limited to watching TV.    With this visit today patient is found sitting up in her power wc. She is unable to ambulate. Patient is AAOx3, able to verify her name and , and provide most history along with her medical record.  has completed their home visit. Denies history of drinking or smoking. She is a maximum assist. Endorses eating x 3 meals per day, remains incontinent of stool and urine, and reports an adequate sleep pattern. Reports med compliance.    When discussing medications and overall care within the home, it appears " that the patient could use much more additional help.  I spoke with the patient and her sister who lives with her, and I will now place orders (second time) for case management to give him information on a long-term assisted living facility versus a nursing home for the patient.  Plan to follow-up on next visit.  No additional needs at this time. All of my information was given to the patient and family if any questions or concerns arise.     VSS. Denies fever, chest pain, shortness of breath, nausea, vomiting, diarrhea. Risks of environmental exposure to coronavirus discussed including: social distancing, hand hygiene, and limiting departures from the home for necessities only.  Reports understanding and willingness to comply.  All hospital discharge orders reviewed and being followed, all medications reconciled and reviewed, patient and family verbalized understanding. No other needs identified at this time.      I initiated the process of advance care planning today and explained the importance of this process to the patient and family.  I introduced the concept of advance directives to the patient, as well. The patient has not previously appointed a HCPOA. Then the patient received detailed information about the importance of designating a Health Care Power of  (HCPOA). The patient was also instructed to communicate with this person about their wishes for future healthcare, should patient become sick and lose decision-making capacity. We spoke about ACP for 30 minutes.    Attestation: Screening criteria to assess the level of the patient's risk for infection with COVID-19 as recommended by the CDC at the time of the above documented home visit concluded appropriateness to proceed. Universal precautions were maintained at all times, including provider use of 60% alcohol gel hand  immediately prior to entry and upon departing the patient's home.      Review of Systems   Constitutional: Negative  for activity change and appetite change.   HENT: Negative for congestion and dental problem.    Eyes: Negative for discharge and itching.   Respiratory: Negative for choking and chest tightness.    Cardiovascular: Negative for chest pain and palpitations.   Gastrointestinal: Negative for rectal pain and vomiting.   Endocrine: Negative for cold intolerance and heat intolerance.   Genitourinary: Negative for enuresis and flank pain.   Musculoskeletal: Positive for gait problem. Negative for myalgias and neck pain.   Skin: Negative for color change and wound.   Allergic/Immunologic: Negative for environmental allergies and food allergies.   Neurological: Negative for tremors and syncope.   Hematological: Does not bruise/bleed easily.   Psychiatric/Behavioral: Negative for decreased concentration and dysphoric mood.       Assessments:  · Environmental: clean, apt  · Functional Status: partial to max assist  · Safety: mod fr 2/2 wc bound  · Nutritional: adequate per patient  · Home Health/DME/Supplies: power wc, hospital bed, NC with oxygen    Objective:   Physical Exam  Vitals reviewed.   Constitutional:       Appearance: She is well-developed.   HENT:      Head: Normocephalic and atraumatic.   Eyes:      Pupils: Pupils are equal, round, and reactive to light.   Cardiovascular:      Rate and Rhythm: Normal rate.   Pulmonary:      Effort: Pulmonary effort is normal.      Breath sounds: Normal breath sounds.   Abdominal:      General: Bowel sounds are normal.      Palpations: Abdomen is soft.   Musculoskeletal:         General: Normal range of motion.      Cervical back: Normal range of motion and neck supple.      Right lower le+ Edema present.      Left lower le+ Edema present.      Comments: Cerebral palsy. Limited ROM   Skin:     General: Skin is warm and dry.      Comments: LLE lac dressed, dry, intact.   Neurological:      Mental Status: She is alert and oriented to person, place, and time. Mental status is  at baseline.      GCS: GCS eye subscore is 4. GCS verbal subscore is 5. GCS motor subscore is 6.         Vitals:    07/25/22 1125   BP: 115/60   Pulse: 74   Resp: 14   Temp: 98.2 °F (36.8 °C)   PainSc: 0-No pain     There is no height or weight on file to calculate BMI.    Assessment:     No diagnosis found.    Plan:     Ethical / Legal: Advance Care Planning   · Surrogate decision maker:  Name Ayde Zhu, Relationship: sister  · Code Status: undecided  · [place]:  None on file  · Other advance directive:  None on file, Capacity to make medical decisions:  full, Conflict none       Diagnoses and all orders for this visit:    Hypertension, unspecified type  --compliant with antihypertensives  --normotensive at visit    Cerebral palsy, unspecified type  -- completed visit  -WC bound, power WC in home    Dependent edema  --advised to elevate legs  --compliant with diuretic prn; instructed to take her Lasix once a day for 3 days and elevate her legs.    Wheelchair bound  --educated on turn q2h and skin hygiene to avoid breakdown      Were controlled substances prescribed?  No    Follow Up Appointments:   Future Appointments   Date Time Provider Department Center   8/25/2022 11:15 AM hSahana Weiner DPM [facility]       Signature:  Rikki Judice, AG-ACNP [facility]    Total face-to-face time was 90 minutes, >50% of this was spent on counseling and coordination of care. The following issues were discussed: primary and secondary diagnoses, co-morbidities, prescribed medications, treatment modalities, importance of compliance with medical advice, and directives for follow-up care.

## 2022-07-28 ENCOUNTER — EXTERNAL HOME HEALTH (OUTPATIENT)
Dept: HOME HEALTH SERVICES | Facility: HOSPITAL | Age: 62
End: 2022-07-28
Payer: MEDICARE

## 2022-08-08 ENCOUNTER — TELEPHONE (OUTPATIENT)
Dept: ADMINISTRATIVE | Facility: CLINIC | Age: 62
End: 2022-08-08
Payer: MEDICARE

## 2022-08-10 ENCOUNTER — IMMUNIZATION (OUTPATIENT)
Dept: PHARMACY | Facility: CLINIC | Age: 62
End: 2022-08-10
Payer: MEDICARE

## 2022-08-10 ENCOUNTER — OFFICE VISIT (OUTPATIENT)
Dept: INTERNAL MEDICINE | Facility: CLINIC | Age: 62
End: 2022-08-10
Payer: MEDICARE

## 2022-08-10 DIAGNOSIS — H91.93 BILATERAL HEARING LOSS, UNSPECIFIED HEARING LOSS TYPE: ICD-10-CM

## 2022-08-10 DIAGNOSIS — F33.1 MODERATE EPISODE OF RECURRENT MAJOR DEPRESSIVE DISORDER: ICD-10-CM

## 2022-08-10 DIAGNOSIS — Z00.00 ENCOUNTER FOR PREVENTIVE HEALTH EXAMINATION: Primary | ICD-10-CM

## 2022-08-10 DIAGNOSIS — Z23 NEED FOR VACCINATION: Primary | ICD-10-CM

## 2022-08-10 DIAGNOSIS — Z13.31 POSITIVE DEPRESSION SCREENING: ICD-10-CM

## 2022-08-10 DIAGNOSIS — Z99.3 DEPENDENCE ON WHEELCHAIR: ICD-10-CM

## 2022-08-10 DIAGNOSIS — E78.2 MIXED HYPERLIPIDEMIA: ICD-10-CM

## 2022-08-10 DIAGNOSIS — G80.8 OTHER CEREBRAL PALSY: ICD-10-CM

## 2022-08-10 DIAGNOSIS — J45.20 MILD INTERMITTENT ASTHMA WITHOUT COMPLICATION: ICD-10-CM

## 2022-08-10 DIAGNOSIS — Z99.3 WHEELCHAIR BOUND: ICD-10-CM

## 2022-08-10 DIAGNOSIS — Z11.4 SCREENING FOR HIV WITHOUT PRESENCE OF RISK FACTORS: ICD-10-CM

## 2022-08-10 DIAGNOSIS — I50.33 ACUTE ON CHRONIC DIASTOLIC CONGESTIVE HEART FAILURE: ICD-10-CM

## 2022-08-10 DIAGNOSIS — H53.8 BLURRED VISION: ICD-10-CM

## 2022-08-10 DIAGNOSIS — Z12.11 COLON CANCER SCREENING: ICD-10-CM

## 2022-08-10 DIAGNOSIS — L97.322 SKIN ULCER OF LEFT ANKLE WITH FAT LAYER EXPOSED: ICD-10-CM

## 2022-08-10 DIAGNOSIS — F43.23 ADJUSTMENT DISORDER WITH MIXED ANXIETY AND DEPRESSED MOOD: ICD-10-CM

## 2022-08-10 DIAGNOSIS — R26.9 ABNORMALITY OF GAIT AND MOBILITY: ICD-10-CM

## 2022-08-10 PROCEDURE — G0439 PPPS, SUBSEQ VISIT: HCPCS | Mod: S$GLB,,, | Performed by: NURSE PRACTITIONER

## 2022-08-10 PROCEDURE — 3044F HG A1C LEVEL LT 7.0%: CPT | Mod: CPTII,S$GLB,, | Performed by: NURSE PRACTITIONER

## 2022-08-10 PROCEDURE — 1159F MED LIST DOCD IN RCRD: CPT | Mod: CPTII,S$GLB,, | Performed by: NURSE PRACTITIONER

## 2022-08-10 PROCEDURE — 3074F SYST BP LT 130 MM HG: CPT | Mod: CPTII,S$GLB,, | Performed by: NURSE PRACTITIONER

## 2022-08-10 PROCEDURE — 3078F DIAST BP <80 MM HG: CPT | Mod: CPTII,S$GLB,, | Performed by: NURSE PRACTITIONER

## 2022-08-10 PROCEDURE — 99499 RISK ADDL DX/OHS AUDIT: ICD-10-PCS | Mod: S$GLB,,, | Performed by: NURSE PRACTITIONER

## 2022-08-10 PROCEDURE — 3078F PR MOST RECENT DIASTOLIC BLOOD PRESSURE < 80 MM HG: ICD-10-PCS | Mod: CPTII,S$GLB,, | Performed by: NURSE PRACTITIONER

## 2022-08-10 PROCEDURE — 3044F PR MOST RECENT HEMOGLOBIN A1C LEVEL <7.0%: ICD-10-PCS | Mod: CPTII,S$GLB,, | Performed by: NURSE PRACTITIONER

## 2022-08-10 PROCEDURE — 99499 UNLISTED E&M SERVICE: CPT | Mod: S$GLB,,, | Performed by: NURSE PRACTITIONER

## 2022-08-10 PROCEDURE — 3074F PR MOST RECENT SYSTOLIC BLOOD PRESSURE < 130 MM HG: ICD-10-PCS | Mod: CPTII,S$GLB,, | Performed by: NURSE PRACTITIONER

## 2022-08-10 PROCEDURE — 99999 PR PBB SHADOW E&M-EST. PATIENT-LVL V: CPT | Mod: PBBFAC,,, | Performed by: NURSE PRACTITIONER

## 2022-08-10 PROCEDURE — 1160F RVW MEDS BY RX/DR IN RCRD: CPT | Mod: CPTII,S$GLB,, | Performed by: NURSE PRACTITIONER

## 2022-08-10 PROCEDURE — G0439 PR MEDICARE ANNUAL WELLNESS SUBSEQUENT VISIT: ICD-10-PCS | Mod: S$GLB,,, | Performed by: NURSE PRACTITIONER

## 2022-08-10 PROCEDURE — 1159F PR MEDICATION LIST DOCUMENTED IN MEDICAL RECORD: ICD-10-PCS | Mod: CPTII,S$GLB,, | Performed by: NURSE PRACTITIONER

## 2022-08-10 PROCEDURE — 1160F PR REVIEW ALL MEDS BY PRESCRIBER/CLIN PHARMACIST DOCUMENTED: ICD-10-PCS | Mod: CPTII,S$GLB,, | Performed by: NURSE PRACTITIONER

## 2022-08-10 PROCEDURE — 99999 PR PBB SHADOW E&M-EST. PATIENT-LVL V: ICD-10-PCS | Mod: PBBFAC,,, | Performed by: NURSE PRACTITIONER

## 2022-08-10 NOTE — Clinical Note
Kathrin Salazar MD,   Your patient was seen today for a HRA visit. Abnormalities have been identified during this visit that require additional testing and possible follow up.  Orders Placed This Encounter     Fecal Immunochemical Test (iFOBT)     Lipid Panel     HIV 1/2 Ag/Ab (4th Gen)                      Ambulatory referral/consult to Psychiatry                             Ambulatory referral/consult to Primary Care Behavioral Health (Non-Opioids)        Ambulatory referral/consult to Outpatient Case Management                 Ambulatory referral/consult to Optometry         Standing Status: Future    These orders were placed using [facility] approved protocol and any results will be forwarded to your office for appropriate follow up.  I have included a copy of my visit note, please review the note and feel free to contact me with any questions.   Thank you for allowing me to participate in the care of your patients.  Yue Talavera NP

## 2022-08-10 NOTE — PROGRESS NOTES
Naga Benavidez presented for a  Medicare AWV and comprehensive Health Risk Assessment today. The following components were reviewed and updated:    · Medical history  · Family History  · Social history  · Allergies and Current Medications  · Health Risk Assessment  · Health Maintenance  · Care Team         ** See Completed Assessments for Annual Wellness Visit within the encounter summary.**         The following assessments were completed:  · Living Situation  · CAGE  · Depression Screening  · Timed Get Up and Go - not done due to limited mobility   · Whisper Test - not done due to bilateral hearing loss  · Cognitive Function Screening - not done due limited mobility   · Nutrition Screening  · ADL Screening  · PAQ Screening        Vitals:    08/10/22 1321   BP: 118/62   Pulse: 74   Resp: 16   SpO2: 99%     There is no height or weight on file to calculate BMI.  Physical Exam  Vitals and nursing note reviewed.   Constitutional:       Appearance: She is well-developed.   HENT:      Head: Normocephalic and atraumatic.      Right Ear: External ear normal.      Left Ear: External ear normal.      Nose: Nose normal.   Eyes:      Pupils: Pupils are equal, round, and reactive to light.   Cardiovascular:      Rate and Rhythm: Normal rate and regular rhythm.      Heart sounds: Normal heart sounds.   Pulmonary:      Effort: Pulmonary effort is normal.      Breath sounds: Normal breath sounds.   Abdominal:      General: Bowel sounds are normal.      Palpations: Abdomen is soft.   Musculoskeletal:         General: Normal range of motion.      Cervical back: Normal range of motion.   Skin:     General: Skin is warm and dry.      Comments: Covered dressing noted to left leg   Neurological:      Mental Status: She is alert and oriented to person, place, and time.      Motor: Weakness present.      Comments: Patient in electric wheelchair with limited mobility               Diagnoses and health risks identified today and  associated recommendations/orders:    1. Encounter for preventive health examination  Health Maintenance updated   Records reviewed   Exam done     2. Moderate episode of recurrent major depressive disorder   I advised the patient to return to clinic or go to the emergency department if suicidal thoughts occur, thought of hurting others, or other serious symptoms.  Patient voiced no intention of self-harm.  The patient expressed verbal understanding.  All questions were answered.      - Ambulatory referral/consult to Psychiatry; Future  - Ambulatory referral/consult to Primary Care Behavioral Health (Non-Opioids); Future  - Ambulatory referral/consult to Outpatient Case Management    3. Other cerebral palsy  Stable and chronic. Followed by PCP.   - Ambulatory referral/consult to Psychiatry; Future  - Ambulatory referral/consult to Primary Care Behavioral Health (Non-Opioids); Future  - Ambulatory referral/consult to Outpatient Case Management    4. Bilateral hearing loss, unspecified hearing loss type  Stable and chronic. Followed by PCP.     5. Wheelchair bound  Stable and chronic. Followed by PCP.     6. Positive depression screening   I advised the patient to return to clinic or go to the emergency department if suicidal thoughts occur, thought of hurting others, or other serious symptoms.  Patient voiced no intention of self-harm.  The patient expressed verbal understanding.  All questions were answered.      - Ambulatory referral/consult to Psychiatry; Future  - Ambulatory referral/consult to Primary Care Behavioral Health (Non-Opioids); Future  - Ambulatory referral/consult to Outpatient Case Management    7. Blurred vision  - Ambulatory referral/consult to Optometry; Future    8. Colon cancer screening  - Fecal Immunochemical Test (iFOBT); Future    9. Screening for HIV without presence of risk factors  - HIV 1/2 Ag/Ab (4th Gen); Future    10. Mixed hyperlipidemia  Stable and chronic. Continue current  medications. Followed by PCP.   - Lipid Panel; Future    11. Adjustment disorder with mixed anxiety and depressed mood   I advised the patient to return to clinic or go to the emergency department if suicidal thoughts occur, thought of hurting others, or other serious symptoms.  Patient voiced no intention of self-harm.  The patient expressed verbal understanding.  All questions were answered.      - Ambulatory referral/consult to Psychiatry; Future  - Ambulatory referral/consult to Primary Care Behavioral Health (Non-Opioids); Future  - Ambulatory referral/consult to Outpatient Case Management    12. Mild intermittent asthma without complication  Stable and chronic. Continue current medications. Followed by PCP and care at home.     13. Skin ulcer of left ankle with fat layer exposed  Stable and chronic. Continue current medications. Followed by PCP and care at home.     14. Acute on chronic diastolic congestive heart failure  Stable and chronic. Continue current medications. Followed by PCP and care at home.     15. Dependence on wheelchair  Stable and chronic. Followed by PCP.     16. Abnormality of gait and mobility  Stable and chronic. Followed by PCP.     Counseling and Referral of Other Preventative  (Italic type indicates deductible and co-insurance are waived)    Patient Name: Naga Benavidez  Today's Date: 8/11/2022    Health Maintenance       Date Due Completion Date    HIV Screening Never done ---    COVID-19 Vaccine (4 - Booster for Moderna series) 03/17/2022 11/17/2021    Shingles Vaccine (2 of 2) 05/30/2022 4/4/2022    Lipid Panel 06/10/2022 6/10/2021    Override on 6/15/2018: Done ([facility])    Colorectal Cancer Screening 06/24/2022 6/24/2021    Cervical Cancer Screening 08/10/2023 (Originally 9/29/2019) 9/29/2016    Influenza Vaccine (1) 09/01/2022 10/14/2021    Mammogram 11/16/2022 11/16/2021    TETANUS VACCINE 07/01/2032 7/1/2022        Orders Placed This Encounter   Procedures    Fecal  Immunochemical Test (iFOBT)    Lipid Panel    HIV 1/2 Ag/Ab (4th Gen)    Ambulatory referral/consult to Psychiatry    Ambulatory referral/consult to Primary Care Behavioral Health (Non-Opioids)    Ambulatory referral/consult to Outpatient Case Management    Ambulatory referral/consult to Optometry     Provided Naga with a 5-10 year written screening schedule and personal prevention plan. Recommendations were developed using the USPSTF age appropriate recommendations. Education, counseling, and referrals were provided as needed. After Visit Summary printed and given to patient which includes a list of additional screenings\tests needed.    Follow up in about 1 year (around 8/10/2023) for medicare annual wellness visit.    Yue Talavera, NP    I have reviewed the positive depression score which warrants active treatment with psychotherapy and/or medications. I offered to discuss advanced care planning, including how to pick a person who would make decisions for you if you were unable to make them for yourself, called a health care power of , and what kind of decisions you might make such as use of life sustaining treatments such as ventilators and tube feeding when faced with a life limiting illness recorded on a living will that they will need to know. (How you want to be cared for as you near the end of your natural life)     X Patient is interested in learning more about how to make advanced directives.  I provided them paperwork and offered to discuss this with them.

## 2022-08-11 ENCOUNTER — TELEPHONE (OUTPATIENT)
Dept: BEHAVIORAL HEALTH | Facility: CLINIC | Age: 62
End: 2022-08-11
Payer: MEDICARE

## 2022-08-11 VITALS
DIASTOLIC BLOOD PRESSURE: 62 MMHG | RESPIRATION RATE: 16 BRPM | HEART RATE: 74 BPM | SYSTOLIC BLOOD PRESSURE: 118 MMHG | OXYGEN SATURATION: 99 %

## 2022-08-11 NOTE — PROGRESS NOTES
Behavioral Health Community Health Worker  Initial Assessment  Completed by:  Mike Rollins     Date:  8/11/2022    Patient Enrollment in Behavioral Health Program:  · Patient verbalized understanding of Behavioral Health Integration services to include:  · Patient understands that CHW, LCSW, PharmD and consulting Psychiatrist are members of the care team working collaboratively with his/her primary care provider: Yes  · Patient understands that activation of their [facility] patient portal account is required for accessing the full scope of team services: Yes  · Patient understands that some counseling sessions may occur via video: Yes  · Clinic visits with the psychiatrist may be subject to a co-pay based on your insurance: Yes  · Patient consents to enroll in BHI program: Yes    Assessments     Single Item Health Literacy Scale:  · How often do you need to have someone help you read instructions, pamphlets or other written material from your doctor or pharmacy?: Never    Promis 10:  · Promis 10 Responses  · In general, would you say your health is: Good  · In general, would you say your quality of life is: Good  · In general, how would you rate your physical health?: Good  · In general, how would you rate your mental health, including your mood and your ability to think?: Very good  · In general, how would you rate your satisfaction with your social activities and relationships?: Very good  · In general, please rate how well you carry out your usual social activities and roles. (This includes activities at home, at work and in your community, and responsibilities as a parent, child, spouse, employee, friend, etc.): Very good  · To what extent are you able to carry out your everyday physical activities such as walking, climbing stairs, carrying groceries, or moving a chair? : A little  · How often have you been bothered by emotional problems such as feeling anxious, depressed or irritable?: Sometimes  · In the  past 7 days, how would you rate your fatigue on average?: Mild  · In the past 7 days, on a scale of 0 to 10 (where 0 is no pain and 10 is the worst pain imaginable) how would you rate your pain on average?: 2  · Global Physical Health: 9  · Global Mental health Score: 14    Depression PHQ:  PHQ9 8/10/2022   Total Score 15         Generalized Anxiety Disorder 7-Item Scale:  GAD7 8/11/2022   1. Feeling nervous, anxious, or on edge? 2   2. Not being able to stop or control worrying? 2   3. Worrying too much about different things? 2   4. Trouble relaxing? 0   5. Being so restless that it is hard to sit still? 0   6. Becoming easily annoyed or irritable? 0   7. Feeling afraid as if something awful might happen? 2   8. If you checked off any problems, how difficult have these problems made it for you to do your work, take care of things at home, or get along with other people? 1   JJ-7 Score 8       History     Social History     Socioeconomic History    Marital status: Single   Tobacco Use    Smoking status: Never Smoker    Smokeless tobacco: Never Used   Substance and Sexual Activity    Alcohol use: No    Drug use: No    Sexual activity: Never     Birth control/protection: None     Social Determinants of Health     Financial Resource Strain: Medium Risk    Difficulty of Paying Living Expenses: Somewhat hard   Food Insecurity: Food Insecurity Present    Worried About Running Out of Food in the Last Year: Sometimes true    Ran Out of Food in the Last Year: Sometimes true   Transportation Needs: No Transportation Needs    Lack of Transportation (Medical): No    Lack of Transportation (Non-Medical): No   Physical Activity: Inactive    Days of Exercise per Week: 0 days    Minutes of Exercise per Session: 0 min   Stress: Stress Concern Present    Feeling of Stress : Rather much   Social Connections: Moderately Integrated    Frequency of Communication with Friends and Family: More than three times a week     "Frequency of Social Gatherings with Friends and Family: Never    Attends Yarsani Services: More than 4 times per year    Active Member of Clubs or Organizations: Yes    Attends Club or Organization Meetings: 1 to 4 times per year    Marital Status: Never    Housing Stability: Low Risk     Unable to Pay for Housing in the Last Year: No    Number of Places Lived in the Last Year: 1    Unstable Housing in the Last Year: No       Call Summary     Patient was referred to the BHI (Non-opioid) program by Primary Care Provider, Dr. Sadaf COHEN contacted Naga Aliza Pramod who reports depression  that limits [her] activities of daily living (ADLs).   Patient scored "15" on the PHQ9 and "8" on the JJ 7. Based on these scores patient is eligible for the Behavioral health Integration (Non-opioid) Program. NOEL completed the intake and scheduled an appointment for patient with Kristine Blanton LCSW, on 9/1/22        "

## 2022-08-11 NOTE — PATIENT INSTRUCTIONS
Counseling and Referral of Other Preventative  (Italic type indicates deductible and co-insurance are waived)    Patient Name: Naga Benavidez  Today's Date: 8/11/2022    Health Maintenance       Date Due Completion Date    HIV Screening Never done ---    Shingles Vaccine (2 of 2) 05/30/2022 4/4/2022    Lipid Panel 06/10/2022 6/10/2021    Override on 6/15/2018: Done ([facility])    Colorectal Cancer Screening 06/24/2022 6/24/2021    Cervical Cancer Screening 08/10/2023 (Originally 9/29/2019) 9/29/2016    Influenza Vaccine (1) 09/01/2022 10/14/2021    Mammogram 11/16/2022 11/16/2021    TETANUS VACCINE 07/01/2032 7/1/2022        Orders Placed This Encounter   Procedures    Fecal Immunochemical Test (iFOBT)    Lipid Panel    HIV 1/2 Ag/Ab (4th Gen)    Ambulatory referral/consult to Psychiatry    Ambulatory referral/consult to Primary Care Behavioral Health (Non-Opioids)    Ambulatory referral/consult to Outpatient Case Management    Ambulatory referral/consult to Optometry     The following information is provided to all patients.  This information is to help you find resources for any of the problems found today that may be affecting your health:                Living healthy guide: www.[facility].[place].gov      Understanding Diabetes: www.diabetes.org      Eating healthy: www.cdc.gov/healthyweight      CDC home safety checklist: www.cdc.gov/steadi/patient.html      Agency on Aging: www.goea.[place].[facility]      Alcoholics anonymous (AA): www.aa.org      Physical Activity: www.douglas.nih.gov/ma9lswd      Tobacco use: www.quitwithusla.org

## 2022-08-18 ENCOUNTER — DOCUMENT SCAN (OUTPATIENT)
Dept: HOME HEALTH SERVICES | Facility: HOSPITAL | Age: 62
End: 2022-08-18
Payer: MEDICARE

## 2022-08-18 ENCOUNTER — LAB VISIT (OUTPATIENT)
Dept: LAB | Facility: HOSPITAL | Age: 62
End: 2022-08-18
Attending: NURSE PRACTITIONER
Payer: MEDICARE

## 2022-08-18 DIAGNOSIS — E78.2 MIXED HYPERLIPIDEMIA: ICD-10-CM

## 2022-08-18 DIAGNOSIS — Z11.4 SCREENING FOR HIV WITHOUT PRESENCE OF RISK FACTORS: ICD-10-CM

## 2022-08-18 LAB
CHOLEST SERPL-MCNC: 174 MG/DL (ref 120–199)
CHOLEST/HDLC SERPL: 2.8 {RATIO} (ref 2–5)
HDLC SERPL-MCNC: 62 MG/DL (ref 40–75)
HDLC SERPL: 35.6 % (ref 20–50)
LDLC SERPL CALC-MCNC: 104.2 MG/DL (ref 63–159)
NONHDLC SERPL-MCNC: 112 MG/DL
TRIGL SERPL-MCNC: 39 MG/DL (ref 30–150)

## 2022-08-18 PROCEDURE — 36415 COLL VENOUS BLD VENIPUNCTURE: CPT | Performed by: NURSE PRACTITIONER

## 2022-08-18 PROCEDURE — 80061 LIPID PANEL: CPT | Performed by: NURSE PRACTITIONER

## 2022-08-18 PROCEDURE — 87389 HIV-1 AG W/HIV-1&-2 AB AG IA: CPT | Performed by: NURSE PRACTITIONER

## 2022-08-19 LAB — HIV 1+2 AB+HIV1 P24 AG SERPL QL IA: NEGATIVE

## 2022-08-23 ENCOUNTER — DOCUMENT SCAN (OUTPATIENT)
Dept: HOME HEALTH SERVICES | Facility: HOSPITAL | Age: 62
End: 2022-08-23
Payer: MEDICARE

## 2022-09-01 ENCOUNTER — TELEPHONE (OUTPATIENT)
Dept: BEHAVIORAL HEALTH | Facility: CLINIC | Age: 62
End: 2022-09-01
Payer: MEDICARE

## 2022-09-01 NOTE — TELEPHONE ENCOUNTER
LCSW called pt again as she was unable to log into virtual visit due to not knowing her password.  Pt was rescheduled for sept 28

## 2022-09-01 NOTE — TELEPHONE ENCOUNTER
SW called pt who was having trouble logging in to virtual visit.  Offered to reschedule pt for an in person visit. She is going to call patient tech line again to try to get on.

## 2022-09-02 ENCOUNTER — TELEPHONE (OUTPATIENT)
Dept: BEHAVIORAL HEALTH | Facility: CLINIC | Age: 62
End: 2022-09-02
Payer: MEDICARE

## 2022-09-06 ENCOUNTER — OFFICE VISIT (OUTPATIENT)
Dept: BEHAVIORAL HEALTH | Facility: CLINIC | Age: 62
End: 2022-09-06
Payer: COMMERCIAL

## 2022-09-06 ENCOUNTER — TELEPHONE (OUTPATIENT)
Dept: BEHAVIORAL HEALTH | Facility: CLINIC | Age: 62
End: 2022-09-06
Payer: MEDICARE

## 2022-09-06 DIAGNOSIS — F40.00 AGORAPHOBIA: ICD-10-CM

## 2022-09-06 DIAGNOSIS — F41.1 GENERALIZED ANXIETY DISORDER: ICD-10-CM

## 2022-09-06 DIAGNOSIS — F33.1 MODERATE EPISODE OF RECURRENT MAJOR DEPRESSIVE DISORDER: Primary | ICD-10-CM

## 2022-09-06 PROCEDURE — 90791 PSYCH DIAGNOSTIC EVALUATION: CPT | Mod: 95,,, | Performed by: SOCIAL WORKER

## 2022-09-06 PROCEDURE — 3044F HG A1C LEVEL LT 7.0%: CPT | Mod: CPTII,95,, | Performed by: SOCIAL WORKER

## 2022-09-06 PROCEDURE — 3044F PR MOST RECENT HEMOGLOBIN A1C LEVEL <7.0%: ICD-10-PCS | Mod: CPTII,95,, | Performed by: SOCIAL WORKER

## 2022-09-06 PROCEDURE — 90791 PR PSYCHIATRIC DIAGNOSTIC EVALUATION: ICD-10-PCS | Mod: 95,,, | Performed by: SOCIAL WORKER

## 2022-09-06 NOTE — PROGRESS NOTES
CHW reached out to pt who had a home visit by a nurse.  Pt agreed to call for technical assistance prior to her 4pm visit so that she can be seen today by Kristine Blanton LCSW.
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
## 2022-09-14 PROCEDURE — G0179 MD RECERTIFICATION HHA PT: HCPCS | Mod: ,,, | Performed by: INTERNAL MEDICINE

## 2022-09-14 PROCEDURE — G0179 PR HOME HEALTH MD RECERTIFICATION: ICD-10-PCS | Mod: ,,, | Performed by: INTERNAL MEDICINE

## 2022-09-15 ENCOUNTER — TELEPHONE (OUTPATIENT)
Dept: BEHAVIORAL HEALTH | Facility: CLINIC | Age: 62
End: 2022-09-15
Payer: MEDICARE

## 2022-09-15 NOTE — PROGRESS NOTES
Behavioral Health Community Health Worker  Follow-Up  Completed by:  Lolis Boles    Date:  9/15/2022    Patient Enrollment in Behavioral Health Program:  Naga Benavidez was enrolled in the Behavioral Health Program on 8/11//22    Assessments     Promis 10:  PROMIS-10 Questionnaire Scores 8/11/2022   Global Physical Health 9   Global Mental health Score 14       Depression PHQ:  PHQ9 9/15/2022   Total Score 12       Generalized Anxiety Disorder 7-Item Scale:  GAD7 9/15/2022   1. Feeling nervous, anxious, or on edge? 2   2. Not being able to stop or control worrying? 3   3. Worrying too much about different things? 3   4. Trouble relaxing? 1   5. Being so restless that it is hard to sit still? 0   6. Becoming easily annoyed or irritable? 1   7. Feeling afraid as if something awful might happen? 3   8. If you checked off any problems, how difficult have these problems made it for you to do your work, take care of things at home, or get along with other people? 2   JJ-7 Score 13       Patients' Global Impression of Change (PGIC) Scale:  Since beginning treatment at this clinic, how would you describe the change (if any) in ACTIVITY LIMITATIONS, SYMPTOMS, EMOTIONS, and OVERALL QUALITY OF LIFE, related to your painful condition?  No Value exists for the : OHS#25140      In a similar way, please check the number below that matches your degree of change since beginning care at this clinic (Much better (0) - Much Worse (10)): No Value exists for the : OHS#09901        Much Better                                     No Change                                    Much Worse                        -----------------------------------------------------------------------------                        0       1       2       3       4       5       6       7      8       9      10                     Call Summary     Assessment updated

## 2022-09-19 PROBLEM — F41.1 GENERALIZED ANXIETY DISORDER: Status: ACTIVE | Noted: 2022-09-19

## 2022-09-19 PROBLEM — F40.00 AGORAPHOBIA: Status: ACTIVE | Noted: 2022-09-19

## 2022-09-27 ENCOUNTER — EXTERNAL HOME HEALTH (OUTPATIENT)
Dept: HOME HEALTH SERVICES | Facility: HOSPITAL | Age: 62
End: 2022-09-27
Payer: MEDICARE

## 2022-09-29 ENCOUNTER — TELEPHONE (OUTPATIENT)
Dept: INTERNAL MEDICINE | Facility: CLINIC | Age: 62
End: 2022-09-29

## 2022-09-29 ENCOUNTER — OFFICE VISIT (OUTPATIENT)
Dept: BEHAVIORAL HEALTH | Facility: CLINIC | Age: 62
End: 2022-09-29
Payer: MEDICARE

## 2022-09-29 ENCOUNTER — PATIENT MESSAGE (OUTPATIENT)
Dept: BEHAVIORAL HEALTH | Facility: CLINIC | Age: 62
End: 2022-09-29

## 2022-09-29 DIAGNOSIS — F33.1 MODERATE EPISODE OF RECURRENT MAJOR DEPRESSIVE DISORDER: Primary | ICD-10-CM

## 2022-09-29 DIAGNOSIS — F40.00 AGORAPHOBIA: ICD-10-CM

## 2022-09-29 DIAGNOSIS — F41.1 GENERALIZED ANXIETY DISORDER: ICD-10-CM

## 2022-09-29 PROCEDURE — 3044F PR MOST RECENT HEMOGLOBIN A1C LEVEL <7.0%: ICD-10-PCS | Mod: CPTII,95,, | Performed by: SOCIAL WORKER

## 2022-09-29 PROCEDURE — 90834 PR PSYCHOTHERAPY W/PATIENT, 45 MIN: ICD-10-PCS | Mod: 95,,, | Performed by: SOCIAL WORKER

## 2022-09-29 PROCEDURE — 90834 PSYTX W PT 45 MINUTES: CPT | Mod: 95,,, | Performed by: SOCIAL WORKER

## 2022-09-29 PROCEDURE — 3044F HG A1C LEVEL LT 7.0%: CPT | Mod: CPTII,95,, | Performed by: SOCIAL WORKER
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
## 2022-10-05 ENCOUNTER — PATIENT MESSAGE (OUTPATIENT)
Dept: INTERNAL MEDICINE | Facility: CLINIC | Age: 62
End: 2022-10-05
Payer: MEDICARE

## 2022-10-10 PROBLEM — F43.23 ADJUSTMENT DISORDER WITH MIXED ANXIETY AND DEPRESSED MOOD: Status: RESOLVED | Noted: 2021-12-13 | Resolved: 2022-10-10

## 2022-10-14 ENCOUNTER — PATIENT MESSAGE (OUTPATIENT)
Dept: INTERNAL MEDICINE | Facility: CLINIC | Age: 62
End: 2022-10-14
Payer: MEDICARE

## 2022-10-17 ENCOUNTER — TELEPHONE (OUTPATIENT)
Dept: BEHAVIORAL HEALTH | Facility: CLINIC | Age: 62
End: 2022-10-17
Payer: MEDICARE

## 2022-10-21 ENCOUNTER — PATIENT MESSAGE (OUTPATIENT)
Dept: INTERNAL MEDICINE | Facility: CLINIC | Age: 62
End: 2022-10-21
Payer: MEDICARE

## 2022-10-21 ENCOUNTER — PATIENT OUTREACH (OUTPATIENT)
Dept: ADMINISTRATIVE | Facility: OTHER | Age: 62
End: 2022-10-21
Payer: MEDICARE

## 2022-10-21 NOTE — PROGRESS NOTES
CHW - Initial Contact    This Community Health Worker updated the Social Determinant of Health questionnaire with patient via telephone today.    Pt identified barriers of most importance are: food   Referrals to community agencies completed with patient/caregiver consent outside of [facility] include:  no  Referrals were put through [facility] - yes:   Support and Services: provided pt with a food stamp referral through [facility]  Other information discussed the patient needs / wants help with: n/a   Follow up required: yes  Follow-up Outreach - Due: 11/2/2022

## 2022-10-22 ENCOUNTER — HOSPITAL ENCOUNTER (OUTPATIENT)
Facility: HOSPITAL | Age: 62
Discharge: HOME HEALTH | End: 2022-10-27
Attending: EMERGENCY MEDICINE | Admitting: HOSPITALIST
Payer: MEDICARE

## 2022-10-22 DIAGNOSIS — R06.00 DYSPNEA: ICD-10-CM

## 2022-10-22 DIAGNOSIS — F41.1 GENERALIZED ANXIETY DISORDER: ICD-10-CM

## 2022-10-22 DIAGNOSIS — R07.9 CHEST PAIN: ICD-10-CM

## 2022-10-22 DIAGNOSIS — R06.02 SHORTNESS OF BREATH: ICD-10-CM

## 2022-10-22 DIAGNOSIS — J90 PLEURAL EFFUSION ON RIGHT: ICD-10-CM

## 2022-10-22 DIAGNOSIS — F41.9 ANXIETY: ICD-10-CM

## 2022-10-22 DIAGNOSIS — I26.99 OTHER ACUTE PULMONARY EMBOLISM WITHOUT ACUTE COR PULMONALE: Primary | ICD-10-CM

## 2022-10-22 DIAGNOSIS — R53.83 FATIGUE, UNSPECIFIED TYPE: ICD-10-CM

## 2022-10-22 DIAGNOSIS — R06.02 SOB (SHORTNESS OF BREATH): ICD-10-CM

## 2022-10-22 DIAGNOSIS — D69.6 THROMBOCYTOPENIA: ICD-10-CM

## 2022-10-22 DIAGNOSIS — Z86.69 HISTORY OF CEREBRAL PALSY: ICD-10-CM

## 2022-10-22 DIAGNOSIS — F33.1 MODERATE EPISODE OF RECURRENT MAJOR DEPRESSIVE DISORDER: ICD-10-CM

## 2022-10-22 PROBLEM — S81.809A MULTIPLE OPEN WOUNDS OF LOWER EXTREMITY: Status: ACTIVE | Noted: 2022-10-22

## 2022-10-22 PROBLEM — I50.33 ACUTE ON CHRONIC DIASTOLIC CONGESTIVE HEART FAILURE: Status: RESOLVED | Noted: 2022-02-11 | Resolved: 2022-10-22

## 2022-10-22 LAB
ALBUMIN SERPL BCP-MCNC: 2.8 G/DL (ref 3.5–5.2)
ALP SERPL-CCNC: 119 U/L (ref 55–135)
ALT SERPL W/O P-5'-P-CCNC: 54 U/L (ref 10–44)
ANION GAP SERPL CALC-SCNC: 11 MMOL/L (ref 8–16)
ANISOCYTOSIS BLD QL SMEAR: SLIGHT
AST SERPL-CCNC: 39 U/L (ref 10–40)
BASOPHILS # BLD AUTO: 0.02 K/UL (ref 0–0.2)
BASOPHILS NFR BLD: 0.4 % (ref 0–1.9)
BILIRUB SERPL-MCNC: 0.6 MG/DL (ref 0.1–1)
BILIRUB UR QL STRIP: NEGATIVE
BNP SERPL-MCNC: 94 PG/ML (ref 0–99)
BUN SERPL-MCNC: 17 MG/DL (ref 8–23)
CALCIUM SERPL-MCNC: 9.3 MG/DL (ref 8.7–10.5)
CHLORIDE SERPL-SCNC: 107 MMOL/L (ref 95–110)
CLARITY UR REFRACT.AUTO: ABNORMAL
CO2 SERPL-SCNC: 23 MMOL/L (ref 23–29)
COLOR UR AUTO: ABNORMAL
CREAT SERPL-MCNC: 0.6 MG/DL (ref 0.5–1.4)
D DIMER PPP IA.FEU-MCNC: 3.08 MG/L FEU
DIFFERENTIAL METHOD: ABNORMAL
EOSINOPHIL # BLD AUTO: 0 K/UL (ref 0–0.5)
EOSINOPHIL NFR BLD: 0.6 % (ref 0–8)
ERYTHROCYTE [DISTWIDTH] IN BLOOD BY AUTOMATED COUNT: 15.5 % (ref 11.5–14.5)
EST. GFR  (NO RACE VARIABLE): >60 ML/MIN/1.73 M^2
GLUCOSE SERPL-MCNC: 80 MG/DL (ref 70–110)
GLUCOSE UR QL STRIP: NEGATIVE
HCT VFR BLD AUTO: 36.4 % (ref 37–48.5)
HGB BLD-MCNC: 11.7 G/DL (ref 12–16)
HGB UR QL STRIP: NEGATIVE
IMM GRANULOCYTES # BLD AUTO: 0.01 K/UL (ref 0–0.04)
IMM GRANULOCYTES NFR BLD AUTO: 0.2 % (ref 0–0.5)
KETONES UR QL STRIP: ABNORMAL
LACTATE SERPL-SCNC: 0.7 MMOL/L (ref 0.5–2.2)
LEUKOCYTE ESTERASE UR QL STRIP: NEGATIVE
LYMPHOCYTES # BLD AUTO: 0.7 K/UL (ref 1–4.8)
LYMPHOCYTES NFR BLD: 15.2 % (ref 18–48)
MAGNESIUM SERPL-MCNC: 1.6 MG/DL (ref 1.6–2.6)
MCH RBC QN AUTO: 28.9 PG (ref 27–31)
MCHC RBC AUTO-ENTMCNC: 32.1 G/DL (ref 32–36)
MCV RBC AUTO: 90 FL (ref 82–98)
MONOCYTES # BLD AUTO: 0.3 K/UL (ref 0.3–1)
MONOCYTES NFR BLD: 6.4 % (ref 4–15)
NEUTROPHILS # BLD AUTO: 3.8 K/UL (ref 1.8–7.7)
NEUTROPHILS NFR BLD: 77.2 % (ref 38–73)
NITRITE UR QL STRIP: NEGATIVE
NRBC BLD-RTO: 0 /100 WBC
PH UR STRIP: 6 [PH] (ref 5–8)
PHOSPHATE SERPL-MCNC: 2.4 MG/DL (ref 2.7–4.5)
PLATELET # BLD AUTO: 102 K/UL (ref 150–450)
PLATELET BLD QL SMEAR: ABNORMAL
PMV BLD AUTO: ABNORMAL FL (ref 9.2–12.9)
POTASSIUM SERPL-SCNC: 3.8 MMOL/L (ref 3.5–5.1)
PROT SERPL-MCNC: 5.8 G/DL (ref 6–8.4)
PROT UR QL STRIP: NEGATIVE
RBC # BLD AUTO: 4.05 M/UL (ref 4–5.4)
SARS-COV-2 RDRP RESP QL NAA+PROBE: NEGATIVE
SODIUM SERPL-SCNC: 141 MMOL/L (ref 136–145)
SP GR UR STRIP: 1.02 (ref 1–1.03)
TROPONIN I SERPL DL<=0.01 NG/ML-MCNC: <0.006 NG/ML (ref 0–0.03)
TSH SERPL DL<=0.005 MIU/L-ACNC: 1.25 UIU/ML (ref 0.4–4)
URN SPEC COLLECT METH UR: ABNORMAL
WBC # BLD AUTO: 4.86 K/UL (ref 3.9–12.7)

## 2022-10-22 PROCEDURE — 25000003 PHARM REV CODE 250: Performed by: HOSPITALIST

## 2022-10-22 PROCEDURE — 93010 EKG 12-LEAD: ICD-10-PCS | Mod: ,,, | Performed by: INTERNAL MEDICINE

## 2022-10-22 PROCEDURE — 83605 ASSAY OF LACTIC ACID: CPT | Performed by: EMERGENCY MEDICINE

## 2022-10-22 PROCEDURE — 83880 ASSAY OF NATRIURETIC PEPTIDE: CPT | Performed by: EMERGENCY MEDICINE

## 2022-10-22 PROCEDURE — 84443 ASSAY THYROID STIM HORMONE: CPT | Performed by: EMERGENCY MEDICINE

## 2022-10-22 PROCEDURE — 83735 ASSAY OF MAGNESIUM: CPT | Performed by: EMERGENCY MEDICINE

## 2022-10-22 PROCEDURE — 99285 EMERGENCY DEPT VISIT HI MDM: CPT | Mod: CS,,, | Performed by: EMERGENCY MEDICINE

## 2022-10-22 PROCEDURE — 99220 PR INITIAL OBSERVATION CARE,LEVL III: ICD-10-PCS | Mod: ,,, | Performed by: HOSPITALIST

## 2022-10-22 PROCEDURE — 93005 ELECTROCARDIOGRAM TRACING: CPT

## 2022-10-22 PROCEDURE — U0002 COVID-19 LAB TEST NON-CDC: HCPCS | Performed by: EMERGENCY MEDICINE

## 2022-10-22 PROCEDURE — 81003 URINALYSIS AUTO W/O SCOPE: CPT | Performed by: EMERGENCY MEDICINE

## 2022-10-22 PROCEDURE — 80053 COMPREHEN METABOLIC PANEL: CPT | Performed by: EMERGENCY MEDICINE

## 2022-10-22 PROCEDURE — 25500020 PHARM REV CODE 255: Performed by: HOSPITALIST

## 2022-10-22 PROCEDURE — 99220 PR INITIAL OBSERVATION CARE,LEVL III: CPT | Mod: ,,, | Performed by: HOSPITALIST

## 2022-10-22 PROCEDURE — 99285 PR EMERGENCY DEPT VISIT,LEVEL V: ICD-10-PCS | Mod: CS,,, | Performed by: EMERGENCY MEDICINE

## 2022-10-22 PROCEDURE — 84484 ASSAY OF TROPONIN QUANT: CPT | Performed by: EMERGENCY MEDICINE

## 2022-10-22 PROCEDURE — 93010 ELECTROCARDIOGRAM REPORT: CPT | Mod: ,,, | Performed by: INTERNAL MEDICINE

## 2022-10-22 PROCEDURE — G0378 HOSPITAL OBSERVATION PER HR: HCPCS

## 2022-10-22 PROCEDURE — 25000003 PHARM REV CODE 250: Performed by: EMERGENCY MEDICINE

## 2022-10-22 PROCEDURE — 85379 FIBRIN DEGRADATION QUANT: CPT | Performed by: EMERGENCY MEDICINE

## 2022-10-22 PROCEDURE — 99285 EMERGENCY DEPT VISIT HI MDM: CPT | Mod: 25

## 2022-10-22 PROCEDURE — 84100 ASSAY OF PHOSPHORUS: CPT | Performed by: EMERGENCY MEDICINE

## 2022-10-22 PROCEDURE — 85025 COMPLETE CBC W/AUTO DIFF WBC: CPT | Performed by: EMERGENCY MEDICINE

## 2022-10-22 RX ORDER — ACETAMINOPHEN 500 MG
1000 TABLET ORAL EVERY 8 HOURS PRN
Status: DISCONTINUED | OUTPATIENT
Start: 2022-10-22 | End: 2022-10-27 | Stop reason: HOSPADM

## 2022-10-22 RX ORDER — NALOXONE HCL 0.4 MG/ML
0.02 VIAL (ML) INJECTION
Status: DISCONTINUED | OUTPATIENT
Start: 2022-10-22 | End: 2022-10-27 | Stop reason: HOSPADM

## 2022-10-22 RX ORDER — ONDANSETRON 2 MG/ML
4 INJECTION INTRAMUSCULAR; INTRAVENOUS EVERY 8 HOURS PRN
Status: DISCONTINUED | OUTPATIENT
Start: 2022-10-22 | End: 2022-10-27 | Stop reason: HOSPADM

## 2022-10-22 RX ORDER — AMOXICILLIN 250 MG
1 CAPSULE ORAL DAILY PRN
Status: DISCONTINUED | OUTPATIENT
Start: 2022-10-22 | End: 2022-10-27 | Stop reason: HOSPADM

## 2022-10-22 RX ORDER — IPRATROPIUM BROMIDE AND ALBUTEROL SULFATE 2.5; .5 MG/3ML; MG/3ML
3 SOLUTION RESPIRATORY (INHALATION)
Status: DISPENSED | OUTPATIENT
Start: 2022-10-23 | End: 2022-10-26

## 2022-10-22 RX ORDER — BACLOFEN 10 MG/1
10 TABLET ORAL NIGHTLY PRN
Status: DISCONTINUED | OUTPATIENT
Start: 2022-10-22 | End: 2022-10-27 | Stop reason: HOSPADM

## 2022-10-22 RX ORDER — ENOXAPARIN SODIUM 100 MG/ML
1 INJECTION SUBCUTANEOUS
Status: DISCONTINUED | OUTPATIENT
Start: 2022-10-22 | End: 2022-10-22

## 2022-10-22 RX ORDER — POLYETHYLENE GLYCOL 3350 17 G/17G
17 POWDER, FOR SOLUTION ORAL 2 TIMES DAILY PRN
Status: DISCONTINUED | OUTPATIENT
Start: 2022-10-22 | End: 2022-10-27 | Stop reason: HOSPADM

## 2022-10-22 RX ORDER — FLUTICASONE PROPIONATE 50 MCG
2 SPRAY, SUSPENSION (ML) NASAL DAILY
Status: DISCONTINUED | OUTPATIENT
Start: 2022-10-23 | End: 2022-10-27 | Stop reason: HOSPADM

## 2022-10-22 RX ORDER — AMLODIPINE BESYLATE 2.5 MG/1
2.5 TABLET ORAL 2 TIMES DAILY
Status: DISCONTINUED | OUTPATIENT
Start: 2022-10-23 | End: 2022-10-27 | Stop reason: HOSPADM

## 2022-10-22 RX ORDER — TALC
6 POWDER (GRAM) TOPICAL NIGHTLY PRN
Status: DISCONTINUED | OUTPATIENT
Start: 2022-10-22 | End: 2022-10-27 | Stop reason: HOSPADM

## 2022-10-22 RX ORDER — PROCHLORPERAZINE EDISYLATE 5 MG/ML
5 INJECTION INTRAMUSCULAR; INTRAVENOUS EVERY 6 HOURS PRN
Status: DISCONTINUED | OUTPATIENT
Start: 2022-10-22 | End: 2022-10-27 | Stop reason: HOSPADM

## 2022-10-22 RX ORDER — FUROSEMIDE 10 MG/ML
40 INJECTION INTRAMUSCULAR; INTRAVENOUS DAILY
Status: COMPLETED | OUTPATIENT
Start: 2022-10-23 | End: 2022-10-24

## 2022-10-22 RX ORDER — DOXYLAMINE SUCCINATE 25 MG
TABLET ORAL 2 TIMES DAILY
Status: DISCONTINUED | OUTPATIENT
Start: 2022-10-23 | End: 2022-10-27 | Stop reason: HOSPADM

## 2022-10-22 RX ORDER — SODIUM CHLORIDE 0.9 % (FLUSH) 0.9 %
10 SYRINGE (ML) INJECTION EVERY 6 HOURS PRN
Status: DISCONTINUED | OUTPATIENT
Start: 2022-10-22 | End: 2022-10-27 | Stop reason: HOSPADM

## 2022-10-22 RX ORDER — ACETAMINOPHEN 325 MG/1
650 TABLET ORAL EVERY 4 HOURS PRN
Status: DISCONTINUED | OUTPATIENT
Start: 2022-10-22 | End: 2022-10-27 | Stop reason: HOSPADM

## 2022-10-22 RX ADMIN — IOHEXOL 100 ML: 350 INJECTION, SOLUTION INTRAVENOUS at 08:10

## 2022-10-22 RX ADMIN — DIBASIC SODIUM PHOSPHATE, MONOBASIC POTASSIUM PHOSPHATE AND MONOBASIC SODIUM PHOSPHATE 2 TABLET: 852; 155; 130 TABLET ORAL at 07:10

## 2022-10-22 RX ADMIN — APIXABAN 10 MG: 5 TABLET, FILM COATED ORAL at 11:10

## 2022-10-22 NOTE — ED TRIAGE NOTES
Pt arriving from EMS with c/o leg pain and bilateral swelling on upper thighs. States pain under her heels on both feet. Pt is bed bound at baseline.

## 2022-10-22 NOTE — ED NOTES
Patient identifiers verified and correct for Naga Benavidez  LOC: The patient is awake, alert and aware of environment with an appropriate affect, the patient is oriented x 3 and speaking appropriately.   APPEARANCE: Patient appears comfortable and in no acute distress, patient is clean and well groomed.  SKIN: The skin is warm and dry, color consistent with ethnicity, patient has normal skin turgor.   MUSCULOSKELETAL: Patient c/o leg/foot pain as well as swelling on bilateral upper thighs.   RESPIRATORY: Airway is open and patent, respirations are spontaneous, patient has a normal effort and rate, no accessory muscle use noted, O2 sats noted at 100% on room air.  CARDIAC: Patient has a normal rate, no edema noted, capillary refill < 3 seconds.   GASTRO: Soft and non tender to palpation, no distention noted, normoactive bowel sounds present in all four quadrants. Pt states bowel movements have been regular.  : Pt denies any pain or frequency with urination.  NEURO: Pt opens eyes spontaneously, behavior appropriate to situation, follows commands, facial expression symmetrical, bilateral hand grasp equal and even, purposeful motor response noted, normal sensation in all extremities when touched with a finger.

## 2022-10-22 NOTE — ED PROVIDER NOTES
Encounter Date: 10/22/2022       History     Chief Complaint   Patient presents with    Leg Pain     Hx of Cerebral Palsy     62-year-old woman with comorbidities of cerebral palsy with chronic wheelchair utilization with minimal ambulation presents by EMS from her residence where she resides alone for evaluation of worsened, chronic bilateral proximal leg swelling as well as bilateral heel discomfort, worsening fatigue x2 days associated with diminished exercise tolerance.  Patient is notably not an ideal historian. The patient denies associated fever or chills as well as any productive cough, but does report that she is unable to transfer adequately from wheelchair to bed/ wheelchair to toilet secondary to generalized fatigue and shortness of breath with exertion, and reports that she has been largely seated in her wheelchair.  She denies associated fever, chest pain or significant dyspnea while at rest.  She reports being seen by wound care who has addressed and managed wounds to her posterior lower extremities with pressure dressings.  She also endorses recent outpatient antibiotic therapy initiated by her PCP for a reported urinary tract infection.    Review of patient's allergies indicates:   Allergen Reactions    Tramadol Other (See Comments)     She could not wake up     Augmentin [amoxicillin-pot clavulanate] Diarrhea    Levaquin [levofloxacin] Other (See Comments)     itching    Lipitor [atorvastatin] Other (See Comments)     Muscle pain    Statins-hmg-coa reductase inhibitors Other (See Comments)     Muscle pains    Bactrim [sulfamethoxazole-trimethoprim] Nausea Only     Past Medical History:   Diagnosis Date    Anemia     Arthritis     Asthma     Cerebral palsy     stable    DJD (degenerative joint disease)     Hyperlipidemia     Hypertension     Neuromuscular disorder     Obstructive sleep apnea     Uterine fibroids affecting pregnancy     with adnexal mass     Past Surgical History:   Procedure  Laterality Date    FOOT CAPSULE RELEASE W/ PERCUTANEOUS HEEL CORD LENGTHENING, TIBIAL TENDON TRANSFER      HAMSTRING RELEASE       Family History   Problem Relation Age of Onset    Diabetes Mother     Hypertension Mother      Social History     Tobacco Use    Smoking status: Never    Smokeless tobacco: Never   Substance Use Topics    Alcohol use: No    Drug use: No     Review of Systems   Constitutional:  Positive for activity change and fatigue. Negative for chills and fever.   HENT:  Negative for nosebleeds and trouble swallowing.    Respiratory:  Positive for shortness of breath. Negative for cough, choking and chest tightness.    Cardiovascular:  Positive for leg swelling. Negative for chest pain.   Gastrointestinal:  Negative for abdominal pain, diarrhea, nausea and vomiting.   Genitourinary:  Positive for difficulty urinating (Chronic secondary to neuromuscular disorder). Negative for flank pain, hematuria and vaginal bleeding.   Musculoskeletal:  Negative for neck pain and neck stiffness.   Skin:  Positive for wound (To posterior left and right  lower legs). Negative for rash.   Neurological:  Negative for seizures and syncope.   Psychiatric/Behavioral:  Negative for confusion and decreased concentration.      Physical Exam     Initial Vitals [10/22/22 1225]   BP Pulse Resp Temp SpO2   128/73 90 16 98.4 °F (36.9 °C) 100 %      MAP       --         Physical Exam    Vitals reviewed.  Constitutional:   62-year-old  woman, chronically ill-appearing, no acute distress noted   HENT:   Head: Normocephalic and atraumatic.   Redundant pharyngeal soft tissues noted without evidence of acute intraoral injury   Eyes: EOM are normal. Pupils are equal, round, and reactive to light.   Neck: No tracheal deviation present.   Cardiovascular:  Normal rate, regular rhythm and intact distal pulses.           Moderate jugular venous distention is noted at 60°   Pulmonary/Chest: Breath sounds normal. No stridor. No  respiratory distress.   Abdominal: Abdomen is soft. She exhibits no distension. There is no abdominal tenderness. There is no guarding.   Musculoskeletal:      Comments: Chronic atrophic changes are noted to bilateral lower extremities; there is palpation tenderness of bilateral heels without associated wound or expressible drainage.  Bilateral lower extremity dressings have been removed to reveal crenated tissue.  A healing compression wound is noted to posterior aspects of bilateral lower legs without active drainage, but with mild associated tenderness to palpation; there is noted 2+ swelling of the left foot and associated chronic deformities consistent with chronic diagnosis     Neurological: She is alert and oriented to person, place, and time.   Normal speech noted   Skin: Skin is warm and dry.   Psychiatric: Her behavior is normal. Thought content normal.       ED Course   Procedures  Labs Reviewed   CBC W/ AUTO DIFFERENTIAL - Abnormal; Notable for the following components:       Result Value    Hemoglobin 11.7 (*)     Hematocrit 36.4 (*)     RDW 15.5 (*)     Platelets 102 (*)     Lymph # 0.7 (*)     Gran % 77.2 (*)     Lymph % 15.2 (*)     Platelet Estimate Decreased (*)     All other components within normal limits   COMPREHENSIVE METABOLIC PANEL - Abnormal; Notable for the following components:    Total Protein 5.8 (*)     Albumin 2.8 (*)     ALT 54 (*)     All other components within normal limits   URINALYSIS, REFLEX TO URINE CULTURE - Abnormal; Notable for the following components:    Appearance, UA Hazy (*)     Ketones, UA Trace (*)     All other components within normal limits    Narrative:     Specimen Source->Urine   PHOSPHORUS - Abnormal; Notable for the following components:    Phosphorus 2.4 (*)     All other components within normal limits   D DIMER, QUANTITATIVE - Abnormal; Notable for the following components:    D-Dimer 3.08 (*)     All other components within normal limits   LACTIC ACID,  PLASMA   TSH   TROPONIN I   B-TYPE NATRIURETIC PEPTIDE   MAGNESIUM   SARS-COV-2 RNA AMPLIFICATION, QUAL    Narrative:     Is the patient symptomatic?->Yes  Is testing needed for patient travel?->No  Is this needed for pre-procedure or pre-op testing?->No   D DIMER, QUANTITATIVE   POCT INFLUENZA A/B MOLECULAR     EKG Readings: (Independently Interpreted)   Initial Reading: No STEMI. Rhythm: Normal Sinus Rhythm. Heart Rate: 83. Ectopy: No Ectopy. Conduction: Normal. ST Segments: Normal ST Segments. Axis: Normal.     Imaging Results               CTA Chest Non-Coronary (PE Studies) (Final result)  Result time 10/22/22 20:39:26      Final result by Cornelio Pandey MD (10/22/22 20:39:26)                   Impression:      1. Small pulmonary emboli in a left upper lobe segmental pulmonary artery.  Follow-up recommended.  2. Mild cardiomegaly.  3. Small pleural effusion on the right with mild bilateral atelectasis.  4.  This report was flagged in Epic as abnormal.      Electronically signed by: Cornelio Pandey  Date:    10/22/2022  Time:    20:39               Narrative:    EXAMINATION:  CTA CHEST NON CORONARY (PE STUDIES)    CLINICAL HISTORY:  Pulmonary embolism (PE) suspected, positive D-dimer;    TECHNIQUE:  Low dose axial images, sagittal and coronal reformations were obtained from the thoracic inlet to the lung bases following the IV administration of 100 mL of Omnipaque 350.  Contrast timing was optimized to evaluate the pulmonary arteries.  MIP images were performed.    COMPARISON:  None    FINDINGS:  Pulmonary arteries:Pulmonary arteries are adequately enhanced.Small pulmonary emboli in a left upper lobe segmental pulmonary artery.    Thoracic soft tissues: Unremarkable.    Aorta: Left-sided aortic arch.  No aneurysm or dissection.    Heart: Heart appears mildly enlarged.  No pericardial effusion.    Barbra/Mediastinum: No pathologic na enlargement.    Airways: Patent.    Lungs/Pleura: Small posterior right pleural  effusion with mild associated atelectasis.  Minimal left lower lobe atelectasis also.    Esophagus: Unremarkable.    Upper Abdomen: Bilateral probable renal cysts at the upper pole is incompletely visualized.    Bones: No acute fracture. No suspicious lytic or sclerotic lesions.    Levoscoliosis near the thoracolumbar junction.                                       X-Ray Chest AP Portable (Final result)  Result time 10/22/22 13:20:56      Final result by Kristine Grant MD (10/22/22 13:20:56)                   Impression:      1. Stable enlarged cardiac silhouette  2. Mild interstitial edema is suspected.      Electronically signed by: Kristine Grant MD  Date:    10/22/2022  Time:    13:20               Narrative:    EXAMINATION:  XR CHEST AP PORTABLE    CLINICAL HISTORY:  shortness of breath;    TECHNIQUE:  Single frontal view of the chest was performed.    COMPARISON:  Prior dated 02/11/2022    FINDINGS:  The mediastinal structures are midline.  The cardiac silhouette is enlarged and stable.  There is mild cephalization of flow and faint reticular opacities suggesting mild interstitial edema.    There is osteopenia and degenerative change of the spine and shoulders.                                       Medications   enoxaparin injection 1 mg/kg (Dosing Weight) (has no administration in time range)   k phos di & mono-sod phos mono 250 mg tablet 2 tablet (2 tablets Oral Given 10/22/22 1953)   iohexoL (OMNIPAQUE 350) injection 100 mL (100 mLs Intravenous Given 10/22/22 2009)     Medical Decision Making:   History:   Old Medical Records: I decided to obtain old medical records.  Old Records Summarized: records from clinic visits.  Differential Diagnosis:   Symptomatic anemia, UTI, electrolyte derangement, a KI, CHF exacerbation, pulmonary edema, shortness of breath, deconditioning, lethal arrhythmia  Clinical Tests:   Lab Tests: Ordered and Reviewed  Radiological Study: Ordered and Reviewed  Medical Tests:  Ordered and Reviewed          Attending Attestation:             Attending ED Notes:   Laboratory evaluation today reveals mild macrocytic anemia with thrombocytopenia as well as a mild granulocytosis.  Metabolic profile reveals moderate malnutrition with a diminished albumin without additional jose evidence of solid organ dysfunction or electrolyte derangement.  EKG does not reveal acute arrhythmia or evidence of ischemia.  The serum troponin and BNP are notably within normal limits, however, a serum D-dimer is grossly elevated, prompting a CT angiogram of the chest which does identify a small pulmonary embolism as well as a small pleural effusion.  Findings and concerns have been discussed with Hospital Medicine, and the patient will be placed in observation under the care for further therapy and management.                 Clinical Impression:   Final diagnoses:  [R06.02] Shortness of breath  [Z86.69] History of cerebral palsy (Primary)  [R53.83] Fatigue, unspecified type  [D69.6] Thrombocytopenia  [R06.00] Dyspnea  [I26.99] Other acute pulmonary embolism without acute cor pulmonale  [J90] Pleural effusion on right        ED Disposition Condition    Observation Stable                Bolivar Sheehan MD  10/22/22 3615

## 2022-10-23 PROBLEM — F41.9 ANXIETY: Status: ACTIVE | Noted: 2022-10-23

## 2022-10-23 LAB
ANION GAP SERPL CALC-SCNC: 11 MMOL/L (ref 8–16)
BASOPHILS # BLD AUTO: 0.02 K/UL (ref 0–0.2)
BASOPHILS NFR BLD: 0.5 % (ref 0–1.9)
BUN SERPL-MCNC: 20 MG/DL (ref 8–23)
CALCIUM SERPL-MCNC: 9.2 MG/DL (ref 8.7–10.5)
CHLORIDE SERPL-SCNC: 105 MMOL/L (ref 95–110)
CO2 SERPL-SCNC: 25 MMOL/L (ref 23–29)
CREAT SERPL-MCNC: 0.7 MG/DL (ref 0.5–1.4)
DIFFERENTIAL METHOD: ABNORMAL
EOSINOPHIL # BLD AUTO: 0.1 K/UL (ref 0–0.5)
EOSINOPHIL NFR BLD: 1.6 % (ref 0–8)
ERYTHROCYTE [DISTWIDTH] IN BLOOD BY AUTOMATED COUNT: 15.8 % (ref 11.5–14.5)
EST. GFR  (NO RACE VARIABLE): >60 ML/MIN/1.73 M^2
GLUCOSE SERPL-MCNC: 85 MG/DL (ref 70–110)
HCT VFR BLD AUTO: 42.5 % (ref 37–48.5)
HGB BLD-MCNC: 12.8 G/DL (ref 12–16)
IMM GRANULOCYTES # BLD AUTO: 0 K/UL (ref 0–0.04)
IMM GRANULOCYTES NFR BLD AUTO: 0 % (ref 0–0.5)
LYMPHOCYTES # BLD AUTO: 0.7 K/UL (ref 1–4.8)
LYMPHOCYTES NFR BLD: 16.7 % (ref 18–48)
MAGNESIUM SERPL-MCNC: 1.7 MG/DL (ref 1.6–2.6)
MCH RBC QN AUTO: 28.8 PG (ref 27–31)
MCHC RBC AUTO-ENTMCNC: 30.1 G/DL (ref 32–36)
MCV RBC AUTO: 96 FL (ref 82–98)
MONOCYTES # BLD AUTO: 0.3 K/UL (ref 0.3–1)
MONOCYTES NFR BLD: 7.6 % (ref 4–15)
NEUTROPHILS # BLD AUTO: 3.2 K/UL (ref 1.8–7.7)
NEUTROPHILS NFR BLD: 73.6 % (ref 38–73)
NRBC BLD-RTO: 0 /100 WBC
PHOSPHATE SERPL-MCNC: 3.4 MG/DL (ref 2.7–4.5)
PLATELET # BLD AUTO: 94 K/UL (ref 150–450)
PMV BLD AUTO: 13.9 FL (ref 9.2–12.9)
POTASSIUM SERPL-SCNC: 3.8 MMOL/L (ref 3.5–5.1)
RBC # BLD AUTO: 4.45 M/UL (ref 4–5.4)
SODIUM SERPL-SCNC: 141 MMOL/L (ref 136–145)
WBC # BLD AUTO: 4.36 K/UL (ref 3.9–12.7)

## 2022-10-23 PROCEDURE — 80048 BASIC METABOLIC PNL TOTAL CA: CPT | Performed by: HOSPITALIST

## 2022-10-23 PROCEDURE — 94761 N-INVAS EAR/PLS OXIMETRY MLT: CPT

## 2022-10-23 PROCEDURE — 36415 COLL VENOUS BLD VENIPUNCTURE: CPT | Performed by: HOSPITALIST

## 2022-10-23 PROCEDURE — 97162 PT EVAL MOD COMPLEX 30 MIN: CPT

## 2022-10-23 PROCEDURE — 83735 ASSAY OF MAGNESIUM: CPT | Performed by: HOSPITALIST

## 2022-10-23 PROCEDURE — 97535 SELF CARE MNGMENT TRAINING: CPT

## 2022-10-23 PROCEDURE — G0378 HOSPITAL OBSERVATION PER HR: HCPCS

## 2022-10-23 PROCEDURE — 99226 PR SUBSEQUENT OBSERVATION CARE,LEVEL III: ICD-10-PCS | Mod: ,,,

## 2022-10-23 PROCEDURE — 25000003 PHARM REV CODE 250: Performed by: HOSPITALIST

## 2022-10-23 PROCEDURE — 84100 ASSAY OF PHOSPHORUS: CPT | Performed by: HOSPITALIST

## 2022-10-23 PROCEDURE — 25000242 PHARM REV CODE 250 ALT 637 W/ HCPCS: Performed by: HOSPITALIST

## 2022-10-23 PROCEDURE — 94640 AIRWAY INHALATION TREATMENT: CPT

## 2022-10-23 PROCEDURE — 96375 TX/PRO/DX INJ NEW DRUG ADDON: CPT

## 2022-10-23 PROCEDURE — 99226 PR SUBSEQUENT OBSERVATION CARE,LEVEL III: CPT | Mod: ,,,

## 2022-10-23 PROCEDURE — 85025 COMPLETE CBC W/AUTO DIFF WBC: CPT | Performed by: HOSPITALIST

## 2022-10-23 PROCEDURE — 97166 OT EVAL MOD COMPLEX 45 MIN: CPT

## 2022-10-23 PROCEDURE — 63600175 PHARM REV CODE 636 W HCPCS: Performed by: HOSPITALIST

## 2022-10-23 PROCEDURE — 97530 THERAPEUTIC ACTIVITIES: CPT

## 2022-10-23 RX ADMIN — FUROSEMIDE 40 MG: 10 INJECTION, SOLUTION INTRAMUSCULAR; INTRAVENOUS at 08:10

## 2022-10-23 RX ADMIN — FLUTICASONE PROPIONATE 100 MCG: 50 SPRAY, METERED NASAL at 08:10

## 2022-10-23 RX ADMIN — SENNOSIDES AND DOCUSATE SODIUM 1 TABLET: 50; 8.6 TABLET ORAL at 09:10

## 2022-10-23 RX ADMIN — ACETAMINOPHEN 650 MG: 325 TABLET ORAL at 09:10

## 2022-10-23 RX ADMIN — MICONAZOLE NITRATE: 20 CREAM TOPICAL at 08:10

## 2022-10-23 RX ADMIN — APIXABAN 10 MG: 5 TABLET, FILM COATED ORAL at 09:10

## 2022-10-23 RX ADMIN — IPRATROPIUM BROMIDE AND ALBUTEROL SULFATE 3 ML: 2.5; .5 SOLUTION RESPIRATORY (INHALATION) at 07:10

## 2022-10-23 RX ADMIN — AMLODIPINE BESYLATE 2.5 MG: 2.5 TABLET ORAL at 09:10

## 2022-10-23 RX ADMIN — MICONAZOLE NITRATE: 20 CREAM TOPICAL at 09:10

## 2022-10-23 RX ADMIN — AMLODIPINE BESYLATE 2.5 MG: 2.5 TABLET ORAL at 08:10

## 2022-10-23 RX ADMIN — THERA TABS 1 TABLET: TAB at 08:10

## 2022-10-23 RX ADMIN — APIXABAN 10 MG: 5 TABLET, FILM COATED ORAL at 08:10

## 2022-10-23 NOTE — ASSESSMENT & PLAN NOTE
Etiology likely due to new PE noted on CTA Chest   -pt with hx of HFpEF and noted to have some possible edema/congestion on chest imaging today  -Give 2 doses of IV lasix daily starting tomorrow morning and monitor for symptoms improvement.

## 2022-10-23 NOTE — SUBJECTIVE & OBJECTIVE
Past Medical History:   Diagnosis Date    Anemia     Arthritis     Asthma     Cerebral palsy     stable    DJD (degenerative joint disease)     Hyperlipidemia     Hypertension     Neuromuscular disorder     Obstructive sleep apnea     Uterine fibroids affecting pregnancy     with adnexal mass       Past Surgical History:   Procedure Laterality Date    FOOT CAPSULE RELEASE W/ PERCUTANEOUS HEEL CORD LENGTHENING, TIBIAL TENDON TRANSFER      HAMSTRING RELEASE         Review of patient's allergies indicates:   Allergen Reactions    Tramadol Other (See Comments)     She could not wake up     Augmentin [amoxicillin-pot clavulanate] Diarrhea    Levaquin [levofloxacin] Other (See Comments)     itching    Lipitor [atorvastatin] Other (See Comments)     Muscle pain    Statins-hmg-coa reductase inhibitors Other (See Comments)     Muscle pains    Bactrim [sulfamethoxazole-trimethoprim] Nausea Only       No current facility-administered medications on file prior to encounter.     Current Outpatient Medications on File Prior to Encounter   Medication Sig    acetaminophen (TYLENOL) 325 MG tablet Take 650 mg by mouth 4 (four) times daily as needed for Pain.    amLODIPine (NORVASC) 2.5 MG tablet Take 1 tablet (2.5 mg total) by mouth 2 (two) times daily.    baclofen (LIORESAL) 10 MG tablet TAKE ONE TABLET BY MOUTH AT BEDTIME (Patient taking differently: Take 10 mg by mouth nightly as needed (muscle spasm). TAKE ONE TABLET BY MOUTH AT BEDTIME)    blood pressure monitor (IHEALTH EASE) XL arm size (OP) by Other route as needed for High Blood Pressure.    fluconazole (DIFLUCAN) 150 MG Tab Use while you are on antibiotics then may repeat    fluticasone propionate (FLONASE) 50 mcg/actuation nasal spray 2 sprays (100 mcg total) by Each Nostril route once daily.    furosemide (LASIX) 20 MG tablet If wt gain 2-3 lbs x 2-5d, take 20mg daily. If no improvement, call MD    ketoconazole (NIZORAL) 2 % cream Apply topically once daily.    miconazole  (MICATIN) 2 % cream Apply topically 2 (two) times daily.    multivitamin capsule Take 1 capsule by mouth once daily.    nitrofurantoin, macrocrystal-monohydrate, (MACROBID) 100 MG capsule Take 1 capsule (100 mg total) by mouth 2 (two) times daily.    triamcinolone acetonide 0.1% (KENALOG) 0.1 % cream APPLY TWO TIMES A DAY FOR 10 DAYS     Family History       Problem Relation (Age of Onset)    Diabetes Mother    Hypertension Mother          Tobacco Use    Smoking status: Never    Smokeless tobacco: Never   Substance and Sexual Activity    Alcohol use: No    Drug use: No    Sexual activity: Never     Birth control/protection: None     Review of Systems   Constitutional:  Positive for activity change. Negative for appetite change, chills and fever.   HENT:  Negative for trouble swallowing.    Respiratory:  Positive for cough and shortness of breath. Negative for wheezing.    Gastrointestinal:  Negative for abdominal pain, nausea and vomiting.   Genitourinary:  Negative for dysuria.   Musculoskeletal:         Feet heel pain   Skin:  Negative for rash.   Neurological:  Positive for weakness.   Psychiatric/Behavioral:  Negative for confusion.    Objective:     Vital Signs (Most Recent):  Temp: 97 °F (36.1 °C) (10/22/22 2111)  Pulse: 85 (10/22/22 2111)  Resp: 18 (10/22/22 2111)  BP: (!) 143/74 (10/22/22 2111)  SpO2: 99 % (10/22/22 2111)   Vital Signs (24h Range):  Temp:  [97 °F (36.1 °C)-98.4 °F (36.9 °C)] 97 °F (36.1 °C)  Pulse:  [83-90] 85  Resp:  [16-18] 18  SpO2:  [99 %-100 %] 99 %  BP: (128-144)/(73-89) 143/74     Weight: 63.5 kg (139 lb 15.9 oz)  Body mass index is 26.47 kg/m².    Physical Exam  Vitals and nursing note reviewed.   HENT:      Mouth/Throat:      Pharynx: Oropharynx is clear. No oropharyngeal exudate.   Eyes:      Pupils: Pupils are equal, round, and reactive to light.   Cardiovascular:      Rate and Rhythm: Normal rate and regular rhythm.      Pulses: Normal pulses.      Heart sounds: No murmur  heard.  Pulmonary:      Effort: Pulmonary effort is normal. No respiratory distress.      Breath sounds: Normal breath sounds. No wheezing or rales.   Abdominal:      General: Bowel sounds are normal. There is no distension.      Palpations: Abdomen is soft.      Tenderness: There is no abdominal tenderness.   Musculoskeletal:      Right lower leg: No edema.      Left lower leg: No edema.      Comments: Chronic deformity, muscle wasting of bilateral lower extremities   Skin:     General: Skin is dry.      Comments: Dry cracked skin on BLE, report 2 left posterior thigh/hip skin ulcers   Neurological:      Mental Status: She is alert.         CRANIAL NERVES     CN III, IV, VI   Pupils are equal, round, and reactive to light.     Significant Labs: All pertinent labs within the past 24 hours have been reviewed.  CBC:   Recent Labs   Lab 10/22/22  1409   WBC 4.86   HGB 11.7*   HCT 36.4*   *     CMP:   Recent Labs   Lab 10/22/22  1409      K 3.8      CO2 23   GLU 80   BUN 17   CREATININE 0.6   CALCIUM 9.3   PROT 5.8*   ALBUMIN 2.8*   BILITOT 0.6   ALKPHOS 119   AST 39   ALT 54*   ANIONGAP 11     Cardiac Markers:   Recent Labs   Lab 10/22/22  1409   BNP 94     Coagulation: No results for input(s): PT, INR, APTT in the last 48 hours.  Lactic Acid:   Recent Labs   Lab 10/22/22  1409   LACTATE 0.7     Troponin:   Recent Labs   Lab 10/22/22  1409   TROPONINI <0.006     Urine Studies:   Recent Labs   Lab 10/22/22  1353   COLORU Aud[place]   APPEARANCEUA Hazy*   PHUR 6.0   SPECGRAV 1.020   PROTEINUA Negative   GLUCUA Negative   KETONESU Trace*   BILIRUBINUA Negative   OCCULTUA Negative   NITRITE Negative   LEUKOCYTESUR Negative       Significant Imaging: I have reviewed all pertinent imaging results/findings within the past 24 hours.  CTA CHEST NON CORONARY (PE STUDIES)     CLINICAL HISTORY:  Pulmonary embolism (PE) suspected, positive D-dimer;     TECHNIQUE:  Low dose axial images, sagittal and coronal  reformations were obtained from the thoracic inlet to the lung bases following the IV administration of 100 mL of Omnipaque 350.  Contrast timing was optimized to evaluate the pulmonary arteries.  MIP images were performed.     COMPARISON:  None     FINDINGS:  Pulmonary arteries:Pulmonary arteries are adequately enhanced.Small pulmonary emboli in a left upper lobe segmental pulmonary artery.     Thoracic soft tissues: Unremarkable.     Aorta: Left-sided aortic arch.  No aneurysm or dissection.     Heart: Heart appears mildly enlarged.  No pericardial effusion.     Barbra/Mediastinum: No pathologic na enlargement.     Airways: Patent.     Lungs/Pleura: Small posterior right pleural effusion with mild associated atelectasis.  Minimal left lower lobe atelectasis also.     Esophagus: Unremarkable.     Upper Abdomen: Bilateral probable renal cysts at the upper pole is incompletely visualized.     Bones: No acute fracture. No suspicious lytic or sclerotic lesions.     Levoscoliosis near the thoracolumbar junction.     Impression:     1. Small pulmonary emboli in a left upper lobe segmental pulmonary artery.  Follow-up recommended.  2. Mild cardiomegaly.  3. Small pleural effusion on the right with mild bilateral atelectasis.  4.  This report was flagged in Epic as abnormal.        Electronically signed by: Cornelio Busby  Date:                                            10/22/2022  Time:                                           20:39    XR CHEST AP PORTABLE     CLINICAL HISTORY:  shortness of breath;     TECHNIQUE:  Single frontal view of the chest was performed.     COMPARISON:  Prior dated 02/11/2022     FINDINGS:  The mediastinal structures are midline.  The cardiac silhouette is enlarged and stable.  There is mild cephalization of flow and faint reticular opacities suggesting mild interstitial edema.     There is osteopenia and degenerative change of the spine and shoulders.     Impression:     1. Stable enlarged  cardiac silhouette  2. Mild interstitial edema is suspected.        Electronically signed by: Kristine Grant MD  Date:                                            10/22/2022  Time:                                           13:20

## 2022-10-23 NOTE — ASSESSMENT & PLAN NOTE
- etiology likely due to new PE noted on CTA Chest   -pt with hx of HFpEF and noted to have some possible edema/congestion on chest imaging today   - CTA with small pleural effusion on the right with mild bilateral atelectasis  - trace pitting edema on exam  -Give 2 doses of IV lasix daily and monitor for symptoms improvement.

## 2022-10-23 NOTE — NURSING
Patient transported to unit via stretcher.   VSS.  Patient oriented to room.  Safety and fall precautions initiated.  Respirations even and unlabored.   Will continue to monitor.

## 2022-10-23 NOTE — H&P
[facility] [address] [place] [address] Medicine  History & Physical    Patient Name: Naga Benavidez  MRN: [id number]  Patient Class: OP- Observation  Admission Date: 10/22/2022  Attending Physician: Judy Alejandre MD [facility]  Admitting Physician: Isrrael Nogueira MD  Primary Care Provider: Kathrin Salazar MD         Patient information was obtained from patient, past medical records and ER records.     Subjective:     Principal Problem:Acute pulmonary embolism without acute cor pulmonale    Chief Complaint:   Chief Complaint   Patient presents with    Leg Pain     Hx of Cerebral Palsy        HPI: 63 y/o AAF hx of Cerebral Palsy, Chronic Debility, Wheelchair Dependence, HTN, presents wto the ED with complaints of shortness of breath.   She lives alone, primarily wheelchair dependent, usually can perform transfers at home, but with recent symptoms of increased dyspnea, has been unable to transfer.  Her family member who usually helps/assists in her care cannot provide care now, they have over-riding medical issue.  She has hx of chronic LE wounds, managed by wound care clinic.     She was found with stable lab work (CBC/CMP)  Patient admitted to hospital medicine and a pending CTA Chest which was ordered for elevated D-dimer value 3.08 has returned with critical value reported to ED physician, patient with Left Sided subsegmental pulmonary embolism.     ED Treatment: Kphos.       Past Medical History:   Diagnosis Date    Anemia     Arthritis     Asthma     Cerebral palsy     stable    DJD (degenerative joint disease)     Hyperlipidemia     Hypertension     Neuromuscular disorder     Obstructive sleep apnea     Uterine fibroids affecting pregnancy     with adnexal mass       Past Surgical History:   Procedure Laterality Date    FOOT CAPSULE RELEASE W/ PERCUTANEOUS HEEL CORD LENGTHENING, TIBIAL TENDON TRANSFER      HAMSTRING RELEASE         Review of patient's allergies indicates:   Allergen  Reactions    Tramadol Other (See Comments)     She could not wake up     Augmentin [amoxicillin-pot clavulanate] Diarrhea    Levaquin [levofloxacin] Other (See Comments)     itching    Lipitor [atorvastatin] Other (See Comments)     Muscle pain    Statins-hmg-coa reductase inhibitors Other (See Comments)     Muscle pains    Bactrim [sulfamethoxazole-trimethoprim] Nausea Only       No current facility-administered medications on file prior to encounter.     Current Outpatient Medications on File Prior to Encounter   Medication Sig    acetaminophen (TYLENOL) 325 MG tablet Take 650 mg by mouth 4 (four) times daily as needed for Pain.    amLODIPine (NORVASC) 2.5 MG tablet Take 1 tablet (2.5 mg total) by mouth 2 (two) times daily.    baclofen (LIORESAL) 10 MG tablet TAKE ONE TABLET BY MOUTH AT BEDTIME (Patient taking differently: Take 10 mg by mouth nightly as needed (muscle spasm). TAKE ONE TABLET BY MOUTH AT BEDTIME)    blood pressure monitor (IHEALTH EASE) XL arm size (OP) by Other route as needed for High Blood Pressure.    fluconazole (DIFLUCAN) 150 MG Tab Use while you are on antibiotics then may repeat    fluticasone propionate (FLONASE) 50 mcg/actuation nasal spray 2 sprays (100 mcg total) by Each Nostril route once daily.    furosemide (LASIX) 20 MG tablet If wt gain 2-3 lbs x 2-5d, take 20mg daily. If no improvement, call MD    ketoconazole (NIZORAL) 2 % cream Apply topically once daily.    miconazole (MICATIN) 2 % cream Apply topically 2 (two) times daily.    multivitamin capsule Take 1 capsule by mouth once daily.    nitrofurantoin, macrocrystal-monohydrate, (MACROBID) 100 MG capsule Take 1 capsule (100 mg total) by mouth 2 (two) times daily.    triamcinolone acetonide 0.1% (KENALOG) 0.1 % cream APPLY TWO TIMES A DAY FOR 10 DAYS     Family History       Problem Relation (Age of Onset)    Diabetes Mother    Hypertension Mother          Tobacco Use    Smoking status: Never    Smokeless  tobacco: Never   Substance and Sexual Activity    Alcohol use: No    Drug use: No    Sexual activity: Never     Birth control/protection: None     Review of Systems   Constitutional:  Positive for activity change. Negative for appetite change, chills and fever.   HENT:  Negative for trouble swallowing.    Respiratory:  Positive for cough and shortness of breath. Negative for wheezing.    Gastrointestinal:  Negative for abdominal pain, nausea and vomiting.   Genitourinary:  Negative for dysuria.   Musculoskeletal:         Feet heel pain   Skin:  Negative for rash.   Neurological:  Positive for weakness.   Psychiatric/Behavioral:  Negative for confusion.    Objective:     Vital Signs (Most Recent):  Temp: 97 °F (36.1 °C) (10/22/22 2111)  Pulse: 85 (10/22/22 2111)  Resp: 18 (10/22/22 2111)  BP: (!) 143/74 (10/22/22 2111)  SpO2: 99 % (10/22/22 2111)   Vital Signs (24h Range):  Temp:  [97 °F (36.1 °C)-98.4 °F (36.9 °C)] 97 °F (36.1 °C)  Pulse:  [83-90] 85  Resp:  [16-18] 18  SpO2:  [99 %-100 %] 99 %  BP: (128-144)/(73-89) 143/74     Weight: 63.5 kg (139 lb 15.9 oz)  Body mass index is 26.47 kg/m².    Physical Exam  Vitals and nursing note reviewed.   HENT:      Mouth/Throat:      Pharynx: Oropharynx is clear. No oropharyngeal exudate.   Eyes:      Pupils: Pupils are equal, round, and reactive to light.   Cardiovascular:      Rate and Rhythm: Normal rate and regular rhythm.      Pulses: Normal pulses.      Heart sounds: No murmur heard.  Pulmonary:      Effort: Pulmonary effort is normal. No respiratory distress.      Breath sounds: Normal breath sounds. No wheezing or rales.   Abdominal:      General: Bowel sounds are normal. There is no distension.      Palpations: Abdomen is soft.      Tenderness: There is no abdominal tenderness.   Musculoskeletal:      Right lower leg: No edema.      Left lower leg: No edema.      Comments: Chronic deformity, muscle wasting of bilateral lower extremities   Skin:     General: Skin  is dry.      Comments: Dry cracked skin on BLE, report 2 left posterior thigh/hip skin ulcers   Neurological:      Mental Status: She is alert.         CRANIAL NERVES     CN III, IV, VI   Pupils are equal, round, and reactive to light.     Significant Labs: All pertinent labs within the past 24 hours have been reviewed.  CBC:   Recent Labs   Lab 10/22/22  1409   WBC 4.86   HGB 11.7*   HCT 36.4*   *     CMP:   Recent Labs   Lab 10/22/22  1409      K 3.8      CO2 23   GLU 80   BUN 17   CREATININE 0.6   CALCIUM 9.3   PROT 5.8*   ALBUMIN 2.8*   BILITOT 0.6   ALKPHOS 119   AST 39   ALT 54*   ANIONGAP 11     Cardiac Markers:   Recent Labs   Lab 10/22/22  1409   BNP 94     Coagulation: No results for input(s): PT, INR, APTT in the last 48 hours.  Lactic Acid:   Recent Labs   Lab 10/22/22  1409   LACTATE 0.7     Troponin:   Recent Labs   Lab 10/22/22  1409   TROPONINI <0.006     Urine Studies:   Recent Labs   Lab 10/22/22  1353   COLORU [place]   APPEARANCEUA Hazy*   PHUR 6.0   SPECGRAV 1.020   PROTEINUA Negative   GLUCUA Negative   KETONESU Trace*   BILIRUBINUA Negative   OCCULTUA Negative   NITRITE Negative   LEUKOCYTESUR Negative       Significant Imaging: I have reviewed all pertinent imaging results/findings within the past 24 hours.  CTA CHEST NON CORONARY (PE STUDIES)     CLINICAL HISTORY:  Pulmonary embolism (PE) suspected, positive D-dimer;     TECHNIQUE:  Low dose axial images, sagittal and coronal reformations were obtained from the thoracic inlet to the lung bases following the IV administration of 100 mL of Omnipaque 350.  Contrast timing was optimized to evaluate the pulmonary arteries.  MIP images were performed.     COMPARISON:  None     FINDINGS:  Pulmonary arteries:Pulmonary arteries are adequately enhanced.Small pulmonary emboli in a left upper lobe segmental pulmonary artery.     Thoracic soft tissues: Unremarkable.     Aorta: Left-sided aortic arch.  No aneurysm or dissection.      Heart: Heart appears mildly enlarged.  No pericardial effusion.     Barbra/Mediastinum: No pathologic na enlargement.     Airways: Patent.     Lungs/Pleura: Small posterior right pleural effusion with mild associated atelectasis.  Minimal left lower lobe atelectasis also.     Esophagus: Unremarkable.     Upper Abdomen: Bilateral probable renal cysts at the upper pole is incompletely visualized.     Bones: No acute fracture. No suspicious lytic or sclerotic lesions.     Levoscoliosis near the thoracolumbar junction.     Impression:     1. Small pulmonary emboli in a left upper lobe segmental pulmonary artery.  Follow-up recommended.  2. Mild cardiomegaly.  3. Small pleural effusion on the right with mild bilateral atelectasis.  4.  This report was flagged in Epic as abnormal.        Electronically signed by: Cornelio Busby  Date:                                            10/22/2022  Time:                                           20:39    XR CHEST AP PORTABLE     CLINICAL HISTORY:  shortness of breath;     TECHNIQUE:  Single frontal view of the chest was performed.     COMPARISON:  Prior dated 02/11/2022     FINDINGS:  The mediastinal structures are midline.  The cardiac silhouette is enlarged and stable.  There is mild cephalization of flow and faint reticular opacities suggesting mild interstitial edema.     There is osteopenia and degenerative change of the spine and shoulders.     Impression:     1. Stable enlarged cardiac silhouette  2. Mild interstitial edema is suspected.        Electronically signed by: Kristine Grant MD  Date:                                            10/22/2022  Time:                                           13:20      Assessment/Plan:     * Acute pulmonary embolism without acute cor pulmonale  -Noted on CTA  -Patient very averse to injections - will cancel planned enoxaparin, start Eliquis treatment dose 10mg PO BID x 7 days.    -obtain new weight for patient - discussed with  nursing  -PRN O2 to maintain sats >92%  -Check Bilateral LE venous duplex U/S to eval for VTE   -      Shortness of breath  Etiology likely due to new PE noted on CTA Chest   -pt with hx of HFpEF and noted to have some possible edema/congestion on chest imaging today  -Give 2 doses of IV lasix daily starting tomorrow morning and monitor for symptoms improvement.       Cerebral palsy  q2 turns and elevate heels off bed      Wheelchair dependence  Fall precautions      Mild intermittent asthma without complication  Will assess need for scheduled vs PRN nebulizers       Multiple open wounds of lower extremity  Wound care consultation for Monday.           VTE Risk Mitigation (From admission, onward)         Ordered     apixaban tablet 5 mg  2 times daily        See Hyperspace for full Linked Orders Report.    10/22/22 2303     apixaban tablet 10 mg  2 times daily        See Hyperspace for full Linked Orders Report.    10/22/22 2303     Reason for No Pharmacological VTE Prophylaxis  Once        Question:  Reasons:  Answer:  Already adequately anticoagulated on oral Anticoagulants    10/22/22 2301     IP VTE HIGH RISK PATIENT  Once         10/22/22 2301     Place sequential compression device  Until discontinued         10/22/22 2301                   Isrrael Nogueira MD  Department of Hospital Medicine   [facility]

## 2022-10-23 NOTE — PROGRESS NOTES
[facility] [address] [place] [address] Medicine  Progress Note    Patient Name: Naga Benavidez  MRN: [id number]  Patient Class: OP- Observation   Admission Date: 10/22/2022  Length of Stay: 0 days  Attending Physician: Judy Alejandre MD  Primary Care Provider: Kathrin Salazar MD        Subjective:     Principal Problem:Acute pulmonary embolism without acute cor pulmonale        HPI:  63 y/o AAF hx of Cerebral Palsy, Chronic Debility, Wheelchair Dependence, HTN, presents wto the ED with complaints of shortness of breath.   She lives alone, primarily wheelchair dependent, usually can perform transfers at home, but with recent symptoms of increased dyspnea, has been unable to transfer.  Her family member who usually helps/assists in her care cannot provide care now, they have over-riding medical issue.  She has hx of chronic LE wounds, managed by wound care clinic.     She was found with stable lab work (CBC/CMP)  Patient admitted to hospital medicine and a pending CTA Chest which was ordered for elevated D-dimer value 3.08 has returned with critical value reported to ED physician, patient with Left Sided subsegmental pulmonary embolism.     ED Treatment: Kphos.       Overview/Hospital Course:  Naga Benavidez is a 62 F admitted to hospital medicine for further management of L sided PE. Started on Eliquis 10mg BID x7 days; will decrease to 5 mg BID. PT/OT consulted; recommending SNF.       Interval History: Hypertensive overnight to 181/93, but now improved. RR 20; satting >97% on RA. Reports SOB. Reports increased difficulty transferring for the past 2 days 2/2 dyspnea. Evaluated by PT, recommending SNF. CM to work on placement tomorrow.     Discussed with patient's care with her sister, who believes patient has been very anxious and has been struggling after their mom passed 2 years ago. Sister has been living with patient since that time. Feels that patient sometimes lacks the motivation to care for  herself. Discussed this with patient who reports feeling overwhelmed most days. She says when she feels this way it is hard to find the motivation to perform tasks. She is not interested in anxiety medication at this time because she did not like how they made her feel in the past. Will place referral for OP psych and give resources for therapy.    Review of Systems   Constitutional:  Positive for activity change. Negative for appetite change, chills and fever.   HENT:  Negative for trouble swallowing.    Respiratory:  Positive for cough and shortness of breath. Negative for chest tightness and wheezing.    Cardiovascular:  Negative for chest pain and leg swelling.   Gastrointestinal:  Negative for abdominal pain, nausea and vomiting.   Genitourinary:  Negative for dysuria.   Musculoskeletal:         Feet heel pain   Skin:  Negative for rash.   Neurological:  Positive for weakness. Negative for dizziness.   Psychiatric/Behavioral:  Negative for confusion.         +anxiety   Objective:     Vital Signs (Most Recent):  Temp: 97.4 °F (36.3 °C) (10/23/22 1537)  Pulse: 82 (10/23/22 1537)  Resp: 20 (10/23/22 1537)  BP: 125/75 (10/23/22 1537)  SpO2: 99 % (10/23/22 1537) Vital Signs (24h Range):  Temp:  [97 °F (36.1 °C)-97.8 °F (36.6 °C)] 97.4 °F (36.3 °C)  Pulse:  [72-90] 82  Resp:  [16-20] 20  SpO2:  [97 %-100 %] 99 %  BP: (119-181)/(66-93) 125/75     Weight: 63.5 kg (139 lb 15.9 oz)  Body mass index is 26.47 kg/m².    Intake/Output Summary (Last 24 hours) at 10/23/2022 1637  Last data filed at 10/23/2022 1241  Gross per 24 hour   Intake --   Output 1450 ml   Net -1450 ml      Physical Exam  Vitals and nursing note reviewed.   HENT:      Mouth/Throat:      Pharynx: Oropharynx is clear. No oropharyngeal exudate.   Eyes:      Pupils: Pupils are equal, round, and reactive to light.   Cardiovascular:      Rate and Rhythm: Normal rate and regular rhythm.      Pulses: Normal pulses.      Heart sounds: No murmur  heard.  Pulmonary:      Effort: Pulmonary effort is normal. No respiratory distress.      Breath sounds: Normal breath sounds. No wheezing or rales.   Abdominal:      General: Bowel sounds are normal. There is no distension.      Palpations: Abdomen is soft.      Tenderness: There is no abdominal tenderness.   Musculoskeletal:      Right lower leg: Edema (trace pitting edema) present.      Left lower leg: Edema (trace pitting edema) present.      Comments: Chronic deformity, muscle wasting of bilateral lower extremities   Skin:     General: Skin is dry.      Comments: Dry cracked skin on BLE, report 2 left posterior thigh/hip skin ulcers   Neurological:      Mental Status: She is alert.       Significant Labs: All pertinent labs within the past 24 hours have been reviewed.  BMP:   Recent Labs   Lab 10/23/22  0445   GLU 85      K 3.8      CO2 25   BUN 20   CREATININE 0.7   CALCIUM 9.2   MG 1.7     CBC:   Recent Labs   Lab 10/22/22  1409 10/23/22  0445   WBC 4.86 4.36   HGB 11.7* 12.8   HCT 36.4* 42.5   * 94*     Urine Studies:   Recent Labs   Lab 10/22/22  1353   COLORU Audelia   APPEARANCEUA Hazy*   PHUR 6.0   SPECGRAV 1.020   PROTEINUA Negative   GLUCUA Negative   KETONESU Trace*   BILIRUBINUA Negative   OCCULTUA Negative   NITRITE Negative   LEUKOCYTESUR Negative       Significant Imaging: I have reviewed all pertinent imaging results/findings within the past 24 hours.      Assessment/Plan:      * Acute pulmonary embolism without acute cor pulmonale  -patient reporting worsening SOB for the past few days with decreased ability to perform ADLs  -CTA on admit with small pulmonary emboli in a left upper lobe segmental pulmonary artery  -Patient very averse to injections - will cancel planned enoxaparin, start Eliquis treatment dose 10mg PO BID x 7 days.   With plan to decrease to 5mg   -PRN O2 to maintain sats >92%  -Bilateral LE venous duplex U/S pending  -echo pending to evaluate for right heart  strain    Multiple open wounds of lower extremity  - wound care consultation for Monday    Shortness of breath  - etiology likely due to new PE noted on CTA Chest   -pt with hx of HFpEF and noted to have some possible edema/congestion on chest imaging today   - CTA with small pleural effusion on the right with mild bilateral atelectasis  - trace pitting edema on exam  -Give 2 doses of IV lasix daily and monitor for symptoms improvement.     Generalized anxiety disorder  Major depressive disorder  Prolonged grief disorder  - patient and sister reporting anxiety that affects patient's motivation to perform tasks  - sister reports patient has struggled a lot with her mom passing 2 years ago  - patient speaks with counselor 1-2x/month over the phone, but her sessions are almost done  - patient not interested in starting medication at this time  - will give resources for therapy  - will place OP psych referral     Mild intermittent asthma without complication  - duonebs BID  - Will assess need for scheduled vs PRN nebulizers     Cerebral palsy  Wheelchair dependence  Decreased functional mobility  - reporting increased difficulty performing ADLs and transferring 2/2 SOB  - PT/OT consulted; recommending SNF  - q2 turns and elevate heels off bed  - fall precautions      VTE Risk Mitigation (From admission, onward)         Ordered     apixaban tablet 5 mg  2 times daily        See Hyperspace for full Linked Orders Report.    10/22/22 2303     apixaban tablet 10 mg  2 times daily        See Hyperspace for full Linked Orders Report.    10/22/22 2303     Reason for No Pharmacological VTE Prophylaxis  Once        Question:  Reasons:  Answer:  Already adequately anticoagulated on oral Anticoagulants    10/22/22 2301     IP VTE HIGH RISK PATIENT  Once         10/22/22 2301     Place sequential compression device  Until discontinued         10/22/22 2301                Discharge Planning   CHAPO: 10/25/2022     Code Status: Full  Code   Is the patient medically ready for discharge?: No    Reason for patient still in hospital (select all that apply): Patient trending condition, Treatment, PT / OT recommendations and Pending disposition                     Marissa Rouse PA-C  Department of Hospital Medicine   [facility] 11[facility]

## 2022-10-23 NOTE — ASSESSMENT & PLAN NOTE
Wheelchair dependence  Decreased functional mobility  - reporting increased difficulty performing ADLs and transferring 2/2 SOB  - PT/OT consulted; recommending SNF  - q2 turns and elevate heels off bed  - fall precautions

## 2022-10-23 NOTE — ASSESSMENT & PLAN NOTE
-patient reporting worsening SOB for the past few days with decreased ability to perform ADLs  -CTA on admit with small pulmonary emboli in a left upper lobe segmental pulmonary artery  -Patient very averse to injections - will cancel planned enoxaparin, start Eliquis treatment dose 10mg PO BID x 7 days.   With plan to decrease to 5mg   -PRN O2 to maintain sats >92%  -Bilateral LE venous duplex U/S pending  -echo pending to evaluate for right heart strain

## 2022-10-23 NOTE — PT/OT/SLP EVAL
Physical Therapy Evaluation    Patient Name:  Naga Benavidez   MRN:  [id number]  Admit Date: 10/22/2022  Admitting Diagnosis:  Acute pulmonary embolism without acute cor pulmonale  Length of Stay: 0 days  Recent Surgery: * No surgery found *      Recommendations:     Discharge Recommendations:  nursing facility, skilled   Discharge Equipment Recommendations: bedside commode, shower chair   Barriers to discharge: Decreased caregiver support    Assessment:     Naga Benavidez is a 62 y.o. female admitted with a medical diagnosis of Acute pulmonary embolism without acute cor pulmonale. Pt reported that she lives alone and that he baseline is mod ind with ADLs and transfers. Pt is WC bound at baseline. Pt reported that she has been getting progressively weaker and has recently been sleeping in WC and waiting for HH assistance to perform ADLs that she has difficulty performing alone. Recommend SNF once pt is medically stable for discharge.      Problem List: weakness, impaired endurance, impaired functional mobility, gait instability, impaired balance, decreased upper extremity function, decreased lower extremity function, impaired cardiopulmonary response to activity  Rehab Prognosis: Good; patient would benefit from acute skilled PT services to address these deficits and reach maximum level of function.      Plan:     During this hospitalization, patient to be seen 3 x/week to address the identified rehab impairments via gait training, therapeutic activities, therapeutic exercises, neuromuscular re-education and progress towards the established goals.    Plan of Care Expires:  11/22/22    Subjective   Communicated with RN prior to session.  Patient found HOB elevated upon PT entry to room, agreeable to evaluation. Naga Benavidez's alone during session.    Chief Complaint: Leg Pain (Hx of Cerebral Palsy)    Patient/Family Comments/goals: to get better  Pain/Comfort:  Pain Rating 1: 0/10  Pain Rating  "Post-Intervention 1: 0/10    Living Environment:  Pt lives alone in a 1st floor apt. Sister is currently staying with pt. Pt has been getting progressively weaker and was no longer able to independently transfer or independently perform dressing and bathing as she once was a few months ago. Patient uses DME as follows: wheelchair, hospital bed, power chair. DME owned (not currently used): none    Patient reports they will not have assistance upon discharge.    Objective:   Patient found with: telemetry, peripheral IV     General Precautions: Standard, Cardiac fall   Orthopedic Precautions:N/A   Braces: N/A   Oxygen Device: Room Air  Vitals: /71 (Patient Position: Lying)   Pulse 82   Temp 97.5 °F (36.4 °C) (Oral)   Resp 20   Ht 5' 0.98" (1.549 m)   Wt 63.5 kg (139 lb 15.9 oz)   LMP 09/07/2016 Comment: no weight motorized scooter   SpO2 99%   BMI 26.47 kg/m²     Exams:  Cognition:   Alert and Cooperative  Ox4  Command following: Follows two-step commands  Fluency: clear/fluent    Pt with B LE weakness (R>L) and B LE deformities that make her B LE mostly non functional   Pt with moderate decrease in B ankle plantarflexion and dorsiflexion   Pt with minimal decrease in B Knee flexion and extension  Pt with maximal decrease in B hip flexion    BLE strength grossly 2-/5     Outcome Measures:  AM-PAC 6 CLICK MOBILITY  Turning over in bed (including adjusting bedclothes, sheets and blankets)?: 2  Sitting down on and standing up from a chair with arms (e.g., wheelchair, bedside commode, etc.): 1  Moving from lying on back to sitting on the side of the bed?: 2  Moving to and from a bed to a chair (including a wheelchair)?: 1  Need to walk in hospital room?: 1  Climbing 3-5 steps with a railing?: 1  Basic Mobility Total Score: 8     Functional Mobility:  Additional staff present: OT  Bed Mobility:  Supine to Sit: maximal assistance; HOB elevated  Scooting anteriorly to EOB to have both feet planted on floor: " total assistance  Sit to Supine: maximal assistance; HOB flat    Sitting Balance at Edge of Bed:  Assistance Level Required: SBA-Rajani  Time: 12 minutes  Comments:   Pt demo'd posterior lean. Pt only able to sustain static sitting in midline with no UE support for short bouts of time. 1 UE support assisted with increasing time in midline   Worked on activity tolerance sitting EOB, worked on tolerance to positional change, and worked on sitting balance   Pt peformed seated ADLs with OT    Transfers:   Lateral Scooting to right: max A x 2 persons  Verbal cuing for technique and sequencing       Gait:   Not performed 2nd to pt does not ambulate at baseline     Therapeutic Activities, Exercises, & Education:   Educated pt on PT role/POC  Educated pt importance of bed level exercise    Pt verbalized understanding       Patient left HOB elevated with all lines intact, call button in reach, and RN notified.    GOALS:   Multidisciplinary Problems       Physical Therapy Goals          Problem: Physical Therapy    Goal Priority Disciplines Outcome Goal Variances Interventions   Physical Therapy Goal     PT, PT/OT Ongoing, Progressing     Description: Goals to be met by: 2022    Patient will increase functional independence with mobility by performin. Supine to sit with MInimal Assistance  2. Sit to supine with MInimal Assistance  3. Rolling to Left and Right with Minimal Assistance.  4. Bed to chair transfer with Minimal Assistance                          History:     Past Medical History:   Diagnosis Date    Anemia     Arthritis     Asthma     Cerebral palsy     stable    DJD (degenerative joint disease)     Hyperlipidemia     Hypertension     Neuromuscular disorder     Obstructive sleep apnea     Uterine fibroids affecting pregnancy     with adnexal mass       Past Surgical History:   Procedure Laterality Date    FOOT CAPSULE RELEASE W/ PERCUTANEOUS HEEL CORD LENGTHENING, TIBIAL TENDON TRANSFER      HAMSTRING  RELEASE         Time Tracking:     PT Received On: 10/23/22  PT Start Time: 1002     PT Stop Time: 1036  PT Total Time (min): 34 min     Billable Minutes: Evaluation 5 and Therapeutic Activity 23

## 2022-10-23 NOTE — HOSPITAL COURSE
Naga Benavidez is a 62 F admitted to [facility] medicine for further management of L sided PE. She was started on Eliquis 10mg BID, which she will take for 7 days, then decrease to 5 mg BID. PT/OT consulted and recommending SNF. All questions were answered. Patient acknowledged understanding of discharge instructions and feels safe to discharge to SNF. Patient was discharged on 10/27/2022 to [facility] in stable condition.

## 2022-10-23 NOTE — ASSESSMENT & PLAN NOTE
-Noted on CTA  -Patient very averse to injections - will cancel planned enoxaparin, start Eliquis treatment dose 10mg PO BID x 7 days.    -obtain new weight for patient - discussed with nursing  -PRN O2 to maintain sats >92%  -Check Bilateral LE venous duplex U/S to eval for VTE   -

## 2022-10-23 NOTE — SUBJECTIVE & OBJECTIVE
Interval History: Hypertensive overnight to 181/93, but now improved. RR 20; satting >97% on RA. Reports SOB. Reports increased difficulty transferring for the past 2 days 2/2 dyspnea. Evaluated by PT, recommending SNF. CM to work on placement tomorrow.     Discussed with patient's care with her sister, who believes patient has been very anxious and has been struggling after their mom passed 2 years ago. Sister has been living with patient since that time. Feels that patient sometimes lacks the motivation to care for herself. Discussed this with patient who reports feeling overwhelmed most days. She says when she feels this way it is hard to find the motivation to perform tasks. She is not interested in anxiety medication at this time because she did not like how they made her feel in the past. Will place referral for OP psych and give resources for therapy.    Review of Systems   Constitutional:  Positive for activity change. Negative for appetite change, chills and fever.   HENT:  Negative for trouble swallowing.    Respiratory:  Positive for cough and shortness of breath. Negative for chest tightness and wheezing.    Cardiovascular:  Negative for chest pain and leg swelling.   Gastrointestinal:  Negative for abdominal pain, nausea and vomiting.   Genitourinary:  Negative for dysuria.   Musculoskeletal:         Feet heel pain   Skin:  Negative for rash.   Neurological:  Positive for weakness. Negative for dizziness.   Psychiatric/Behavioral:  Negative for confusion.         +anxiety   Objective:     Vital Signs (Most Recent):  Temp: 97.4 °F (36.3 °C) (10/23/22 1537)  Pulse: 82 (10/23/22 1537)  Resp: 20 (10/23/22 1537)  BP: 125/75 (10/23/22 1537)  SpO2: 99 % (10/23/22 1537) Vital Signs (24h Range):  Temp:  [97 °F (36.1 °C)-97.8 °F (36.6 °C)] 97.4 °F (36.3 °C)  Pulse:  [72-90] 82  Resp:  [16-20] 20  SpO2:  [97 %-100 %] 99 %  BP: (119-181)/(66-93) 125/75     Weight: 63.5 kg (139 lb 15.9 oz)  Body mass index is 26.47  kg/m².    Intake/Output Summary (Last 24 hours) at 10/23/2022 1637  Last data filed at 10/23/2022 1241  Gross per 24 hour   Intake --   Output 1450 ml   Net -1450 ml      Physical Exam  Vitals and nursing note reviewed.   HENT:      Mouth/Throat:      Pharynx: Oropharynx is clear. No oropharyngeal exudate.   Eyes:      Pupils: Pupils are equal, round, and reactive to light.   Cardiovascular:      Rate and Rhythm: Normal rate and regular rhythm.      Pulses: Normal pulses.      Heart sounds: No murmur heard.  Pulmonary:      Effort: Pulmonary effort is normal. No respiratory distress.      Breath sounds: Normal breath sounds. No wheezing or rales.   Abdominal:      General: Bowel sounds are normal. There is no distension.      Palpations: Abdomen is soft.      Tenderness: There is no abdominal tenderness.   Musculoskeletal:      Right lower leg: Edema (trace pitting edema) present.      Left lower leg: Edema (trace pitting edema) present.      Comments: Chronic deformity, muscle wasting of bilateral lower extremities   Skin:     General: Skin is dry.      Comments: Dry cracked skin on BLE, report 2 left posterior thigh/hip skin ulcers   Neurological:      Mental Status: She is alert.       Significant Labs: All pertinent labs within the past 24 hours have been reviewed.  BMP:   Recent Labs   Lab 10/23/22  0445   GLU 85      K 3.8      CO2 25   BUN 20   CREATININE 0.7   CALCIUM 9.2   MG 1.7     CBC:   Recent Labs   Lab 10/22/22  1409 10/23/22  0445   WBC 4.86 4.36   HGB 11.7* 12.8   HCT 36.4* 42.5   * 94*     Urine Studies:   Recent Labs   Lab 10/22/22  1353   COLORU Audelia   APPEARANCEUA Hazy*   PHUR 6.0   SPECGRAV 1.020   PROTEINUA Negative   GLUCUA Negative   KETONESU Trace*   BILIRUBINUA Negative   OCCULTUA Negative   NITRITE Negative   LEUKOCYTESUR Negative       Significant Imaging: I have reviewed all pertinent imaging results/findings within the past 24 hours.

## 2022-10-23 NOTE — PLAN OF CARE
Pt admitted and care plan initiated.Telemetry maintained,NSR.Started on anticoagulation.r/a sats 98-99%. Wound care consult placed.safety precautions implemented.bed in low position.rails up.call bell in reach.continue plan of care.

## 2022-10-23 NOTE — PLAN OF CARE
Problem: Physical Therapy  Goal: Physical Therapy Goal  Description: Goals to be met by: 2022    Patient will increase functional independence with mobility by performin. Supine to sit with MInimal Assistance  2. Sit to supine with MInimal Assistance  3. Rolling to Left and Right with Minimal Assistance.  4. Bed to chair transfer with Minimal Assistance     Outcome: Ongoing, Progressing     Eval completed. Goals appropriate

## 2022-10-23 NOTE — ASSESSMENT & PLAN NOTE
Major depressive disorder  Prolonged grief disorder  - patient and sister reporting anxiety that affects patient's motivation to perform tasks  - sister reports patient has struggled a lot with her mom passing 2 years ago  - patient speaks with counselor 1-2x/month over the phone, but her sessions are almost done  - patient not interested in starting medication at this time  - will give resources for therapy  - will place OP psych referral

## 2022-10-23 NOTE — ASSESSMENT & PLAN NOTE
- patient and sister reporting anxiety that affects patient's motivation to perform tasks  - patient not interested in starting medication at this time  - will give resources for therapy  - will place OP psych referral

## 2022-10-23 NOTE — PT/OT/SLP EVAL
"Occupational Therapy   Evaluation    Name: Naga Benavidez  MRN: [id number]  Admitting Diagnosis:  Acute pulmonary embolism without acute cor pulmonale  Recent Surgery: * No surgery found *      Recommendations:     Discharge Recommendations: nursing facility, skilled  Discharge Equipment Recommendations:  bedside commode, shower chair  Barriers to discharge:  Decreased caregiver support    Assessment:     Naga Benavidez is a 62 y.o. female with a medical diagnosis of Acute pulmonary embolism without acute cor pulmonale.  She presents motivated and agreeable to therapy. Pt's BLE revealed flexion contractions and decreased purposeful use of LUE. Max (A) x 2 with bed rail (A) for bed mobility. Pt required Total (A) for lotion application and donning B socks to LE. Once EOB pt was provided moderate verbal cues for hand placement to maintain neutral position. Excessive propping of LUE to maintained functional position. LUE is considered to be "helping" extremity and RUE presents as primary "functional use " extremity. Pt manipulated / used grooming task materials with set up (A) / increased time to complete tasks. Performance deficits affecting function: weakness, impaired endurance, impaired self care skills, impaired functional mobility, gait instability, impaired balance, decreased upper extremity function, decreased lower extremity function, decreased coordination, abnormal tone, decreased ROM, impaired joint extensibility, impaired muscle length.      Rehab Prognosis: Fair; patient would benefit from acute skilled OT services to address these deficits and reach maximum level of function.       Plan:     Patient to be seen 3 x/week to address the above listed problems via self-care/home management, neuromuscular re-education, therapeutic activities, therapeutic exercises  Plan of Care Expires: 11/22/22  Plan of Care Reviewed with: patient    Subjective     Chief Complaint: "OO are my legs " "swollen"  Patient/Family Comments/goals: Return to PLOF    Occupational Profile:  Living Environment: Pt lives with sister in a SSH with 0 ALANNA.   Previous level of function: Pt required (A) for all ADLs and bathed/dressed in w/c  Roles and Routines: Community member  Equipment Used at Home:  wheelchair, hospital bed, lift device  Assistance upon Discharge: Family and health aides     Pain/Comfort:  Pain Rating 1: 0/10    Patients cultural, spiritual, Protestant conflicts given the current situation: no    Objective:     Communicated with: Nurse prior to session.  Patient found HOB elevated with PureWick, telemetry upon OT entry to room.    General Precautions: Standard, fall   Orthopedic Precautions:N/A   Braces: N/A  Respiratory Status: Room air    Occupational Performance:    Bed Mobility:    Patient completed Rolling/Turning to Left with  maximal assistance  Patient completed Rolling/Turning to Right with maximal assistance  Patient completed Scooting/Bridging with maximal assistance  Patient completed Supine to Sit with maximal assistance  Patient completed Sit to Supine with maximal assistance    Functional Mobility/Transfers:  Functional Mobility: Once EOB pt scooted to HOB with Mod (A) x 2    Activities of Daily Living:  Grooming: stand by assistance Pt washed face / brushed teeth with set up   Lower Body Dressing: total assistance Pt required ToT for donning socks / lotion application    Cognitive/Visual Perceptual:  Cognitive/Psychosocial Skills:     -       Oriented to: Person, Place, Time, and Situation   -       Follows Commands/attention:Follows two-step commands  -       Safety awareness/insight to disability: intact   -       Mood/Affect/Coping skills/emotional control: Cooperative and Happy    Physical Exam:  Balance:    -       Good static balance with LUE support / R lean. Fair dynamic balance  Postural examination/scapula alignment:    -       Rounded shoulders  -       Forward head  -       " Affected scapula elevated  -       Posterior pelvic tilt  -       Scoliosis  Edema:  Moderate LLE  Sensation:    -       Intact  Upper Extremity Range of Motion:     -       Right Upper Extremity: Limited to 90 degrees flexion  -       Left Upper Extremity: Limited to 90 degrees flexion  Upper Extremity Strength:    -       Right Upper Extremity: WFL  -       Left Upper Extremity: WFL   Strength:    -       Right Upper Extremity: WFL  -       Left Upper Extremity: WFL    AMPAC 6 Click ADL:  AMPAC Total Score: 15    Treatment & Education:  Pt educated on role of OT/POC  Pt. Educated on safety precautions   Pt provided self-care/ADL re-education  Pt reviewed bed mobility/functional mobility     Patient left HOB elevated with all lines intact, call button in reach, bed alarm on, and nurse notified    GOALS:   Multidisciplinary Problems       Occupational Therapy Goals          Problem: Occupational Therapy    Goal Priority Disciplines Outcome Interventions   Occupational Therapy Goal     OT, PT/OT Ongoing, Progressing    Description: Goals to be met by: 10/08     Patient will increase functional independence with ADLs by performing:    Feeding with Supervision.  UE Dressing with Minimal Assistance.  LE Dressing with Moderate Assistance.  Grooming while seated with Supervision.  Toileting from bedside commode with Minimal Assistance for hygiene and clothing management.                          History:     Past Medical History:   Diagnosis Date    Anemia     Arthritis     Asthma     Cerebral palsy     stable    DJD (degenerative joint disease)     Hyperlipidemia     Hypertension     Neuromuscular disorder     Obstructive sleep apnea     Uterine fibroids affecting pregnancy     with adnexal mass         Past Surgical History:   Procedure Laterality Date    FOOT CAPSULE RELEASE W/ PERCUTANEOUS HEEL CORD LENGTHENING, TIBIAL TENDON TRANSFER      HAMSTRING RELEASE         Time Tracking:     OT Date of Treatment:  10/23/22  OT Start Time: 1003  OT Stop Time: 1036  OT Total Time (min): 33 min    Billable Minutes:Evaluation 8  Self Care/Home Management 25    10/23/2022

## 2022-10-23 NOTE — PLAN OF CARE
Problem: Adult Inpatient Plan of Care  Goal: Plan of Care Review  Outcome: Ongoing, Progressing  Goal: Patient-Specific Goal (Individualized)  Outcome: Ongoing, Progressing     Problem: Impaired Wound Healing  Goal: Optimal Wound Healing  Outcome: Ongoing, Progressing     Problem: Infection  Goal: Absence of Infection Signs and Symptoms  Outcome: Ongoing, Progressing     Problem: VTE (Venous Thromboembolism)  Goal: VTE (Venous Thromboembolism) Symptom Resolution  Outcome: Ongoing, Progressing

## 2022-10-23 NOTE — HPI
63 y/o AAF hx of Cerebral Palsy, Chronic Debility, Wheelchair Dependence, HTN, presents wto the ED with complaints of shortness of breath.   She lives alone, primarily wheelchair dependent, usually can perform transfers at home, but with recent symptoms of increased dyspnea, has been unable to transfer.  Her family member who usually helps/assists in her care cannot provide care now, they have over-riding medical issue.  She has hx of chronic LE wounds, managed by wound care clinic.     She was found with stable lab work (CBC/CMP)  Patient admitted to hospital medicine and a pending CTA Chest which was ordered for elevated D-dimer value 3.08 has returned with critical value reported to ED physician, patient with Left Sided subsegmental pulmonary embolism.     ED Treatment: [facility].

## 2022-10-23 NOTE — PLAN OF CARE
Problem: Occupational Therapy  Goal: Occupational Therapy Goal  Description: Goals to be met by: 10/08     Patient will increase functional independence with ADLs by performing:    Feeding with Supervision.  UE Dressing with Minimal Assistance.  LE Dressing with Moderate Assistance.  Grooming while seated with Supervision.  Toileting from bedside commode with Minimal Assistance for hygiene and clothing management.     Outcome: Ongoing, Progressing

## 2022-10-24 ENCOUNTER — TELEPHONE (OUTPATIENT)
Dept: BEHAVIORAL HEALTH | Facility: CLINIC | Age: 62
End: 2022-10-24
Payer: MEDICARE

## 2022-10-24 LAB
ANION GAP SERPL CALC-SCNC: 9 MMOL/L (ref 8–16)
ASCENDING AORTA: 2.5 CM
AV INDEX (PROSTH): 0.79
AV MEAN GRADIENT: 4 MMHG
AV PEAK GRADIENT: 7 MMHG
AV VALVE AREA: 2.08 CM2
AV VELOCITY RATIO: 0.82
BASOPHILS # BLD AUTO: 0.02 K/UL (ref 0–0.2)
BASOPHILS NFR BLD: 0.5 % (ref 0–1.9)
BSA FOR ECHO PROCEDURE: 1.65 M2
BUN SERPL-MCNC: 26 MG/DL (ref 8–23)
CALCIUM SERPL-MCNC: 8.6 MG/DL (ref 8.7–10.5)
CHLORIDE SERPL-SCNC: 103 MMOL/L (ref 95–110)
CO2 SERPL-SCNC: 27 MMOL/L (ref 23–29)
CREAT SERPL-MCNC: 0.6 MG/DL (ref 0.5–1.4)
CV ECHO LV RWT: 0.55 CM
DIFFERENTIAL METHOD: ABNORMAL
DOP CALC AO PEAK VEL: 1.36 M/S
DOP CALC AO VTI: 26.55 CM
DOP CALC LVOT AREA: 2.6 CM2
DOP CALC LVOT DIAMETER: 1.83 CM
DOP CALC LVOT PEAK VEL: 1.11 M/S
DOP CALC LVOT STROKE VOLUME: 55.13 CM3
DOP CALCLVOT PEAK VEL VTI: 20.97 CM
E WAVE DECELERATION TIME: 84.01 MSEC
E/A RATIO: 0.71
E/E' RATIO: 8.35 M/S
ECHO LV POSTERIOR WALL: 1.14 CM (ref 0.6–1.1)
EJECTION FRACTION: 70 %
EOSINOPHIL # BLD AUTO: 0.1 K/UL (ref 0–0.5)
EOSINOPHIL NFR BLD: 1.6 % (ref 0–8)
ERYTHROCYTE [DISTWIDTH] IN BLOOD BY AUTOMATED COUNT: 15.2 % (ref 11.5–14.5)
EST. GFR  (NO RACE VARIABLE): >60 ML/MIN/1.73 M^2
FRACTIONAL SHORTENING: 33 % (ref 28–44)
GLUCOSE SERPL-MCNC: 68 MG/DL (ref 70–110)
HCT VFR BLD AUTO: 36.1 % (ref 37–48.5)
HGB BLD-MCNC: 11.3 G/DL (ref 12–16)
IMM GRANULOCYTES # BLD AUTO: 0.01 K/UL (ref 0–0.04)
IMM GRANULOCYTES NFR BLD AUTO: 0.2 % (ref 0–0.5)
INTERVENTRICULAR SEPTUM: 0.75 CM (ref 0.6–1.1)
LA MAJOR: 4.09 CM
LA MINOR: 4.48 CM
LA WIDTH: 3.35 CM
LEFT ATRIUM SIZE: 2.75 CM
LEFT ATRIUM VOLUME INDEX MOD: 18.4 ML/M2
LEFT ATRIUM VOLUME INDEX: 20.7 ML/M2
LEFT ATRIUM VOLUME MOD: 29.81 CM3
LEFT ATRIUM VOLUME: 33.48 CM3
LEFT INTERNAL DIMENSION IN SYSTOLE: 2.78 CM (ref 2.1–4)
LEFT VENTRICLE DIASTOLIC VOLUME INDEX: 46.39 ML/M2
LEFT VENTRICLE DIASTOLIC VOLUME: 75.15 ML
LEFT VENTRICLE MASS INDEX: 76 G/M2
LEFT VENTRICLE SYSTOLIC VOLUME INDEX: 18 ML/M2
LEFT VENTRICLE SYSTOLIC VOLUME: 29.14 ML
LEFT VENTRICULAR INTERNAL DIMENSION IN DIASTOLE: 4.12 CM (ref 3.5–6)
LEFT VENTRICULAR MASS: 123.03 G
LV LATERAL E/E' RATIO: 7.89 M/S
LV SEPTAL E/E' RATIO: 8.88 M/S
LYMPHOCYTES # BLD AUTO: 0.9 K/UL (ref 1–4.8)
LYMPHOCYTES NFR BLD: 19.9 % (ref 18–48)
MAGNESIUM SERPL-MCNC: 1.5 MG/DL (ref 1.6–2.6)
MCH RBC QN AUTO: 28.6 PG (ref 27–31)
MCHC RBC AUTO-ENTMCNC: 31.3 G/DL (ref 32–36)
MCV RBC AUTO: 91 FL (ref 82–98)
MONOCYTES # BLD AUTO: 0.3 K/UL (ref 0.3–1)
MONOCYTES NFR BLD: 7.5 % (ref 4–15)
MV PEAK A VEL: 1 M/S
MV PEAK E VEL: 0.71 M/S
MV STENOSIS PRESSURE HALF TIME: 24.36 MS
MV VALVE AREA P 1/2 METHOD: 9.03 CM2
NEUTROPHILS # BLD AUTO: 3.1 K/UL (ref 1.8–7.7)
NEUTROPHILS NFR BLD: 70.3 % (ref 38–73)
NRBC BLD-RTO: 0 /100 WBC
PHOSPHATE SERPL-MCNC: 3.3 MG/DL (ref 2.7–4.5)
PISA TR MAX VEL: 2.08 M/S
PLATELET # BLD AUTO: 97 K/UL (ref 150–450)
PMV BLD AUTO: ABNORMAL FL (ref 9.2–12.9)
POTASSIUM SERPL-SCNC: 3.7 MMOL/L (ref 3.5–5.1)
RA MAJOR: 3.8 CM
RA PRESSURE: 3 MMHG
RA WIDTH: 3.04 CM
RBC # BLD AUTO: 3.95 M/UL (ref 4–5.4)
RIGHT VENTRICULAR END-DIASTOLIC DIMENSION: 3.8 CM
RV TISSUE DOPPLER FREE WALL SYSTOLIC VELOCITY 1 (APICAL 4 CHAMBER VIEW): 17.44 CM/S
SINUS: 2.3 CM
SODIUM SERPL-SCNC: 139 MMOL/L (ref 136–145)
STJ: 2.14 CM
TDI LATERAL: 0.09 M/S
TDI SEPTAL: 0.08 M/S
TDI: 0.09 M/S
TR MAX PG: 17 MMHG
TRICUSPID ANNULAR PLANE SYSTOLIC EXCURSION: 2.26 CM
TV REST PULMONARY ARTERY PRESSURE: 20 MMHG
WBC # BLD AUTO: 4.42 K/UL (ref 3.9–12.7)

## 2022-10-24 PROCEDURE — 85025 COMPLETE CBC W/AUTO DIFF WBC: CPT | Performed by: HOSPITALIST

## 2022-10-24 PROCEDURE — G0378 HOSPITAL OBSERVATION PER HR: HCPCS

## 2022-10-24 PROCEDURE — 99226 PR SUBSEQUENT OBSERVATION CARE,LEVEL III: ICD-10-PCS | Mod: ,,,

## 2022-10-24 PROCEDURE — 63600175 PHARM REV CODE 636 W HCPCS: Performed by: HOSPITALIST

## 2022-10-24 PROCEDURE — 96376 TX/PRO/DX INJ SAME DRUG ADON: CPT

## 2022-10-24 PROCEDURE — 84100 ASSAY OF PHOSPHORUS: CPT | Performed by: HOSPITALIST

## 2022-10-24 PROCEDURE — 94761 N-INVAS EAR/PLS OXIMETRY MLT: CPT

## 2022-10-24 PROCEDURE — 80048 BASIC METABOLIC PNL TOTAL CA: CPT | Performed by: HOSPITALIST

## 2022-10-24 PROCEDURE — 94640 AIRWAY INHALATION TREATMENT: CPT

## 2022-10-24 PROCEDURE — 36415 COLL VENOUS BLD VENIPUNCTURE: CPT | Performed by: HOSPITALIST

## 2022-10-24 PROCEDURE — 99226 PR SUBSEQUENT OBSERVATION CARE,LEVEL III: CPT | Mod: ,,,

## 2022-10-24 PROCEDURE — 25000242 PHARM REV CODE 250 ALT 637 W/ HCPCS: Performed by: HOSPITALIST

## 2022-10-24 PROCEDURE — 96366 THER/PROPH/DIAG IV INF ADDON: CPT

## 2022-10-24 PROCEDURE — 83735 ASSAY OF MAGNESIUM: CPT | Performed by: HOSPITALIST

## 2022-10-24 PROCEDURE — 63600175 PHARM REV CODE 636 W HCPCS

## 2022-10-24 PROCEDURE — 25000003 PHARM REV CODE 250: Performed by: HOSPITALIST

## 2022-10-24 PROCEDURE — 96365 THER/PROPH/DIAG IV INF INIT: CPT

## 2022-10-24 RX ORDER — MAGNESIUM SULFATE HEPTAHYDRATE 40 MG/ML
2 INJECTION, SOLUTION INTRAVENOUS ONCE
Status: COMPLETED | OUTPATIENT
Start: 2022-10-24 | End: 2022-10-24

## 2022-10-24 RX ADMIN — MICONAZOLE NITRATE: 20 CREAM TOPICAL at 08:10

## 2022-10-24 RX ADMIN — APIXABAN 10 MG: 5 TABLET, FILM COATED ORAL at 08:10

## 2022-10-24 RX ADMIN — AMLODIPINE BESYLATE 2.5 MG: 2.5 TABLET ORAL at 08:10

## 2022-10-24 RX ADMIN — THERA TABS 1 TABLET: TAB at 08:10

## 2022-10-24 RX ADMIN — FLUTICASONE PROPIONATE 100 MCG: 50 SPRAY, METERED NASAL at 08:10

## 2022-10-24 RX ADMIN — FUROSEMIDE 40 MG: 10 INJECTION, SOLUTION INTRAMUSCULAR; INTRAVENOUS at 08:10

## 2022-10-24 RX ADMIN — MAGNESIUM SULFATE HEPTAHYDRATE 2 G: 40 INJECTION, SOLUTION INTRAVENOUS at 12:10

## 2022-10-24 RX ADMIN — IPRATROPIUM BROMIDE AND ALBUTEROL SULFATE 3 ML: 2.5; .5 SOLUTION RESPIRATORY (INHALATION) at 07:10

## 2022-10-24 NOTE — PLAN OF CARE
Problem: Adult Inpatient Plan of Care  Goal: Plan of Care Review  10/24/2022 0632 by Tasha Quezada LPN  Outcome: Ongoing, Progressing  10/24/2022 0632 by Tasha Quezada LPN  Outcome: Ongoing, Progressing  Goal: Patient-Specific Goal (Individualized)  10/24/2022 0632 by Tasha Quezada LPN  Outcome: Ongoing, Progressing  10/24/2022 0632 by Tasha Quezada LPN  Outcome: Ongoing, Progressing  Goal: Absence of Hospital-Acquired Illness or Injury  10/24/2022 0632 by Tasha Quezada LPN  Outcome: Ongoing, Progressing  10/24/2022 0632 by Tasha Quezada LPN  Outcome: Ongoing, Progressing  Goal: Optimal Comfort and Wellbeing  10/24/2022 0632 by Tasha Quezada LPN  Outcome: Ongoing, Progressing  10/24/2022 0632 by Tasha Quezada LPN  Outcome: Ongoing, Progressing  Goal: Readiness for Transition of Care  10/24/2022 0632 by Tasha Quezada LPN  Outcome: Ongoing, Progressing  10/24/2022 0632 by Tasha Quezada LPN  Outcome: Ongoing, Progressing     Problem: Impaired Wound Healing  Goal: Optimal Wound Healing  10/24/2022 0632 by Tasha Quezada LPN  Outcome: Ongoing, Progressing  10/24/2022 0632 by Tasha Quezada LPN  Outcome: Ongoing, Progressing     Problem: Infection  Goal: Absence of Infection Signs and Symptoms  10/24/2022 0632 by Tasha Quezada LPN  Outcome: Ongoing, Progressing  10/24/2022 0632 by Tasha Quezada LPN  Outcome: Ongoing, Progressing     Problem: Skin Injury Risk Increased  Goal: Skin Health and Integrity  10/24/2022 0632 by Tasha Quezada LPN  Outcome: Ongoing, Progressing  10/24/2022 0632 by Tasha Quezada LPN  Outcome: Ongoing, Progressing     Problem: VTE (Venous Thromboembolism)  Goal: VTE (Venous Thromboembolism) Symptom Resolution  10/24/2022 0632 by Tasha Quezada LPN  Outcome: Ongoing, Progressing  10/24/2022 0632 by Tasha Quezada LPN  Outcome: Ongoing, Progressing

## 2022-10-24 NOTE — PROGRESS NOTES
[facility] [address] [place] [address] Medicine  Progress Note    Patient Name: Naga Benavidez  MRN: [id number]  Patient Class: OP- Observation   Admission Date: 10/22/2022  Length of Stay: 0 days  Attending Physician: Judy Alejandre MD  Primary Care Provider: Kathrin Salazar MD        Subjective:     Principal Problem:Acute pulmonary embolism without acute cor pulmonale        HPI:  63 y/o AAF hx of Cerebral Palsy, Chronic Debility, Wheelchair Dependence, HTN, presents wto the ED with complaints of shortness of breath.   She lives alone, primarily wheelchair dependent, usually can perform transfers at home, but with recent symptoms of increased dyspnea, has been unable to transfer.  Her family member who usually helps/assists in her care cannot provide care now, they have over-riding medical issue.  She has hx of chronic LE wounds, managed by wound care clinic.     She was found with stable lab work (CBC/CMP)  Patient admitted to hospital medicine and a pending CTA Chest which was ordered for elevated D-dimer value 3.08 has returned with critical value reported to ED physician, patient with Left Sided subsegmental pulmonary embolism.     ED Treatment: K[facility]os.       Overview/Hospital Course:  Naga Benavidez is a 62 F admitted to hospital medicine for further management of L sided PE. Started on Eliquis 10mg BID x7 days; will decrease to 5 mg BID. PT/OT consulted; recommending SNF. CM working on placement.       Interval History: NAEON. Afebrile. Hypotensive this morning 99/50. Otherwise VSS. BLE US yesterday without DVT. Echo without R heart strain. Mg 1.5; repleted. PT/OT recommending SNF. CM working on placement. Patient admits B/L heel pain. Denies any other complaints at this time. Wound care to evaluate today.     Review of Systems   Constitutional:  Positive for activity change. Negative for appetite change, chills and fever.   HENT:  Negative for trouble swallowing.    Respiratory:  Positive for  cough and shortness of breath. Negative for chest tightness and wheezing.    Cardiovascular:  Negative for chest pain and leg swelling.   Gastrointestinal:  Negative for abdominal pain, nausea and vomiting.   Genitourinary:  Negative for dysuria.   Musculoskeletal:         Feet heel pain   Skin:  Negative for rash.   Neurological:  Positive for weakness. Negative for dizziness.   Psychiatric/Behavioral:  Negative for confusion.         +anxiety   Objective:     Vital Signs (Most Recent):  Temp: 97.1 °F (36.2 °C) (10/24/22 0759)  Pulse: 98 (10/24/22 0759)  Resp: 16 (10/24/22 0759)  BP: (!) 99/50 (10/24/22 0759)  SpO2: 97 % (10/24/22 0759)   Vital Signs (24h Range):  Temp:  [97.1 °F (36.2 °C)-97.7 °F (36.5 °C)] 97.1 °F (36.2 °C)  Pulse:  [69-98] 98  Resp:  [16-20] 16  SpO2:  [96 %-100 %] 97 %  BP: ()/(50-88) 99/50     Weight: 63.5 kg (139 lb 15.9 oz)  Body mass index is 26.47 kg/m².    Intake/Output Summary (Last 24 hours) at 10/24/2022 0848  Last data filed at 10/23/2022 1640  Gross per 24 hour   Intake --   Output 1500 ml   Net -1500 ml      Physical Exam  Vitals and nursing note reviewed.   HENT:      Mouth/Throat:      Pharynx: Oropharynx is clear. No oropharyngeal exudate.   Eyes:      Pupils: Pupils are equal, round, and reactive to light.   Cardiovascular:      Rate and Rhythm: Normal rate and regular rhythm.      Pulses: Normal pulses.      Heart sounds: No murmur heard.  Pulmonary:      Effort: Pulmonary effort is normal. No respiratory distress.      Breath sounds: Normal breath sounds. No wheezing or rales.   Abdominal:      General: Bowel sounds are normal. There is no distension.      Palpations: Abdomen is soft.      Tenderness: There is no abdominal tenderness.   Musculoskeletal:      Right lower leg: Edema (trace pitting edema) present.      Left lower leg: Edema (trace pitting edema) present.      Comments: Chronic deformity, muscle wasting of bilateral lower extremities   Skin:     General:  Skin is dry.      Comments: Dry cracked skin on BLE and heels, ~4cm stage 1 sacral wound   Neurological:      Mental Status: She is alert.       Significant Labs: All pertinent labs within the past 24 hours have been reviewed.  BMP:   Recent Labs   Lab 10/24/22  0630   GLU 68*      K 3.7      CO2 27   BUN 26*   CREATININE 0.6   CALCIUM 8.6*   MG 1.5*     CBC:   Recent Labs   Lab 10/22/22  1409 10/23/22  0445 10/24/22  0630   WBC 4.86 4.36 4.42   HGB 11.7* 12.8 11.3*   HCT 36.4* 42.5 36.1*   * 94* 97*       Significant Imaging: I have reviewed all pertinent imaging results/findings within the past 24 hours.      Assessment/Plan:      * Acute pulmonary embolism without acute cor pulmonale  -patient reporting worsening SOB for the past few days with decreased ability to perform ADLs  -CTA on admit with small pulmonary emboli in a left upper lobe segmental pulmonary artery  -Patient very averse to injections - will cancel planned enoxaparin, start Eliquis treatment dose 10mg PO BID x 7 days.   With plan to decrease to 5mg   -PRN O2 to maintain sats >92%  -Bilateral LE venous duplex U/S pending  -echo pending to evaluate for right heart strain; results below    Results for orders placed during the hospital encounter of 10/22/22    Echo    Interpretation Summary  · The left ventricle is normal in size with concentric remodeling and normal systolic function. The estimated ejection fraction is 70%.  · Normal right ventricular size with normal right ventricular systolic function.  · Normal left ventricular diastolic function.  · Small pericardial effusion.  · The estimated PA systolic pressure is 20 mmHg.  · Normal central venous pressure (3 mmHg).    Multiple open wounds of lower extremity  - wound care consultation for Monday    Shortness of breath  - etiology likely due to new PE noted on CTA Chest   -pt with hx of HFpEF and noted to have some possible edema/congestion on chest imaging today   - CTA  with small pleural effusion on the right with mild bilateral atelectasis  - trace pitting edema on exam  -Give 2 doses of IV lasix daily and monitor for symptoms improvement.     Generalized anxiety disorder  Major depressive disorder  Prolonged grief disorder  - patient and sister reporting anxiety that affects patient's motivation to perform tasks  - sister reports patient has struggled a lot with her mom passing 2 years ago  - patient speaks with counselor 1-2x/month over the phone, but her sessions are almost done  - enrolled in behavior health program; who she spoke with on 10/24:   - GAD7 score:11, moderate anxiety   - PHQ9 score:12, moderate depression severity  - patient not interested in starting medication at this time  - will give resources for therapy  - will place OP psych referral     Wheelchair dependence  Fall precautions      Mild intermittent asthma without complication  - duonebs BID  - Will assess need for scheduled vs PRN nebulizers     Cerebral palsy  Wheelchair dependence  Decreased functional mobility  - reporting increased difficulty performing ADLs and transferring 2/2 SOB  - PT/OT consulted; recommending SNF  - q2 turns and elevate heels off bed  - fall precautions      VTE Risk Mitigation (From admission, onward)         Ordered     apixaban tablet 5 mg  2 times daily        See Hyperspace for full Linked Orders Report.    10/22/22 2303     apixaban tablet 10 mg  2 times daily        See Hyperspace for full Linked Orders Report.    10/22/22 2303     Reason for No Pharmacological VTE Prophylaxis  Once        Question:  Reasons:  Answer:  Already adequately anticoagulated on oral Anticoagulants    10/22/22 2301     IP VTE HIGH RISK PATIENT  Once         10/22/22 2301     Place sequential compression device  Until discontinued         10/22/22 2301                Discharge Planning   CHAPO: 10/25/2022     Code Status: Full Code   Is the patient medically ready for discharge?: No    Reason for  patient still in hospital (select all that apply): Treatment, PT / OT recommendations and Pending disposition  Discharge Plan A: Skilled Nursing Facility                  Marissa Rouse PA-C  Department of Hospital Medicine   [facility]

## 2022-10-24 NOTE — ASSESSMENT & PLAN NOTE
Major depressive disorder  Prolonged grief disorder  - patient and sister reporting anxiety that affects patient's motivation to perform tasks  - sister reports patient has struggled a lot with her mom passing 2 years ago  - patient speaks with counselor 1-2x/month over the phone, but her sessions are almost done  - enrolled in behavior health program; who she spoke with on 10/24:   - GAD7 score:11, moderate anxiety   - PHQ9 score:12, moderate depression severity  - patient not interested in starting medication at this time  - will give resources for therapy  - will place OP psych referral

## 2022-10-24 NOTE — ASSESSMENT & PLAN NOTE
-patient reporting worsening SOB for the past few days with decreased ability to perform ADLs  -CTA on admit with small pulmonary emboli in a left upper lobe segmental pulmonary artery  -Patient very averse to injections - will cancel planned enoxaparin, start Eliquis treatment dose 10mg PO BID x 7 days.   With plan to decrease to 5mg   -PRN O2 to maintain sats >92%  -Bilateral LE venous duplex U/S pending  -echo pending to evaluate for right heart strain; results below    Results for orders placed during the hospital encounter of 10/22/22    Echo    Interpretation Summary  · The left ventricle is normal in size with concentric remodeling and normal systolic function. The estimated ejection fraction is 70%.  · Normal right ventricular size with normal right ventricular systolic function.  · Normal left ventricular diastolic function.  · Small pericardial effusion.  · The estimated PA systolic pressure is 20 mmHg.  · Normal central venous pressure (3 mmHg).

## 2022-10-24 NOTE — PLAN OF CARE
[facility]  Initial Discharge Assessment       Primary Care Provider: Kathrin Salazar MD    Admission Diagnosis: Shortness of breath [R06.02]  Dyspnea [R06.00]  Thrombocytopenia [D69.6]  History of cerebral palsy [Z86.69]  Other acute pulmonary embolism without acute cor pulmonale [I26.99]  Fatigue, unspecified type [R53.83]    Admission Date: 10/22/2022  Expected Discharge Date: 10/25/2022    Discharge Barriers Identified: None    Payor: [employer] MEDICARE / Plan: [employer] HEALTH / Product Type: Medicare Advantage /     Extended Emergency Contact Information  Primary Emergency Contact: Nadege Zhu  Address: unknown   [facility]  Home Phone: [phone]  Work Phone: [phone]  Mobile Phone: [phone]  Relation: Sister    Discharge Plan A: Skilled Nursing Facility  Discharge Plan B: Home with family, Home Health      [facility]  [address]  [facility] [address]  Phone: [phone] Fax: [phone]    [facility] [address] [facility]  [address] [facility] [address]  Phone: [phone] Fax: [phone]      Initial Assessment (most recent)       Adult Discharge Assessment - 10/24/22 1410          Discharge Assessment    Assessment Type Discharge Planning Assessment     Confirmed/corrected address, phone number and insurance Yes     Confirmed Demographics Correct on Facesheet     Source of Information patient;family     Communicated CHAPO with patient/caregiver Date not available/Unable to determine     Lives With alone     Do you expect to return to your current living situation? Yes     Prior to hospitilization cognitive status: Alert/Oriented     Current cognitive status: Alert/Oriented     Walking or Climbing Stairs Difficulty ambulation difficulty, requires equipment     Dressing/Bathing Difficulty none     Do you have any problems with: Errands/Grocery     Home Accessibility  wheelchair accessible     Home Layout Able to live on 1st floor     Equipment Currently Used at Home wheelchair     Readmission within 30 days? No     Patient currently being followed by outpatient case management? No     Do you currently have service(s) that help you manage your care at home? No     Do you take prescription medications? Yes     Do you have prescription coverage? Yes     Coverage [employer] and [employer]     Do you have any problems affording any of your prescribed medications? No     Is the patient taking medications as prescribed? yes     Who is going to help you get home at discharge? Sister- Nadege #[phone]     How do you get to doctors appointments? public transportation     Are you on dialysis? No     Do you take coumadin? No     Discharge Plan A Skilled Nursing Facility     Discharge Plan B Home with family;Home Health     DME Needed Upon Discharge  none     Discharge Plan discussed with: Patient;Sibling     Discharge Barriers Identified None     SDOH Lack of Primary/family support                   SW covering to assist with discharge planning assessments on today.       SW completed discharge planning assessment with the patient and pt's sister (Nadege via phone call) at bedside. SW verified demographic information listed on the pt.'s Face sheet. Patient reports living alone in a single, 1st floor apartment. Pt's sister (Nadege) plans to stay with the pt for 3-4 months following this hospital stay. Pt normally uses public transport ([facility]) for medical appointments. Pt's sister provides groceries, pt prepare her own meals. Pt currently uses a motorized wheelchair, reports complications with the leg rest (SW informed to contact [facility] for maintenance needs). Pt in need of a new shower chair, plans to purchase after this hospital stay. Pt and pt's sister agreeable to SNF placement ([facility] and [facility] preferences). SW sent referrals to ([facility], [facility], [facility]). Pt plans to return to her home after SNF placement.       Lucila Farrar LMSW  Case Management   [facility]   Ext. [phone]

## 2022-10-24 NOTE — SUBJECTIVE & OBJECTIVE
Interval History: NAEON. Afebrile. Hypotensive this morning 99/50. Otherwise VSS. BLE US yesterday without DVT. Echo without R heart strain. Mg 1.5; repleted. PT/OT recommending SNF. CM working on placement. Patient admits B/L heel pain. Denies any other complaints at this time. Wound care to evaluate today.     Review of Systems   Constitutional:  Positive for activity change. Negative for appetite change, chills and fever.   HENT:  Negative for trouble swallowing.    Respiratory:  Positive for cough and shortness of breath. Negative for chest tightness and wheezing.    Cardiovascular:  Negative for chest pain and leg swelling.   Gastrointestinal:  Negative for abdominal pain, nausea and vomiting.   Genitourinary:  Negative for dysuria.   Musculoskeletal:         Feet heel pain   Skin:  Negative for rash.   Neurological:  Positive for weakness. Negative for dizziness.   Psychiatric/Behavioral:  Negative for confusion.         +anxiety   Objective:     Vital Signs (Most Recent):  Temp: 97.1 °F (36.2 °C) (10/24/22 0759)  Pulse: 98 (10/24/22 0759)  Resp: 16 (10/24/22 0759)  BP: (!) 99/50 (10/24/22 0759)  SpO2: 97 % (10/24/22 0759)   Vital Signs (24h Range):  Temp:  [97.1 °F (36.2 °C)-97.7 °F (36.5 °C)] 97.1 °F (36.2 °C)  Pulse:  [69-98] 98  Resp:  [16-20] 16  SpO2:  [96 %-100 %] 97 %  BP: ()/(50-88) 99/50     Weight: 63.5 kg (139 lb 15.9 oz)  Body mass index is 26.47 kg/m².    Intake/Output Summary (Last 24 hours) at 10/24/2022 0848  Last data filed at 10/23/2022 1640  Gross per 24 hour   Intake --   Output 1500 ml   Net -1500 ml      Physical Exam  Vitals and nursing note reviewed.   HENT:      Mouth/Throat:      Pharynx: Oropharynx is clear. No oropharyngeal exudate.   Eyes:      Pupils: Pupils are equal, round, and reactive to light.   Cardiovascular:      Rate and Rhythm: Normal rate and regular rhythm.      Pulses: Normal pulses.      Heart sounds: No murmur heard.  Pulmonary:      Effort: Pulmonary effort  is normal. No respiratory distress.      Breath sounds: Normal breath sounds. No wheezing or rales.   Abdominal:      General: Bowel sounds are normal. There is no distension.      Palpations: Abdomen is soft.      Tenderness: There is no abdominal tenderness.   Musculoskeletal:      Right lower leg: Edema (trace pitting edema) present.      Left lower leg: Edema (trace pitting edema) present.      Comments: Chronic deformity, muscle wasting of bilateral lower extremities   Skin:     General: Skin is dry.      Comments: Dry cracked skin on BLE and heels, ~4cm stage 1 sacral wound   Neurological:      Mental Status: She is alert.       Significant Labs: All pertinent labs within the past 24 hours have been reviewed.  BMP:   Recent Labs   Lab 10/24/22  0630   GLU 68*      K 3.7      CO2 27   BUN 26*   CREATININE 0.6   CALCIUM 8.6*   MG 1.5*     CBC:   Recent Labs   Lab 10/22/22  1409 10/23/22  0445 10/24/22  0630   WBC 4.86 4.36 4.42   HGB 11.7* 12.8 11.3*   HCT 36.4* 42.5 36.1*   * 94* 97*       Significant Imaging: I have reviewed all pertinent imaging results/findings within the past 24 hours.

## 2022-10-24 NOTE — PROGRESS NOTES
Behavioral Health Community Health Worker  Follow-Up  Completed by:  Lolis Boles    Date:  10/24/2022    Patient Enrollment in Behavioral Health Program:  Naga Benavidez was enrolled in the Behavioral Health Program on 8/11/22    Assessments     Promis 10:  PROMIS-10 Questionnaire Scores 8/11/2022   Global Physical Health 9   Global Mental health Score 14       Depression PHQ:  PHQ9 10/24/2022   Total Score 12       Generalized Anxiety Disorder 7-Item Scale:  GAD7 10/24/2022   1. Feeling nervous, anxious, or on edge? 1   2. Not being able to stop or control worrying? 3   3. Worrying too much about different things? 3   4. Trouble relaxing? 1   5. Being so restless that it is hard to sit still? 0   6. Becoming easily annoyed or irritable? 2   7. Feeling afraid as if something awful might happen? 1   8. If you checked off any problems, how difficult have these problems made it for you to do your work, take care of things at home, or get along with other people? 1   JJ-7 Score 11       Patients' Global Impression of Change (PGIC) Scale:  Since beginning treatment at this clinic, how would you describe the change (if any) in ACTIVITY LIMITATIONS, SYMPTOMS, EMOTIONS, and OVERALL QUALITY OF LIFE, related to your painful condition?  No Value exists for the : OHS#[id number]      In a similar way, please check the number below that matches your degree of change since beginning care at this clinic (Much better (0) - Much Worse (10)): No Value exists for the : OHS#[id number]        Much Better                                     No Change                                    Much Worse                        -----------------------------------------------------------------------------                        0       1       2       3       4       5       6       7      8       9      10                     Call Summary     Assessment updated. Patient is in the hospital for physical issues and it may have some effect on her  mood.

## 2022-10-24 NOTE — PLAN OF CARE
Problem: Adult Inpatient Plan of Care  Goal: Plan of Care Review  Outcome: Ongoing, Progressing  Goal: Patient-Specific Goal (Individualized)  Outcome: Ongoing, Progressing  Goal: Absence of Hospital-Acquired Illness or Injury  Outcome: Ongoing, Progressing     Problem: Impaired Wound Healing  Goal: Optimal Wound Healing  Outcome: Ongoing, Progressing     Problem: Infection  Goal: Absence of Infection Signs and Symptoms  Outcome: Ongoing, Progressing     Problem: VTE (Venous Thromboembolism)  Goal: VTE (Venous Thromboembolism) Symptom Resolution  Outcome: Ongoing, Progressing

## 2022-10-25 ENCOUNTER — OFFICE VISIT (OUTPATIENT)
Dept: BEHAVIORAL HEALTH | Facility: CLINIC | Age: 62
End: 2022-10-25
Payer: COMMERCIAL

## 2022-10-25 DIAGNOSIS — F33.1 MODERATE EPISODE OF RECURRENT MAJOR DEPRESSIVE DISORDER: Primary | ICD-10-CM

## 2022-10-25 DIAGNOSIS — F41.1 GENERALIZED ANXIETY DISORDER: ICD-10-CM

## 2022-10-25 DIAGNOSIS — F40.00 AGORAPHOBIA: ICD-10-CM

## 2022-10-25 LAB
ANION GAP SERPL CALC-SCNC: 8 MMOL/L (ref 8–16)
ANISOCYTOSIS BLD QL SMEAR: SLIGHT
BASOPHILS # BLD AUTO: ABNORMAL K/UL (ref 0–0.2)
BASOPHILS NFR BLD: 0 % (ref 0–1.9)
BUN SERPL-MCNC: 27 MG/DL (ref 8–23)
CALCIUM SERPL-MCNC: 8.8 MG/DL (ref 8.7–10.5)
CHLORIDE SERPL-SCNC: 101 MMOL/L (ref 95–110)
CO2 SERPL-SCNC: 28 MMOL/L (ref 23–29)
CREAT SERPL-MCNC: 0.6 MG/DL (ref 0.5–1.4)
DACRYOCYTES BLD QL SMEAR: ABNORMAL
DIFFERENTIAL METHOD: ABNORMAL
EOSINOPHIL # BLD AUTO: ABNORMAL K/UL (ref 0–0.5)
EOSINOPHIL NFR BLD: 0 % (ref 0–8)
ERYTHROCYTE [DISTWIDTH] IN BLOOD BY AUTOMATED COUNT: 15.3 % (ref 11.5–14.5)
EST. GFR  (NO RACE VARIABLE): >60 ML/MIN/1.73 M^2
GLUCOSE SERPL-MCNC: 60 MG/DL (ref 70–110)
HCT VFR BLD AUTO: 35.6 % (ref 37–48.5)
HGB BLD-MCNC: 11.3 G/DL (ref 12–16)
HYPOCHROMIA BLD QL SMEAR: ABNORMAL
IMM GRANULOCYTES # BLD AUTO: ABNORMAL K/UL (ref 0–0.04)
IMM GRANULOCYTES NFR BLD AUTO: ABNORMAL % (ref 0–0.5)
LYMPHOCYTES # BLD AUTO: ABNORMAL K/UL (ref 1–4.8)
LYMPHOCYTES NFR BLD: 7 % (ref 18–48)
MAGNESIUM SERPL-MCNC: 1.8 MG/DL (ref 1.6–2.6)
MCH RBC QN AUTO: 28.6 PG (ref 27–31)
MCHC RBC AUTO-ENTMCNC: 31.7 G/DL (ref 32–36)
MCV RBC AUTO: 90 FL (ref 82–98)
METAMYELOCYTES NFR BLD MANUAL: 1 %
MONOCYTES # BLD AUTO: ABNORMAL K/UL (ref 0.3–1)
MONOCYTES NFR BLD: 4 % (ref 4–15)
MYELOCYTES NFR BLD MANUAL: 1 %
NEUTROPHILS # BLD AUTO: ABNORMAL K/UL (ref 1.8–7.7)
NEUTROPHILS NFR BLD: 86 % (ref 38–73)
NEUTS BAND NFR BLD MANUAL: 1 %
NRBC BLD-RTO: 0 /100 WBC
OVALOCYTES BLD QL SMEAR: ABNORMAL
PHOSPHATE SERPL-MCNC: 3.4 MG/DL (ref 2.7–4.5)
PLATELET # BLD AUTO: 109 K/UL (ref 150–450)
PLATELET BLD QL SMEAR: ABNORMAL
PMV BLD AUTO: ABNORMAL FL (ref 9.2–12.9)
POIKILOCYTOSIS BLD QL SMEAR: SLIGHT
POLYCHROMASIA BLD QL SMEAR: ABNORMAL
POTASSIUM SERPL-SCNC: 4.2 MMOL/L (ref 3.5–5.1)
RBC # BLD AUTO: 3.95 M/UL (ref 4–5.4)
SCHISTOCYTES BLD QL SMEAR: PRESENT
SODIUM SERPL-SCNC: 137 MMOL/L (ref 136–145)
SPHEROCYTES BLD QL SMEAR: ABNORMAL
WBC # BLD AUTO: 4.89 K/UL (ref 3.9–12.7)

## 2022-10-25 PROCEDURE — 90834 PSYTX W PT 45 MINUTES: CPT | Mod: 95,,, | Performed by: SOCIAL WORKER

## 2022-10-25 PROCEDURE — 90834 PR PSYCHOTHERAPY W/PATIENT, 45 MIN: ICD-10-PCS | Mod: 95,,, | Performed by: SOCIAL WORKER

## 2022-10-25 PROCEDURE — G0378 HOSPITAL OBSERVATION PER HR: HCPCS

## 2022-10-25 PROCEDURE — 99499 UNLISTED E&M SERVICE: CPT | Mod: 95,,, | Performed by: SOCIAL WORKER

## 2022-10-25 PROCEDURE — 3044F HG A1C LEVEL LT 7.0%: CPT | Mod: CPTII,95,, | Performed by: SOCIAL WORKER

## 2022-10-25 PROCEDURE — 99226 PR SUBSEQUENT OBSERVATION CARE,LEVEL III: ICD-10-PCS | Mod: ,,,

## 2022-10-25 PROCEDURE — 36415 COLL VENOUS BLD VENIPUNCTURE: CPT | Performed by: HOSPITALIST

## 2022-10-25 PROCEDURE — 3044F PR MOST RECENT HEMOGLOBIN A1C LEVEL <7.0%: ICD-10-PCS | Mod: CPTII,95,, | Performed by: SOCIAL WORKER

## 2022-10-25 PROCEDURE — 83735 ASSAY OF MAGNESIUM: CPT | Performed by: HOSPITALIST

## 2022-10-25 PROCEDURE — 94640 AIRWAY INHALATION TREATMENT: CPT

## 2022-10-25 PROCEDURE — 99499 RISK ADDL DX/OHS AUDIT: ICD-10-PCS | Mod: 95,,, | Performed by: SOCIAL WORKER

## 2022-10-25 PROCEDURE — 84100 ASSAY OF PHOSPHORUS: CPT | Performed by: HOSPITALIST

## 2022-10-25 PROCEDURE — 97110 THERAPEUTIC EXERCISES: CPT | Mod: CQ

## 2022-10-25 PROCEDURE — 97530 THERAPEUTIC ACTIVITIES: CPT | Mod: CQ

## 2022-10-25 PROCEDURE — 80048 BASIC METABOLIC PNL TOTAL CA: CPT | Performed by: HOSPITALIST

## 2022-10-25 PROCEDURE — 85025 COMPLETE CBC W/AUTO DIFF WBC: CPT | Performed by: HOSPITALIST

## 2022-10-25 PROCEDURE — 25000242 PHARM REV CODE 250 ALT 637 W/ HCPCS: Performed by: HOSPITALIST

## 2022-10-25 PROCEDURE — 94761 N-INVAS EAR/PLS OXIMETRY MLT: CPT

## 2022-10-25 PROCEDURE — 25000003 PHARM REV CODE 250: Performed by: HOSPITALIST

## 2022-10-25 PROCEDURE — 99226 PR SUBSEQUENT OBSERVATION CARE,LEVEL III: CPT | Mod: ,,,

## 2022-10-25 RX ADMIN — APIXABAN 10 MG: 5 TABLET, FILM COATED ORAL at 08:10

## 2022-10-25 RX ADMIN — FLUTICASONE PROPIONATE 100 MCG: 50 SPRAY, METERED NASAL at 08:10

## 2022-10-25 RX ADMIN — THERA TABS 1 TABLET: TAB at 08:10

## 2022-10-25 RX ADMIN — IPRATROPIUM BROMIDE AND ALBUTEROL SULFATE 3 ML: 2.5; .5 SOLUTION RESPIRATORY (INHALATION) at 07:10

## 2022-10-25 RX ADMIN — MICONAZOLE NITRATE: 20 CREAM TOPICAL at 08:10

## 2022-10-25 RX ADMIN — ACETAMINOPHEN 650 MG: 325 TABLET ORAL at 09:10

## 2022-10-25 RX ADMIN — AMLODIPINE BESYLATE 2.5 MG: 2.5 TABLET ORAL at 08:10

## 2022-10-25 NOTE — PROGRESS NOTES
The patient location is: [place]  The chief complaint leading to consultation is: depression, anxiety, agoraphobia    Visit type: audiovisual    Face to Face time with patient: 40  45 minutes of total time spent on the encounter, which includes face to face time and non-face to face time preparing to see the patient (eg, review of tests), Obtaining and/or reviewing separately obtained history, Documenting clinical information in the electronic or other health record, Independently interpreting results (not separately reported) and communicating results to the patient/family/caregiver, or Care coordination (not separately reported).     Individual Psychotherapy (MASON/PhD)  Nagaez Abrams Pramod,  10/25/2022    Site:  Telemed         Therapeutic Intervention: Met with patient for individual psychotherapy.    Chief complaint/reason for encounter: depression and anxiety     Interval history and content of current session: Pt is in the hospital. Pt went in for a pulmonary embolism. She is going to be transferred to a rehab facility. She feels the supplements she is taking in the hospital are putting her in a better mood. She is getting used to the low sodium diet. PT has been in the hospital since Saturday.  PT reports she was feeling depressed before the hospital. Her sister told her to have a more positive attitude. Pt realizes she needs to speak more positively to herself so she doesn't think everything is going to be bad.  She also realizes she shouldn't watch the news as much as she does. Pt has an appointment with Emma CUEVAS on 11/22. This  will see this pt once more before she transfers to [place]. She shares how her anxiety about danger keeps her inside and prevents her from leaving her house. She feels trying to be more positive overall will help with this.     Treatment plan:  Target symptoms: depression, anxiety , agoraphobia  Why chosen therapy is appropriate versus another modality: relevant to diagnosis,  patient responds to this modality  Outcome monitoring methods: self-report, observation  Therapeutic intervention type: behavior modifying psychotherapy, supportive psychotherapy    Risk parameters:  Patient reports no suicidal ideation  Patient reports no homicidal ideation  Patient reports no self-injurious behavior  Patient reports no violent behavior    Verbal deficits: None    Patient's response to intervention:  The patient's response to intervention is accepting.    Progress toward goals and other mental status changes:  The patient's progress toward goals is fair .    Diagnosis:     ICD-10-CM ICD-9-CM   1. Moderate episode of recurrent major depressive disorder  F33.1 296.32   2. Generalized anxiety disorder  F41.1 300.02   3. Agoraphobia  F40.00 300.22       Plan: Pt plans to continue individual psychotherapy    Return to clinic: 3 weeks    Length of Service (minutes): 45        Each patient to whom he or she provides medical services by telemedicine is:  (1) informed of the relationship between the physician and patient and the respective role of any other health care provider with respect to management of the patient; and (2) notified that he or she may decline to receive medical services by telemedicine and may withdraw from such care at any time.

## 2022-10-25 NOTE — PT/OT/SLP PROGRESS
"Physical Therapy Treatment    Patient Name:  Naga Benavidez   MRN:  [id number]    Recommendations:     Discharge Recommendations:  nursing facility, skilled   Discharge Equipment Recommendations: bedside commode, shower chair   Barriers to discharge: Decreased caregiver support    Assessment:     Naga Benavidez is a 62 y.o. female admitted with a medical diagnosis of Acute pulmonary embolism without acute cor pulmonale.  She presents with the following impairments/functional limitations:  weakness, impaired endurance, gait instability, impaired functional mobility, pain, decreased safety awareness, decreased coordination.  Pt was agreeable and tolerated session fairly. Pt continues to needed increase assistance with lateral scooting along EOB, but able to completed with 1 person and maxA. Pt continues to benefit from therapy to improve functional endurance and strength    Rehab Prognosis: Good; patient would benefit from acute skilled PT services to address these deficits and reach maximum level of function.    Recent Surgery: * No surgery found *      Plan:     During this hospitalization, patient to be seen 3 x/week to address the identified rehab impairments via gait training, therapeutic activities, therapeutic exercises, neuromuscular re-education and progress toward the following goals:    Plan of Care Expires:  11/22/22    Subjective     Chief Complaint: pain  Patient/Family Comments/goals: "doesn't hurt as much since I got [pressure boot]"  Pain/Comfort:  Pain Rating 1: 3/10  Location 1:  (did not specify location)  Pain Addressed 1: Reposition, Distraction  Pain Rating Post-Intervention 1: 3/10      Objective:     Communicated with RN prior to session.  Patient found HOB elevated with telemetry, peripheral IV upon PT entry to room.     General Precautions: Standard, fall   Orthopedic Precautions:N/A   Braces: N/A  Respiratory Status: Room air     Functional Mobility:  Additional staff present: " Rehab Tech (Delia)  Bed Mobility:   Rolling to Left/Right: minimum assistance and moderate assistance  Assisted with reaching to bed rails and hand placement  Scooting   to HOB: via drawsheet: total assistance and 2 persons  to EOB: maximal assistance  Along EOB: maximal assistance  Verbal cues for hand/feet placement   Supine to Sit: moderate assistance; HOB elevated  Assisted with trunk and BLE management   Sit to Supine: moderate assistance and 2 persons  Assisted with trunk and BLE management   Transfer/Gait:  WB bound at baseline  Balance:   Static/Dynamic sitting EOB balance: Rajani-CGA   Completed therex and lateral scooting    AM-PAC 6 CLICK MOBILITY  Turning over in bed (including adjusting bedclothes, sheets and blankets)?: 2  Sitting down on and standing up from a chair with arms (e.g., wheelchair, bedside commode, etc.): 2  Moving from lying on back to sitting on the side of the bed?: 2  Moving to and from a bed to a chair (including a wheelchair)?: 1  Need to walk in hospital room?: 1  Climbing 3-5 steps with a railing?: 1  Basic Mobility Total Score: 9     Treatment & Education:  Increase time assisted with pericare cleaning  Seated BLE AAROM/PROM x10 reps: LAQ, marching, and heel/toe raises  Increased knee flexion noted  Patient educated on role of therapy, goals of session, and benefits of out of bed mobility.   Instructed on use of call button and importance of calling nursing staff for assistance with mobility   Questions/concerns addressed within PTA scope of practice  Pt verbalized understanding.  Whiteboard Updated    Patient left HOB elevated with all lines intact, call button in reach, and PCT notified..    GOALS:   Multidisciplinary Problems       Physical Therapy Goals          Problem: Physical Therapy    Goal Priority Disciplines Outcome Goal Variances Interventions   Physical Therapy Goal     PT, PT/OT Ongoing, Progressing     Description: Goals to be met by: 11/7/2022    Patient will  increase functional independence with mobility by performin. Supine to sit with MInimal Assistance  2. Sit to supine with MInimal Assistance  3. Rolling to Left and Right with Minimal Assistance.  4. Bed to chair transfer with Minimal Assistance                          Time Tracking:     PT Received On: 10/25/22  PT Start Time: 1438     PT Stop Time: 1506  PT Total Time (min): 28 min     Billable Minutes: Therapeutic Activity 18 and Therapeutic Exercise 10    Treatment Type: Treatment  PT/PTA: PTA     PTA Visit Number: 1     10/25/2022

## 2022-10-25 NOTE — ASSESSMENT & PLAN NOTE
-patient reporting worsening SOB for the past few days with decreased ability to perform ADLs  -CTA on admit with small pulmonary emboli in a left upper lobe segmental pulmonary artery  -Patient very averse to injections - will cancel planned enoxaparin, start Eliquis treatment dose 10mg PO BID x 7 days.   With plan to decrease to 5mg   -PRN O2 to maintain sats >92%  -Bilateral LE venous duplex U/S without dvt  -echo to evaluate for right heart strain; results below    Results for orders placed during the hospital encounter of 10/22/22    Echo    Interpretation Summary  · The left ventricle is normal in size with concentric remodeling and normal systolic function. The estimated ejection fraction is 70%.  · Normal right ventricular size with normal right ventricular systolic function.  · Normal left ventricular diastolic function.  · Small pericardial effusion.  · The estimated PA systolic pressure is 20 mmHg.  · Normal central venous pressure (3 mmHg).

## 2022-10-25 NOTE — PROGRESS NOTES
[facility] [address] [place] [address] Medicine  Progress Note    Patient Name: Naga Benavidez  MRN: [id number]  Patient Class: OP- Observation   Admission Date: 10/22/2022  Length of Stay: 0 days  Attending Physician: Judy Alejandre MD  Primary Care Provider: Kathrin Salazar MD        Subjective:     Principal Problem:Acute pulmonary embolism without acute cor pulmonale        HPI:  61 y/o AAF hx of Cerebral Palsy, Chronic Debility, Wheelchair Dependence, HTN, presents wto the ED with complaints of shortness of breath.   She lives alone, primarily wheelchair dependent, usually can perform transfers at home, but with recent symptoms of increased dyspnea, has been unable to transfer.  Her family member who usually helps/assists in her care cannot provide care now, they have over-riding medical issue.  She has hx of chronic LE wounds, managed by wound care clinic.     She was found with stable lab work (CBC/CMP)  Patient admitted to hospital medicine and a pending CTA Chest which was ordered for elevated D-dimer value 3.08 has returned with critical value reported to ED physician, patient with Left Sided subsegmental pulmonary embolism.     ED Treatment: [facility].       Overview/Hospital Course:  Naga Benavidez is a 62 F admitted to hospital medicine for further management of L sided PE. Started on Eliquis 10mg BID x7 days; will decrease to 5 mg BID. PT/OT consulted; recommending SNF. CM working on placement.       Interval History: NAEON. VSSAF. Evaluated by woundcare yesterday. PT/OT recommending SNF. CM following for placement. Patient denies complaints at this time.     Review of Systems   Constitutional:  Positive for activity change. Negative for appetite change, chills and fever.   HENT:  Negative for trouble swallowing.    Respiratory:  Positive for cough and shortness of breath. Negative for chest tightness and wheezing.    Cardiovascular:  Negative for chest pain and leg swelling.    Gastrointestinal:  Negative for abdominal pain, nausea and vomiting.   Genitourinary:  Negative for dysuria.   Musculoskeletal:         Feet heel pain   Skin:  Negative for rash.   Neurological:  Positive for weakness. Negative for dizziness.   Psychiatric/Behavioral:  Negative for confusion.         +anxiety   Objective:     Vital Signs (Most Recent):  Temp: 97.2 °F (36.2 °C) (10/25/22 0809)  Pulse: 82 (10/25/22 0809)  Resp: 18 (10/25/22 0809)  BP: 127/72 (10/25/22 0809)  SpO2: 100 % (10/25/22 0809) Vital Signs (24h Range):  Temp:  [96.7 °F (35.9 °C)-97.8 °F (36.6 °C)] 97.2 °F (36.2 °C)  Pulse:  [72-95] 82  Resp:  [16-18] 18  SpO2:  [99 %-100 %] 100 %  BP: (116-132)/(57-79) 127/72     Weight: 63.5 kg (139 lb 15.9 oz)  Body mass index is 26.47 kg/m².    Intake/Output Summary (Last 24 hours) at 10/25/2022 0900  Last data filed at 10/25/2022 0645  Gross per 24 hour   Intake --   Output 2500 ml   Net -2500 ml      Physical Exam  Vitals and nursing note reviewed.   HENT:      Mouth/Throat:      Pharynx: Oropharynx is clear. No oropharyngeal exudate.   Eyes:      Pupils: Pupils are equal, round, and reactive to light.   Cardiovascular:      Rate and Rhythm: Normal rate and regular rhythm.      Pulses: Normal pulses.      Heart sounds: No murmur heard.  Pulmonary:      Effort: Pulmonary effort is normal. No respiratory distress.      Breath sounds: Normal breath sounds. No wheezing or rales.   Abdominal:      General: Bowel sounds are normal. There is no distension.      Palpations: Abdomen is soft.      Tenderness: There is no abdominal tenderness.   Musculoskeletal:      Right lower leg: Edema (trace pitting edema) present.      Left lower leg: Edema (trace pitting edema) present.      Comments: Chronic deformity, muscle wasting of bilateral lower extremities   Skin:     General: Skin is dry.      Comments: Dry cracked skin on BLE and heels, ~4cm stage 1 sacral wound. Protective heel boots in place B/L   Neurological:       Mental Status: She is alert.       Significant Labs: All pertinent labs within the past 24 hours have been reviewed.  BMP:   Recent Labs   Lab 10/25/22  0304   GLU 60*      K 4.2      CO2 28   BUN 27*   CREATININE 0.6   CALCIUM 8.8   MG 1.8     CMP:   Recent Labs   Lab 10/24/22  0630 10/25/22  0304    137   K 3.7 4.2    101   CO2 27 28   GLU 68* 60*   BUN 26* 27*   CREATININE 0.6 0.6   CALCIUM 8.6* 8.8   ANIONGAP 9 8     Magnesium:   Recent Labs   Lab 10/24/22  0630 10/25/22  0304   MG 1.5* 1.8       Significant Imaging: I have reviewed all pertinent imaging results/findings within the past 24 hours.      Assessment/Plan:      * Acute pulmonary embolism without acute cor pulmonale  -patient reporting worsening SOB for the past few days with decreased ability to perform ADLs  -CTA on admit with small pulmonary emboli in a left upper lobe segmental pulmonary artery  -Patient very averse to injections - will cancel planned enoxaparin, start Eliquis treatment dose 10mg PO BID x 7 days.   With plan to decrease to 5mg   -PRN O2 to maintain sats >92%  -Bilateral LE venous duplex U/S without dvt  -echo to evaluate for right heart strain; results below    Results for orders placed during the hospital encounter of 10/22/22    Echo    Interpretation Summary  · The left ventricle is normal in size with concentric remodeling and normal systolic function. The estimated ejection fraction is 70%.  · Normal right ventricular size with normal right ventricular systolic function.  · Normal left ventricular diastolic function.  · Small pericardial effusion.  · The estimated PA systolic pressure is 20 mmHg.  · Normal central venous pressure (3 mmHg).    Multiple open wounds of lower extremity  - wound care following  - protective heel boots in place    Shortness of breath  - etiology likely due to new PE noted on CTA Chest   -pt with hx of HFpEF and noted to have some possible edema/congestion on chest  imaging today   - CTA with small pleural effusion on the right with mild bilateral atelectasis  - trace pitting edema on exam  -Give 2 doses of IV lasix daily and monitor for symptoms improvement  - satting well on RA    Generalized anxiety disorder  Major depressive disorder  Prolonged grief disorder  - patient and sister reporting anxiety that affects patient's motivation to perform tasks  - sister reports patient has struggled a lot with her mom passing 2 years ago  - patient speaks with counselor 1-2x/month over the phone, but her sessions are almost done  - enrolled in behavior health program; who she spoke with on 10/24:   - GAD7 score:11, moderate anxiety   - PHQ9 score:12, moderate depression severity  - patient not interested in starting medication at this time  - will give resources for therapy  - OP psych referral; appointment scheduled    Mild intermittent asthma without complication  - duonebs BID  - Will assess need for scheduled vs PRN nebulizers     Cerebral palsy  Wheelchair dependence  Decreased functional mobility  - reporting increased difficulty performing ADLs and transferring 2/2 SOB  - PT/OT consulted; recommending SNF  - q2 turns and elevate heels off bed  - fall precautions      VTE Risk Mitigation (From admission, onward)         Ordered     apixaban tablet 5 mg  2 times daily        See Hyperspace for full Linked Orders Report.    10/22/22 2303     apixaban tablet 10 mg  2 times daily        See Hyperspace for full Linked Orders Report.    10/22/22 2303     Reason for No Pharmacological VTE Prophylaxis  Once        Question:  Reasons:  Answer:  Already adequately anticoagulated on oral Anticoagulants    10/22/22 2301     IP VTE HIGH RISK PATIENT  Once         10/22/22 2301     Place sequential compression device  Until discontinued         10/22/22 2301                Discharge Planning   CHAPO: 10/26/2022     Code Status: Full Code   Is the patient medically ready for discharge?: No     Reason for patient still in hospital (select all that apply): Pending disposition  Discharge Plan A: Skilled Nursing Facility                  Marissa Rouse PA-C  Department of Hospital Medicine   [facility]

## 2022-10-25 NOTE — SUBJECTIVE & OBJECTIVE
Interval History: NAEON. VSSAF. Evaluated by woundcare yesterday. PT/OT recommending SNF. CM following for placement. Patient denies complaints at this time.     Review of Systems   Constitutional:  Positive for activity change. Negative for appetite change, chills and fever.   HENT:  Negative for trouble swallowing.    Respiratory:  Positive for cough and shortness of breath. Negative for chest tightness and wheezing.    Cardiovascular:  Negative for chest pain and leg swelling.   Gastrointestinal:  Negative for abdominal pain, nausea and vomiting.   Genitourinary:  Negative for dysuria.   Musculoskeletal:         Feet heel pain   Skin:  Negative for rash.   Neurological:  Positive for weakness. Negative for dizziness.   Psychiatric/Behavioral:  Negative for confusion.         +anxiety   Objective:     Vital Signs (Most Recent):  Temp: 97.2 °F (36.2 °C) (10/25/22 0809)  Pulse: 82 (10/25/22 0809)  Resp: 18 (10/25/22 0809)  BP: 127/72 (10/25/22 0809)  SpO2: 100 % (10/25/22 0809) Vital Signs (24h Range):  Temp:  [96.7 °F (35.9 °C)-97.8 °F (36.6 °C)] 97.2 °F (36.2 °C)  Pulse:  [72-95] 82  Resp:  [16-18] 18  SpO2:  [99 %-100 %] 100 %  BP: (116-132)/(57-79) 127/72     Weight: 63.5 kg (139 lb 15.9 oz)  Body mass index is 26.47 kg/m².    Intake/Output Summary (Last 24 hours) at 10/25/2022 0900  Last data filed at 10/25/2022 0645  Gross per 24 hour   Intake --   Output 2500 ml   Net -2500 ml      Physical Exam  Vitals and nursing note reviewed.   HENT:      Mouth/Throat:      Pharynx: Oropharynx is clear. No oropharyngeal exudate.   Eyes:      Pupils: Pupils are equal, round, and reactive to light.   Cardiovascular:      Rate and Rhythm: Normal rate and regular rhythm.      Pulses: Normal pulses.      Heart sounds: No murmur heard.  Pulmonary:      Effort: Pulmonary effort is normal. No respiratory distress.      Breath sounds: Normal breath sounds. No wheezing or rales.   Abdominal:      General: Bowel sounds are normal.  There is no distension.      Palpations: Abdomen is soft.      Tenderness: There is no abdominal tenderness.   Musculoskeletal:      Right lower leg: Edema (trace pitting edema) present.      Left lower leg: Edema (trace pitting edema) present.      Comments: Chronic deformity, muscle wasting of bilateral lower extremities   Skin:     General: Skin is dry.      Comments: Dry cracked skin on BLE and heels, ~4cm stage 1 sacral wound. Protective heel boots in place B/L   Neurological:      Mental Status: She is alert.       Significant Labs: All pertinent labs within the past 24 hours have been reviewed.  BMP:   Recent Labs   Lab 10/25/22  0304   GLU 60*      K 4.2      CO2 28   BUN 27*   CREATININE 0.6   CALCIUM 8.8   MG 1.8     CMP:   Recent Labs   Lab 10/24/22  0630 10/25/22  0304    137   K 3.7 4.2    101   CO2 27 28   GLU 68* 60*   BUN 26* 27*   CREATININE 0.6 0.6   CALCIUM 8.6* 8.8   ANIONGAP 9 8     Magnesium:   Recent Labs   Lab 10/24/22  0630 10/25/22  0304   MG 1.5* 1.8       Significant Imaging: I have reviewed all pertinent imaging results/findings within the past 24 hours.

## 2022-10-25 NOTE — PLAN OF CARE
LOS spoke with Pt's sister, Nadege #[phone], she was notified that there are no accepting SNF's at this time, and that we will expand the search for an accepting SNF. Nadege asked the SNF be close to home. SW stated her understanding that close to home is preferred, however, we will have to expand the search. Nadege verbalized her understanding.     [facility] fax [facility] per family request. More SNF referrals sent via Care port.     LOS will follow.    KULDIP Erickson, LMSW [facility]  [id number]

## 2022-10-25 NOTE — ASSESSMENT & PLAN NOTE
- etiology likely due to new PE noted on CTA Chest   -pt with hx of HFpEF and noted to have some possible edema/congestion on chest imaging today   - CTA with small pleural effusion on the right with mild bilateral atelectasis  - trace pitting edema on exam  -Give 2 doses of IV lasix daily and monitor for symptoms improvement  - satting well on RA

## 2022-10-25 NOTE — ASSESSMENT & PLAN NOTE
Major depressive disorder  Prolonged grief disorder  - patient and sister reporting anxiety that affects patient's motivation to perform tasks  - sister reports patient has struggled a lot with her mom passing 2 years ago  - patient speaks with counselor 1-2x/month over the phone, but her sessions are almost done  - enrolled in behavior health program; who she spoke with on 10/24:   - GAD7 score:11, moderate anxiety   - PHQ9 score:12, moderate depression severity  - patient not interested in starting medication at this time  - will give resources for therapy  - OP psych referral; appointment scheduled

## 2022-10-25 NOTE — PLAN OF CARE
Problem: Adult Inpatient Plan of Care  Goal: Plan of Care Review  Outcome: Ongoing, Progressing  Goal: Patient-Specific Goal (Individualized)  Outcome: Ongoing, Progressing  Goal: Absence of Hospital-Acquired Illness or Injury  Outcome: Ongoing, Progressing  Goal: Optimal Comfort and Wellbeing  Outcome: Ongoing, Progressing  Goal: Readiness for Transition of Care  Outcome: Ongoing, Progressing     Problem: Impaired Wound Healing  Goal: Optimal Wound Healing  Outcome: Ongoing, Progressing     Problem: Infection  Goal: Absence of Infection Signs and Symptoms  Outcome: Ongoing, Progressing     Problem: Skin Injury Risk Increased  Goal: Skin Health and Integrity  Outcome: Ongoing, Progressing     Problem: VTE (Venous Thromboembolism)  Goal: VTE (Venous Thromboembolism) Symptom Resolution  Outcome: Ongoing, Progressing

## 2022-10-26 PROCEDURE — 97112 NEUROMUSCULAR REEDUCATION: CPT

## 2022-10-26 PROCEDURE — 97530 THERAPEUTIC ACTIVITIES: CPT

## 2022-10-26 PROCEDURE — 25000003 PHARM REV CODE 250: Performed by: HOSPITALIST

## 2022-10-26 PROCEDURE — 99226 PR SUBSEQUENT OBSERVATION CARE,LEVEL III: ICD-10-PCS | Mod: ,,,

## 2022-10-26 PROCEDURE — G0378 HOSPITAL OBSERVATION PER HR: HCPCS

## 2022-10-26 PROCEDURE — 99226 PR SUBSEQUENT OBSERVATION CARE,LEVEL III: CPT | Mod: ,,,

## 2022-10-26 RX ORDER — FUROSEMIDE 40 MG/1
40 TABLET ORAL ONCE
Status: DISCONTINUED | OUTPATIENT
Start: 2022-10-27 | End: 2022-10-27 | Stop reason: HOSPADM

## 2022-10-26 RX ORDER — IPRATROPIUM BROMIDE AND ALBUTEROL SULFATE 2.5; .5 MG/3ML; MG/3ML
3 SOLUTION RESPIRATORY (INHALATION)
Status: DISCONTINUED | OUTPATIENT
Start: 2022-10-26 | End: 2022-10-27 | Stop reason: HOSPADM

## 2022-10-26 RX ADMIN — APIXABAN 10 MG: 5 TABLET, FILM COATED ORAL at 08:10

## 2022-10-26 RX ADMIN — FLUTICASONE PROPIONATE 100 MCG: 50 SPRAY, METERED NASAL at 09:10

## 2022-10-26 RX ADMIN — THERA TABS 1 TABLET: TAB at 08:10

## 2022-10-26 RX ADMIN — AMLODIPINE BESYLATE 2.5 MG: 2.5 TABLET ORAL at 08:10

## 2022-10-26 RX ADMIN — MICONAZOLE NITRATE: 20 CREAM TOPICAL at 09:10

## 2022-10-26 RX ADMIN — MICONAZOLE NITRATE: 20 CREAM TOPICAL at 08:10

## 2022-10-26 NOTE — PT/OT/SLP PROGRESS
Occupational Therapy   Treatment    Name: Naga Benavidez  MRN: [id number]  Admitting Diagnosis:  Acute pulmonary embolism without acute cor pulmonale       Recommendations:     Discharge Recommendations: nursing facility, skilled  Discharge Equipment Recommendations:  bedside commode, shower chair  Barriers to discharge:       Assessment:     Naga Benavidez is a 62 y.o. female with a medical diagnosis of Acute pulmonary embolism without acute cor pulmonale. 3Performance deficits affecting function are weakness, impaired endurance, impaired self care skills, impaired functional mobility, gait instability, impaired balance, decreased coordination, decreased lower extremity function, decreased upper extremity function, decreased safety awareness.     Rehab Prognosis:  Fair; patient would benefit from acute skilled OT services to address these deficits and reach maximum level of function.       Plan:     Patient to be seen 3 x/week to address the above listed problems via self-care/home management, therapeutic activities, therapeutic exercises, neuromuscular re-education  Plan of Care Expires: 11/22/22  Plan of Care Reviewed with: patient    Subjective     Pain/Comfort:  Pain Rating 1: 0/10    Objective:     Communicated with: RN prior to session.  Patient found supine with   upon OT entry to room.    General Precautions: Standard, fall   Orthopedic Precautions:N/A   Braces:    Respiratory Status: Room air     Occupational Performance:     Bed Mobility:    Patient completed Rolling/Turning to Left with  minimum assistance  Patient completed Scooting/Bridging with minimum assistance (lateral scooting with knees blocked).  Patient completed Supine to Sit with moderate assistance  Patient completed Sit to Supine with moderate assistance     [facility] 6 Click ADL: 17    Treatment & Education:  Pt sat EOB 15 minutes with SBA/CGA needing to block knees to prevent sliding.  (B)LE contractures present  Discussed OT  POC.    Patient left supine with call button in reach    GOALS:   Multidisciplinary Problems       Occupational Therapy Goals          Problem: Occupational Therapy    Goal Priority Disciplines Outcome Interventions   Occupational Therapy Goal     OT, PT/OT Ongoing, Progressing    Description: Goals to be met by: 10/08     Patient will increase functional independence with ADLs by performing:    Feeding with Supervision.  UE Dressing with Minimal Assistance.  LE Dressing with Moderate Assistance.  Grooming while seated with Supervision.  Toileting from bedside commode with Minimal Assistance for hygiene and clothing management.                          Time Tracking:     OT Date of Treatment: 10/26/22  OT Start Time: 1054  OT Stop Time: 1120  OT Total Time (min): 26 min    Billable Minutes:Therapeutic Activity 13  Neuromuscular Re-education 13    OT/MARY: OT     MARY Visit Number: 0    10/26/2022

## 2022-10-26 NOTE — SUBJECTIVE & OBJECTIVE
Interval History: NAEON. VSSAF. Continues satting >95% on RA. SOB at rest improving, but still c/o AHMADI. Denies any other complaints at this time. Awaiting SNF placement.     Review of Systems   Constitutional:  Positive for activity change. Negative for appetite change, chills and fever.   HENT:  Negative for trouble swallowing.    Respiratory:  Positive for cough and shortness of breath. Negative for chest tightness and wheezing.    Cardiovascular:  Negative for chest pain and leg swelling.   Gastrointestinal:  Negative for abdominal pain, nausea and vomiting.   Genitourinary:  Negative for dysuria.   Musculoskeletal:         Feet heel pain   Skin:  Negative for rash.   Neurological:  Positive for weakness. Negative for dizziness.   Psychiatric/Behavioral:  Negative for confusion.         +anxiety   Objective:     Vital Signs (Most Recent):  Temp: 97 °F (36.1 °C) (10/26/22 0339)  Pulse: 67 (10/26/22 0339)  Resp: 18 (10/26/22 0339)  BP: 139/85 (10/26/22 0339)  SpO2: 97 % (10/26/22 0339) Vital Signs (24h Range):  Temp:  [97 °F (36.1 °C)-98.4 °F (36.9 °C)] 97 °F (36.1 °C)  Pulse:  [67-81] 67  Resp:  [17-20] 18  SpO2:  [97 %-100 %] 97 %  BP: (101-150)/(70-85) 139/85     Weight: 63.5 kg (139 lb 15.9 oz)  Body mass index is 26.47 kg/m².    Intake/Output Summary (Last 24 hours) at 10/26/2022 0816  Last data filed at 10/26/2022 0438  Gross per 24 hour   Intake --   Output 700 ml   Net -700 ml      Physical Exam  Vitals and nursing note reviewed.   HENT:      Mouth/Throat:      Pharynx: Oropharynx is clear. No oropharyngeal exudate.   Eyes:      Pupils: Pupils are equal, round, and reactive to light.   Cardiovascular:      Rate and Rhythm: Normal rate and regular rhythm.      Pulses: Normal pulses.      Heart sounds: No murmur heard.  Pulmonary:      Effort: Pulmonary effort is normal. No respiratory distress.      Breath sounds: Normal breath sounds. No wheezing or rales.   Abdominal:      General: Bowel sounds are normal.  There is no distension.      Palpations: Abdomen is soft.      Tenderness: There is no abdominal tenderness.   Musculoskeletal:      Right lower leg: Edema (trace pitting edema) present.      Left lower leg: Edema (trace pitting edema) present.      Comments: Chronic deformity, muscle wasting of bilateral lower extremities   Skin:     General: Skin is dry.      Comments: Dry cracked skin on BLE and heels, ~4cm stage 1 sacral wound. Protective heel boots in place B/L   Neurological:      Mental Status: She is alert.       Significant Labs: All pertinent labs within the past 24 hours have been reviewed.  BMP:   Recent Labs   Lab 10/25/22  0304   GLU 60*      K 4.2      CO2 28   BUN 27*   CREATININE 0.6   CALCIUM 8.8   MG 1.8     CBC:   Recent Labs   Lab 10/25/22  0304   WBC 4.89   HGB 11.3*   HCT 35.6*   *       Significant Imaging: I have reviewed all pertinent imaging results/findings within the past 24 hours.

## 2022-10-26 NOTE — ASSESSMENT & PLAN NOTE
Major depressive disorder  Prolonged grief disorder  - patient and sister reporting anxiety that affects patient's motivation to perform tasks  - sister reports patient has struggled a lot with her mom passing 2 years ago  - patient speaks with counselor 1-2x/month over the phone, but her sessions are almost done  - enrolled in behavior health program; who she spoke with on 10/24:   - GAD7 score:11, moderate anxiety   - PHQ9 score:12, moderate depression severity  - patient not interested in starting medication at this time  - follows with behavioral health  - OP psych referral; appointment scheduled

## 2022-10-26 NOTE — PLAN OF CARE
"SW following up on SNF referrals. None accepting at this time, but no answers yet from [facility]. SW called Jennifer at [facility], they will review now and get back to SW.     12:39 PM  Jennifer called: [facility] and [facility] have accepted. Pt's sister, Nadege, who is hesitate to go to either facility. SW explained to Nadege that the Pt is medically ready, and has 2 accepting facilities and either has to choose one or they can take the Pt home with home health. Nadege states she's unable to care for the Pt at home. [facility] choice [facility]. SW contacted them, but they denied the Pt due to care needs exceed capacity to care for her. Nadege stated she will make a decision and asked SW to call back in one hour. Pt has "Community Choice Program Waiver, LOCET is not needed. PAS[employer] faxed and uploaded.    4:30 PM  Family decided on [facility], they are calling Nadege to sign paperwork. PHN auth submitted.     SW will follow.    KULDIP Erickson, LMSW [facility]  [id number]    "

## 2022-10-26 NOTE — PROGRESS NOTES
[facility]ation [address] [place] [address] Medicine  Progress Note    Patient Name: Naga Benavidez  MRN: [id number]  Patient Class: OP- Observation   Admission Date: 10/22/2022  Length of Stay: 0 days  Attending Physician: Judy Alejandre MD  Primary Care Provider: Kathrin Salazar MD        Subjective:     Principal Problem:Acute pulmonary embolism without acute cor pulmonale        HPI:  63 y/o AAF hx of Cerebral Palsy, Chronic Debility, Wheelchair Dependence, HTN, presents wto the ED with complaints of shortness of breath.   She lives alone, primarily wheelchair dependent, usually can perform transfers at home, but with recent symptoms of increased dyspnea, has been unable to transfer.  Her family member who usually helps/assists in her care cannot provide care now, they have over-riding medical issue.  She has hx of chronic LE wounds, managed by wound care clinic.     She was found with stable lab work (CBC/CMP)  Patient admitted to hospital medicine and a pending CTA Chest which was ordered for elevated D-dimer value 3.08 has returned with critical value reported to ED physician, patient with Left Sided subsegmental pulmonary embolism.     ED Treatment: [facility].       Overview/Hospital Course:  Naga Benavidez is a 62 F admitted to hospital medicine for further management of L sided PE. Started on Eliquis 10mg BID x7 days; will decrease to 5 mg BID. PT/OT consulted; recommending SNF. CM working on placement.       Interval History: NAEON. VSSAF. Continues satting >95% on RA. SOB at rest improving, but still c/o AHMADI. Denies any other complaints at this time. Awaiting SNF placement.     Review of Systems   Constitutional:  Positive for activity change. Negative for appetite change, chills and fever.   HENT:  Negative for trouble swallowing.    Respiratory:  Positive for cough and shortness of breath. Negative for chest tightness and wheezing.    Cardiovascular:  Negative for chest pain and leg swelling.    Gastrointestinal:  Negative for abdominal pain, nausea and vomiting.   Genitourinary:  Negative for dysuria.   Musculoskeletal:         Feet heel pain   Skin:  Negative for rash.   Neurological:  Positive for weakness. Negative for dizziness.   Psychiatric/Behavioral:  Negative for confusion.         +anxiety   Objective:     Vital Signs (Most Recent):  Temp: 97 °F (36.1 °C) (10/26/22 0339)  Pulse: 67 (10/26/22 0339)  Resp: 18 (10/26/22 0339)  BP: 139/85 (10/26/22 0339)  SpO2: 97 % (10/26/22 0339) Vital Signs (24h Range):  Temp:  [97 °F (36.1 °C)-98.4 °F (36.9 °C)] 97 °F (36.1 °C)  Pulse:  [67-81] 67  Resp:  [17-20] 18  SpO2:  [97 %-100 %] 97 %  BP: (101-150)/(70-85) 139/85     Weight: 63.5 kg (139 lb 15.9 oz)  Body mass index is 26.47 kg/m².    Intake/Output Summary (Last 24 hours) at 10/26/2022 0816  Last data filed at 10/26/2022 0438  Gross per 24 hour   Intake --   Output 700 ml   Net -700 ml      Physical Exam  Vitals and nursing note reviewed.   HENT:      Mouth/Throat:      Pharynx: Oropharynx is clear. No oropharyngeal exudate.   Eyes:      Pupils: Pupils are equal, round, and reactive to light.   Cardiovascular:      Rate and Rhythm: Normal rate and regular rhythm.      Pulses: Normal pulses.      Heart sounds: No murmur heard.  Pulmonary:      Effort: Pulmonary effort is normal. No respiratory distress.      Breath sounds: Normal breath sounds. No wheezing or rales.   Abdominal:      General: Bowel sounds are normal. There is no distension.      Palpations: Abdomen is soft.      Tenderness: There is no abdominal tenderness.   Musculoskeletal:      Right lower leg: Edema (trace pitting edema) present.      Left lower leg: Edema (trace pitting edema) present.      Comments: Chronic deformity, muscle wasting of bilateral lower extremities   Skin:     General: Skin is dry.      Comments: Dry cracked skin on BLE and heels, ~4cm stage 1 sacral wound. Protective heel boots in place B/L   Neurological:       Mental Status: She is alert.       Significant Labs: All pertinent labs within the past 24 hours have been reviewed.  BMP:   Recent Labs   Lab 10/25/22  0304   GLU 60*      K 4.2      CO2 28   BUN 27*   CREATININE 0.6   CALCIUM 8.8   MG 1.8     CBC:   Recent Labs   Lab 10/25/22  0304   WBC 4.89   HGB 11.3*   HCT 35.6*   *       Significant Imaging: I have reviewed all pertinent imaging results/findings within the past 24 hours.      Assessment/Plan:      * Acute pulmonary embolism without acute cor pulmonale  -patient reporting worsening SOB for the past few days with decreased ability to perform ADLs  -CTA on admit with small pulmonary emboli in a left upper lobe segmental pulmonary artery  -Patient very averse to injections - will cancel planned enoxaparin, start Eliquis treatment dose 10mg PO BID x 7 days.   With plan to decrease to 5mg   -PRN O2 to maintain sats >92%  -Bilateral LE venous duplex U/S without dvt  -echo to evaluate for right heart strain; results below    Results for orders placed during the hospital encounter of 10/22/22    Echo    Interpretation Summary  · The left ventricle is normal in size with concentric remodeling and normal systolic function. The estimated ejection fraction is 70%.  · Normal right ventricular size with normal right ventricular systolic function.  · Normal left ventricular diastolic function.  · Small pericardial effusion.  · The estimated PA systolic pressure is 20 mmHg.  · Normal central venous pressure (3 mmHg).    Multiple open wounds of lower extremity  - wound care following  - protective heel boots in place    Shortness of breath  - etiology likely due to new PE noted on CTA Chest   -pt with hx of HFpEF and noted to have some possible edema/congestion on chest imaging today   - CTA with small pleural effusion on the right with mild bilateral atelectasis  - trace pitting edema on exam  -Give 2 doses of IV lasix daily and monitor for symptoms  improvement  - satting well on RA    Generalized anxiety disorder  Major depressive disorder  Prolonged grief disorder  - patient and sister reporting anxiety that affects patient's motivation to perform tasks  - sister reports patient has struggled a lot with her mom passing 2 years ago  - patient speaks with counselor 1-2x/month over the phone, but her sessions are almost done  - enrolled in behavior health program; who she spoke with on 10/24:   - GAD7 score:11, moderate anxiety   - PHQ9 score:12, moderate depression severity  - patient not interested in starting medication at this time  - follows with behavioral health  - OP psych referral; appointment scheduled    Mild intermittent asthma without complication  - chronic, controlled  - duonebs BID    Cerebral palsy  Wheelchair dependence  Decreased functional mobility  - reporting increased difficulty performing ADLs and transferring 2/2 SOB  - PT/OT consulted; recommending SNF  - q2 turns and elevate heels off bed  - fall precautions      VTE Risk Mitigation (From admission, onward)         Ordered     apixaban tablet 5 mg  2 times daily        See Hyperspace for full Linked Orders Report.    10/22/22 2303     apixaban tablet 10 mg  2 times daily        See Hyperspace for full Linked Orders Report.    10/22/22 2303     Reason for No Pharmacological VTE Prophylaxis  Once        Question:  Reasons:  Answer:  Already adequately anticoagulated on oral Anticoagulants    10/22/22 2301     IP VTE HIGH RISK PATIENT  Once         10/22/22 2301     Place sequential compression device  Until discontinued         10/22/22 2301                Discharge Planning   CHAPO: 10/28/2022     Code Status: Full Code   Is the patient medically ready for discharge?: Yes    Reason for patient still in hospital (select all that apply): Pending disposition  Discharge Plan A: Skilled Nursing Facility                  Marissa Rouse PA-C  Department of Hospital Medicine   [facility] -  Observation [facility]

## 2022-10-27 VITALS
HEIGHT: 61 IN | OXYGEN SATURATION: 99 % | SYSTOLIC BLOOD PRESSURE: 120 MMHG | WEIGHT: 140 LBS | BODY MASS INDEX: 26.43 KG/M2 | TEMPERATURE: 98 F | DIASTOLIC BLOOD PRESSURE: 72 MMHG | HEART RATE: 88 BPM | RESPIRATION RATE: 18 BRPM

## 2022-10-27 LAB — SARS-COV-2 RNA RESP QL NAA+PROBE: NOT DETECTED

## 2022-10-27 PROCEDURE — 86580 TB INTRADERMAL TEST: CPT | Performed by: INTERNAL MEDICINE

## 2022-10-27 PROCEDURE — 94640 AIRWAY INHALATION TREATMENT: CPT

## 2022-10-27 PROCEDURE — U0005 INFEC AGEN DETEC AMPLI PROBE: HCPCS | Performed by: INTERNAL MEDICINE

## 2022-10-27 PROCEDURE — 99217 PR OBSERVATION CARE DISCHARGE: CPT | Mod: ,,,

## 2022-10-27 PROCEDURE — 25000242 PHARM REV CODE 250 ALT 637 W/ HCPCS

## 2022-10-27 PROCEDURE — G0378 HOSPITAL OBSERVATION PER HR: HCPCS

## 2022-10-27 PROCEDURE — 94761 N-INVAS EAR/PLS OXIMETRY MLT: CPT

## 2022-10-27 PROCEDURE — U0003 INFECTIOUS AGENT DETECTION BY NUCLEIC ACID (DNA OR RNA); SEVERE ACUTE RESPIRATORY SYNDROME CORONAVIRUS 2 (SARS-COV-2) (CORONAVIRUS DISEASE [COVID-19]), AMPLIFIED PROBE TECHNIQUE, MAKING USE OF HIGH THROUGHPUT TECHNOLOGIES AS DESCRIBED BY CMS-2020-01-R: HCPCS | Performed by: INTERNAL MEDICINE

## 2022-10-27 PROCEDURE — 25000003 PHARM REV CODE 250: Performed by: HOSPITALIST

## 2022-10-27 PROCEDURE — 99217 PR OBSERVATION CARE DISCHARGE: ICD-10-PCS | Mod: ,,,

## 2022-10-27 PROCEDURE — 30200315 PPD INTRADERMAL TEST REV CODE 302: Performed by: INTERNAL MEDICINE

## 2022-10-27 RX ORDER — DOXYLAMINE SUCCINATE 25 MG
TABLET ORAL 2 TIMES DAILY
Refills: 0 | COMMUNITY
Start: 2022-10-27

## 2022-10-27 RX ORDER — DOXYLAMINE SUCCINATE 25 MG
TABLET ORAL 2 TIMES DAILY
Refills: 0 | COMMUNITY
Start: 2022-10-27 | End: 2022-10-27 | Stop reason: SDUPTHER

## 2022-10-27 RX ADMIN — FLUTICASONE PROPIONATE 100 MCG: 50 SPRAY, METERED NASAL at 09:10

## 2022-10-27 RX ADMIN — APIXABAN 10 MG: 5 TABLET, FILM COATED ORAL at 09:10

## 2022-10-27 RX ADMIN — IPRATROPIUM BROMIDE AND ALBUTEROL SULFATE 3 ML: 2.5; .5 SOLUTION RESPIRATORY (INHALATION) at 07:10

## 2022-10-27 RX ADMIN — MICONAZOLE NITRATE: 20 CREAM TOPICAL at 09:10

## 2022-10-27 RX ADMIN — THERA TABS 1 TABLET: TAB at 09:10

## 2022-10-27 RX ADMIN — AMLODIPINE BESYLATE 2.5 MG: 2.5 TABLET ORAL at 09:10

## 2022-10-27 RX ADMIN — TUBERCULIN PURIFIED PROTEIN DERIVATIVE 5 UNITS: 5 INJECTION, SOLUTION INTRADERMAL at 11:10

## 2022-10-27 NOTE — PLAN OF CARE
NURSING HOME ORDERS    10/27/2022  [facility]  [facility] O[facility]  [address]  [facility] [address]  Dept: [phone]  Loc: [phone]     Admit to Nursing Home:  Skilled Nursing Facility    Diagnoses:  Active Hospital Problems    Diagnosis  POA    *Acute pulmonary embolism without acute cor pulmonale [I26.99]  Yes    Shortness of breath [R06.02]  Yes    Multiple open wounds of lower extremity [S81.809A]  Yes    Generalized anxiety disorder [F41.1]  Yes    Wheelchair dependence [Z99.3]  Not Applicable    Prolonged grief disorder [F43.81]  Yes    Moderate episode of recurrent major depressive disorder [F33.1]  Yes    Physical debility [R53.81]  Yes    Cerebral palsy [G80.9]  Yes    Mild intermittent asthma without complication [J45.20]  Yes      Resolved Hospital Problems   No resolved problems to display.       Patient is homebound due to:  Acute pulmonary embolism without acute cor pulmonale    Allergies:  Review of patient's allergies indicates:   Allergen Reactions    Tramadol Other (See Comments)     She could not wake up     Augmentin [amoxicillin-pot clavulanate] Diarrhea    Levaquin [levofloxacin] Other (See Comments)     itching    Lipitor [atorvastatin] Other (See Comments)     Muscle pain    Statins-hmg-coa reductase inhibitors Other (See Comments)     Muscle pains    Bactrim [sulfamethoxazole-trimethoprim] Nausea Only       Vitals:  Every shift    Diet: cardiac diet    Activities:   Activity as tolerated    Goals of Care Treatment Preferences:  Code Status: Full Code      Labs: Per facility protocol    Nursing Precautions:  Fall and Pressure ulcer prevention    Consults:   PT to evaluate and treat- 5 times a week, OT to evaluate and treat- 5 times a week, Wound Care, and Nutrition to evaluate and recommend diet     Wound care orders:   yes:  Pressure Ulcer(s) Stage II :   Location: R posterior, L posterior,   Apply Miconzazole:  Barrier ointment                        Frequency:  Daily                             If incontinent of stool or urine, apply thin layer Barrier cream                   twice daily and PRN to wound         Pressure relief measure:  for pressure redistribution    Medications: Discontinue all previous medication orders, if any. See new list below.     Medication List        START taking these medications      * apixaban 5 mg Tab  Commonly known as: ELIQUIS  Take 2 tablets (10 mg total) by mouth 2 (two) times daily. for 2 days     * apixaban 5 mg Tab  Commonly known as: ELIQUIS  Take 1 tablet (5 mg total) by mouth 2 (two) times daily.  Start taking on: October 30, 2022           * This list has 2 medication(s) that are the same as other medications prescribed for you. Read the directions carefully, and ask your doctor or other care provider to review them with you.                CHANGE how you take these medications      baclofen 10 MG tablet  Commonly known as: LIORESAL  TAKE ONE TABLET BY MOUTH AT BEDTIME  What changed:   when to take this  reasons to take this  additional instructions     miconazole 2 % cream  Commonly known as: MICATIN  Apply topically 2 (two) times daily. Apply two times daily to sacral wound, skin folds, and groin  What changed: additional instructions            CONTINUE taking these medications      acetaminophen 325 MG tablet  Commonly known as: TYLENOL  Take 650 mg by mouth 4 (four) times daily as needed for Pain.     amLODIPine 2.5 MG tablet  Commonly known as: NORVASC  Take 1 tablet (2.5 mg total) by mouth 2 (two) times daily.     blood pressure monitor XL arm size (OP)  Commonly known as: iHEALTH EASE  by Other route as needed for High Blood Pressure.     fluconazole 150 MG Tab  Commonly known as: DIFLUCAN  Use while you are on antibiotics then may repeat     fluticasone propionate 50 mcg/actuation nasal spray  Commonly known as: FLONASE  2 sprays (100 mcg total) by Each Nostril route once daily.     furosemide 20  MG tablet  Commonly known as: LASIX  If wt gain 2-3 lbs x 2-5d, take 20mg daily. If no improvement, call MD     multivitamin capsule  Take 1 capsule by mouth once daily.     nitrofurantoin (macrocrystal-monohydrate) 100 MG capsule  Commonly known as: MACROBID  Take 1 capsule (100 mg total) by mouth 2 (two) times daily.            STOP taking these medications      ketoconazole 2 % cream  Commonly known as: NIZORAL     triamcinolone acetonide 0.1% 0.1 % cream  Commonly known as: KENALOG                Immunizations Administered as of 10/27/2022       Name Date Dose VIS Date Route Exp Date    COVID-19, MRNA, LN-S, PF (Moderna) 3/30/2021 0.5 mL -- Intramuscular --    Site: Right deltoid     Lot: [id number]     COVID-19, MRNA, LN-S, PF (Moderna) 3/2/2021 0.5 mL -- Intramuscular --    Site: Left deltoid     Lot: [id number]             This patient has had both covid vaccinations          Nisa Engle PA-C  10/27/2022

## 2022-10-27 NOTE — PLAN OF CARE
PHN gave SNF auth. Family to sign paperwork today, SW will coordinate with [facility] to discharge there today. Medical team notified. PPD/Covid stat ordered.    KULDIP Erickson LMSW  [facility]  [id number]

## 2022-10-27 NOTE — NURSING
Patient is being transferred to [facility]. Patient is being transported by [facility] via stretcher. Patient requires IV discontinuation d/t discharge. Discontinue IV catheter from the right arm. Cannula intact. Pressure held for 3 minutes,assess for continue blood loss, none noted. 4X4 guaze dressing with covan covering IV site. Patient tolerated well, no complaints of pain or swelling. Reassessed in 5 minutes for bleeding, none noted. Discharge instructions reviewed was given to [facility] staff. Discharged at 1834, belonging sent  and patient condition stable.

## 2022-10-27 NOTE — NURSING
Pt AOX4 during shift. No falls or injuries noted.  Patient ate 100% all meals. Patient needs assist x 1 with ADLs. Safety and fall precaution maintained. Will continue current care plan as ordered.

## 2022-10-27 NOTE — PLAN OF CARE
LOS has attempted to reach admissions at [facility] but has been unsuccessful. SW sent admissions and email and messages through [email or web address].     SW uploaded need documents for SNF placement. Waiting on orders.     KULDIP Erickson LMSW  [facility]  [id number]

## 2022-10-27 NOTE — PLAN OF CARE
[facility]  Discharge Final Note    Primary Care Provider: Kathrin Salazar MD    Expected Discharge Date: 10/27/2022    Final Discharge Note (most recent)       Final Note - 10/27/22 1649          Final Note    Assessment Type Final Discharge Note     Anticipated Discharge Disposition Home-Health Care [facility]     What phone number can be called within the next 1-3 days to see how you are doing after discharge? [phone]     Hospital Resources/Appts/Education Provided Provided patient/caregiver with written discharge plan information;Appointments scheduled by Navigator/Coordinator        Post-Acute Status    Post-Acute Authorization Placement     Post-Acute Placement Status Set-up Complete/Auth obtained     Coverage PHN     Discharge Delays Ambulance Transport/Facility Transport                   Pt discharging to [facility] via stretcher transport set up for 5:45 PM, family and RN notified.     RN to call report to [phone], Pt will be in room 225 B, so just ask for the nurse covering that room.    Pt has no other post acute needs and is cleared for discharge from a case management standpoint    KULDIP Erickson LMSW  [facility]  [id number]

## 2022-10-27 NOTE — PLAN OF CARE
Problem: Adult Inpatient Plan of Care  Goal: Plan of Care Review  Outcome: Met  Goal: Patient-Specific Goal (Individualized)  Outcome: Met  Goal: Absence of Hospital-Acquired Illness or Injury  Outcome: Met  Goal: Optimal Comfort and Wellbeing  Outcome: Met  Goal: Readiness for Transition of Care  Outcome: Met     Problem: Impaired Wound Healing  Goal: Optimal Wound Healing  Outcome: Met     Problem: Infection  Goal: Absence of Infection Signs and Symptoms  Outcome: Met     Problem: Skin Injury Risk Increased  Goal: Skin Health and Integrity  Outcome: Met     Problem: VTE (Venous Thromboembolism)  Goal: VTE (Venous Thromboembolism) Symptom Resolution  Outcome: Met     Problem: Occupational Therapy  Goal: Occupational Therapy Goal  Description: Goals to be met by: 10/08     Patient will increase functional independence with ADLs by performing:    Feeding with Supervision.  UE Dressing with Minimal Assistance.  LE Dressing with Moderate Assistance.  Grooming while seated with Supervision.  Toileting from bedside commode with Minimal Assistance for hygiene and clothing management.     Outcome: Met     Problem: Physical Therapy  Goal: Physical Therapy Goal  Description: Goals to be met by: 2022    Patient will increase functional independence with mobility by performin. Supine to sit with MInimal Assistance  2. Sit to supine with MInimal Assistance  3. Rolling to Left and Right with Minimal Assistance.  4. Bed to chair transfer with Minimal Assistance     Outcome: Met

## 2022-10-28 NOTE — ASSESSMENT & PLAN NOTE
- patient reported worsening SOB for the past few days with decreased ability to perform ADLs  - CTA on admit with small pulmonary emboli in a left upper lobe segmental pulmonary artery  - Patient started on Eliquis treatment dose 10mg PO BID x 7 days; plan to decrease to 5mg BID   - PRN O2 to maintain sats >92%  - Bilateral LE venous duplex U/S without dvt  - echo to evaluate for right heart strain; results below    Results for orders placed during the hospital encounter of 10/22/22    Echo    Interpretation Summary  · The left ventricle is normal in size with concentric remodeling and normal systolic function. The estimated ejection fraction is 70%.  · Normal right ventricular size with normal right ventricular systolic function.  · Normal left ventricular diastolic function.  · Small pericardial effusion.  · The estimated PA systolic pressure is 20 mmHg.  · Normal central venous pressure (3 mmHg).

## 2022-10-28 NOTE — ASSESSMENT & PLAN NOTE
- etiology likely due to new PE noted on CTA Chest   - pt with hx of HFpEF and noted to have some possible edema/congestion on chest imaging today   - CTA with small pleural effusion on the right with mild bilateral atelectasis  - trace pitting edema on exam  - given 2 doses of IV lasix daily with symptomatic improvement  - satting well on RA, euvolemic at discharge

## 2022-10-28 NOTE — DISCHARGE SUMMARY
[facility] - Observation [address] [place] [address] Medicine  Discharge Summary      Patient Name: Naga Benavidez  MRN: [id number]  Patient Class: OP- Observation  Admission Date: 10/22/2022  Hospital Length of Stay: 0 days  Discharge Date and Time: 10/27/2022  6:56 PM  Attending Physician: No att. providers found   Discharging Provider: Nisa Engle PA-C  Primary Care Provider: Kathrin Salazar MD  [facility] Medicine Team: [facility] HOSP MED Y Nisa Engle PA-C    HPI:   61 y/o AAF hx of Cerebral Palsy, Chronic Debility, Wheelchair Dependence, HTN, presents wto the ED with complaints of shortness of breath.   She lives alone, primarily wheelchair dependent, usually can perform transfers at home, but with recent symptoms of increased dyspnea, has been unable to transfer.  Her family member who usually helps/assists in her care cannot provide care now, they have over-riding medical issue.  She has hx of chronic LE wounds, managed by wound care clinic.     She was found with stable lab work (CBC/CMP)  Patient admitted to hospital medicine and a pending CTA Chest which was ordered for elevated D-dimer value 3.08 has returned with critical value reported to ED physician, patient with Left Sided subsegmental pulmonary embolism.     ED Treatment: [facility].       * No surgery found *      Hospital Course:   Naga Benavidez is a 62 F admitted to hospital medicine for further management of L sided PE. She was started on Eliquis 10mg BID, which she will take for 7 days, then decrease to 5 mg BID. PT/OT consulted and recommending SNF. All questions were answered. Patient acknowledged understanding of discharge instructions and feels safe to discharge to SNF. Patient was discharged on 10/27/2022 to [facility] in stable condition.        Goals of Care Treatment Preferences:  Code Status: Full Code      Consults:     * Acute pulmonary embolism without acute cor pulmonale  - patient reported worsening SOB for the past few days  with decreased ability to perform ADLs  - CTA on admit with small pulmonary emboli in a left upper lobe segmental pulmonary artery  - Patient started on Eliquis treatment dose 10mg PO BID x 7 days; plan to decrease to 5mg BID   - PRN O2 to maintain sats >92%  - Bilateral LE venous duplex U/S without dvt  - echo to evaluate for right heart strain; results below    Results for orders placed during the hospital encounter of 10/22/22    Echo    Interpretation Summary  · The left ventricle is normal in size with concentric remodeling and normal systolic function. The estimated ejection fraction is 70%.  · Normal right ventricular size with normal right ventricular systolic function.  · Normal left ventricular diastolic function.  · Small pericardial effusion.  · The estimated PA systolic pressure is 20 mmHg.  · Normal central venous pressure (3 mmHg).    Shortness of breath  - etiology likely due to new PE noted on CTA Chest   - pt with hx of HFpEF and noted to have some possible edema/congestion on chest imaging today   - CTA with small pleural effusion on the right with mild bilateral atelectasis  - trace pitting edema on exam  - given 2 doses of IV lasix daily with symptomatic improvement  - satting well on RA, euvolemic at discharge    Generalized anxiety disorder  Major depressive disorder  Prolonged grief disorder  - patient and sister reporting anxiety that affects patient's motivation to perform tasks  - sister reports patient has struggled a lot with her mom passing 2 years ago  - patient speaks with counselor 1-2x/month over the phone, but her sessions are almost done  - enrolled in behavior health program; who she spoke with on 10/24:   - GAD7 score:11, moderate anxiety   - PHQ9 score:12, moderate depression severity  - patient not interested in starting medication at this time  - follows with behavioral health  - OP psych referral; appointment scheduled       Final Active Diagnoses:    Diagnosis Date Noted  POA    PRINCIPAL PROBLEM:  Acute pulmonary embolism without acute cor pulmonale [I26.99] 10/22/2022 Yes    Shortness of breath [R06.02] 10/22/2022 Yes    Multiple open wounds of lower extremity [S81.809A] 10/22/2022 Yes    Generalized anxiety disorder [F41.1] 09/19/2022 Yes    Wheelchair dependence [Z99.3] 02/11/2022 Not Applicable    Prolonged grief disorder [F43.81] 12/13/2021 Yes    Moderate episode of recurrent major depressive disorder [F33.1] 12/13/2021 Yes    Physical debility [R53.81] 12/12/2016 Yes    Cerebral palsy [G80.9] 09/05/2012 Yes    Mild intermittent asthma without complication [J45.20]  Yes      Problems Resolved During this Admission:       Discharged Condition: good    Disposition: Home-Health Care [facility]    Follow Up:    Patient Instructions:      Ambulatory referral/consult to Psychiatry   Standing Status: Future   Referral Priority: Routine Referral Type: Psychiatric   Referral Reason: Specialty Services Required   Requested Specialty: Psychiatry   Number of Visits Requested: 1     Diet Cardiac     Notify your health care provider if you experience any of the following:  temperature >100.4     Notify your health care provider if you experience any of the following:  severe uncontrolled pain     Notify your health care provider if you experience any of the following:  difficulty breathing or increased cough     Activity as tolerated       Significant Diagnostic Studies:    Radiology:   EXAMINATION:  CTA CHEST NON CORONARY (PE STUDIES)     CLINICAL HISTORY:  Pulmonary embolism (PE) suspected, positive D-dimer;     TECHNIQUE:  Low dose axial images, sagittal and coronal reformations were obtained from the thoracic inlet to the lung bases following the IV administration of 100 mL of Omnipaque 350.  Contrast timing was optimized to evaluate the pulmonary arteries.  MIP images were performed.     COMPARISON:  None     FINDINGS:  Pulmonary arteries:Pulmonary arteries are adequately  enhanced.Small pulmonary emboli in a left upper lobe segmental pulmonary artery.     Thoracic soft tissues: Unremarkable.     Aorta: Left-sided aortic arch.  No aneurysm or dissection.     Heart: Heart appears mildly enlarged.  No pericardial effusion.     Barbra/Mediastinum: No pathologic na enlargement.     Airways: Patent.     Lungs/Pleura: Small posterior right pleural effusion with mild associated atelectasis.  Minimal left lower lobe atelectasis also.     Esophagus: Unremarkable.     Upper Abdomen: Bilateral probable renal cysts at the upper pole is incompletely visualized.     Bones: No acute fracture. No suspicious lytic or sclerotic lesions.     Levoscoliosis near the thoracolumbar junction.     Impression:     1. Small pulmonary emboli in a left upper lobe segmental pulmonary artery.  Follow-up recommended.  2. Mild cardiomegaly.  3. Small pleural effusion on the right with mild bilateral atelectasis.  4.  This report was flagged in Epic as abnormal.      EXAMINATION:  US LOWER EXTREMITY VEINS BILATERAL     CLINICAL HISTORY:  concern for DVT;     TECHNIQUE:  Duplex and color flow Doppler and dynamic compression was performed of the bilateral lower extremity veins was performed.     COMPARISON:  Bilateral lower extremity venous ultrasound 02/11/2022.     FINDINGS:  Right thigh veins: The common femoral, femoral, popliteal, upper greater saphenous, and deep femoral veins are patent and free of thrombus. The veins are normally compressible and have normal phasic flow and augmentation response.     Right calf veins: The visualized calf veins are patent.     Left thigh veins: The common femoral, femoral, popliteal, upper greater saphenous, and deep femoral veins are patent and free of thrombus. The veins are normally compressible and have normal phasic flow and augmentation response.     Left calf veins: The visualized calf veins are patent.     Miscellaneous: None     Impression:     No evidence of deep  venous thrombosis in either lower extremity.     Electronically signed by resident: Nisa Doty  Date:                                            10/23/2022  Time:                                           19:32    Echo  · The left ventricle is normal in size with concentric remodeling and   normal systolic function. The estimated ejection fraction is 70%.  · Normal right ventricular size with normal right ventricular systolic   function.  · Normal left ventricular diastolic function.  · Small pericardial effusion.  · The estimated PA systolic pressure is 20 mmHg.  · Normal central venous pressure (3 mmHg).         Pending Diagnostic Studies:     None         Medications:  Reconciled Home Medications:      Medication List      START taking these medications    * apixaban 5 mg Tab  Commonly known as: ELIQUIS  Take 2 tablets (10 mg total) by mouth 2 (two) times daily. for 2 days     * apixaban 5 mg Tab  Commonly known as: ELIQUIS  Take 1 tablet (5 mg total) by mouth 2 (two) times daily.  Start taking on: October 30, 2022         * This list has 2 medication(s) that are the same as other medications prescribed for you. Read the directions carefully, and ask your doctor or other care provider to review them with you.            CHANGE how you take these medications    baclofen 10 MG tablet  Commonly known as: LIORESAL  TAKE ONE TABLET BY MOUTH AT BEDTIME  What changed:   · when to take this  · reasons to take this  · additional instructions     miconazole 2 % cream  Commonly known as: MICATIN  Apply topically 2 (two) times daily. Apply two times daily to sacral wound, skin folds, and groin  What changed: additional instructions        CONTINUE taking these medications    acetaminophen 325 MG tablet  Commonly known as: TYLENOL  Take 650 mg by mouth 4 (four) times daily as needed for Pain.     amLODIPine 2.5 MG tablet  Commonly known as: NORVASC  Take 1 tablet (2.5 mg total) by mouth 2 (two) times daily.     blood  pressure monitor XL arm size (OP)  Commonly known as: iHEALTH EASE  by Other route as needed for High Blood Pressure.     fluconazole 150 MG Tab  Commonly known as: DIFLUCAN  Use while you are on antibiotics then may repeat     fluticasone propionate 50 mcg/actuation nasal spray  Commonly known as: FLONASE  2 sprays (100 mcg total) by Each Nostril route once daily.     furosemide 20 MG tablet  Commonly known as: LASIX  If wt gain 2-3 lbs x 2-5d, take 20mg daily. If no improvement, call MD     multivitamin capsule  Take 1 capsule by mouth once daily.     nitrofurantoin (macrocrystal-monohydrate) 100 MG capsule  Commonly known as: MACROBID  Take 1 capsule (100 mg total) by mouth 2 (two) times daily.        STOP taking these medications    ketoconazole 2 % cream  Commonly known as: NIZORAL     triamcinolone acetonide 0.1% 0.1 % cream  Commonly known as: KENALOG            Indwelling Lines/Drains at time of discharge:   Lines/Drains/Airways     Drain  Duration           Female External Urinary Catheter 10/23/22 5 days                Time spent on the discharge of patient: 36 minutes         Nisa Engle PA-C  Department of Hospital Medicine  [facility]

## 2022-10-31 ENCOUNTER — DOCUMENT SCAN (OUTPATIENT)
Dept: HOME HEALTH SERVICES | Facility: HOSPITAL | Age: 62
End: 2022-10-31
Payer: MEDICARE

## 2022-11-08 ENCOUNTER — OFFICE VISIT (OUTPATIENT)
Dept: BEHAVIORAL HEALTH | Facility: CLINIC | Age: 62
End: 2022-11-08
Payer: MEDICARE

## 2022-11-08 DIAGNOSIS — F40.00 AGORAPHOBIA: ICD-10-CM

## 2022-11-08 DIAGNOSIS — F33.1 MODERATE EPISODE OF RECURRENT MAJOR DEPRESSIVE DISORDER: Primary | ICD-10-CM

## 2022-11-08 DIAGNOSIS — F41.1 GENERALIZED ANXIETY DISORDER: ICD-10-CM

## 2022-11-08 PROCEDURE — 3044F HG A1C LEVEL LT 7.0%: CPT | Mod: CPTII,95,, | Performed by: SOCIAL WORKER

## 2022-11-08 PROCEDURE — 90834 PSYTX W PT 45 MINUTES: CPT | Mod: 95,,, | Performed by: SOCIAL WORKER

## 2022-11-08 PROCEDURE — 3044F PR MOST RECENT HEMOGLOBIN A1C LEVEL <7.0%: ICD-10-PCS | Mod: CPTII,95,, | Performed by: SOCIAL WORKER

## 2022-11-08 PROCEDURE — 90834 PR PSYCHOTHERAPY W/PATIENT, 45 MIN: ICD-10-PCS | Mod: 95,,, | Performed by: SOCIAL WORKER

## 2022-11-08 NOTE — PROGRESS NOTES
The patient location is: [place]  The chief complaint leading to consultation is: depression, anxiety, agoraphobia    Visit type: audiovisual    Face to Face time with patient: 45  47 minutes of total time spent on the encounter, which includes face to face time and non-face to face time preparing to see the patient (eg, review of tests), Obtaining and/or reviewing separately obtained history, Documenting clinical information in the electronic or other health record, Independently interpreting results (not separately reported) and communicating results to the patient/family/caregiver, or Care coordination (not separately reported).     Individual Psychotherapy (RADHAW/PhD)  Naga Benavidez,  11/8/2022    Site:  Telemed         Therapeutic Intervention: Met with patient for individual psychotherapy.    Chief complaint/reason for encounter: depression and anxiety     Interval history and content of current session: Pt is in a skilled nursing facility.  Pt has an appointment with Emma CUEVAS on 11/22. She has been having food cravings.   She has been eating carbs even though they give her constipation. She really she has to fight for herself and her health.  She sometimes worries people are out to get her. She has bed sores from being in the hospital bed. She is trying to tell herself it's going to get better and she will get through this. She feels she doesn't have many people she can talk to about how she feels without them being judgemental. Encouraged pt to consider keeping a journal to help process feelings.  She shares she has poor body image and self-esteem.  Discussed using affirmations to help with this.    This is our last session. Pt verbalized understanding.  Pt to f/u with Emma.     Treatment plan:  Target symptoms: depression, anxiety   Why chosen therapy is appropriate versus another modality: relevant to diagnosis, patient responds to this modality  Outcome monitoring methods: self-report,  observation  Therapeutic intervention type: behavior modifying psychotherapy, supportive psychotherapy    Risk parameters:  Patient reports no suicidal ideation  Patient reports no homicidal ideation  Patient reports no self-injurious behavior  Patient reports no violent behavior    Verbal deficits: None    Patient's response to intervention:  The patient's response to intervention is accepting.    Progress toward goals and other mental status changes:  The patient's progress toward goals is fair .    Diagnosis:     ICD-10-CM ICD-9-CM   1. Moderate episode of recurrent major depressive disorder  F33.1 296.32   2. Generalized anxiety disorder  F41.1 300.02   3. Agoraphobia  F40.00 300.22       Plan: Pt plans to terminate.  Pt to f/u with Emma Antunez    Return to clinic: as scheduled    Length of Service (minutes): 45        Each patient to whom he or she provides medical services by telemedicine is:  (1) informed of the relationship between the physician and patient and the respective role of any other health care provider with respect to management of the patient; and (2) notified that he or she may decline to receive medical services by telemedicine and may withdraw from such care at any time.

## 2022-11-10 ENCOUNTER — TELEPHONE (OUTPATIENT)
Dept: PSYCHIATRY | Facility: CLINIC | Age: 62
End: 2022-11-10
Payer: MEDICARE

## 2022-11-10 ENCOUNTER — TELEPHONE (OUTPATIENT)
Dept: INTERNAL MEDICINE | Facility: CLINIC | Age: 62
End: 2022-11-10
Payer: MEDICARE

## 2022-11-10 ENCOUNTER — PATIENT MESSAGE (OUTPATIENT)
Dept: BEHAVIORAL HEALTH | Facility: CLINIC | Age: 62
End: 2022-11-10
Payer: MEDICARE

## 2022-11-10 NOTE — TELEPHONE ENCOUNTER
----- Message from Ana M Brooks sent at 11/10/2022 10:31 AM CST -----  Contact: Nadege anton/ [phone]  Pt sister Nadege is calling in regards to getting the pt released from the skilled nursing facility. Pt sister stated insurance company stated the doctor has to put in a request. Pt  would like to start that process please. Please call Nadege guerrero  @ [phone] please

## 2022-11-10 NOTE — TELEPHONE ENCOUNTER
Pt sister will call rehab she want to discharge her sister I explained to her it has to go through rehab

## 2022-11-15 ENCOUNTER — PATIENT OUTREACH (OUTPATIENT)
Dept: ADMINISTRATIVE | Facility: OTHER | Age: 62
End: 2022-11-15
Payer: MEDICARE

## 2022-11-15 NOTE — PROGRESS NOTES
CHW - Follow Up    This Community Health Worker completed a follow up visit with patient via telephone today.  Pt/Caregiver reported: that she have heard from [facility] but have to f/u with them to complete the process  Community Health Worker provided: pt with a f/u date to check on pt referral status  Follow up required: yes  Follow-up Outreach - Due: 12/1/2022

## 2022-11-17 RX ORDER — NITROFURANTOIN 25; 75 MG/1; MG/1
100 CAPSULE ORAL 2 TIMES DAILY
Qty: 14 CAPSULE | Refills: 0 | OUTPATIENT
Start: 2022-11-17

## 2022-11-17 NOTE — TELEPHONE ENCOUNTER
----- Message from Nilam Craven sent at 11/17/2022 10:37 AM CST -----  Contact: [facility]/ Kristy/  Requesting an RX refill or new RX.  Is this a refill or new RX: New  RX name and strength :  apixaban (ELIQUIS) 5 mg Tab  Is this a 30 day or 90 day RX: 30  [facility] [address]  [address] [address]  Phone: [phone] Fax: [phone]      The doctors have asked that we provide their patients with the following 2 reminders -- prescription refills can take up to 72 hours, and a friendly reminder that in the future you can use your [facility] account to request refills:         Requesting an RX refill or new RX.  Is this a refill or new RX: New  RX name and strength :  nitrofurantoin, macrocrystal-monohydrate, (MACROBID) 100 MG capsule  Is this a 30 day or 90 day RX: 30  [facility] [address]  [address] [address]  Phone: [phone] Fax: [phone]      The doctors have asked that we provide their patients with the following 2 reminders -- prescription refills can take up to 72 hours, and a friendly reminder that in the future you can use your [facility] account to request refills:

## 2022-11-18 ENCOUNTER — TELEPHONE (OUTPATIENT)
Dept: INTERNAL MEDICINE | Facility: CLINIC | Age: 62
End: 2022-11-18
Payer: MEDICARE

## 2022-11-18 NOTE — TELEPHONE ENCOUNTER
----- Message from Ana M Brooks sent at 11/18/2022 12:02 PM CST -----  Contact: [phone] Patient  Caller is requesting an earlier appointment then we can schedule.  Caller is requesting a message be sent to the provider.  If this is for urgent care symptoms, did you offer other providers at this location, providers at other locations, or [facility]? (yes, no, n/a):    If this is for the patients physical, did you offer to schedule next available and put on wait list, or to see NP or PA for their physical?  (yes, no, n/a):    When is the next available appointment with their provider:  01/2023  Reason for the appointment:  Hospital F/U pt went into skilled nursing then left on 11/14/2022  Patient preference of timeframe to be scheduled:  ASAP please  Would the patient like a call back, or a response through their [facility] portal?:   Call Back Please  Comments:   Pt needs a 24 hr notice of appt so the family can get the wheelchair van to  the pt. Pt needs special transportation and must notify them 24 hrs in advance. Thank you

## 2022-11-22 ENCOUNTER — TELEPHONE (OUTPATIENT)
Dept: INTERNAL MEDICINE | Facility: CLINIC | Age: 62
End: 2022-11-22

## 2022-11-22 DIAGNOSIS — L97.322 SKIN ULCER OF LEFT ANKLE WITH FAT LAYER EXPOSED: ICD-10-CM

## 2022-11-22 DIAGNOSIS — G80.8 OTHER CEREBRAL PALSY: Primary | ICD-10-CM

## 2022-11-25 ENCOUNTER — LAB VISIT (OUTPATIENT)
Dept: LAB | Facility: HOSPITAL | Age: 62
End: 2022-11-25
Attending: INTERNAL MEDICINE
Payer: MEDICARE

## 2022-11-25 ENCOUNTER — PATIENT MESSAGE (OUTPATIENT)
Dept: INTERNAL MEDICINE | Facility: CLINIC | Age: 62
End: 2022-11-25

## 2022-11-25 ENCOUNTER — OFFICE VISIT (OUTPATIENT)
Dept: INTERNAL MEDICINE | Facility: CLINIC | Age: 62
End: 2022-11-25
Payer: MEDICARE

## 2022-11-25 VITALS
DIASTOLIC BLOOD PRESSURE: 70 MMHG | BODY MASS INDEX: 27.34 KG/M2 | HEART RATE: 83 BPM | HEIGHT: 60 IN | SYSTOLIC BLOOD PRESSURE: 124 MMHG | OXYGEN SATURATION: 96 %

## 2022-11-25 DIAGNOSIS — E55.9 MILD VITAMIN D DEFICIENCY: ICD-10-CM

## 2022-11-25 DIAGNOSIS — S81.809D MULTIPLE OPENS WOUND OF LOWER EXTREMITY, UNSPECIFIED LATERALITY, SUBSEQUENT ENCOUNTER: ICD-10-CM

## 2022-11-25 DIAGNOSIS — F33.9 EPISODE OF RECURRENT MAJOR DEPRESSIVE DISORDER, UNSPECIFIED DEPRESSION EPISODE SEVERITY: ICD-10-CM

## 2022-11-25 DIAGNOSIS — D64.9 ANEMIA, UNSPECIFIED TYPE: ICD-10-CM

## 2022-11-25 DIAGNOSIS — D51.8 OTHER VITAMIN B12 DEFICIENCY ANEMIAS: ICD-10-CM

## 2022-11-25 DIAGNOSIS — D64.9 ANEMIA, UNSPECIFIED TYPE: Primary | ICD-10-CM

## 2022-11-25 LAB
25(OH)D3+25(OH)D2 SERPL-MCNC: 39 NG/ML (ref 30–96)
ALBUMIN SERPL BCP-MCNC: 3.1 G/DL (ref 3.5–5.2)
ALP SERPL-CCNC: 108 U/L (ref 55–135)
ALT SERPL W/O P-5'-P-CCNC: 14 U/L (ref 10–44)
ANION GAP SERPL CALC-SCNC: 8 MMOL/L (ref 8–16)
AST SERPL-CCNC: 17 U/L (ref 10–40)
BASOPHILS # BLD AUTO: 0.04 K/UL (ref 0–0.2)
BASOPHILS NFR BLD: 0.4 % (ref 0–1.9)
BILIRUB SERPL-MCNC: 0.9 MG/DL (ref 0.1–1)
BUN SERPL-MCNC: 18 MG/DL (ref 8–23)
CALCIUM SERPL-MCNC: 9.9 MG/DL (ref 8.7–10.5)
CHLORIDE SERPL-SCNC: 104 MMOL/L (ref 95–110)
CO2 SERPL-SCNC: 24 MMOL/L (ref 23–29)
CREAT SERPL-MCNC: 0.6 MG/DL (ref 0.5–1.4)
CRP SERPL-MCNC: 64.1 MG/L (ref 0–8.2)
DIFFERENTIAL METHOD: ABNORMAL
EOSINOPHIL # BLD AUTO: 0 K/UL (ref 0–0.5)
EOSINOPHIL NFR BLD: 0.4 % (ref 0–8)
ERYTHROCYTE [DISTWIDTH] IN BLOOD BY AUTOMATED COUNT: 14.6 % (ref 11.5–14.5)
ERYTHROCYTE [SEDIMENTATION RATE] IN BLOOD BY PHOTOMETRIC METHOD: 89 MM/HR (ref 0–36)
EST. GFR  (NO RACE VARIABLE): >60 ML/MIN/1.73 M^2
GLUCOSE SERPL-MCNC: 95 MG/DL (ref 70–110)
HCT VFR BLD AUTO: 44.1 % (ref 37–48.5)
HGB BLD-MCNC: 13.6 G/DL (ref 12–16)
IMM GRANULOCYTES # BLD AUTO: 0.03 K/UL (ref 0–0.04)
IMM GRANULOCYTES NFR BLD AUTO: 0.3 % (ref 0–0.5)
LYMPHOCYTES # BLD AUTO: 1 K/UL (ref 1–4.8)
LYMPHOCYTES NFR BLD: 10.6 % (ref 18–48)
MCH RBC QN AUTO: 28.4 PG (ref 27–31)
MCHC RBC AUTO-ENTMCNC: 30.8 G/DL (ref 32–36)
MCV RBC AUTO: 92 FL (ref 82–98)
MONOCYTES # BLD AUTO: 0.5 K/UL (ref 0.3–1)
MONOCYTES NFR BLD: 5.6 % (ref 4–15)
NEUTROPHILS # BLD AUTO: 7.6 K/UL (ref 1.8–7.7)
NEUTROPHILS NFR BLD: 82.7 % (ref 38–73)
NRBC BLD-RTO: 0 /100 WBC
PLATELET # BLD AUTO: 256 K/UL (ref 150–450)
PMV BLD AUTO: 12.7 FL (ref 9.2–12.9)
POTASSIUM SERPL-SCNC: 4.7 MMOL/L (ref 3.5–5.1)
PROT SERPL-MCNC: 7.7 G/DL (ref 6–8.4)
RBC # BLD AUTO: 4.79 M/UL (ref 4–5.4)
SODIUM SERPL-SCNC: 136 MMOL/L (ref 136–145)
VIT B12 SERPL-MCNC: 854 PG/ML (ref 210–950)
WBC # BLD AUTO: 9.22 K/UL (ref 3.9–12.7)

## 2022-11-25 PROCEDURE — 1159F PR MEDICATION LIST DOCUMENTED IN MEDICAL RECORD: ICD-10-PCS | Mod: CPTII,S$GLB,, | Performed by: INTERNAL MEDICINE

## 2022-11-25 PROCEDURE — 3044F PR MOST RECENT HEMOGLOBIN A1C LEVEL <7.0%: ICD-10-PCS | Mod: CPTII,S$GLB,, | Performed by: INTERNAL MEDICINE

## 2022-11-25 PROCEDURE — 3078F DIAST BP <80 MM HG: CPT | Mod: CPTII,S$GLB,, | Performed by: INTERNAL MEDICINE

## 2022-11-25 PROCEDURE — 85652 RBC SED RATE AUTOMATED: CPT | Performed by: INTERNAL MEDICINE

## 2022-11-25 PROCEDURE — 99214 OFFICE O/P EST MOD 30 MIN: CPT | Mod: S$GLB,,, | Performed by: INTERNAL MEDICINE

## 2022-11-25 PROCEDURE — 82607 VITAMIN B-12: CPT | Performed by: INTERNAL MEDICINE

## 2022-11-25 PROCEDURE — 3008F PR BODY MASS INDEX (BMI) DOCUMENTED: ICD-10-PCS | Mod: CPTII,S$GLB,, | Performed by: INTERNAL MEDICINE

## 2022-11-25 PROCEDURE — 99214 PR OFFICE/OUTPT VISIT, EST, LEVL IV, 30-39 MIN: ICD-10-PCS | Mod: S$GLB,,, | Performed by: INTERNAL MEDICINE

## 2022-11-25 PROCEDURE — G0008 ADMIN INFLUENZA VIRUS VAC: HCPCS | Mod: S$GLB,,, | Performed by: INTERNAL MEDICINE

## 2022-11-25 PROCEDURE — 3008F BODY MASS INDEX DOCD: CPT | Mod: CPTII,S$GLB,, | Performed by: INTERNAL MEDICINE

## 2022-11-25 PROCEDURE — 3074F SYST BP LT 130 MM HG: CPT | Mod: CPTII,S$GLB,, | Performed by: INTERNAL MEDICINE

## 2022-11-25 PROCEDURE — 3074F PR MOST RECENT SYSTOLIC BLOOD PRESSURE < 130 MM HG: ICD-10-PCS | Mod: CPTII,S$GLB,, | Performed by: INTERNAL MEDICINE

## 2022-11-25 PROCEDURE — G0008 FLU VACCINE (QUAD) GREATER THAN OR EQUAL TO 3YO PRESERVATIVE FREE IM: ICD-10-PCS | Mod: S$GLB,,, | Performed by: INTERNAL MEDICINE

## 2022-11-25 PROCEDURE — 1159F MED LIST DOCD IN RCRD: CPT | Mod: CPTII,S$GLB,, | Performed by: INTERNAL MEDICINE

## 2022-11-25 PROCEDURE — 90686 IIV4 VACC NO PRSV 0.5 ML IM: CPT | Mod: S$GLB,,, | Performed by: INTERNAL MEDICINE

## 2022-11-25 PROCEDURE — 90686 FLU VACCINE (QUAD) GREATER THAN OR EQUAL TO 3YO PRESERVATIVE FREE IM: ICD-10-PCS | Mod: S$GLB,,, | Performed by: INTERNAL MEDICINE

## 2022-11-25 PROCEDURE — 36415 COLL VENOUS BLD VENIPUNCTURE: CPT | Performed by: INTERNAL MEDICINE

## 2022-11-25 PROCEDURE — 99999 PR PBB SHADOW E&M-EST. PATIENT-LVL IV: ICD-10-PCS | Mod: PBBFAC,,, | Performed by: INTERNAL MEDICINE

## 2022-11-25 PROCEDURE — 99999 PR PBB SHADOW E&M-EST. PATIENT-LVL IV: CPT | Mod: PBBFAC,,, | Performed by: INTERNAL MEDICINE

## 2022-11-25 PROCEDURE — 86140 C-REACTIVE PROTEIN: CPT | Performed by: INTERNAL MEDICINE

## 2022-11-25 PROCEDURE — 3078F PR MOST RECENT DIASTOLIC BLOOD PRESSURE < 80 MM HG: ICD-10-PCS | Mod: CPTII,S$GLB,, | Performed by: INTERNAL MEDICINE

## 2022-11-25 PROCEDURE — 83921 ORGANIC ACID SINGLE QUANT: CPT | Performed by: INTERNAL MEDICINE

## 2022-11-25 PROCEDURE — 82306 VITAMIN D 25 HYDROXY: CPT | Performed by: INTERNAL MEDICINE

## 2022-11-25 PROCEDURE — 85025 COMPLETE CBC W/AUTO DIFF WBC: CPT | Performed by: INTERNAL MEDICINE

## 2022-11-25 PROCEDURE — 80053 COMPREHEN METABOLIC PANEL: CPT | Performed by: INTERNAL MEDICINE

## 2022-11-25 PROCEDURE — 3044F HG A1C LEVEL LT 7.0%: CPT | Mod: CPTII,S$GLB,, | Performed by: INTERNAL MEDICINE

## 2022-11-25 RX ORDER — ACETAMINOPHEN AND CODEINE PHOSPHATE 300; 30 MG/1; MG/1
TABLET ORAL
Qty: 30 TABLET | Refills: 0 | Status: SHIPPED | OUTPATIENT
Start: 2022-11-25 | End: 2023-02-06

## 2022-11-25 NOTE — PROGRESS NOTES
Subjective:       Patient ID: Naga Benavidez is a 62 y.o. female.    Chief Complaint: Follow-up and Rectal Pain    Follow-up  Pertinent negatives include no abdominal pain, chest pain (arm pain or jaw pain), headaches, nausea or vomiting. Pt is not having rectal pain - she has two decubiti on her buttocks and L ischium and they are painful today.  She is taking eliquis for PE.  No fever or chills. Denies any new CP or SOB.  R heel also has break down.  She has lost considerable weight.  She is living at home with her sister who cannot care for her fully due to her own health issues.   Review of Systems   Respiratory:  Negative for shortness of breath (PND or orthopnea).    Cardiovascular:  Negative for chest pain (arm pain or jaw pain).   Gastrointestinal:  Negative for abdominal pain, diarrhea, nausea and vomiting.   Genitourinary:  Negative for dysuria.   Neurological:  Negative for seizures, syncope and headaches.     Objective:      Physical Exam  Constitutional:       General: She is not in acute distress.     Appearance: She is well-developed.   HENT:      Head: Normocephalic.   Eyes:      Pupils: Pupils are equal, round, and reactive to light.   Neck:      Thyroid: No thyromegaly.      Vascular: No JVD.   Cardiovascular:      Rate and Rhythm: Normal rate and regular rhythm.      Heart sounds: Normal heart sounds. No murmur heard.    No friction rub. No gallop.   Pulmonary:      Effort: Pulmonary effort is normal.      Breath sounds: Normal breath sounds. No wheezing or rales.   Abdominal:      General: Bowel sounds are normal. There is no distension.      Palpations: Abdomen is soft. There is no mass.      Tenderness: There is no abdominal tenderness. There is no guarding or rebound.   Musculoskeletal:      Cervical back: Neck supple.   Lymphadenopathy:      Cervical: No cervical adenopathy.   Skin:     General: Skin is warm and dry.   Neurological:      Mental Status: She is alert and oriented to  person, place, and time.      Deep Tendon Reflexes: Reflexes are normal and symmetric.   Psychiatric:         Behavior: Behavior normal.         Thought Content: Thought content normal.         Judgment: Judgment normal.     I viewed pictures of her wounds as she is very difficult to move.   Assessment:       1. Anemia, unspecified type    2. Mild vitamin D deficiency    3. Multiple opens wound of lower extremity, unspecified laterality, subsequent encounter    4. Other vitamin B12 deficiency anemias    5. Episode of recurrent major depressive disorder, unspecified depression episode severity          Plan:   Anemia, unspecified type  -     CBC Auto Differential; Future; Expected date: 11/25/2022  -     Comprehensive Metabolic Panel; Future; Expected date: 11/25/2022  -     Vitamin B12; Future; Expected date: 12/25/2022  -     Methylmalonic Acid, Serum; Future; Expected date: 11/25/2022    Mild vitamin D deficiency  -     Vitamin D; Future; Expected date: 11/25/2022    Multiple opens wound of lower extremity, unspecified laterality, subsequent encounter  -     Sedimentation rate; Future; Expected date: 11/25/2022  -     C-Reactive Protein; Future; Expected date: 11/25/2022  -     Ambulatory referral/consult to Home Health; Future; Expected date: 11/26/2022    Other vitamin B12 deficiency anemias  -     Vitamin B12; Future; Expected date: 12/25/2022    Episode of recurrent major depressive disorder, unspecified depression episode severity  -     Ambulatory referral/consult to Psychiatry; Future; Expected date: 12/02/2022    Other orders  -     acetaminophen-codeine 300-30mg (TYLENOL #3) 300-30 mg Tab; Half to One tablet by mouth every 6-8 hours as needed for pain  Dispense: 30 tablet; Refill: 0  -     Influenza - Quadrivalent (PF)      Pt is depressed but not suicidal     Pain meds given    [facility] for wound care PT/OT   She declines antidepressants  Advised to increase protein so as to help to heal  the wounds.

## 2022-11-28 ENCOUNTER — TELEPHONE (OUTPATIENT)
Dept: INTERNAL MEDICINE | Facility: CLINIC | Age: 62
End: 2022-11-28
Payer: MEDICARE

## 2022-11-28 NOTE — TELEPHONE ENCOUNTER
----- Message from Diamante Barbosa sent at 11/28/2022  4:12 PM CST -----  Contact: [phone]  Karuna with home health is calling in regards to the pt she states the pts sister stated that the dr was going to put in orders for physical therapy and they have not received the orders just yet..please advise    Fax [phone]    Pt has not has not had a bowel movement in 7 days abdomen is non diepended no pain on palpitation and sister is giving dulcolax

## 2022-11-29 ENCOUNTER — PATIENT MESSAGE (OUTPATIENT)
Dept: INTERNAL MEDICINE | Facility: CLINIC | Age: 62
End: 2022-11-29
Payer: MEDICARE

## 2022-11-29 LAB — METHYLMALONATE SERPL-SCNC: <0.1 UMOL/L

## 2022-12-01 ENCOUNTER — PATIENT OUTREACH (OUTPATIENT)
Dept: ADMINISTRATIVE | Facility: OTHER | Age: 62
End: 2022-12-01

## 2022-12-01 NOTE — PROGRESS NOTES
CHW - Follow Up    This Community Health Worker completed a follow up visit with patient via telephone today.  Pt/Caregiver reported: that they still need to follow up on their food stamp referral  Community Health Worker provided: pt with a f/u date to check on pt referral status  Follow up required: yes  Follow-up Outreach - Due: 12/13/2022

## 2022-12-05 ENCOUNTER — DOCUMENT SCAN (OUTPATIENT)
Dept: HOME HEALTH SERVICES | Facility: HOSPITAL | Age: 62
End: 2022-12-05
Payer: MEDICARE

## 2022-12-06 ENCOUNTER — DOCUMENT SCAN (OUTPATIENT)
Dept: HOME HEALTH SERVICES | Facility: HOSPITAL | Age: 62
End: 2022-12-06
Payer: MEDICARE

## 2022-12-14 NOTE — TELEPHONE ENCOUNTER
Refill Routing Note   Medication(s) are not appropriate for processing by [facility] for the following reason(s):      - Outside of protocol    [facility] action(s):  Route          Medication reconciliation completed: No     Appointments  past 12m or future 3m with PCP    Date Provider   Last Visit   11/25/2022 Kathrin Salazar MD   Next Visit   12/23/2022 Kathrin Salazar MD   ED visits in past 90 days: 0        Note composed:12:27 PM 12/14/2022

## 2022-12-15 RX ORDER — COLLAGENASE SANTYL 250 [ARB'U]/G
OINTMENT TOPICAL
Qty: 30 G | Refills: 0 | Status: SHIPPED | OUTPATIENT
Start: 2022-12-15 | End: 2023-01-13 | Stop reason: SDUPTHER

## 2022-12-29 ENCOUNTER — DOCUMENT SCAN (OUTPATIENT)
Dept: HOME HEALTH SERVICES | Facility: HOSPITAL | Age: 62
End: 2022-12-29
Payer: MEDICARE

## 2023-01-04 NOTE — PROGRESS NOTES
CHW - Unable to Contact    Community Health Worker to close episode at this time due to three missed attempts for patient contact.

## 2023-01-17 ENCOUNTER — DOCUMENT SCAN (OUTPATIENT)
Dept: HOME HEALTH SERVICES | Facility: HOSPITAL | Age: 63
End: 2023-01-17
Payer: MEDICARE

## 2023-01-17 PROCEDURE — G0179 MD RECERTIFICATION HHA PT: HCPCS | Mod: ,,, | Performed by: INTERNAL MEDICINE

## 2023-01-17 PROCEDURE — G0179 PR HOME HEALTH MD RECERTIFICATION: ICD-10-PCS | Mod: ,,, | Performed by: INTERNAL MEDICINE

## 2023-01-17 RX ORDER — COLLAGENASE SANTYL 250 [ARB'U]/G
OINTMENT TOPICAL
Qty: 30 G | Refills: 0 | Status: SHIPPED | OUTPATIENT
Start: 2023-01-17 | End: 2023-02-19 | Stop reason: SDUPTHER

## 2023-01-20 ENCOUNTER — PATIENT MESSAGE (OUTPATIENT)
Dept: INTERNAL MEDICINE | Facility: CLINIC | Age: 63
End: 2023-01-20
Payer: MEDICARE

## 2023-01-23 PROBLEM — I26.99 ACUTE PULMONARY EMBOLISM WITHOUT ACUTE COR PULMONALE: Status: RESOLVED | Noted: 2022-10-22 | Resolved: 2023-01-23

## 2023-01-27 ENCOUNTER — PATIENT OUTREACH (OUTPATIENT)
Dept: ADMINISTRATIVE | Facility: HOSPITAL | Age: 63
End: 2023-01-27
Payer: MEDICARE

## 2023-01-27 DIAGNOSIS — Z12.31 ENCOUNTER FOR SCREENING MAMMOGRAM FOR MALIGNANT NEOPLASM OF BREAST: Primary | ICD-10-CM

## 2023-01-27 NOTE — PROGRESS NOTES
Health Maintenance Due   Topic Date Due    Pneumococcal Vaccines (Age 0-64) (2 - PCV) 09/12/2015    Shingles Vaccine (2 of 2) 05/30/2022    Colorectal Cancer Screening  06/24/2022    COVID-19 Vaccine (5 - Booster for Moderna series) 10/05/2022    Mammogram  11/16/2022     Chart reviewed.   Immunizations: Reconciled  Orders placed: Mammogram  Upcoming appts to satisfy MICHAEL topics: N/A

## 2023-02-03 ENCOUNTER — EXTERNAL HOME HEALTH (OUTPATIENT)
Dept: HOME HEALTH SERVICES | Facility: HOSPITAL | Age: 63
End: 2023-02-03
Payer: MEDICARE

## 2023-02-06 ENCOUNTER — OFFICE VISIT (OUTPATIENT)
Dept: INTERNAL MEDICINE | Facility: CLINIC | Age: 63
End: 2023-02-06
Payer: MEDICARE

## 2023-02-06 ENCOUNTER — LAB VISIT (OUTPATIENT)
Dept: LAB | Facility: HOSPITAL | Age: 63
End: 2023-02-06
Attending: INTERNAL MEDICINE
Payer: MEDICARE

## 2023-02-06 VITALS
DIASTOLIC BLOOD PRESSURE: 80 MMHG | HEART RATE: 83 BPM | SYSTOLIC BLOOD PRESSURE: 132 MMHG | HEIGHT: 60 IN | OXYGEN SATURATION: 99 % | BODY MASS INDEX: 27.34 KG/M2

## 2023-02-06 DIAGNOSIS — L89.90 PRESSURE INJURY OF SKIN, UNSPECIFIED INJURY STAGE, UNSPECIFIED LOCATION: ICD-10-CM

## 2023-02-06 DIAGNOSIS — F33.9 EPISODE OF RECURRENT MAJOR DEPRESSIVE DISORDER, UNSPECIFIED DEPRESSION EPISODE SEVERITY: ICD-10-CM

## 2023-02-06 DIAGNOSIS — I73.9 PERIPHERAL VASCULAR DISEASE, UNSPECIFIED: ICD-10-CM

## 2023-02-06 DIAGNOSIS — L89.90 PRESSURE INJURY OF SKIN, UNSPECIFIED INJURY STAGE, UNSPECIFIED LOCATION: Primary | ICD-10-CM

## 2023-02-06 DIAGNOSIS — G80.9 CEREBRAL PALSY, UNSPECIFIED TYPE: ICD-10-CM

## 2023-02-06 LAB
ALBUMIN SERPL BCP-MCNC: 3.5 G/DL (ref 3.5–5.2)
ALP SERPL-CCNC: 112 U/L (ref 55–135)
ALT SERPL W/O P-5'-P-CCNC: 16 U/L (ref 10–44)
ANION GAP SERPL CALC-SCNC: 9 MMOL/L (ref 8–16)
AST SERPL-CCNC: 16 U/L (ref 10–40)
BASOPHILS # BLD AUTO: 0.02 K/UL (ref 0–0.2)
BASOPHILS NFR BLD: 0.4 % (ref 0–1.9)
BILIRUB SERPL-MCNC: 0.6 MG/DL (ref 0.1–1)
BUN SERPL-MCNC: 14 MG/DL (ref 8–23)
CALCIUM SERPL-MCNC: 10 MG/DL (ref 8.7–10.5)
CHLORIDE SERPL-SCNC: 105 MMOL/L (ref 95–110)
CO2 SERPL-SCNC: 24 MMOL/L (ref 23–29)
CREAT SERPL-MCNC: 0.5 MG/DL (ref 0.5–1.4)
CRP SERPL-MCNC: 1.1 MG/L (ref 0–8.2)
DIFFERENTIAL METHOD: ABNORMAL
EOSINOPHIL # BLD AUTO: 0.1 K/UL (ref 0–0.5)
EOSINOPHIL NFR BLD: 1.8 % (ref 0–8)
ERYTHROCYTE [DISTWIDTH] IN BLOOD BY AUTOMATED COUNT: 14.9 % (ref 11.5–14.5)
ERYTHROCYTE [SEDIMENTATION RATE] IN BLOOD BY PHOTOMETRIC METHOD: 53 MM/HR (ref 0–36)
EST. GFR  (NO RACE VARIABLE): >60 ML/MIN/1.73 M^2
GLUCOSE SERPL-MCNC: 86 MG/DL (ref 70–110)
HCT VFR BLD AUTO: 39.3 % (ref 37–48.5)
HGB BLD-MCNC: 12.2 G/DL (ref 12–16)
IMM GRANULOCYTES # BLD AUTO: 0.01 K/UL (ref 0–0.04)
IMM GRANULOCYTES NFR BLD AUTO: 0.2 % (ref 0–0.5)
LYMPHOCYTES # BLD AUTO: 1.3 K/UL (ref 1–4.8)
LYMPHOCYTES NFR BLD: 28.3 % (ref 18–48)
MCH RBC QN AUTO: 28.2 PG (ref 27–31)
MCHC RBC AUTO-ENTMCNC: 31 G/DL (ref 32–36)
MCV RBC AUTO: 91 FL (ref 82–98)
MONOCYTES # BLD AUTO: 0.2 K/UL (ref 0.3–1)
MONOCYTES NFR BLD: 4.9 % (ref 4–15)
NEUTROPHILS # BLD AUTO: 2.9 K/UL (ref 1.8–7.7)
NEUTROPHILS NFR BLD: 64.4 % (ref 38–73)
NRBC BLD-RTO: 0 /100 WBC
PLATELET # BLD AUTO: 220 K/UL (ref 150–450)
PMV BLD AUTO: 12 FL (ref 9.2–12.9)
POTASSIUM SERPL-SCNC: 4 MMOL/L (ref 3.5–5.1)
PREALB SERPL-MCNC: 22 MG/DL (ref 20–43)
PROT SERPL-MCNC: 7.3 G/DL (ref 6–8.4)
RBC # BLD AUTO: 4.32 M/UL (ref 4–5.4)
SODIUM SERPL-SCNC: 138 MMOL/L (ref 136–145)
WBC # BLD AUTO: 4.49 K/UL (ref 3.9–12.7)

## 2023-02-06 PROCEDURE — 3079F PR MOST RECENT DIASTOLIC BLOOD PRESSURE 80-89 MM HG: ICD-10-PCS | Mod: CPTII,S$GLB,, | Performed by: INTERNAL MEDICINE

## 2023-02-06 PROCEDURE — 99214 OFFICE O/P EST MOD 30 MIN: CPT | Mod: S$GLB,,, | Performed by: INTERNAL MEDICINE

## 2023-02-06 PROCEDURE — 1159F MED LIST DOCD IN RCRD: CPT | Mod: CPTII,S$GLB,, | Performed by: INTERNAL MEDICINE

## 2023-02-06 PROCEDURE — 3075F PR MOST RECENT SYSTOLIC BLOOD PRESS GE 130-139MM HG: ICD-10-PCS | Mod: CPTII,S$GLB,, | Performed by: INTERNAL MEDICINE

## 2023-02-06 PROCEDURE — 36415 COLL VENOUS BLD VENIPUNCTURE: CPT | Performed by: INTERNAL MEDICINE

## 2023-02-06 PROCEDURE — 3008F PR BODY MASS INDEX (BMI) DOCUMENTED: ICD-10-PCS | Mod: CPTII,S$GLB,, | Performed by: INTERNAL MEDICINE

## 2023-02-06 PROCEDURE — 80053 COMPREHEN METABOLIC PANEL: CPT | Performed by: INTERNAL MEDICINE

## 2023-02-06 PROCEDURE — 84134 ASSAY OF PREALBUMIN: CPT | Performed by: INTERNAL MEDICINE

## 2023-02-06 PROCEDURE — 85652 RBC SED RATE AUTOMATED: CPT | Performed by: INTERNAL MEDICINE

## 2023-02-06 PROCEDURE — 99214 PR OFFICE/OUTPT VISIT, EST, LEVL IV, 30-39 MIN: ICD-10-PCS | Mod: S$GLB,,, | Performed by: INTERNAL MEDICINE

## 2023-02-06 PROCEDURE — 1159F PR MEDICATION LIST DOCUMENTED IN MEDICAL RECORD: ICD-10-PCS | Mod: CPTII,S$GLB,, | Performed by: INTERNAL MEDICINE

## 2023-02-06 PROCEDURE — 3079F DIAST BP 80-89 MM HG: CPT | Mod: CPTII,S$GLB,, | Performed by: INTERNAL MEDICINE

## 2023-02-06 PROCEDURE — 3075F SYST BP GE 130 - 139MM HG: CPT | Mod: CPTII,S$GLB,, | Performed by: INTERNAL MEDICINE

## 2023-02-06 PROCEDURE — 86140 C-REACTIVE PROTEIN: CPT | Performed by: INTERNAL MEDICINE

## 2023-02-06 PROCEDURE — 85025 COMPLETE CBC W/AUTO DIFF WBC: CPT | Performed by: INTERNAL MEDICINE

## 2023-02-06 PROCEDURE — 99999 PR PBB SHADOW E&M-EST. PATIENT-LVL IV: ICD-10-PCS | Mod: PBBFAC,,, | Performed by: INTERNAL MEDICINE

## 2023-02-06 PROCEDURE — 3008F BODY MASS INDEX DOCD: CPT | Mod: CPTII,S$GLB,, | Performed by: INTERNAL MEDICINE

## 2023-02-06 PROCEDURE — 99999 PR PBB SHADOW E&M-EST. PATIENT-LVL IV: CPT | Mod: PBBFAC,,, | Performed by: INTERNAL MEDICINE

## 2023-02-06 RX ORDER — DULOXETIN HYDROCHLORIDE 20 MG/1
20 CAPSULE, DELAYED RELEASE ORAL DAILY
Qty: 30 CAPSULE | Refills: 3 | Status: SHIPPED | OUTPATIENT
Start: 2023-02-06 | End: 2023-04-06

## 2023-02-06 NOTE — PROGRESS NOTES
Lab is improved - inflammatory markers are better indicating less infection and healing.  Nutritional status is improved - looks great - keep up the good work!

## 2023-02-06 NOTE — PROGRESS NOTES
Subjective:       Patient ID: Naga Benavidez is a 62 y.o. female.    Chief Complaint: Follow-up    Follow-up  Pertinent negatives include no abdominal pain, chest pain (arm pain or jaw pain), headaches, nausea or vomiting. Pt is doing better - wounds are improving and she is eating better too.  She is open to some duloxetine to help with pain and depression.   Review of Systems   Respiratory:  Negative for shortness of breath (PND or orthopnea).    Cardiovascular:  Negative for chest pain (arm pain or jaw pain).   Gastrointestinal:  Negative for abdominal pain, diarrhea, nausea and vomiting.   Genitourinary:  Negative for dysuria.   Neurological:  Negative for seizures, syncope and headaches.     Objective:      Physical Exam  Constitutional:       General: She is not in acute distress.     Appearance: She is well-developed.   HENT:      Head: Normocephalic.   Eyes:      Pupils: Pupils are equal, round, and reactive to light.   Neck:      Thyroid: No thyromegaly.      Vascular: No JVD.   Cardiovascular:      Rate and Rhythm: Normal rate and regular rhythm.      Heart sounds: Normal heart sounds. No murmur heard.    No friction rub. No gallop.   Pulmonary:      Effort: Pulmonary effort is normal.      Breath sounds: Normal breath sounds. No wheezing or rales.   Abdominal:      General: Bowel sounds are normal. There is no distension.      Palpations: Abdomen is soft. There is no mass.      Tenderness: There is no abdominal tenderness. There is no guarding or rebound.   Musculoskeletal:      Cervical back: Neck supple.   Lymphadenopathy:      Cervical: No cervical adenopathy.   Skin:     General: Skin is warm and dry.   Neurological:      Mental Status: She is alert and oriented to person, place, and time.      Deep Tendon Reflexes: Reflexes are normal and symmetric.   Psychiatric:         Behavior: Behavior normal.         Thought Content: Thought content normal.         Judgment: Judgment normal.        Assessment:       1. Pressure injury of skin, unspecified injury stage, unspecified location          Plan:   Pressure injury of skin, unspecified injury stage, unspecified location  -     Sedimentation rate; Future; Expected date: 02/06/2023  -     C-Reactive Protein; Future; Expected date: 02/06/2023  -     Comprehensive Metabolic Panel; Future; Expected date: 02/06/2023  -     CBC Auto Differential; Future; Expected date: 02/06/2023  -     PREALBUMIN; Future; Expected date: 02/06/2023    Episode of recurrent major depression   -     DULoxetine (CYMBALTA) 20 MG capsule; Take 1 capsule (20 mg total) by mouth once daily.  Dispense: 30 capsule; Refill: 3    PVD  stable  Monitored    Cerebral palsy  stable

## 2023-02-10 ENCOUNTER — DOCUMENT SCAN (OUTPATIENT)
Dept: HOME HEALTH SERVICES | Facility: HOSPITAL | Age: 63
End: 2023-02-10
Payer: MEDICARE

## 2023-02-15 ENCOUNTER — PATIENT MESSAGE (OUTPATIENT)
Dept: INTERNAL MEDICINE | Facility: CLINIC | Age: 63
End: 2023-02-15
Payer: MEDICARE

## 2023-02-19 DIAGNOSIS — L89.90 PRESSURE INJURY OF SKIN, UNSPECIFIED INJURY STAGE, UNSPECIFIED LOCATION: Primary | ICD-10-CM

## 2023-02-19 DIAGNOSIS — I26.99 PULMONARY EMBOLISM, UNSPECIFIED CHRONICITY, UNSPECIFIED PULMONARY EMBOLISM TYPE, UNSPECIFIED WHETHER ACUTE COR PULMONALE PRESENT: ICD-10-CM

## 2023-02-20 RX ORDER — COLLAGENASE SANTYL 250 [ARB'U]/G
OINTMENT TOPICAL
Qty: 30 G | Refills: 0 | Status: SHIPPED | OUTPATIENT
Start: 2023-02-20

## 2023-02-26 PROBLEM — L97.322 SKIN ULCER OF LEFT ANKLE WITH FAT LAYER EXPOSED: Status: RESOLVED | Noted: 2019-03-01 | Resolved: 2023-02-26

## 2023-02-26 PROBLEM — I73.9 PERIPHERAL VASCULAR DISEASE, UNSPECIFIED: Status: ACTIVE | Noted: 2023-02-26

## 2023-03-06 ENCOUNTER — TELEPHONE (OUTPATIENT)
Dept: INTERNAL MEDICINE | Facility: CLINIC | Age: 63
End: 2023-03-06
Payer: MEDICARE

## 2023-03-06 NOTE — TELEPHONE ENCOUNTER
----- Message from Tamara Engle sent at 3/6/2023 11:24 AM CST -----  Contact: Karuna anton/Jw Paul  [phone]  Karuna is calling in regards to pt having a decrease in strength and an increase in weakness. Pt is in need of physical therapy. Per Karuna, she is requesting an order for physical therapy.              Thank you

## 2023-03-18 PROCEDURE — G0179 PR HOME HEALTH MD RECERTIFICATION: ICD-10-PCS | Mod: ,,, | Performed by: INTERNAL MEDICINE

## 2023-03-18 PROCEDURE — G0179 MD RECERTIFICATION HHA PT: HCPCS | Mod: ,,, | Performed by: INTERNAL MEDICINE

## 2023-03-24 ENCOUNTER — DOCUMENT SCAN (OUTPATIENT)
Dept: HOME HEALTH SERVICES | Facility: HOSPITAL | Age: 63
End: 2023-03-24
Payer: MEDICARE

## 2023-03-28 ENCOUNTER — PATIENT OUTREACH (OUTPATIENT)
Dept: ADMINISTRATIVE | Facility: HOSPITAL | Age: 63
End: 2023-03-28
Payer: MEDICARE

## 2023-03-28 NOTE — PROGRESS NOTES
Health Maintenance Due   Topic Date Due    Pneumococcal Vaccines (Age 0-64) (2 - PCV) 09/12/2015    Shingles Vaccine (2 of 2) 05/30/2022    Colorectal Cancer Screening  06/24/2022    COVID-19 Vaccine (5 - Booster for Moderna series) 10/05/2022    Mammogram  11/16/2022     Chart reviewed.   Immunizations: Reconciled  Orders placed: N/A  Upcoming appts to satisfy MICHAEL topics: N/A

## 2023-03-30 ENCOUNTER — PATIENT MESSAGE (OUTPATIENT)
Dept: INTERNAL MEDICINE | Facility: CLINIC | Age: 63
End: 2023-03-30
Payer: MEDICARE

## 2023-03-31 ENCOUNTER — DOCUMENT SCAN (OUTPATIENT)
Dept: HOME HEALTH SERVICES | Facility: HOSPITAL | Age: 63
End: 2023-03-31
Payer: MEDICARE

## 2023-04-04 ENCOUNTER — DOCUMENT SCAN (OUTPATIENT)
Dept: HOME HEALTH SERVICES | Facility: HOSPITAL | Age: 63
End: 2023-04-04
Payer: MEDICARE

## 2023-04-06 ENCOUNTER — OFFICE VISIT (OUTPATIENT)
Dept: INTERNAL MEDICINE | Facility: CLINIC | Age: 63
End: 2023-04-06
Payer: MEDICARE

## 2023-04-06 VITALS
HEIGHT: 60 IN | DIASTOLIC BLOOD PRESSURE: 82 MMHG | BODY MASS INDEX: 27.34 KG/M2 | OXYGEN SATURATION: 99 % | SYSTOLIC BLOOD PRESSURE: 124 MMHG | HEART RATE: 86 BPM

## 2023-04-06 DIAGNOSIS — I10 HYPERTENSION, UNSPECIFIED TYPE: ICD-10-CM

## 2023-04-06 DIAGNOSIS — Z86.711 HX OF PULMONARY EMBOLUS: ICD-10-CM

## 2023-04-06 DIAGNOSIS — G80.8 OTHER CEREBRAL PALSY: Primary | ICD-10-CM

## 2023-04-06 PROCEDURE — 3074F PR MOST RECENT SYSTOLIC BLOOD PRESSURE < 130 MM HG: ICD-10-PCS | Mod: CPTII,S$GLB,, | Performed by: INTERNAL MEDICINE

## 2023-04-06 PROCEDURE — 99214 PR OFFICE/OUTPT VISIT, EST, LEVL IV, 30-39 MIN: ICD-10-PCS | Mod: S$GLB,,, | Performed by: INTERNAL MEDICINE

## 2023-04-06 PROCEDURE — 99214 OFFICE O/P EST MOD 30 MIN: CPT | Mod: S$GLB,,, | Performed by: INTERNAL MEDICINE

## 2023-04-06 PROCEDURE — 3079F PR MOST RECENT DIASTOLIC BLOOD PRESSURE 80-89 MM HG: ICD-10-PCS | Mod: CPTII,S$GLB,, | Performed by: INTERNAL MEDICINE

## 2023-04-06 PROCEDURE — 99999 PR PBB SHADOW E&M-EST. PATIENT-LVL III: CPT | Mod: PBBFAC,,, | Performed by: INTERNAL MEDICINE

## 2023-04-06 PROCEDURE — 99999 PR PBB SHADOW E&M-EST. PATIENT-LVL III: ICD-10-PCS | Mod: PBBFAC,,, | Performed by: INTERNAL MEDICINE

## 2023-04-06 PROCEDURE — 3008F PR BODY MASS INDEX (BMI) DOCUMENTED: ICD-10-PCS | Mod: CPTII,S$GLB,, | Performed by: INTERNAL MEDICINE

## 2023-04-06 PROCEDURE — 3074F SYST BP LT 130 MM HG: CPT | Mod: CPTII,S$GLB,, | Performed by: INTERNAL MEDICINE

## 2023-04-06 PROCEDURE — 3079F DIAST BP 80-89 MM HG: CPT | Mod: CPTII,S$GLB,, | Performed by: INTERNAL MEDICINE

## 2023-04-06 PROCEDURE — 3008F BODY MASS INDEX DOCD: CPT | Mod: CPTII,S$GLB,, | Performed by: INTERNAL MEDICINE

## 2023-04-06 RX ORDER — FUROSEMIDE 20 MG/1
TABLET ORAL
Qty: 30 TABLET | Refills: 1 | Status: SHIPPED | OUTPATIENT
Start: 2023-04-06

## 2023-04-06 RX ORDER — FLUTICASONE PROPIONATE 50 MCG
2 SPRAY, SUSPENSION (ML) NASAL DAILY
Qty: 16 G | Refills: 6 | Status: SHIPPED | OUTPATIENT
Start: 2023-04-06

## 2023-04-06 RX ORDER — DULOXETIN HYDROCHLORIDE 20 MG/1
CAPSULE, DELAYED RELEASE ORAL
Qty: 60 CAPSULE | Refills: 3 | Status: SHIPPED | OUTPATIENT
Start: 2023-04-06 | End: 2023-08-21 | Stop reason: SDUPTHER

## 2023-04-06 NOTE — PROGRESS NOTES
Subjective:       Patient ID: Naga Benavidez is a 63 y.o. female.    Chief Complaint: Follow-up    Follow-up  Pertinent negatives include no abdominal pain, chest pain (arm pain or jaw pain), headaches, nausea or vomiting. SHe is doing okay - wounds are slowly healing - she is in less pain.  No CP or SOB. Taking meds.  Review of Systems   Respiratory:  Negative for shortness of breath (PND or orthopnea).    Cardiovascular:  Negative for chest pain (arm pain or jaw pain).   Gastrointestinal:  Negative for abdominal pain, diarrhea, nausea and vomiting.   Genitourinary:  Negative for dysuria.   Neurological:  Negative for seizures, syncope and headaches.     Objective:      Physical Exam  Constitutional:       General: She is not in acute distress.     Appearance: She is well-developed.   HENT:      Head: Normocephalic.   Eyes:      Pupils: Pupils are equal, round, and reactive to light.   Neck:      Thyroid: No thyromegaly.      Vascular: No JVD.   Cardiovascular:      Rate and Rhythm: Normal rate and regular rhythm.      Heart sounds: Normal heart sounds. No murmur heard.    No friction rub. No gallop.   Pulmonary:      Effort: Pulmonary effort is normal.      Breath sounds: Normal breath sounds. No wheezing or rales.   Abdominal:      General: Bowel sounds are normal. There is no distension.      Palpations: Abdomen is soft. There is no mass.      Tenderness: There is no abdominal tenderness. There is no guarding or rebound.   Musculoskeletal:      Cervical back: Neck supple.   Lymphadenopathy:      Cervical: No cervical adenopathy.   Skin:     General: Skin is warm and dry.   Neurological:      Mental Status: She is alert and oriented to person, place, and time.      Deep Tendon Reflexes: Reflexes are normal and symmetric.   Psychiatric:         Behavior: Behavior normal.         Thought Content: Thought content normal.         Judgment: Judgment normal.       Assessment:       1. Other cerebral palsy    2.  Hypertension, unspecified type    3. Hx of pulmonary embolus        Plan:   Other cerebral palsy  stable    Hypertension, unspecified type  Controlled - continue current meds    Hx of pulmonary embolus  Stable on apixaban  Other orders  -     DULoxetine (CYMBALTA) 20 MG capsule; Take 1 capsule by mouth twice daily  Dispense: 60 capsule; Refill: 3- increase for chronic pain and mood  -     fluticasone propionate (FLONASE) 50 mcg/actuation nasal spray; Spray 2 sprays (100 mcg total) in each nostril once daily.  Dispense: 16 g; Refill: 6  -     furosemide (LASIX) 20 MG tablet; If weight gain  of 2-3 lbs for 2-5days, take 1 tablet by mouth daily. If no improvement, call MD  Dispense: 30 tablet; Refill: 1

## 2023-04-13 ENCOUNTER — DOCUMENT SCAN (OUTPATIENT)
Dept: HOME HEALTH SERVICES | Facility: HOSPITAL | Age: 63
End: 2023-04-13
Payer: MEDICARE

## 2023-04-14 ENCOUNTER — EXTERNAL HOME HEALTH (OUTPATIENT)
Dept: HOME HEALTH SERVICES | Facility: HOSPITAL | Age: 63
End: 2023-04-14
Payer: MEDICARE

## 2023-04-21 ENCOUNTER — PATIENT MESSAGE (OUTPATIENT)
Dept: ADMINISTRATIVE | Facility: HOSPITAL | Age: 63
End: 2023-04-21
Payer: MEDICARE

## 2023-04-26 ENCOUNTER — OFFICE VISIT (OUTPATIENT)
Dept: PODIATRY | Facility: CLINIC | Age: 63
End: 2023-04-26
Payer: MEDICARE

## 2023-04-26 VITALS
BODY MASS INDEX: 27.29 KG/M2 | HEART RATE: 82 BPM | SYSTOLIC BLOOD PRESSURE: 134 MMHG | RESPIRATION RATE: 17 BRPM | HEIGHT: 60 IN | DIASTOLIC BLOOD PRESSURE: 77 MMHG | WEIGHT: 139 LBS

## 2023-04-26 DIAGNOSIS — Q84.3 NAIL ABSENT: ICD-10-CM

## 2023-04-26 DIAGNOSIS — G80.9 CEREBRAL PALSY, UNSPECIFIED TYPE: Primary | ICD-10-CM

## 2023-04-26 PROCEDURE — 99213 OFFICE O/P EST LOW 20 MIN: CPT | Mod: S$GLB,,, | Performed by: PODIATRIST

## 2023-04-26 PROCEDURE — 99213 OFFICE O/P EST LOW 20 MIN: CPT | Performed by: PODIATRIST

## 2023-04-26 PROCEDURE — 3078F DIAST BP <80 MM HG: CPT | Mod: CPTII,,, | Performed by: PODIATRIST

## 2023-04-26 PROCEDURE — 3008F PR BODY MASS INDEX (BMI) DOCUMENTED: ICD-10-PCS | Mod: CPTII,,, | Performed by: PODIATRIST

## 2023-04-26 PROCEDURE — 3008F BODY MASS INDEX DOCD: CPT | Mod: CPTII,,, | Performed by: PODIATRIST

## 2023-04-26 PROCEDURE — 99213 PR OFFICE/OUTPT VISIT, EST, LEVL III, 20-29 MIN: ICD-10-PCS | Mod: S$GLB,,, | Performed by: PODIATRIST

## 2023-04-26 PROCEDURE — 1159F MED LIST DOCD IN RCRD: CPT | Mod: CPTII,,, | Performed by: PODIATRIST

## 2023-04-26 PROCEDURE — 3075F PR MOST RECENT SYSTOLIC BLOOD PRESS GE 130-139MM HG: ICD-10-PCS | Mod: CPTII,,, | Performed by: PODIATRIST

## 2023-04-26 PROCEDURE — 1159F PR MEDICATION LIST DOCUMENTED IN MEDICAL RECORD: ICD-10-PCS | Mod: CPTII,,, | Performed by: PODIATRIST

## 2023-04-26 PROCEDURE — 3075F SYST BP GE 130 - 139MM HG: CPT | Mod: CPTII,,, | Performed by: PODIATRIST

## 2023-04-26 PROCEDURE — 3078F PR MOST RECENT DIASTOLIC BLOOD PRESSURE < 80 MM HG: ICD-10-PCS | Mod: CPTII,,, | Performed by: PODIATRIST

## 2023-04-27 ENCOUNTER — DOCUMENT SCAN (OUTPATIENT)
Dept: HOME HEALTH SERVICES | Facility: HOSPITAL | Age: 63
End: 2023-04-27
Payer: MEDICARE

## 2023-05-08 ENCOUNTER — DOCUMENT SCAN (OUTPATIENT)
Dept: HOME HEALTH SERVICES | Facility: HOSPITAL | Age: 63
End: 2023-05-08
Payer: MEDICARE

## 2023-05-16 ENCOUNTER — DOCUMENT SCAN (OUTPATIENT)
Dept: HOME HEALTH SERVICES | Facility: HOSPITAL | Age: 63
End: 2023-05-16
Payer: MEDICARE

## 2023-05-17 PROCEDURE — G0179 PR HOME HEALTH MD RECERTIFICATION: ICD-10-PCS | Mod: ,,, | Performed by: INTERNAL MEDICINE

## 2023-05-17 PROCEDURE — G0179 MD RECERTIFICATION HHA PT: HCPCS | Mod: ,,, | Performed by: INTERNAL MEDICINE

## 2023-05-19 ENCOUNTER — PATIENT MESSAGE (OUTPATIENT)
Dept: INTERNAL MEDICINE | Facility: CLINIC | Age: 63
End: 2023-05-19
Payer: MEDICARE

## 2023-05-22 NOTE — PROGRESS NOTES
Subjective:      Patient ID: Naga Benavidez is a 63 y.o. female.    Chief Complaint: PCP (Kathrin Salazar MD 4/06/23 ), Routine Foot Care, and Nail Problem (Rt 5th toenail came off )    Naga is a 63 y.o. female who presents to the clinic complaining of R 5th toenail falling off. No pain to the region. Naga is inquiring about treatment options.      Review of Systems   Constitutional: Negative for chills, decreased appetite and fever.   Cardiovascular:  Negative for leg swelling.   Skin:  Positive for nail changes. Negative for flushing and itching.   Musculoskeletal:  Positive for muscle weakness, myalgias and stiffness. Negative for arthritis, joint pain, joint swelling and neck pain.   Gastrointestinal:  Negative for nausea and vomiting.   Neurological:  Positive for loss of balance, paresthesias and sensory change. Negative for numbness.         Objective:       Vitals:    04/26/23 1410   BP: 134/77   Pulse: 82   Resp: 17   Weight: 63 kg (139 lb)   Height: 5' (1.524 m)   PainSc: 0-No pain        Physical Exam  Vitals reviewed.   Constitutional:       Appearance: She is well-developed.   Cardiovascular:      Pulses:           Dorsalis pedis pulses are 2+ on the right side and 2+ on the left side.        Posterior tibial pulses are 2+ on the right side and 2+ on the left side.   Musculoskeletal:         General: No tenderness.      Right ankle: Normal.      Left ankle: Normal.      Right foot: No swelling, deformity or crepitus.      Left foot: No swelling, deformity or crepitus.      Comments:          Lymphadenopathy:      Comments: No palpable lymph nodes   Skin:     General: Skin is warm and dry.      Findings: No erythema or rash.      Nails: There is no clubbing.      Comments:   Thickened discolored nails w/ subungual debris     Absent r 5th toenail. Dry stable nail bed. No surrounding erythema, edema, malodor, nor drainage noted          Neurological:      Mental Status: She is alert and  oriented to person, place, and time.   Psychiatric:         Behavior: Behavior normal.             Assessment:       Encounter Diagnoses   Name Primary?    Cerebral palsy, unspecified type Yes    Nail absent          Plan:       Naga was seen today for pcp, routine foot care and nail problem.    Diagnoses and all orders for this visit:    Cerebral palsy, unspecified type    Nail absent      I counseled the patient on her conditions, their implications and medical management.      Feet are stable   Non issues w/ R 5th toenail  Mild tinea resolved  Currently caretaker manages foot care--> continue current foot mgmt pratices

## 2023-05-29 ENCOUNTER — EXTERNAL HOME HEALTH (OUTPATIENT)
Dept: HOME HEALTH SERVICES | Facility: HOSPITAL | Age: 63
End: 2023-05-29
Payer: MEDICARE

## 2023-06-15 ENCOUNTER — PATIENT MESSAGE (OUTPATIENT)
Dept: INTERNAL MEDICINE | Facility: CLINIC | Age: 63
End: 2023-06-15
Payer: MEDICARE

## 2023-06-18 ENCOUNTER — PATIENT MESSAGE (OUTPATIENT)
Dept: INTERNAL MEDICINE | Facility: CLINIC | Age: 63
End: 2023-06-18
Payer: MEDICARE

## 2023-06-19 ENCOUNTER — TELEPHONE (OUTPATIENT)
Dept: INTERNAL MEDICINE | Facility: CLINIC | Age: 63
End: 2023-06-19

## 2023-06-19 RX ORDER — CEFUROXIME AXETIL 250 MG/1
250 TABLET ORAL EVERY 12 HOURS
Qty: 14 TABLET | Refills: 0 | Status: SHIPPED | OUTPATIENT
Start: 2023-06-19 | End: 2023-08-28

## 2023-07-20 ENCOUNTER — PATIENT MESSAGE (OUTPATIENT)
Dept: INTERNAL MEDICINE | Facility: CLINIC | Age: 63
End: 2023-07-20
Payer: MEDICARE

## 2023-07-21 ENCOUNTER — TELEPHONE (OUTPATIENT)
Dept: INTERNAL MEDICINE | Facility: CLINIC | Age: 63
End: 2023-07-21
Payer: MEDICARE

## 2023-07-21 DIAGNOSIS — G80.8 OTHER CEREBRAL PALSY: Primary | ICD-10-CM

## 2023-07-27 ENCOUNTER — PATIENT MESSAGE (OUTPATIENT)
Dept: INTERNAL MEDICINE | Facility: CLINIC | Age: 63
End: 2023-07-27
Payer: MEDICARE

## 2023-07-28 ENCOUNTER — PATIENT MESSAGE (OUTPATIENT)
Dept: INTERNAL MEDICINE | Facility: CLINIC | Age: 63
End: 2023-07-28
Payer: MEDICARE

## 2023-08-01 ENCOUNTER — TELEPHONE (OUTPATIENT)
Dept: INTERNAL MEDICINE | Facility: CLINIC | Age: 63
End: 2023-08-01
Payer: MEDICARE

## 2023-08-01 NOTE — TELEPHONE ENCOUNTER
----- Message from Tessa Jenkins sent at 8/1/2023  9:42 AM CDT -----  Contact: [phone]  [facility] is requesting orders for Home Health PT OT and requesting recent clinic notes       Please call and advise @ # [phone] [facility]

## 2023-08-03 PROCEDURE — G0180 PR HOME HEALTH MD CERTIFICATION: ICD-10-PCS | Mod: ,,, | Performed by: INTERNAL MEDICINE

## 2023-08-03 PROCEDURE — G0180 MD CERTIFICATION HHA PATIENT: HCPCS | Mod: ,,, | Performed by: INTERNAL MEDICINE

## 2023-08-08 ENCOUNTER — EXTERNAL HOME HEALTH (OUTPATIENT)
Dept: HOME HEALTH SERVICES | Facility: HOSPITAL | Age: 63
End: 2023-08-08
Payer: MEDICARE

## 2023-08-10 ENCOUNTER — TELEPHONE (OUTPATIENT)
Dept: INTERNAL MEDICINE | Facility: CLINIC | Age: 63
End: 2023-08-10
Payer: MEDICARE

## 2023-08-10 ENCOUNTER — PATIENT MESSAGE (OUTPATIENT)
Dept: INTERNAL MEDICINE | Facility: CLINIC | Age: 63
End: 2023-08-10
Payer: MEDICARE

## 2023-08-10 RX ORDER — BENZONATATE 200 MG/1
200 CAPSULE ORAL 3 TIMES DAILY PRN
Qty: 30 CAPSULE | Refills: 0 | Status: SHIPPED | OUTPATIENT
Start: 2023-08-10 | End: 2023-08-28

## 2023-08-10 NOTE — TELEPHONE ENCOUNTER
----- Message from Saumya Toro sent at 8/10/2023  4:18 PM CDT -----  Contact: [phone]  1MEDICALADVICE     Patient is calling for Medical Advice regarding: positive covid      How long has patient had these symptoms: 2 days     Pharmacy name and phone#:  [facility]  [address]  [facility] [address]  Phone: [phone] Fax: [phone]       Would like response via [facility]:  call back     Comments:

## 2023-08-14 ENCOUNTER — PATIENT OUTREACH (OUTPATIENT)
Dept: ADMINISTRATIVE | Facility: HOSPITAL | Age: 63
End: 2023-08-14
Payer: MEDICARE

## 2023-08-14 NOTE — PROGRESS NOTES
Health Maintenance Due   Topic Date Due    Cervical Cancer Screening  09/29/2019    Shingles Vaccine (2 of 2) 05/30/2022    Colorectal Cancer Screening  06/24/2022    COVID-19 Vaccine (5 - Moderna series) 10/05/2022    Mammogram  11/16/2022    Lipid Panel  08/18/2023     Chart reviewed.   Immunizations: Reconciled  Orders placed: N/A  Upcoming appts to satisfy MICHAEL topics: N/A

## 2023-08-21 NOTE — TELEPHONE ENCOUNTER
No care due was identified.  [facility] Embedded Care Due Messages. Reference number: [id number].   8/21/2023 11:49:20 AM CDT

## 2023-08-22 RX ORDER — DULOXETIN HYDROCHLORIDE 20 MG/1
CAPSULE, DELAYED RELEASE ORAL
Qty: 180 CAPSULE | Refills: 1 | Status: SHIPPED | OUTPATIENT
Start: 2023-08-22 | End: 2023-08-28

## 2023-08-22 NOTE — TELEPHONE ENCOUNTER
Refill Decision Note   Naga Benavidez  is requesting a refill authorization.  Brief Assessment and Rationale for Refill:  Approve     Medication Therapy Plan:         Comments:     Note composed:9:32 AM 08/22/2023

## 2023-08-28 ENCOUNTER — OFFICE VISIT (OUTPATIENT)
Dept: INTERNAL MEDICINE | Facility: CLINIC | Age: 63
End: 2023-08-28
Payer: MEDICARE

## 2023-08-28 ENCOUNTER — LAB VISIT (OUTPATIENT)
Dept: LAB | Facility: HOSPITAL | Age: 63
End: 2023-08-28
Attending: INTERNAL MEDICINE
Payer: MEDICARE

## 2023-08-28 VITALS
HEART RATE: 73 BPM | WEIGHT: 136 LBS | HEIGHT: 60 IN | BODY MASS INDEX: 26.7 KG/M2 | SYSTOLIC BLOOD PRESSURE: 130 MMHG | OXYGEN SATURATION: 98 % | DIASTOLIC BLOOD PRESSURE: 80 MMHG

## 2023-08-28 DIAGNOSIS — E78.5 HYPERLIPIDEMIA, UNSPECIFIED HYPERLIPIDEMIA TYPE: ICD-10-CM

## 2023-08-28 DIAGNOSIS — R53.83 FATIGUE, UNSPECIFIED TYPE: ICD-10-CM

## 2023-08-28 DIAGNOSIS — R73.9 HYPERGLYCEMIA: ICD-10-CM

## 2023-08-28 DIAGNOSIS — I10 HYPERTENSION, UNSPECIFIED TYPE: ICD-10-CM

## 2023-08-28 DIAGNOSIS — Z12.31 SCREENING MAMMOGRAM, ENCOUNTER FOR: ICD-10-CM

## 2023-08-28 DIAGNOSIS — E53.8 B12 DEFICIENCY: ICD-10-CM

## 2023-08-28 DIAGNOSIS — E55.9 MILD VITAMIN D DEFICIENCY: ICD-10-CM

## 2023-08-28 DIAGNOSIS — J45.20 MILD INTERMITTENT ASTHMA WITHOUT COMPLICATION: ICD-10-CM

## 2023-08-28 DIAGNOSIS — N39.0 URINARY TRACT INFECTION WITHOUT HEMATURIA, SITE UNSPECIFIED: ICD-10-CM

## 2023-08-28 DIAGNOSIS — G80.8 OTHER CEREBRAL PALSY: Primary | ICD-10-CM

## 2023-08-28 LAB
25(OH)D3+25(OH)D2 SERPL-MCNC: 42 NG/ML (ref 30–96)
ALBUMIN SERPL BCP-MCNC: 3.7 G/DL (ref 3.5–5.2)
ALP SERPL-CCNC: 104 U/L (ref 55–135)
ALT SERPL W/O P-5'-P-CCNC: 33 U/L (ref 10–44)
ANION GAP SERPL CALC-SCNC: 11 MMOL/L (ref 8–16)
AST SERPL-CCNC: 27 U/L (ref 10–40)
BASOPHILS # BLD AUTO: 0.03 K/UL (ref 0–0.2)
BASOPHILS NFR BLD: 0.6 % (ref 0–1.9)
BILIRUB SERPL-MCNC: 0.6 MG/DL (ref 0.1–1)
BUN SERPL-MCNC: 15 MG/DL (ref 8–23)
CALCIUM SERPL-MCNC: 9.8 MG/DL (ref 8.7–10.5)
CHLORIDE SERPL-SCNC: 106 MMOL/L (ref 95–110)
CHOLEST SERPL-MCNC: 203 MG/DL (ref 120–199)
CHOLEST/HDLC SERPL: 3.4 {RATIO} (ref 2–5)
CO2 SERPL-SCNC: 22 MMOL/L (ref 23–29)
CREAT SERPL-MCNC: 0.6 MG/DL (ref 0.5–1.4)
CRP SERPL-MCNC: 9.2 MG/L (ref 0–8.2)
DIFFERENTIAL METHOD: ABNORMAL
EOSINOPHIL # BLD AUTO: 0.1 K/UL (ref 0–0.5)
EOSINOPHIL NFR BLD: 1.2 % (ref 0–8)
ERYTHROCYTE [DISTWIDTH] IN BLOOD BY AUTOMATED COUNT: 14 % (ref 11.5–14.5)
ERYTHROCYTE [SEDIMENTATION RATE] IN BLOOD BY PHOTOMETRIC METHOD: 95 MM/HR (ref 0–36)
EST. GFR  (NO RACE VARIABLE): >60 ML/MIN/1.73 M^2
ESTIMATED AVG GLUCOSE: 97 MG/DL (ref 68–131)
GLUCOSE SERPL-MCNC: 83 MG/DL (ref 70–110)
HBA1C MFR BLD: 5 % (ref 4–5.6)
HCT VFR BLD AUTO: 38.9 % (ref 37–48.5)
HDLC SERPL-MCNC: 59 MG/DL (ref 40–75)
HDLC SERPL: 29.1 % (ref 20–50)
HGB BLD-MCNC: 11.9 G/DL (ref 12–16)
IMM GRANULOCYTES # BLD AUTO: 0.01 K/UL (ref 0–0.04)
IMM GRANULOCYTES NFR BLD AUTO: 0.2 % (ref 0–0.5)
LDLC SERPL CALC-MCNC: 132.6 MG/DL (ref 63–159)
LYMPHOCYTES # BLD AUTO: 1.3 K/UL (ref 1–4.8)
LYMPHOCYTES NFR BLD: 27.8 % (ref 18–48)
MCH RBC QN AUTO: 27.9 PG (ref 27–31)
MCHC RBC AUTO-ENTMCNC: 30.6 G/DL (ref 32–36)
MCV RBC AUTO: 91 FL (ref 82–98)
MONOCYTES # BLD AUTO: 0.3 K/UL (ref 0.3–1)
MONOCYTES NFR BLD: 6 % (ref 4–15)
NEUTROPHILS # BLD AUTO: 3.1 K/UL (ref 1.8–7.7)
NEUTROPHILS NFR BLD: 64.2 % (ref 38–73)
NONHDLC SERPL-MCNC: 144 MG/DL
NRBC BLD-RTO: 0 /100 WBC
PLATELET # BLD AUTO: 214 K/UL (ref 150–450)
PMV BLD AUTO: 11.6 FL (ref 9.2–12.9)
POTASSIUM SERPL-SCNC: 3.7 MMOL/L (ref 3.5–5.1)
PROT SERPL-MCNC: 7.7 G/DL (ref 6–8.4)
RBC # BLD AUTO: 4.26 M/UL (ref 4–5.4)
SODIUM SERPL-SCNC: 139 MMOL/L (ref 136–145)
TRIGL SERPL-MCNC: 57 MG/DL (ref 30–150)
TSH SERPL DL<=0.005 MIU/L-ACNC: 0.54 UIU/ML (ref 0.4–4)
VIT B12 SERPL-MCNC: 1427 PG/ML (ref 210–950)
WBC # BLD AUTO: 4.82 K/UL (ref 3.9–12.7)

## 2023-08-28 PROCEDURE — 3060F POS MICROALBUMINURIA REV: CPT | Mod: CPTII,S$GLB,, | Performed by: INTERNAL MEDICINE

## 2023-08-28 PROCEDURE — 83036 HEMOGLOBIN GLYCOSYLATED A1C: CPT | Performed by: INTERNAL MEDICINE

## 2023-08-28 PROCEDURE — 99999 PR PBB SHADOW E&M-EST. PATIENT-LVL IV: CPT | Mod: PBBFAC,,, | Performed by: INTERNAL MEDICINE

## 2023-08-28 PROCEDURE — 3060F PR POS MICROALBUMINURIA RESULT DOCUMENTED/REVIEW: ICD-10-PCS | Mod: CPTII,S$GLB,, | Performed by: INTERNAL MEDICINE

## 2023-08-28 PROCEDURE — 99214 OFFICE O/P EST MOD 30 MIN: CPT | Mod: S$GLB,,, | Performed by: INTERNAL MEDICINE

## 2023-08-28 PROCEDURE — 99999 PR PBB SHADOW E&M-EST. PATIENT-LVL IV: ICD-10-PCS | Mod: PBBFAC,,, | Performed by: INTERNAL MEDICINE

## 2023-08-28 PROCEDURE — 1159F PR MEDICATION LIST DOCUMENTED IN MEDICAL RECORD: ICD-10-PCS | Mod: CPTII,S$GLB,, | Performed by: INTERNAL MEDICINE

## 2023-08-28 PROCEDURE — 80053 COMPREHEN METABOLIC PANEL: CPT | Performed by: INTERNAL MEDICINE

## 2023-08-28 PROCEDURE — 36415 COLL VENOUS BLD VENIPUNCTURE: CPT | Performed by: INTERNAL MEDICINE

## 2023-08-28 PROCEDURE — 84443 ASSAY THYROID STIM HORMONE: CPT | Performed by: INTERNAL MEDICINE

## 2023-08-28 PROCEDURE — 1159F MED LIST DOCD IN RCRD: CPT | Mod: CPTII,S$GLB,, | Performed by: INTERNAL MEDICINE

## 2023-08-28 PROCEDURE — 3075F SYST BP GE 130 - 139MM HG: CPT | Mod: CPTII,S$GLB,, | Performed by: INTERNAL MEDICINE

## 2023-08-28 PROCEDURE — 82306 VITAMIN D 25 HYDROXY: CPT | Performed by: INTERNAL MEDICINE

## 2023-08-28 PROCEDURE — 3075F PR MOST RECENT SYSTOLIC BLOOD PRESS GE 130-139MM HG: ICD-10-PCS | Mod: CPTII,S$GLB,, | Performed by: INTERNAL MEDICINE

## 2023-08-28 PROCEDURE — 3044F HG A1C LEVEL LT 7.0%: CPT | Mod: CPTII,S$GLB,, | Performed by: INTERNAL MEDICINE

## 2023-08-28 PROCEDURE — 3044F PR MOST RECENT HEMOGLOBIN A1C LEVEL <7.0%: ICD-10-PCS | Mod: CPTII,S$GLB,, | Performed by: INTERNAL MEDICINE

## 2023-08-28 PROCEDURE — 83921 ORGANIC ACID SINGLE QUANT: CPT | Performed by: INTERNAL MEDICINE

## 2023-08-28 PROCEDURE — 3066F NEPHROPATHY DOC TX: CPT | Mod: CPTII,S$GLB,, | Performed by: INTERNAL MEDICINE

## 2023-08-28 PROCEDURE — 3066F PR DOCUMENTATION OF TREATMENT FOR NEPHROPATHY: ICD-10-PCS | Mod: CPTII,S$GLB,, | Performed by: INTERNAL MEDICINE

## 2023-08-28 PROCEDURE — 99214 PR OFFICE/OUTPT VISIT, EST, LEVL IV, 30-39 MIN: ICD-10-PCS | Mod: S$GLB,,, | Performed by: INTERNAL MEDICINE

## 2023-08-28 PROCEDURE — 3079F PR MOST RECENT DIASTOLIC BLOOD PRESSURE 80-89 MM HG: ICD-10-PCS | Mod: CPTII,S$GLB,, | Performed by: INTERNAL MEDICINE

## 2023-08-28 PROCEDURE — 82607 VITAMIN B-12: CPT | Performed by: INTERNAL MEDICINE

## 2023-08-28 PROCEDURE — 3008F BODY MASS INDEX DOCD: CPT | Mod: CPTII,S$GLB,, | Performed by: INTERNAL MEDICINE

## 2023-08-28 PROCEDURE — 85025 COMPLETE CBC W/AUTO DIFF WBC: CPT | Performed by: INTERNAL MEDICINE

## 2023-08-28 PROCEDURE — 86140 C-REACTIVE PROTEIN: CPT | Performed by: INTERNAL MEDICINE

## 2023-08-28 PROCEDURE — 3008F PR BODY MASS INDEX (BMI) DOCUMENTED: ICD-10-PCS | Mod: CPTII,S$GLB,, | Performed by: INTERNAL MEDICINE

## 2023-08-28 PROCEDURE — 80061 LIPID PANEL: CPT | Performed by: INTERNAL MEDICINE

## 2023-08-28 PROCEDURE — 3079F DIAST BP 80-89 MM HG: CPT | Mod: CPTII,S$GLB,, | Performed by: INTERNAL MEDICINE

## 2023-08-28 PROCEDURE — 85652 RBC SED RATE AUTOMATED: CPT | Performed by: INTERNAL MEDICINE

## 2023-08-28 RX ORDER — FLUTICASONE PROPIONATE 50 MCG
2 SPRAY, SUSPENSION (ML) NASAL DAILY
Qty: 16 G | Refills: 6 | Status: SHIPPED | OUTPATIENT
Start: 2023-08-28

## 2023-08-28 RX ORDER — DULOXETIN HYDROCHLORIDE 20 MG/1
CAPSULE, DELAYED RELEASE ORAL
Qty: 180 CAPSULE | Refills: 1
Start: 2023-08-28

## 2023-08-28 NOTE — PROGRESS NOTES
Subjective:       Patient ID: Naga Benavidez is a 63 y.o. female.    Chief Complaint: Follow-up    Follow-up  Pertinent negatives include no abdominal pain, chest pain (arm pain or jaw pain), headaches, nausea or vomiting.   Pt is doing better - mood is improved.  No major skin breakdown.  No CP or SOB.  Review of Systems   Respiratory:  Negative for shortness of breath (PND or orthopnea).    Cardiovascular:  Negative for chest pain (arm pain or jaw pain).   Gastrointestinal:  Negative for abdominal pain, diarrhea, nausea and vomiting.   Genitourinary:  Negative for dysuria.   Neurological:  Negative for seizures, syncope and headaches.       Objective:      Physical Exam  Constitutional:       General: She is not in acute distress.     Appearance: She is well-developed.   HENT:      Head: Normocephalic.   Eyes:      Pupils: Pupils are equal, round, and reactive to light.   Neck:      Thyroid: No thyromegaly.      Vascular: No JVD.   Cardiovascular:      Rate and Rhythm: Normal rate and regular rhythm.      Heart sounds: Normal heart sounds. No murmur heard.     No friction rub. No gallop.   Pulmonary:      Effort: Pulmonary effort is normal.      Breath sounds: Normal breath sounds. No wheezing or rales.   Abdominal:      General: Bowel sounds are normal. There is no distension.      Palpations: Abdomen is soft. There is no mass.      Tenderness: There is no abdominal tenderness. There is no guarding or rebound.   Musculoskeletal:      Cervical back: Neck supple.   Lymphadenopathy:      Cervical: No cervical adenopathy.   Skin:     General: Skin is warm and dry.   Neurological:      Mental Status: She is alert and oriented to person, place, and time.      Deep Tendon Reflexes: Reflexes are normal and symmetric.   Psychiatric:         Behavior: Behavior normal.         Thought Content: Thought content normal.         Judgment: Judgment normal.         Assessment:       1. Other cerebral palsy    2. Mild  intermittent asthma without complication    3. Hypertension, unspecified type    4. Hyperlipidemia, unspecified hyperlipidemia type    5. Hyperglycemia    6. Mild vitamin D deficiency    7. B12 deficiency    8. Fatigue, unspecified type    9. Screening mammogram, encounter for    10. Urinary tract infection without hematuria, site unspecified        Plan:   Other cerebral palsy  Stable  Mild intermittent asthma without complication  Controlled - continue current meds    Hypertension, unspecified type  -     CBC Auto Differential; Future; Expected date: 08/28/2023  -     Comprehensive Metabolic Panel; Future; Expected date: 08/28/2023  -     TSH; Future; Expected date: 08/28/2023  -     Microalbumin/Creatinine Ratio, Urine; Future; Expected date: 08/28/2023  Controlled - continue current meds    Hyperlipidemia, unspecified hyperlipidemia type  -     Lipid Panel; Future; Expected date: 08/28/2023    Hyperglycemia  -     Hemoglobin A1C; Future; Expected date: 08/28/2023    Mild vitamin D deficiency  -     Vitamin D; Future; Expected date: 08/28/2023    B12 deficiency  -     Vitamin B12; Future; Expected date: 09/28/2023  -     Methylmalonic Acid, Serum; Future; Expected date: 08/28/2023    Fatigue, unspecified type  -     Sedimentation rate; Future; Expected date: 08/28/2023  -     C-Reactive Protein; Future; Expected date: 08/28/2023    Screening mammogram, encounter for  -     Mammo Digital Screening Bilat w/ Blair; Future; Expected date: 02/28/2024    Urinary tract infection without hematuria, site unspecified  -     Urine culture; Future; Expected date: 08/28/2023  -     Urinalysis; Future; Expected date: 08/28/2023    Other orders  -     DULoxetine (CYMBALTA) 20 MG capsule; Take 1 capsule by mouth  daily  Dispense: 180 capsule; Refill: 1  -     fluticasone propionate (FLONASE) 50 mcg/actuation nasal spray; 2 sprays (100 mcg total) by Each Nostril route once daily.  Dispense: 16 g; Refill: 6

## 2023-09-02 LAB — METHYLMALONATE SERPL-SCNC: <0.1 UMOL/L

## 2023-09-06 ENCOUNTER — PATIENT MESSAGE (OUTPATIENT)
Dept: INTERNAL MEDICINE | Facility: CLINIC | Age: 63
End: 2023-09-06
Payer: MEDICARE

## 2023-09-07 ENCOUNTER — LAB VISIT (OUTPATIENT)
Dept: LAB | Facility: HOSPITAL | Age: 63
End: 2023-09-07
Attending: INTERNAL MEDICINE
Payer: MEDICAID

## 2023-09-07 DIAGNOSIS — I10 HYPERTENSION, UNSPECIFIED TYPE: ICD-10-CM

## 2023-09-07 DIAGNOSIS — N39.0 URINARY TRACT INFECTION WITHOUT HEMATURIA, SITE UNSPECIFIED: ICD-10-CM

## 2023-09-07 LAB
ALBUMIN/CREAT UR: 178.6 UG/MG (ref 0–30)
BILIRUB UR QL STRIP: NEGATIVE
CLARITY UR REFRACT.AUTO: CLEAR
COLOR UR AUTO: YELLOW
CREAT UR-MCNC: 42 MG/DL (ref 15–325)
GLUCOSE UR QL STRIP: NEGATIVE
HGB UR QL STRIP: NEGATIVE
KETONES UR QL STRIP: NEGATIVE
LEUKOCYTE ESTERASE UR QL STRIP: NEGATIVE
MICROALBUMIN UR DL<=1MG/L-MCNC: 75 UG/ML
NITRITE UR QL STRIP: NEGATIVE
PH UR STRIP: 6 [PH] (ref 5–8)
PROT UR QL STRIP: NEGATIVE
SP GR UR STRIP: 1.01 (ref 1–1.03)
URN SPEC COLLECT METH UR: NORMAL

## 2023-09-07 PROCEDURE — 87086 URINE CULTURE/COLONY COUNT: CPT | Performed by: INTERNAL MEDICINE

## 2023-09-07 PROCEDURE — 82570 ASSAY OF URINE CREATININE: CPT | Performed by: INTERNAL MEDICINE

## 2023-09-07 PROCEDURE — 81003 URINALYSIS AUTO W/O SCOPE: CPT | Performed by: INTERNAL MEDICINE

## 2023-09-08 LAB
BACTERIA UR CULT: NORMAL
BACTERIA UR CULT: NORMAL

## 2023-09-10 NOTE — PROGRESS NOTES
Normal lab except cholesterol is only slightly elevated. B12 is high - that is good - better high than low. No diabetes.  Normal thyroid, chemistry and blood counts.  Your ESR is high - do you think your thigh would is infected?    The urine shows some protein probably from long standing high blood pressure - there were multiple organisms on the urine - we can repeat it if you feel that you may have an infection.

## 2023-09-16 ENCOUNTER — DOCUMENT SCAN (OUTPATIENT)
Dept: HOME HEALTH SERVICES | Facility: HOSPITAL | Age: 63
End: 2023-09-16
Payer: MEDICARE

## 2023-09-21 ENCOUNTER — PATIENT MESSAGE (OUTPATIENT)
Dept: INTERNAL MEDICINE | Facility: CLINIC | Age: 63
End: 2023-09-21
Payer: MEDICARE

## 2023-10-02 PROCEDURE — G0179 MD RECERTIFICATION HHA PT: HCPCS | Mod: ,,, | Performed by: INTERNAL MEDICINE

## 2023-10-02 PROCEDURE — G0179 PR HOME HEALTH MD RECERTIFICATION: ICD-10-PCS | Mod: ,,, | Performed by: INTERNAL MEDICINE

## 2023-10-03 ENCOUNTER — PATIENT MESSAGE (OUTPATIENT)
Dept: INTERNAL MEDICINE | Facility: CLINIC | Age: 63
End: 2023-10-03
Payer: MEDICARE

## 2023-10-04 ENCOUNTER — TELEPHONE (OUTPATIENT)
Dept: INTERNAL MEDICINE | Facility: CLINIC | Age: 63
End: 2023-10-04

## 2023-10-04 ENCOUNTER — PATIENT MESSAGE (OUTPATIENT)
Dept: INTERNAL MEDICINE | Facility: CLINIC | Age: 63
End: 2023-10-04
Payer: MEDICARE

## 2023-10-04 RX ORDER — CEFUROXIME AXETIL 250 MG/1
250 TABLET ORAL EVERY 12 HOURS
Qty: 14 TABLET | Refills: 0 | Status: SHIPPED | OUTPATIENT
Start: 2023-10-04 | End: 2024-06-10

## 2023-10-16 ENCOUNTER — TELEPHONE (OUTPATIENT)
Dept: INTERNAL MEDICINE | Facility: CLINIC | Age: 63
End: 2023-10-16
Payer: MEDICARE

## 2023-10-16 ENCOUNTER — PATIENT MESSAGE (OUTPATIENT)
Dept: INTERNAL MEDICINE | Facility: CLINIC | Age: 63
End: 2023-10-16
Payer: MEDICARE

## 2023-10-16 DIAGNOSIS — G80.8 OTHER CEREBRAL PALSY: Primary | ICD-10-CM

## 2023-10-26 ENCOUNTER — EXTERNAL HOME HEALTH (OUTPATIENT)
Dept: HOME HEALTH SERVICES | Facility: HOSPITAL | Age: 63
End: 2023-10-26
Payer: MEDICARE

## 2023-11-13 RX ORDER — AMLODIPINE BESYLATE 2.5 MG/1
2.5 TABLET ORAL 2 TIMES DAILY
Qty: 180 TABLET | Refills: 3 | Status: SHIPPED | OUTPATIENT
Start: 2023-11-13

## 2023-11-13 NOTE — TELEPHONE ENCOUNTER
No care due was identified.  Health [facility] Embedded Care Due Messages. Reference number: [id number].   11/13/2023 12:33:34 PM CST

## 2023-11-13 NOTE — TELEPHONE ENCOUNTER
Refill Decision Note   Naga Benavidez  is requesting a refill authorization.  Brief Assessment and Rationale for Refill:  Approve     Medication Therapy Plan:         Comments:     Note composed:2:18 PM 11/13/2023

## 2023-11-16 ENCOUNTER — DOCUMENT SCAN (OUTPATIENT)
Dept: HOME HEALTH SERVICES | Facility: HOSPITAL | Age: 63
End: 2023-11-16
Payer: MEDICARE

## 2023-11-16 ENCOUNTER — OFFICE VISIT (OUTPATIENT)
Dept: PODIATRY | Facility: CLINIC | Age: 63
End: 2023-11-16
Payer: MEDICARE

## 2023-11-16 VITALS
RESPIRATION RATE: 17 BRPM | DIASTOLIC BLOOD PRESSURE: 87 MMHG | HEIGHT: 60 IN | HEART RATE: 80 BPM | SYSTOLIC BLOOD PRESSURE: 135 MMHG | BODY MASS INDEX: 26.56 KG/M2

## 2023-11-16 DIAGNOSIS — I73.9 PERIPHERAL VASCULAR DISEASE, UNSPECIFIED: Primary | ICD-10-CM

## 2023-11-16 DIAGNOSIS — Q84.3 NAIL ABSENT: ICD-10-CM

## 2023-11-16 DIAGNOSIS — L60.2 THICKENED NAILS: ICD-10-CM

## 2023-11-16 DIAGNOSIS — G80.9 CEREBRAL PALSY, UNSPECIFIED TYPE: ICD-10-CM

## 2023-11-16 PROCEDURE — 99999 PR PBB SHADOW E&M-EST. PATIENT-LVL III: ICD-10-PCS | Mod: PBBFAC,,, | Performed by: PODIATRIST

## 2023-11-16 PROCEDURE — 99213 OFFICE O/P EST LOW 20 MIN: CPT | Mod: PBBFAC | Performed by: PODIATRIST

## 2023-11-16 PROCEDURE — 99999 PR PBB SHADOW E&M-EST. PATIENT-LVL III: CPT | Mod: PBBFAC,,, | Performed by: PODIATRIST

## 2023-11-16 PROCEDURE — 11721 DEBRIDE NAIL 6 OR MORE: CPT | Mod: Q9,PBBFAC | Performed by: PODIATRIST

## 2023-11-21 ENCOUNTER — PATIENT MESSAGE (OUTPATIENT)
Dept: INTERNAL MEDICINE | Facility: CLINIC | Age: 63
End: 2023-11-21
Payer: MEDICARE

## 2023-11-21 ENCOUNTER — TELEPHONE (OUTPATIENT)
Dept: INTERNAL MEDICINE | Facility: CLINIC | Age: 63
End: 2023-11-21

## 2023-11-21 DIAGNOSIS — G80.8 OTHER CEREBRAL PALSY: Primary | ICD-10-CM

## 2023-12-13 ENCOUNTER — IMMUNIZATION (OUTPATIENT)
Dept: INTERNAL MEDICINE | Facility: CLINIC | Age: 63
End: 2023-12-13
Payer: MEDICARE

## 2023-12-13 DIAGNOSIS — Z23 NEED FOR VACCINATION: Primary | ICD-10-CM

## 2023-12-13 PROCEDURE — 90480 ADMN SARSCOV2 VAC 1/ONLY CMP: CPT | Mod: S$GLB,,, | Performed by: INTERNAL MEDICINE

## 2023-12-13 PROCEDURE — 91322 COVID-19 VAC, MRNA 2023 (MODERNA)(PF) 50 MCG/0.5 ML IM SUSR (12+): ICD-10-PCS | Mod: S$GLB,,, | Performed by: INTERNAL MEDICINE

## 2023-12-13 PROCEDURE — 90480 COVID-19 VAC, MRNA 2023 (MODERNA)(PF) 50 MCG/0.5 ML IM SUSR (12+): ICD-10-PCS | Mod: S$GLB,,, | Performed by: INTERNAL MEDICINE

## 2023-12-13 PROCEDURE — 91322 SARSCOV2 VAC 50 MCG/0.5ML IM: CPT | Mod: S$GLB,,, | Performed by: INTERNAL MEDICINE

## 2023-12-20 NOTE — PROGRESS NOTES
Subjective:      Patient ID: Naga Benavidez is a 63 y.o. female.    Chief Complaint: Routine Foot Care (Nail care )    Naga is a 63 y.o. female who presents to the clinic complaining of thick and discolored toenails on both feet. Naga is inquiring about HRFC 2/2 CP      Review of Systems   Constitutional: Negative for chills, decreased appetite and fever.   Cardiovascular:  Negative for leg swelling.   Skin:  Positive for dry skin and nail changes. Negative for flushing and itching.   Musculoskeletal:  Positive for muscle weakness, myalgias and stiffness. Negative for arthritis, joint pain, joint swelling and neck pain.   Gastrointestinal:  Negative for nausea and vomiting.   Neurological:  Positive for loss of balance, paresthesias and sensory change. Negative for numbness.           Objective:       Vitals:    11/16/23 1405   BP: 135/87   Pulse: 80   Resp: 17   Height: 5' (1.524 m)   PainSc: 0-No pain        Physical Exam  Vitals reviewed.   Constitutional:       Appearance: She is well-developed. She is not ill-appearing.   Cardiovascular:      Pulses:           Dorsalis pedis pulses are 2+ on the right side and 2+ on the left side.        Posterior tibial pulses are 2+ on the right side and 2+ on the left side.   Musculoskeletal:         General: No tenderness.      Right ankle: Normal.      Left ankle: Normal.      Right foot: No swelling, deformity or crepitus.      Left foot: No swelling, deformity or crepitus.      Comments:          Lymphadenopathy:      Comments: No palpable lymph nodes   Skin:     General: Skin is warm and dry.      Findings: No erythema or rash.      Nails: There is no clubbing.      Comments: Thickened discolored nails w/ subungual debris X 8     Neurological:      Mental Status: She is alert and oriented to person, place, and time.   Psychiatric:         Behavior: Behavior normal.               Assessment:       Encounter Diagnoses   Name Primary?    Cerebral palsy,  unspecified type     Nail absent     Thickened nails     Peripheral vascular disease, unspecified Yes         Plan:       Naga was seen today for routine foot care.    Diagnoses and all orders for this visit:    Peripheral vascular disease, unspecified    Cerebral palsy, unspecified type    Nail absent    Thickened nails      I counseled the patient on her conditions, their implications and medical management.    Shoe inspection. Foot Education. Patient reminded of the importance of good nutrition and blood sugar control to help prevent podiatric complications of diabetes. Patient instructed on proper foot hygeine. We discussed wearing proper shoe gear, daily foot inspections, never walking without protective shoe gear, never putting sharp instruments to feet    - With patient's permission, nails were aggressively reduced and debrided x 6 to their soft tissue attachment mechanically and with electric , removing all offending nail and debris. Patient relates relief following the procedure. She will continue to monitor the areas daily, inspect her feet, wear protective shoe gear when ambulatory, moisturizer to maintain skin integrity and follow in this office in approximately 2-3 months, sooner p.r.n.

## 2024-01-04 ENCOUNTER — TELEPHONE (OUTPATIENT)
Dept: INTERNAL MEDICINE | Facility: CLINIC | Age: 64
End: 2024-01-04
Payer: MEDICARE

## 2024-01-04 RX ORDER — OSELTAMIVIR PHOSPHATE 75 MG/1
75 CAPSULE ORAL 2 TIMES DAILY
Qty: 10 CAPSULE | Refills: 0 | Status: SHIPPED | OUTPATIENT
Start: 2024-01-04 | End: 2024-01-11

## 2024-02-05 ENCOUNTER — TELEPHONE (OUTPATIENT)
Dept: PODIATRY | Facility: CLINIC | Age: 64
End: 2024-02-05
Payer: MEDICARE

## 2024-03-28 ENCOUNTER — OFFICE VISIT (OUTPATIENT)
Dept: PODIATRY | Facility: CLINIC | Age: 64
End: 2024-03-28
Payer: MEDICARE

## 2024-03-28 VITALS
DIASTOLIC BLOOD PRESSURE: 85 MMHG | HEIGHT: 60 IN | HEART RATE: 80 BPM | BODY MASS INDEX: 26.56 KG/M2 | RESPIRATION RATE: 18 BRPM | SYSTOLIC BLOOD PRESSURE: 134 MMHG

## 2024-03-28 DIAGNOSIS — L60.2 THICKENED NAILS: Primary | ICD-10-CM

## 2024-03-28 PROCEDURE — 17999 UNLISTD PX SKN MUC MEMB SUBQ: CPT | Mod: CSM,S$GLB,, | Performed by: PODIATRIST

## 2024-03-28 PROCEDURE — 99999 PR PBB SHADOW E&M-EST. PATIENT-LVL III: CPT | Mod: PBBFAC,,, | Performed by: PODIATRIST

## 2024-03-28 PROCEDURE — 99499 UNLISTED E&M SERVICE: CPT | Mod: ,,, | Performed by: PODIATRIST

## 2024-03-28 NOTE — PROGRESS NOTES
Pt presents for routine non-covered foot care. Pt. does not have high risk feet. Pedal pulses are palpable. Nails are elongated, thickened Bilaterally. Diagnosis is onychauxis. Nails were reduced Bilaterally. Patient tolerated well and related relief. RTC p.r.n. as PROC B

## 2024-04-18 ENCOUNTER — PATIENT MESSAGE (OUTPATIENT)
Dept: INTERNAL MEDICINE | Facility: CLINIC | Age: 64
End: 2024-04-18
Payer: MEDICARE

## 2024-05-02 RX ORDER — DULOXETIN HYDROCHLORIDE 20 MG/1
CAPSULE, DELAYED RELEASE ORAL
Qty: 90 CAPSULE | Refills: 1 | Status: SHIPPED | OUTPATIENT
Start: 2024-05-02 | End: 2024-06-10

## 2024-05-02 NOTE — TELEPHONE ENCOUNTER
Refill Decision Note   Naga Benavidez  is requesting a refill authorization.  Brief Assessment and Rationale for Refill:  Approve     Medication Therapy Plan:         Comments:     Note composed:4:40 PM 05/02/2024

## 2024-05-02 NOTE — TELEPHONE ENCOUNTER
No care due was identified.  Health [facility] Embedded Care Due Messages. Reference number: [id number].   5/02/2024 1:04:21 PM CDT

## 2024-05-27 ENCOUNTER — PATIENT OUTREACH (OUTPATIENT)
Dept: ADMINISTRATIVE | Facility: HOSPITAL | Age: 64
End: 2024-05-27
Payer: MEDICARE

## 2024-05-27 NOTE — PROGRESS NOTES
Population Health Chart Review & Patient Outreach Details      Additional [facility] Health Notes:               Updates Requested / Reviewed:      Care Everywhere, , and Immunizations Reconciliation Completed or Queried: [place] Topics Overdue:      [facility] [facility] Score: 3     Colon Cancer Screening  Osteoporosis Screening  Mammogram    Pneumonia Vaccine  Shingles/Zoster Vaccine  RSV Vaccine                  Health Maintenance Topic(s) Outreach Outcomes & Actions Taken:    Primary Care Appt - Outreach Outcomes & Actions Taken  : Primary Care Appt Scheduled

## 2024-06-10 ENCOUNTER — LAB VISIT (OUTPATIENT)
Dept: LAB | Facility: HOSPITAL | Age: 64
End: 2024-06-10
Attending: INTERNAL MEDICINE
Payer: MEDICARE

## 2024-06-10 ENCOUNTER — OFFICE VISIT (OUTPATIENT)
Dept: INTERNAL MEDICINE | Facility: CLINIC | Age: 64
End: 2024-06-10
Payer: MEDICARE

## 2024-06-10 VITALS
WEIGHT: 136 LBS | SYSTOLIC BLOOD PRESSURE: 120 MMHG | HEART RATE: 82 BPM | BODY MASS INDEX: 26.7 KG/M2 | DIASTOLIC BLOOD PRESSURE: 74 MMHG | OXYGEN SATURATION: 99 % | HEIGHT: 60 IN

## 2024-06-10 DIAGNOSIS — I10 PRIMARY HYPERTENSION: ICD-10-CM

## 2024-06-10 DIAGNOSIS — R53.81 DEBILITY: ICD-10-CM

## 2024-06-10 DIAGNOSIS — E55.9 MILD VITAMIN D DEFICIENCY: ICD-10-CM

## 2024-06-10 DIAGNOSIS — E78.00 PURE HYPERCHOLESTEROLEMIA: ICD-10-CM

## 2024-06-10 DIAGNOSIS — F33.1 MODERATE EPISODE OF RECURRENT MAJOR DEPRESSIVE DISORDER: ICD-10-CM

## 2024-06-10 DIAGNOSIS — R41.3 OTHER AMNESIA: ICD-10-CM

## 2024-06-10 DIAGNOSIS — R73.9 HYPERGLYCEMIA: ICD-10-CM

## 2024-06-10 DIAGNOSIS — G80.8 OTHER CEREBRAL PALSY: ICD-10-CM

## 2024-06-10 DIAGNOSIS — Z23 NEED FOR VACCINATION: Primary | ICD-10-CM

## 2024-06-10 DIAGNOSIS — R41.3 OTHER AMNESIA: Primary | ICD-10-CM

## 2024-06-10 DIAGNOSIS — E53.8 B12 DEFICIENCY: ICD-10-CM

## 2024-06-10 DIAGNOSIS — N39.0 URINARY TRACT INFECTION WITHOUT HEMATURIA, SITE UNSPECIFIED: ICD-10-CM

## 2024-06-10 DIAGNOSIS — I26.99 OTHER ACUTE PULMONARY EMBOLISM WITHOUT ACUTE COR PULMONALE: ICD-10-CM

## 2024-06-10 LAB
25(OH)D3+25(OH)D2 SERPL-MCNC: 37 NG/ML (ref 30–96)
ALBUMIN SERPL BCP-MCNC: 4 G/DL (ref 3.5–5.2)
ALP SERPL-CCNC: 116 U/L (ref 55–135)
ALT SERPL W/O P-5'-P-CCNC: 19 U/L (ref 10–44)
ANION GAP SERPL CALC-SCNC: 12 MMOL/L (ref 8–16)
AST SERPL-CCNC: 21 U/L (ref 10–40)
BASOPHILS # BLD AUTO: 0.04 K/UL (ref 0–0.2)
BASOPHILS NFR BLD: 0.8 % (ref 0–1.9)
BILIRUB SERPL-MCNC: 0.8 MG/DL (ref 0.1–1)
BUN SERPL-MCNC: 14 MG/DL (ref 8–23)
CALCIUM SERPL-MCNC: 10 MG/DL (ref 8.7–10.5)
CHLORIDE SERPL-SCNC: 106 MMOL/L (ref 95–110)
CHOLEST SERPL-MCNC: 236 MG/DL (ref 120–199)
CHOLEST/HDLC SERPL: 3.6 {RATIO} (ref 2–5)
CO2 SERPL-SCNC: 23 MMOL/L (ref 23–29)
CREAT SERPL-MCNC: 0.6 MG/DL (ref 0.5–1.4)
DIFFERENTIAL METHOD BLD: ABNORMAL
EOSINOPHIL # BLD AUTO: 0.1 K/UL (ref 0–0.5)
EOSINOPHIL NFR BLD: 1.2 % (ref 0–8)
ERYTHROCYTE [DISTWIDTH] IN BLOOD BY AUTOMATED COUNT: 13.4 % (ref 11.5–14.5)
EST. GFR  (NO RACE VARIABLE): >60 ML/MIN/1.73 M^2
ESTIMATED AVG GLUCOSE: 100 MG/DL (ref 68–131)
GLUCOSE SERPL-MCNC: 79 MG/DL (ref 70–110)
HBA1C MFR BLD: 5.1 % (ref 4–5.6)
HCT VFR BLD AUTO: 44.2 % (ref 37–48.5)
HDLC SERPL-MCNC: 65 MG/DL (ref 40–75)
HDLC SERPL: 27.5 % (ref 20–50)
HGB BLD-MCNC: 13.6 G/DL (ref 12–16)
IMM GRANULOCYTES # BLD AUTO: 0.01 K/UL (ref 0–0.04)
IMM GRANULOCYTES NFR BLD AUTO: 0.2 % (ref 0–0.5)
LDLC SERPL CALC-MCNC: 159 MG/DL (ref 63–159)
LYMPHOCYTES # BLD AUTO: 1.2 K/UL (ref 1–4.8)
LYMPHOCYTES NFR BLD: 23.9 % (ref 18–48)
MCH RBC QN AUTO: 29.3 PG (ref 27–31)
MCHC RBC AUTO-ENTMCNC: 30.8 G/DL (ref 32–36)
MCV RBC AUTO: 95 FL (ref 82–98)
MONOCYTES # BLD AUTO: 0.3 K/UL (ref 0.3–1)
MONOCYTES NFR BLD: 6.1 % (ref 4–15)
NEUTROPHILS # BLD AUTO: 3.5 K/UL (ref 1.8–7.7)
NEUTROPHILS NFR BLD: 67.8 % (ref 38–73)
NONHDLC SERPL-MCNC: 171 MG/DL
NRBC BLD-RTO: 0 /100 WBC
PLATELET # BLD AUTO: 172 K/UL (ref 150–450)
PMV BLD AUTO: 11.6 FL (ref 9.2–12.9)
POTASSIUM SERPL-SCNC: 4.2 MMOL/L (ref 3.5–5.1)
PROT SERPL-MCNC: 7.6 G/DL (ref 6–8.4)
RBC # BLD AUTO: 4.64 M/UL (ref 4–5.4)
SODIUM SERPL-SCNC: 141 MMOL/L (ref 136–145)
TREPONEMA PALLIDUM IGG+IGM AB [PRESENCE] IN SERUM OR PLASMA BY IMMUNOASSAY: NONREACTIVE
TRIGL SERPL-MCNC: 60 MG/DL (ref 30–150)
TSH SERPL DL<=0.005 MIU/L-ACNC: 0.7 UIU/ML (ref 0.4–4)
VIT B12 SERPL-MCNC: 1153 PG/ML (ref 210–950)
WBC # BLD AUTO: 5.1 K/UL (ref 3.9–12.7)

## 2024-06-10 PROCEDURE — 83921 ORGANIC ACID SINGLE QUANT: CPT | Performed by: INTERNAL MEDICINE

## 2024-06-10 PROCEDURE — 3078F DIAST BP <80 MM HG: CPT | Mod: CPTII,S$GLB,, | Performed by: INTERNAL MEDICINE

## 2024-06-10 PROCEDURE — 84443 ASSAY THYROID STIM HORMONE: CPT | Performed by: INTERNAL MEDICINE

## 2024-06-10 PROCEDURE — G2211 COMPLEX E/M VISIT ADD ON: HCPCS | Mod: S$GLB,,, | Performed by: INTERNAL MEDICINE

## 2024-06-10 PROCEDURE — 90677 PCV20 VACCINE IM: CPT | Mod: S$GLB,,, | Performed by: INTERNAL MEDICINE

## 2024-06-10 PROCEDURE — 1159F MED LIST DOCD IN RCRD: CPT | Mod: CPTII,S$GLB,, | Performed by: INTERNAL MEDICINE

## 2024-06-10 PROCEDURE — 99999 PR PBB SHADOW E&M-EST. PATIENT-LVL V: CPT | Mod: PBBFAC,,, | Performed by: INTERNAL MEDICINE

## 2024-06-10 PROCEDURE — 99214 OFFICE O/P EST MOD 30 MIN: CPT | Mod: S$GLB,,, | Performed by: INTERNAL MEDICINE

## 2024-06-10 PROCEDURE — 80061 LIPID PANEL: CPT | Performed by: INTERNAL MEDICINE

## 2024-06-10 PROCEDURE — 80053 COMPREHEN METABOLIC PANEL: CPT | Performed by: INTERNAL MEDICINE

## 2024-06-10 PROCEDURE — 3074F SYST BP LT 130 MM HG: CPT | Mod: CPTII,S$GLB,, | Performed by: INTERNAL MEDICINE

## 2024-06-10 PROCEDURE — 3008F BODY MASS INDEX DOCD: CPT | Mod: CPTII,S$GLB,, | Performed by: INTERNAL MEDICINE

## 2024-06-10 PROCEDURE — 82607 VITAMIN B-12: CPT | Performed by: INTERNAL MEDICINE

## 2024-06-10 PROCEDURE — 36415 COLL VENOUS BLD VENIPUNCTURE: CPT | Mod: PO | Performed by: INTERNAL MEDICINE

## 2024-06-10 PROCEDURE — 85025 COMPLETE CBC W/AUTO DIFF WBC: CPT | Performed by: INTERNAL MEDICINE

## 2024-06-10 PROCEDURE — 82306 VITAMIN D 25 HYDROXY: CPT | Performed by: INTERNAL MEDICINE

## 2024-06-10 PROCEDURE — 3288F FALL RISK ASSESSMENT DOCD: CPT | Mod: CPTII,S$GLB,, | Performed by: INTERNAL MEDICINE

## 2024-06-10 PROCEDURE — G0009 ADMIN PNEUMOCOCCAL VACCINE: HCPCS | Mod: S$GLB,,, | Performed by: INTERNAL MEDICINE

## 2024-06-10 PROCEDURE — 83036 HEMOGLOBIN GLYCOSYLATED A1C: CPT | Performed by: INTERNAL MEDICINE

## 2024-06-10 PROCEDURE — 86593 SYPHILIS TEST NON-TREP QUANT: CPT | Performed by: INTERNAL MEDICINE

## 2024-06-10 PROCEDURE — 1101F PT FALLS ASSESS-DOCD LE1/YR: CPT | Mod: CPTII,S$GLB,, | Performed by: INTERNAL MEDICINE

## 2024-06-10 RX ORDER — DULOXETIN HYDROCHLORIDE 30 MG/1
30 CAPSULE, DELAYED RELEASE ORAL DAILY
Qty: 90 CAPSULE | Refills: 3 | Status: SHIPPED | OUTPATIENT
Start: 2024-06-10 | End: 2025-06-10

## 2024-06-10 RX ORDER — AMLODIPINE BESYLATE 2.5 MG/1
2.5 TABLET ORAL 2 TIMES DAILY
Qty: 180 TABLET | Refills: 3 | Status: SHIPPED | OUTPATIENT
Start: 2024-06-10

## 2024-06-10 RX ORDER — KETOCONAZOLE 20 MG/G
CREAM TOPICAL
Qty: 60 G | Refills: 1 | Status: SHIPPED | OUTPATIENT
Start: 2024-06-10

## 2024-06-10 NOTE — PROGRESS NOTES
Subjective:       Patient ID: Naga Benavidez is a 64 y.o. female.    Chief Complaint: Annual Exam    HPIPt is doing better - some painful neuropathy in left foot.  No CP or SOB.  Review of Systems   Respiratory:  Negative for shortness of breath (PND or orthopnea).    Cardiovascular:  Negative for chest pain (arm pain or jaw pain).   Gastrointestinal:  Negative for abdominal pain, diarrhea, nausea and vomiting.   Genitourinary:  Negative for dysuria.   Neurological:  Negative for seizures, syncope and headaches.       Objective:      Physical Exam  Constitutional:       General: She is not in acute distress.     Appearance: She is well-developed.   HENT:      Head: Normocephalic.   Eyes:      Pupils: Pupils are equal, round, and reactive to light.   Neck:      Thyroid: No thyromegaly.      Vascular: No JVD.   Cardiovascular:      Rate and Rhythm: Normal rate and regular rhythm.      Heart sounds: Normal heart sounds. No murmur heard.     No friction rub. No gallop.   Pulmonary:      Effort: Pulmonary effort is normal.      Breath sounds: Normal breath sounds. No wheezing or rales.   Abdominal:      General: Bowel sounds are normal. There is no distension.      Palpations: Abdomen is soft. There is no mass.      Tenderness: There is no abdominal tenderness. There is no guarding or rebound.   Musculoskeletal:      Cervical back: Neck supple.   Lymphadenopathy:      Cervical: No cervical adenopathy.   Skin:     General: Skin is warm and dry.   Neurological:      Mental Status: She is alert and oriented to person, place, and time.      Deep Tendon Reflexes: Reflexes are normal and symmetric.   Psychiatric:         Behavior: Behavior normal.         Thought Content: Thought content normal.         Judgment: Judgment normal.         Assessment:       1. Other amnesia    2. Primary hypertension    3. Pure hypercholesterolemia    4. Hyperglycemia    5. Mild vitamin D deficiency    6. B12 deficiency    7. Urinary  tract infection without hematuria, site unspecified    8. Debility    9. Moderate episode of recurrent major depressive disorder    10. Other cerebral palsy    11. Other acute pulmonary embolism without acute cor pulmonale        Plan:   Other amnesia  -     CT Head Without Contrast; Future; Expected date: 06/10/2024  -     Treponema Pallidium Antibodies IgG, IgM; Future; Expected date: 06/10/2024  -     Ambulatory referral/consult to Neurology; Future; Expected date: 06/17/2024    Primary hypertension  -     CBC Auto Differential; Future; Expected date: 06/10/2024  -     Comprehensive Metabolic Panel; Future; Expected date: 06/10/2024  -     TSH; Future; Expected date: 06/10/2024  -     Microalbumin/Creatinine Ratio, Urine; Future; Expected date: 06/10/2024  -     Urinalysis; Future; Expected date: 06/10/2024    Pure hypercholesterolemia  -     Lipid Panel; Future; Expected date: 06/10/2024    Hyperglycemia  -     Hemoglobin A1C; Future; Expected date: 06/10/2024    Mild vitamin D deficiency  -     Vitamin D; Future; Expected date: 06/10/2024    B12 deficiency  -     Vitamin B12; Future; Expected date: 07/10/2024  -     Methylmalonic Acid, Serum; Future; Expected date: 06/10/2024    Urinary tract infection without hematuria, site unspecified  -     Urine culture; Future; Expected date: 06/10/2024    Debility  -     Ambulatory referral/consult to Home Health; Future; Expected date: 06/11/2024    Moderate episode of recurrent major depressive disorder  Increase cymbalta to 30mg daily  Other cerebral palsy  stable    Other acute pulmonary embolism without acute cor pulmonale  On lifelone apixaban  Other orders  -     amLODIPine (NORVASC) 2.5 MG tablet; Take 1 tablet (2.5 mg total) by mouth 2 (two) times daily.  Dispense: 180 tablet; Refill: 3  -     apixaban (ELIQUIS) 5 mg Tab; Take 1 tablet (5 mg total) by mouth 2 (two) times daily.  Dispense: 180 tablet; Refill: 3  -     DULoxetine (CYMBALTA) 30 MG capsule; Take 1  capsule (30 mg total) by mouth once daily.  Dispense: 90 capsule; Refill: 3  -     (In Office Administered) Pneumococcal Conjugate Vaccine (20 Valent) (IM) (Preferred)  -     ketoconazole (NIZORAL) 2 % cream; Appply daily to her back for itching  Dispense: 60 g; Refill: 1

## 2024-06-14 ENCOUNTER — LAB VISIT (OUTPATIENT)
Dept: LAB | Facility: HOSPITAL | Age: 64
End: 2024-06-14
Attending: INTERNAL MEDICINE
Payer: MEDICARE

## 2024-06-14 DIAGNOSIS — I10 PRIMARY HYPERTENSION: ICD-10-CM

## 2024-06-14 DIAGNOSIS — N39.0 URINARY TRACT INFECTION WITHOUT HEMATURIA, SITE UNSPECIFIED: ICD-10-CM

## 2024-06-14 LAB
ALBUMIN/CREAT UR: 129.7 UG/MG (ref 0–30)
BACTERIA #/AREA URNS AUTO: ABNORMAL /HPF
BILIRUB UR QL STRIP: NEGATIVE
CLARITY UR REFRACT.AUTO: CLEAR
COLOR UR AUTO: YELLOW
CREAT UR-MCNC: 37 MG/DL (ref 15–325)
GLUCOSE UR QL STRIP: NEGATIVE
HGB UR QL STRIP: NEGATIVE
KETONES UR QL STRIP: NEGATIVE
LEUKOCYTE ESTERASE UR QL STRIP: ABNORMAL
METHYLMALONATE SERPL-SCNC: <0.1 UMOL/L
MICROALBUMIN UR DL<=1MG/L-MCNC: 48 UG/ML
MICROSCOPIC COMMENT: ABNORMAL
NITRITE UR QL STRIP: NEGATIVE
PH UR STRIP: 7 [PH] (ref 5–8)
PROT UR QL STRIP: NEGATIVE
RBC #/AREA URNS AUTO: 3 /HPF (ref 0–4)
SP GR UR STRIP: 1.01 (ref 1–1.03)
SQUAMOUS #/AREA URNS AUTO: 9 /HPF
URN SPEC COLLECT METH UR: ABNORMAL
WBC #/AREA URNS AUTO: 30 /HPF (ref 0–5)

## 2024-06-14 PROCEDURE — 81001 URINALYSIS AUTO W/SCOPE: CPT | Performed by: INTERNAL MEDICINE

## 2024-06-14 PROCEDURE — 82570 ASSAY OF URINE CREATININE: CPT | Performed by: INTERNAL MEDICINE

## 2024-06-19 ENCOUNTER — PATIENT MESSAGE (OUTPATIENT)
Dept: INTERNAL MEDICINE | Facility: CLINIC | Age: 64
End: 2024-06-19
Payer: MEDICARE

## 2024-07-08 ENCOUNTER — PATIENT MESSAGE (OUTPATIENT)
Dept: PODIATRY | Facility: CLINIC | Age: 64
End: 2024-07-08
Payer: MEDICARE

## 2024-07-10 NOTE — TELEPHONE ENCOUNTER
Refill Routing Note   Medication(s) are not appropriate for processing by [facility] for the following reason(s):        Clarification of medication (Rx) details    [facility] action(s):  Defer      Medication Therapy Plan: LCO 6/10/24 OV, Pt current therapy is amlodipine 2.5mg 1T BID. pended med directions updated for provider review      Appointments  past 12m or future 3m with PCP    Date Provider   Last Visit   6/10/2024 Kathrin Salazar MD   Next Visit   Visit date not found Kathrin Salazar MD   ED visits in past 90 days: 0        Note composed:3:29 AM 07/10/2024

## 2024-07-10 NOTE — TELEPHONE ENCOUNTER
No care due was identified.  [facility] Embedded Care Due Messages. Reference number: [id number].   7/10/2024 2:03:27 AM CDT

## 2024-07-16 ENCOUNTER — HOSPITAL ENCOUNTER (OUTPATIENT)
Dept: RADIOLOGY | Facility: HOSPITAL | Age: 64
Discharge: HOME | End: 2024-07-16
Attending: INTERNAL MEDICINE
Payer: MEDICARE

## 2024-07-16 DIAGNOSIS — R41.3 OTHER AMNESIA: ICD-10-CM

## 2024-07-16 PROCEDURE — 70450 CT HEAD/BRAIN W/O DYE: CPT | Mod: TC

## 2024-07-16 PROCEDURE — 70450 CT HEAD/BRAIN W/O DYE: CPT | Mod: 26,,, | Performed by: RADIOLOGY

## 2024-07-21 RX ORDER — AMLODIPINE BESYLATE 2.5 MG/1
2.5 TABLET ORAL 2 TIMES DAILY
Qty: 180 TABLET | Refills: 3 | Status: SHIPPED | OUTPATIENT
Start: 2024-07-21

## 2024-08-15 ENCOUNTER — PATIENT OUTREACH (OUTPATIENT)
Dept: ADMINISTRATIVE | Facility: HOSPITAL | Age: 64
End: 2024-08-15
Payer: MEDICARE

## 2024-08-19 ENCOUNTER — TELEPHONE (OUTPATIENT)
Dept: PODIATRY | Facility: CLINIC | Age: 64
End: 2024-08-19
Payer: MEDICARE

## 2024-08-19 ENCOUNTER — PATIENT MESSAGE (OUTPATIENT)
Dept: PODIATRY | Facility: CLINIC | Age: 64
End: 2024-08-19
Payer: MEDICARE

## 2024-08-19 ENCOUNTER — DOCUMENT SCAN (OUTPATIENT)
Dept: HOME HEALTH SERVICES | Facility: HOSPITAL | Age: 64
End: 2024-08-19
Payer: MEDICARE

## 2024-08-19 NOTE — TELEPHONE ENCOUNTER
Spoke with Mrs Benavidez who is requesting to have her appointment reschedule because Dr Mohamud is out of network and only Dr Hernández is in network. Appointment reschedule per patient request till October 30.2024.

## 2024-08-19 NOTE — TELEPHONE ENCOUNTER
----- Message from Lakisha Lay sent at 8/19/2024  1:52 PM CDT -----  Name of Who is Calling: CATY MELENDEZ [[id number]]      What is the request in detail: pt was told by her [employer] company that the Dr is no longer covered under her plan and would need to transfer care to DR Hernández . Please advise       Can the clinic reply by [facility]: No       What Number to Call Back if not in [facility]: Telephone Information:  Mobile          [phone]

## 2024-08-23 ENCOUNTER — DOCUMENT SCAN (OUTPATIENT)
Dept: HOME HEALTH SERVICES | Facility: HOSPITAL | Age: 64
End: 2024-08-23
Payer: MEDICARE

## 2024-09-09 PROBLEM — I26.99 OTHER ACUTE PULMONARY EMBOLISM WITHOUT ACUTE COR PULMONALE: Status: RESOLVED | Noted: 2022-10-22 | Resolved: 2024-09-09

## 2024-10-01 ENCOUNTER — PATIENT MESSAGE (OUTPATIENT)
Dept: INTERNAL MEDICINE | Facility: CLINIC | Age: 64
End: 2024-10-01
Payer: MEDICARE

## 2024-10-02 DIAGNOSIS — Z78.0 MENOPAUSE: ICD-10-CM

## 2024-10-08 NOTE — LETTER
Medication: amphetamine-dextroamphetamine XR (ADDERALL XR) 10 MG 24 hr capsule   Last office visit date: 03/12/2024  Medication Refill Protocol Failed.  Failed criteria: Controlled substance - FWD to Provider. Sent to clinician to review.     Medication: amphetamine-dextroamphetamine (Adderall) 10 MG tablet   Last office visit date: 03/12/2024  Medication Refill Protocol Failed.  Failed criteria: Controlled substance - FWD to Provider. Sent to clinician to review.     Genesys texted and needs provider review.   [facility]  [address] [facility] [address]  Phone: [phone]  Fax: [phone] September 24, 2020    Naga Benavidez  [address]  [facility] [address]      To Whom It May Concern:    Naga Benavidez has severe cerebral palsy.  She relies on her electric wheelchair for her mobility otherwise she is bed bound.  Please put her on a priority list in case of loss of electricity in her area.  If you have any questions or concerns, please feel free to call my office.    Sincerely,        Kathrin Salazar MD

## 2024-10-16 ENCOUNTER — PATIENT MESSAGE (OUTPATIENT)
Dept: INTERNAL MEDICINE | Facility: CLINIC | Age: 64
End: 2024-10-16
Payer: MEDICARE

## 2024-10-18 ENCOUNTER — PATIENT MESSAGE (OUTPATIENT)
Dept: PODIATRY | Facility: CLINIC | Age: 64
End: 2024-10-18
Payer: MEDICARE

## 2024-10-23 ENCOUNTER — PATIENT MESSAGE (OUTPATIENT)
Dept: INTERNAL MEDICINE | Facility: CLINIC | Age: 64
End: 2024-10-23
Payer: MEDICARE

## 2024-10-24 ENCOUNTER — EXTERNAL HOME HEALTH (OUTPATIENT)
Dept: HOME HEALTH SERVICES | Facility: HOSPITAL | Age: 64
End: 2024-10-24
Payer: MEDICARE

## 2024-10-24 ENCOUNTER — TELEPHONE (OUTPATIENT)
Dept: PODIATRY | Facility: CLINIC | Age: 64
End: 2024-10-24
Payer: MEDICARE

## 2024-11-02 ENCOUNTER — PATIENT MESSAGE (OUTPATIENT)
Dept: INTERNAL MEDICINE | Facility: CLINIC | Age: 64
End: 2024-11-02
Payer: MEDICARE

## 2024-11-14 NOTE — TELEPHONE ENCOUNTER
No care due was identified.  Health [facility] Embedded Care Due Messages. Reference number: [id number].   11/14/2024 1:28:06 PM CST

## 2024-11-15 RX ORDER — AMLODIPINE BESYLATE 2.5 MG/1
2.5 TABLET ORAL 2 TIMES DAILY
Qty: 180 TABLET | Refills: 2 | Status: SHIPPED | OUTPATIENT
Start: 2024-11-15

## 2024-11-15 RX ORDER — FLUTICASONE PROPIONATE 50 MCG
2 SPRAY, SUSPENSION (ML) NASAL DAILY
Qty: 48 G | Refills: 2 | Status: SHIPPED | OUTPATIENT
Start: 2024-11-15

## 2024-11-15 NOTE — TELEPHONE ENCOUNTER
Refill Decision Note   Naga Benavidez  is requesting a refill authorization.  Brief Assessment and Rationale for Refill:  Approve     Medication Therapy Plan:         Comments:     Note composed:9:23 AM 11/15/2024

## 2024-11-19 ENCOUNTER — TELEPHONE (OUTPATIENT)
Dept: INTERNAL MEDICINE | Facility: CLINIC | Age: 64
End: 2024-11-19

## 2024-11-19 NOTE — TELEPHONE ENCOUNTER
- How long? Started today  -Were you seen previously? No  -Who,when,where?  -Any Medication taken Over the Counter? Cold tablets/vicks  -How many days?  -Symptoms?   -Fever? no  -Cough? yes  -Congestion?   -Mucus? If yes, what color?   -Drug Allergies? Refer to file  -Allergies?      Patient advised to hold off on getting the flu shot until she is free of symptoms.  Please review.      ----- Message from Gucash sent at 11/19/2024  3:25 PM CST -----  Contact: [phone]  .[email or web address]     Patient is calling for Medical Advice regarding: Pt said she has been sick and canceled her flu shot appt and would like to know how long she has to wait     How long has patient had these symptoms:    Pharmacy name and phone#:    Patient wants a call back or thru [facility]: call back     Comments:    Please advise patient replies from provider may take up to 48 hours.

## 2024-11-19 NOTE — TELEPHONE ENCOUNTER
----- Message from Heikebladimir sent at 11/19/2024  3:25 PM CST -----  Contact: [phone]  .[email or web address]     Patient is calling for Medical Advice regarding: Pt said she has been sick and canceled her flu shot appt and would like to know how long she has to wait     How long has patient had these symptoms:    Pharmacy name and phone#:    Patient wants a call back or thru [facility]: call back     Comments:    Please advise patient replies from provider may take up to 48 hours.

## 2024-12-03 ENCOUNTER — IMMUNIZATION (OUTPATIENT)
Dept: INTERNAL MEDICINE | Facility: CLINIC | Age: 64
End: 2024-12-03
Payer: MEDICARE

## 2024-12-03 DIAGNOSIS — Z23 NEED FOR VACCINATION: Primary | ICD-10-CM

## 2024-12-03 PROCEDURE — G0008 ADMIN INFLUENZA VIRUS VAC: HCPCS | Mod: S$GLB,,, | Performed by: INTERNAL MEDICINE

## 2024-12-03 PROCEDURE — 90653 IIV ADJUVANT VACCINE IM: CPT | Mod: S$GLB,,, | Performed by: INTERNAL MEDICINE

## 2024-12-05 ENCOUNTER — LAB VISIT (OUTPATIENT)
Dept: LAB | Facility: HOSPITAL | Age: 64
End: 2024-12-05
Attending: INTERNAL MEDICINE
Payer: MEDICARE

## 2024-12-05 ENCOUNTER — TELEPHONE (OUTPATIENT)
Dept: INTERNAL MEDICINE | Facility: CLINIC | Age: 64
End: 2024-12-05
Payer: MEDICARE

## 2024-12-05 ENCOUNTER — OFFICE VISIT (OUTPATIENT)
Dept: INTERNAL MEDICINE | Facility: CLINIC | Age: 64
End: 2024-12-05
Payer: MEDICARE

## 2024-12-05 VITALS
DIASTOLIC BLOOD PRESSURE: 68 MMHG | BODY MASS INDEX: 27.05 KG/M2 | WEIGHT: 152.69 LBS | OXYGEN SATURATION: 97 % | HEART RATE: 90 BPM | HEIGHT: 63 IN | SYSTOLIC BLOOD PRESSURE: 128 MMHG

## 2024-12-05 DIAGNOSIS — G47.33 OBSTRUCTIVE SLEEP APNEA: ICD-10-CM

## 2024-12-05 DIAGNOSIS — I10 PRIMARY HYPERTENSION: ICD-10-CM

## 2024-12-05 DIAGNOSIS — G80.8 OTHER CEREBRAL PALSY: ICD-10-CM

## 2024-12-05 DIAGNOSIS — R60.9 EDEMA, UNSPECIFIED TYPE: ICD-10-CM

## 2024-12-05 DIAGNOSIS — Z12.11 SCREENING FOR COLON CANCER: Primary | ICD-10-CM

## 2024-12-05 DIAGNOSIS — Z12.31 SCREENING MAMMOGRAM, ENCOUNTER FOR: ICD-10-CM

## 2024-12-05 LAB
ALBUMIN/CREAT UR: 110.3 UG/MG (ref 0–30)
AMORPH CRY UR QL COMP ASSIST: ABNORMAL
BACTERIA #/AREA URNS AUTO: ABNORMAL /HPF
BILIRUB UR QL STRIP: NEGATIVE
CLARITY UR REFRACT.AUTO: CLEAR
COLOR UR AUTO: YELLOW
CREAT UR-MCNC: 78 MG/DL (ref 15–325)
GLUCOSE UR QL STRIP: NEGATIVE
HGB UR QL STRIP: NEGATIVE
HYALINE CASTS UR QL AUTO: 1 /LPF
KETONES UR QL STRIP: NEGATIVE
LEUKOCYTE ESTERASE UR QL STRIP: NEGATIVE
MICROALBUMIN UR DL<=1MG/L-MCNC: 86 UG/ML
MICROSCOPIC COMMENT: ABNORMAL
NITRITE UR QL STRIP: NEGATIVE
PH UR STRIP: 6 [PH] (ref 5–8)
PROT UR QL STRIP: ABNORMAL
RBC #/AREA URNS AUTO: 1 /HPF (ref 0–4)
SP GR UR STRIP: 1.02 (ref 1–1.03)
SQUAMOUS #/AREA URNS AUTO: 3 /HPF
URN SPEC COLLECT METH UR: ABNORMAL
WBC #/AREA URNS AUTO: 2 /HPF (ref 0–5)

## 2024-12-05 PROCEDURE — 1101F PT FALLS ASSESS-DOCD LE1/YR: CPT | Mod: CPTII,S$GLB,, | Performed by: INTERNAL MEDICINE

## 2024-12-05 PROCEDURE — 82043 UR ALBUMIN QUANTITATIVE: CPT | Performed by: INTERNAL MEDICINE

## 2024-12-05 PROCEDURE — 81001 URINALYSIS AUTO W/SCOPE: CPT | Performed by: INTERNAL MEDICINE

## 2024-12-05 PROCEDURE — 99999 PR PBB SHADOW E&M-EST. PATIENT-LVL IV: CPT | Mod: PBBFAC,,, | Performed by: INTERNAL MEDICINE

## 2024-12-05 PROCEDURE — 3060F POS MICROALBUMINURIA REV: CPT | Mod: CPTII,S$GLB,, | Performed by: INTERNAL MEDICINE

## 2024-12-05 PROCEDURE — 3288F FALL RISK ASSESSMENT DOCD: CPT | Mod: CPTII,S$GLB,, | Performed by: INTERNAL MEDICINE

## 2024-12-05 PROCEDURE — 3008F BODY MASS INDEX DOCD: CPT | Mod: CPTII,S$GLB,, | Performed by: INTERNAL MEDICINE

## 2024-12-05 PROCEDURE — 1159F MED LIST DOCD IN RCRD: CPT | Mod: CPTII,S$GLB,, | Performed by: INTERNAL MEDICINE

## 2024-12-05 PROCEDURE — 3044F HG A1C LEVEL LT 7.0%: CPT | Mod: CPTII,S$GLB,, | Performed by: INTERNAL MEDICINE

## 2024-12-05 PROCEDURE — 3066F NEPHROPATHY DOC TX: CPT | Mod: CPTII,S$GLB,, | Performed by: INTERNAL MEDICINE

## 2024-12-05 PROCEDURE — 99214 OFFICE O/P EST MOD 30 MIN: CPT | Mod: S$GLB,,, | Performed by: INTERNAL MEDICINE

## 2024-12-05 PROCEDURE — 3078F DIAST BP <80 MM HG: CPT | Mod: CPTII,S$GLB,, | Performed by: INTERNAL MEDICINE

## 2024-12-05 PROCEDURE — 3074F SYST BP LT 130 MM HG: CPT | Mod: CPTII,S$GLB,, | Performed by: INTERNAL MEDICINE

## 2024-12-05 RX ORDER — FUROSEMIDE 20 MG/1
TABLET ORAL
Qty: 30 TABLET | Refills: 1 | Status: SHIPPED | OUTPATIENT
Start: 2024-12-05

## 2024-12-06 NOTE — TELEPHONE ENCOUNTER
----- Message from Alexandra sent at 12/6/2024 10:43 AM CST -----  Contact: [phone] from [facility]  Good morning Ms. Crowe from [facility] would like a call back to discuss a referral sent over for the patient. Ms. Crowe says they can accept the patient but will need office notes from patient visit on 12/5. Please call MsCarolina Amrita to advise [phone]

## 2024-12-08 NOTE — PROGRESS NOTES
Subjective:       Patient ID: Naga Benavidez is a 64 y.o. female.    Chief Complaint: Follow-up and Sleep Apnea    Follow-up  Pertinent negatives include no abdominal pain, arthralgias, chest pain, headaches, joint swelling, nausea, neck pain, vomiting or weakness.    Pt is here with her sitter - she is feeling well - No CP or SOB. She is back going out and going to Religious.  Increased snoring and fatigue.  Review of Systems   Constitutional:  Positive for unexpected weight change. Negative for activity change.   HENT:  Negative for hearing loss, rhinorrhea and trouble swallowing.    Eyes:  Negative for discharge and visual disturbance.   Respiratory:  Negative for chest tightness, shortness of breath (PND or orthopnea) and wheezing.    Cardiovascular:  Negative for chest pain and palpitations.   Gastrointestinal:  Positive for constipation. Negative for abdominal pain, blood in stool, diarrhea, nausea and vomiting.   Endocrine: Negative for polydipsia and polyuria.   Genitourinary:  Negative for difficulty urinating, dysuria, hematuria and menstrual problem.   Musculoskeletal:  Negative for arthralgias, joint swelling and neck pain.   Neurological:  Negative for seizures, syncope, weakness and headaches.   Psychiatric/Behavioral:  Negative for confusion and dysphoric mood.        Objective:      Physical Exam  Constitutional:       General: She is not in acute distress.     Appearance: She is well-developed.   HENT:      Head: Normocephalic.   Eyes:      Pupils: Pupils are equal, round, and reactive to light.   Neck:      Thyroid: No thyromegaly.      Vascular: No JVD.   Cardiovascular:      Rate and Rhythm: Normal rate and regular rhythm.      Heart sounds: Normal heart sounds. No murmur heard.     No friction rub. No gallop.   Pulmonary:      Effort: Pulmonary effort is normal.      Breath sounds: Normal breath sounds. No wheezing or rales.   Abdominal:      General: Bowel sounds are normal. There is no  distension.      Palpations: Abdomen is soft. There is no mass.      Tenderness: There is no abdominal tenderness. There is no guarding or rebound.   Musculoskeletal:      Cervical back: Neck supple.   Lymphadenopathy:      Cervical: No cervical adenopathy.   Skin:     General: Skin is warm and dry.   Neurological:      Mental Status: She is alert and oriented to person, place, and time.      Deep Tendon Reflexes: Reflexes are normal and symmetric.   Psychiatric:         Behavior: Behavior normal.         Thought Content: Thought content normal.         Judgment: Judgment normal.         Assessment:       1. Screening for colon cancer    2. Screening mammogram, encounter for    3. Obstructive sleep apnea    4. Edema, unspecified type    5. Other cerebral palsy        Plan:   Screening for colon cancer  -     Fecal Immunochemical Test (iFOBT); Future; Expected date: 12/05/2024    Screening mammogram, encounter for  -     Mammo Digital Screening Bilat w/ Blair; Future; Expected date: 06/05/2025    Obstructive sleep apnea  -     Home Sleep Study; Future    Edema, unspecified type  -     Echo Saline Bubble? No; Ultrasound enhancing contrast? No    Other cerebral palsy  -     Ambulatory referral/consult to Home Health; Future; Expected date: 12/06/2024    Other orders  -     furosemide (LASIX) 20 MG tablet; If weight gain  of 2-3 lbs for 2-5days, take 1 tablet by mouth daily. If no improvement, call MD  Dispense: 30 tablet; Refill: 1

## 2024-12-10 ENCOUNTER — IMMUNIZATION (OUTPATIENT)
Dept: INTERNAL MEDICINE | Facility: CLINIC | Age: 64
End: 2024-12-10
Payer: MEDICARE

## 2024-12-10 DIAGNOSIS — Z23 NEED FOR VACCINATION: Primary | ICD-10-CM

## 2024-12-10 PROCEDURE — 90480 ADMN SARSCOV2 VAC 1/ONLY CMP: CPT | Mod: S$GLB,,, | Performed by: INTERNAL MEDICINE

## 2024-12-10 PROCEDURE — 91320 SARSCV2 VAC 30MCG TRS-SUC IM: CPT | Mod: S$GLB,,, | Performed by: INTERNAL MEDICINE

## 2024-12-11 PROCEDURE — G0180 MD CERTIFICATION HHA PATIENT: HCPCS | Mod: ,,, | Performed by: INTERNAL MEDICINE

## 2024-12-19 ENCOUNTER — PATIENT MESSAGE (OUTPATIENT)
Dept: INTERNAL MEDICINE | Facility: CLINIC | Age: 64
End: 2024-12-19
Payer: MEDICARE

## 2024-12-19 ENCOUNTER — OFFICE VISIT (OUTPATIENT)
Dept: PODIATRY | Facility: CLINIC | Age: 64
End: 2024-12-19
Payer: MEDICARE

## 2024-12-19 VITALS
SYSTOLIC BLOOD PRESSURE: 154 MMHG | HEIGHT: 63 IN | DIASTOLIC BLOOD PRESSURE: 99 MMHG | BODY MASS INDEX: 27.04 KG/M2 | HEART RATE: 91 BPM | RESPIRATION RATE: 18 BRPM

## 2024-12-19 DIAGNOSIS — I73.9 PERIPHERAL VASCULAR DISEASE, UNSPECIFIED: Primary | ICD-10-CM

## 2024-12-19 DIAGNOSIS — L60.2 THICKENED NAILS: ICD-10-CM

## 2024-12-19 DIAGNOSIS — G80.9 CEREBRAL PALSY, UNSPECIFIED TYPE: ICD-10-CM

## 2024-12-19 PROCEDURE — 99999 PR PBB SHADOW E&M-EST. PATIENT-LVL III: CPT | Mod: PBBFAC,,, | Performed by: PODIATRIST

## 2024-12-19 NOTE — PROGRESS NOTES
"      Subjective:      Patient ID: Naga Benavidez is a 64 y.o. female.    Chief Complaint: Peripheral Vascular Disease and Nail Care    Naga is a 64 y.o. female who presents to the clinic complaining of thick and discolored toenails on both feet.   Naga is inquiring about HRFC 2/2 CP and PVD    Chief Complaint   Patient presents with    Peripheral Vascular Disease    Nail Care         Review of Systems   Constitutional: Negative for chills, decreased appetite and fever.   Cardiovascular:  Negative for leg swelling.   Skin:  Positive for dry skin and nail changes. Negative for flushing and itching.   Musculoskeletal:  Positive for muscle weakness, myalgias and stiffness. Negative for arthritis, joint pain, joint swelling and neck pain.   Gastrointestinal:  Negative for nausea and vomiting.   Neurological:  Positive for loss of balance, paresthesias and sensory change. Negative for numbness.           Objective:       Vitals:    12/19/24 1526   BP: (!) 154/99   Pulse: 91   Resp: 18   Height: 5' 3" (1.6 m)   PainSc: 0-No pain        Physical Exam  Vitals reviewed.   Constitutional:       Appearance: She is well-developed. She is not ill-appearing.   Cardiovascular:      Pulses:           Dorsalis pedis pulses are 2+ on the right side and 2+ on the left side.        Posterior tibial pulses are 2+ on the right side and 2+ on the left side.   Musculoskeletal:         General: No tenderness.      Right ankle: Normal.      Left ankle: Normal.      Right foot: No swelling, deformity or crepitus.      Left foot: No swelling, deformity or crepitus.      Comments:          Lymphadenopathy:      Comments: No palpable lymph nodes   Skin:     General: Skin is warm and dry.      Findings: No erythema or rash.      Nails: There is no clubbing.      Comments: Thickened discolored nails w/ subungual debris X 10     Neurological:      Mental Status: She is alert and oriented to person, place, and time.   Psychiatric:         " Behavior: Behavior normal.               Assessment:       Encounter Diagnoses   Name Primary?    Peripheral vascular disease, unspecified Yes    Cerebral palsy, unspecified type     Thickened nails          Plan:       Naga was seen today for peripheral vascular disease and nail care.    Diagnoses and all orders for this visit:    Peripheral vascular disease, unspecified    Cerebral palsy, unspecified type    Thickened nails      I counseled the patient on her conditions, their implications and medical management.    Shoe inspection. Foot Education.atient instructed on proper foot hygeine. We discussed wearing proper shoe gear, daily foot inspections, never walking without protective shoe gear, never putting sharp instruments to feet    - With patient's permission, nails were aggressively reduced and debrided x 10 to their soft tissue attachment mechanically and with electric , removing all offending nail and debris. Patient relates relief following the procedure. She will continue to monitor the areas daily, inspect her feet, wear protective shoe gear when ambulatory, moisturizer to maintain skin integrity and follow in this office in approximately 2-3 months, sooner p.r.n.

## 2024-12-20 ENCOUNTER — TELEPHONE (OUTPATIENT)
Dept: INTERNAL MEDICINE | Facility: CLINIC | Age: 64
End: 2024-12-20
Payer: MEDICARE

## 2024-12-20 RX ORDER — LIDOCAINE 50 MG/G
1 PATCH TOPICAL DAILY
Qty: 30 PATCH | Refills: 1 | Status: SHIPPED | OUTPATIENT
Start: 2024-12-20

## 2024-12-20 NOTE — TELEPHONE ENCOUNTER
Pt is requesting LIDOCAINE PATCHES. Pt stated that she is going out of town tomorrow and she would like to get the prescription before she leaves town

## 2024-12-20 NOTE — TELEPHONE ENCOUNTER
----- Message from Penny sent at 12/20/2024  8:07 AM CST -----  Contact: Pat  [phone]  .1MEDICALADVICE     Patient is calling for Medical Advice regarding: checking on portal request for LIDOCAINE PATCHES    How long has patient had these symptoms:    Pharmacy name and phone#:  [facility]  [address]  [facility] [address]  Phone: [phone] Fax: [phone]       Patient wants a call back or thru [facility]:    Comments:Maeve  [phone]    Please advise patient replies from provider may take up to 48 hours.

## 2024-12-20 NOTE — TELEPHONE ENCOUNTER
Spoke to pt to let her know that her request for LIDOCAINE PATCHES was received and was sent to Dr Salazar

## 2024-12-20 NOTE — TELEPHONE ENCOUNTER
Pt stated that she is having more pain in the knee and shoulder. Pt is requesting Lidocaine Patch 5%

## 2024-12-20 NOTE — TELEPHONE ENCOUNTER
----- Message from Veronica sent at 12/20/2024  3:15 PM CST -----  Contact: [phone]  .1MEDICALADVICE     Patient is calling for Medical Advice regarding:pt is calling she is going out of tomorrow 12/21/24 and would like to have the  LIDOCAINE PATCHES before she leaves.  Can you please call her back and advise.    How long has patient had these symptoms:    Pharmacy name and phone#:  [facility]  [address] [facility] [address]  Phone: [phone] Fax: [phone]       Patient wants a call back or thru [facility]:callback    Comments:    Please advise patient replies from provider may take up to 48 hours.

## 2025-01-27 ENCOUNTER — TELEPHONE (OUTPATIENT)
Dept: INTERNAL MEDICINE | Facility: CLINIC | Age: 65
End: 2025-01-27
Payer: MEDICARE

## 2025-01-27 RX ORDER — LIDOCAINE 50 MG/G
1 PATCH TOPICAL DAILY
Qty: 30 PATCH | Refills: 1 | Status: SHIPPED | OUTPATIENT
Start: 2025-01-27

## 2025-02-04 ENCOUNTER — PATIENT MESSAGE (OUTPATIENT)
Dept: INTERNAL MEDICINE | Facility: CLINIC | Age: 65
End: 2025-02-04
Payer: MEDICARE

## 2025-02-04 ENCOUNTER — LAB VISIT (OUTPATIENT)
Dept: LAB | Facility: HOSPITAL | Age: 65
End: 2025-02-04
Attending: INTERNAL MEDICINE
Payer: MEDICARE

## 2025-02-04 DIAGNOSIS — Z12.11 SCREENING FOR COLON CANCER: ICD-10-CM

## 2025-02-04 LAB — HEMOCCULT STL QL IA: NEGATIVE

## 2025-02-04 PROCEDURE — 82274 ASSAY TEST FOR BLOOD FECAL: CPT | Performed by: INTERNAL MEDICINE

## 2025-02-06 ENCOUNTER — DOCUMENT SCAN (OUTPATIENT)
Dept: HOME HEALTH SERVICES | Facility: HOSPITAL | Age: 65
End: 2025-02-06
Payer: MEDICARE

## 2025-02-21 ENCOUNTER — EXTERNAL HOME HEALTH (OUTPATIENT)
Dept: HOME HEALTH SERVICES | Facility: HOSPITAL | Age: 65
End: 2025-02-21
Payer: MEDICARE

## 2025-04-21 ENCOUNTER — PATIENT MESSAGE (OUTPATIENT)
Dept: INTERNAL MEDICINE | Facility: CLINIC | Age: 65
End: 2025-04-21
Payer: MEDICARE

## 2025-04-21 ENCOUNTER — TELEPHONE (OUTPATIENT)
Dept: INTERNAL MEDICINE | Facility: CLINIC | Age: 65
End: 2025-04-21
Payer: MEDICARE

## 2025-04-21 DIAGNOSIS — E78.00 PURE HYPERCHOLESTEROLEMIA: ICD-10-CM

## 2025-04-21 DIAGNOSIS — I10 PRIMARY HYPERTENSION: Primary | ICD-10-CM

## 2025-04-21 DIAGNOSIS — E53.8 B12 DEFICIENCY: ICD-10-CM

## 2025-04-21 DIAGNOSIS — E55.9 MILD VITAMIN D DEFICIENCY: ICD-10-CM

## 2025-04-21 DIAGNOSIS — R73.9 HYPERGLYCEMIA: ICD-10-CM

## 2025-04-21 NOTE — TELEPHONE ENCOUNTER
----- Message from Sarah sent at 4/21/2025  1:49 PM CDT -----  Contact: [phone]  type: LabCaller is requesting to schedule their Lab appointment prior to an appointment.Order is not listed in EPIC.  Please enter order and contact patient to schedule.Preferred Date and Time of Labs:Patient wants to schedule her own appt. Just need the orders in Date of Appointment:04/24/2025Where would they like the lab performed?[facility] Would the patient rather a call back or a response via [facility]? Call Emely Call Back Number: [phone]

## 2025-04-24 ENCOUNTER — LAB VISIT (OUTPATIENT)
Dept: LAB | Facility: HOSPITAL | Age: 65
End: 2025-04-24
Attending: INTERNAL MEDICINE
Payer: MEDICARE

## 2025-04-24 DIAGNOSIS — R73.9 HYPERGLYCEMIA: ICD-10-CM

## 2025-04-24 DIAGNOSIS — I10 PRIMARY HYPERTENSION: ICD-10-CM

## 2025-04-24 DIAGNOSIS — E78.00 PURE HYPERCHOLESTEROLEMIA: ICD-10-CM

## 2025-04-24 DIAGNOSIS — E55.9 MILD VITAMIN D DEFICIENCY: ICD-10-CM

## 2025-04-24 DIAGNOSIS — E53.8 B12 DEFICIENCY: ICD-10-CM

## 2025-04-24 LAB
25(OH)D3+25(OH)D2 SERPL-MCNC: 40 NG/ML (ref 30–96)
ABSOLUTE EOSINOPHIL (OHS): 0.05 K/UL
ABSOLUTE MONOCYTE (OHS): 0.35 K/UL (ref 0.3–1)
ABSOLUTE NEUTROPHIL COUNT (OHS): 2.92 K/UL (ref 1.8–7.7)
ALBUMIN SERPL BCP-MCNC: 3.9 G/DL (ref 3.5–5.2)
ALP SERPL-CCNC: 106 UNIT/L (ref 40–150)
ALT SERPL W/O P-5'-P-CCNC: 14 UNIT/L (ref 10–44)
ANION GAP (OHS): 9 MMOL/L (ref 8–16)
AST SERPL-CCNC: 20 UNIT/L (ref 11–45)
BASOPHILS # BLD AUTO: 0.02 K/UL
BASOPHILS NFR BLD AUTO: 0.4 %
BILIRUB SERPL-MCNC: 0.7 MG/DL (ref 0.1–1)
BUN SERPL-MCNC: 19 MG/DL (ref 8–23)
CALCIUM SERPL-MCNC: 9.9 MG/DL (ref 8.7–10.5)
CHLORIDE SERPL-SCNC: 106 MMOL/L (ref 95–110)
CHOLEST SERPL-MCNC: 225 MG/DL (ref 120–199)
CHOLEST/HDLC SERPL: 2.9 {RATIO} (ref 2–5)
CO2 SERPL-SCNC: 24 MMOL/L (ref 23–29)
CREAT SERPL-MCNC: 0.7 MG/DL (ref 0.5–1.4)
EAG (OHS): 103 MG/DL (ref 68–131)
ERYTHROCYTE [DISTWIDTH] IN BLOOD BY AUTOMATED COUNT: 13.7 % (ref 11.5–14.5)
GFR SERPLBLD CREATININE-BSD FMLA CKD-EPI: >60 ML/MIN/1.73/M2
GLUCOSE SERPL-MCNC: 86 MG/DL (ref 70–110)
HBA1C MFR BLD: 5.2 % (ref 4–5.6)
HCT VFR BLD AUTO: 43.2 % (ref 37–48.5)
HDLC SERPL-MCNC: 77 MG/DL (ref 40–75)
HDLC SERPL: 34.2 % (ref 20–50)
HGB BLD-MCNC: 13.4 GM/DL (ref 12–16)
IMM GRANULOCYTES # BLD AUTO: 0.01 K/UL (ref 0–0.04)
IMM GRANULOCYTES NFR BLD AUTO: 0.2 % (ref 0–0.5)
LDLC SERPL CALC-MCNC: 139.4 MG/DL (ref 63–159)
LYMPHOCYTES # BLD AUTO: 1.15 K/UL (ref 1–4.8)
MCH RBC QN AUTO: 28.9 PG (ref 27–31)
MCHC RBC AUTO-ENTMCNC: 31 G/DL (ref 32–36)
MCV RBC AUTO: 93 FL (ref 82–98)
NONHDLC SERPL-MCNC: 148 MG/DL
NUCLEATED RBC (/100WBC) (OHS): 0 /100 WBC
PLATELET # BLD AUTO: 183 K/UL (ref 150–450)
PMV BLD AUTO: 12.2 FL (ref 9.2–12.9)
POTASSIUM SERPL-SCNC: 4.1 MMOL/L (ref 3.5–5.1)
PROT SERPL-MCNC: 7.6 GM/DL (ref 6–8.4)
RBC # BLD AUTO: 4.64 M/UL (ref 4–5.4)
RELATIVE EOSINOPHIL (OHS): 1.1 %
RELATIVE LYMPHOCYTE (OHS): 25.6 % (ref 18–48)
RELATIVE MONOCYTE (OHS): 7.8 % (ref 4–15)
RELATIVE NEUTROPHIL (OHS): 64.9 % (ref 38–73)
SODIUM SERPL-SCNC: 139 MMOL/L (ref 136–145)
TRIGL SERPL-MCNC: 43 MG/DL (ref 30–150)
TSH SERPL-ACNC: 0.59 UIU/ML (ref 0.4–4)
VIT B12 SERPL-MCNC: 1346 PG/ML (ref 210–950)
WBC # BLD AUTO: 4.5 K/UL (ref 3.9–12.7)

## 2025-04-24 PROCEDURE — 82607 VITAMIN B-12: CPT

## 2025-04-24 PROCEDURE — 83921 ORGANIC ACID SINGLE QUANT: CPT

## 2025-04-24 PROCEDURE — 82306 VITAMIN D 25 HYDROXY: CPT

## 2025-04-24 PROCEDURE — 80061 LIPID PANEL: CPT

## 2025-04-24 PROCEDURE — 83036 HEMOGLOBIN GLYCOSYLATED A1C: CPT

## 2025-04-24 PROCEDURE — 36415 COLL VENOUS BLD VENIPUNCTURE: CPT

## 2025-04-24 PROCEDURE — 84443 ASSAY THYROID STIM HORMONE: CPT

## 2025-04-24 PROCEDURE — 84075 ASSAY ALKALINE PHOSPHATASE: CPT

## 2025-04-24 PROCEDURE — 85025 COMPLETE CBC W/AUTO DIFF WBC: CPT

## 2025-04-29 LAB — W METHYLMALONIC ACID: 0.16 UMOL/L

## 2025-05-02 ENCOUNTER — OFFICE VISIT (OUTPATIENT)
Dept: INTERNAL MEDICINE | Facility: CLINIC | Age: 65
End: 2025-05-02
Payer: MEDICARE

## 2025-05-02 VITALS
HEIGHT: 63 IN | SYSTOLIC BLOOD PRESSURE: 132 MMHG | DIASTOLIC BLOOD PRESSURE: 78 MMHG | WEIGHT: 148.5 LBS | HEART RATE: 89 BPM | OXYGEN SATURATION: 98 % | BODY MASS INDEX: 26.31 KG/M2

## 2025-05-02 DIAGNOSIS — N39.0 URINARY TRACT INFECTION WITHOUT HEMATURIA, SITE UNSPECIFIED: ICD-10-CM

## 2025-05-02 DIAGNOSIS — F33.1 MODERATE EPISODE OF RECURRENT MAJOR DEPRESSIVE DISORDER: ICD-10-CM

## 2025-05-02 DIAGNOSIS — Z23 NEED FOR MEASLES-MUMPS-RUBELLA (MMR) VACCINE: ICD-10-CM

## 2025-05-02 DIAGNOSIS — I10 HYPERTENSION, UNSPECIFIED TYPE: ICD-10-CM

## 2025-05-02 DIAGNOSIS — I10 ESSENTIAL HYPERTENSION: Primary | ICD-10-CM

## 2025-05-02 DIAGNOSIS — H61.23 HEARING LOSS DUE TO CERUMEN IMPACTION, BILATERAL: ICD-10-CM

## 2025-05-02 DIAGNOSIS — G80.8 OTHER CEREBRAL PALSY: ICD-10-CM

## 2025-05-02 DIAGNOSIS — R10.9 ABDOMINAL PAIN, UNSPECIFIED ABDOMINAL LOCATION: ICD-10-CM

## 2025-05-02 PROCEDURE — 1101F PT FALLS ASSESS-DOCD LE1/YR: CPT | Mod: CPTII,S$GLB,, | Performed by: INTERNAL MEDICINE

## 2025-05-02 PROCEDURE — 99214 OFFICE O/P EST MOD 30 MIN: CPT | Mod: S$GLB,,, | Performed by: INTERNAL MEDICINE

## 2025-05-02 PROCEDURE — 99999 PR PBB SHADOW E&M-EST. PATIENT-LVL IV: CPT | Mod: PBBFAC,,, | Performed by: INTERNAL MEDICINE

## 2025-05-02 PROCEDURE — 3044F HG A1C LEVEL LT 7.0%: CPT | Mod: CPTII,S$GLB,, | Performed by: INTERNAL MEDICINE

## 2025-05-02 PROCEDURE — 1159F MED LIST DOCD IN RCRD: CPT | Mod: CPTII,S$GLB,, | Performed by: INTERNAL MEDICINE

## 2025-05-02 PROCEDURE — 3008F BODY MASS INDEX DOCD: CPT | Mod: CPTII,S$GLB,, | Performed by: INTERNAL MEDICINE

## 2025-05-02 PROCEDURE — 3078F DIAST BP <80 MM HG: CPT | Mod: CPTII,S$GLB,, | Performed by: INTERNAL MEDICINE

## 2025-05-02 PROCEDURE — 1125F AMNT PAIN NOTED PAIN PRSNT: CPT | Mod: CPTII,S$GLB,, | Performed by: INTERNAL MEDICINE

## 2025-05-02 PROCEDURE — 3288F FALL RISK ASSESSMENT DOCD: CPT | Mod: CPTII,S$GLB,, | Performed by: INTERNAL MEDICINE

## 2025-05-02 PROCEDURE — 3075F SYST BP GE 130 - 139MM HG: CPT | Mod: CPTII,S$GLB,, | Performed by: INTERNAL MEDICINE

## 2025-05-02 RX ORDER — LIDOCAINE 50 MG/G
2 PATCH TOPICAL DAILY
Qty: 60 PATCH | Refills: 6 | Status: SHIPPED | OUTPATIENT
Start: 2025-05-02

## 2025-05-02 RX ORDER — FLUCONAZOLE 150 MG/1
TABLET ORAL
Qty: 1 TABLET | Refills: 1 | Status: SHIPPED | OUTPATIENT
Start: 2025-05-02

## 2025-05-02 RX ORDER — DULOXETIN HYDROCHLORIDE 30 MG/1
30 CAPSULE, DELAYED RELEASE ORAL DAILY
Qty: 90 CAPSULE | Refills: 3 | Status: SHIPPED | OUTPATIENT
Start: 2025-05-02 | End: 2026-05-02

## 2025-05-02 RX ORDER — FUROSEMIDE 20 MG/1
TABLET ORAL
Qty: 30 TABLET | Refills: 1 | Status: SHIPPED | OUTPATIENT
Start: 2025-05-02

## 2025-05-02 RX ORDER — AMLODIPINE BESYLATE 2.5 MG/1
2.5 TABLET ORAL 2 TIMES DAILY
Qty: 180 TABLET | Refills: 3 | Status: SHIPPED | OUTPATIENT
Start: 2025-05-02

## 2025-05-02 RX ORDER — FLUTICASONE PROPIONATE 50 MCG
2 SPRAY, SUSPENSION (ML) NASAL DAILY
Qty: 48 G | Refills: 3 | Status: SHIPPED | OUTPATIENT
Start: 2025-05-02

## 2025-06-10 ENCOUNTER — HOSPITAL ENCOUNTER (OUTPATIENT)
Dept: RADIOLOGY | Facility: HOSPITAL | Age: 65
Discharge: HOME | End: 2025-06-10
Attending: INTERNAL MEDICINE
Payer: MEDICARE

## 2025-06-10 ENCOUNTER — PATIENT MESSAGE (OUTPATIENT)
Dept: INTERNAL MEDICINE | Facility: CLINIC | Age: 65
End: 2025-06-10
Payer: MEDICARE

## 2025-06-10 DIAGNOSIS — R10.9 ABDOMINAL PAIN, UNSPECIFIED ABDOMINAL LOCATION: ICD-10-CM

## 2025-06-10 DIAGNOSIS — Z12.31 SCREENING MAMMOGRAM, ENCOUNTER FOR: ICD-10-CM

## 2025-06-10 PROCEDURE — 74176 CT ABD & PELVIS W/O CONTRAST: CPT | Mod: TC

## 2025-06-10 PROCEDURE — 77063 BREAST TOMOSYNTHESIS BI: CPT | Mod: 26,,, | Performed by: RADIOLOGY

## 2025-06-10 PROCEDURE — 74176 CT ABD & PELVIS W/O CONTRAST: CPT | Mod: 26,,, | Performed by: RADIOLOGY

## 2025-06-10 PROCEDURE — 77067 SCR MAMMO BI INCL CAD: CPT | Mod: 26,,, | Performed by: RADIOLOGY

## 2025-06-10 PROCEDURE — 77063 BREAST TOMOSYNTHESIS BI: CPT | Mod: TC

## 2025-06-13 ENCOUNTER — TELEPHONE (OUTPATIENT)
Dept: RADIOLOGY | Facility: HOSPITAL | Age: 65
End: 2025-06-13
Payer: MEDICARE

## 2025-06-13 ENCOUNTER — TELEPHONE (OUTPATIENT)
Dept: INTERNAL MEDICINE | Facility: CLINIC | Age: 65
End: 2025-06-13
Payer: MEDICARE

## 2025-06-13 NOTE — TELEPHONE ENCOUNTER
Spoke with patient and explained mammogram findings.Patient expressed understanding of results. Patient scheduled abnormal mammogram follow up appointment at [facility] on 6/18/2025.

## 2025-06-13 NOTE — TELEPHONE ENCOUNTER
Copied from CRM #[id number]. Topic: General Inquiry - Test Results  >> Jun 13, 2025 12:16 PM Bibi wrote:  2TESTRESULTS    Type: Test Results    What test was performed?Ct Scan And Mammogram    Who ordered the test?Dr.Stacy Salazar    When and where were the test performed? [facility] and [facility] 2025    Who called and call back number: Naga Benavidez [phone]    Would you like a call back and or thru [facility]:Call Back    Comments:

## 2025-06-17 ENCOUNTER — OFFICE VISIT (OUTPATIENT)
Dept: PODIATRY | Facility: CLINIC | Age: 65
End: 2025-06-17
Payer: MEDICARE

## 2025-06-17 VITALS — DIASTOLIC BLOOD PRESSURE: 81 MMHG | HEART RATE: 84 BPM | SYSTOLIC BLOOD PRESSURE: 145 MMHG

## 2025-06-17 DIAGNOSIS — L60.2 THICKENED NAILS: ICD-10-CM

## 2025-06-17 DIAGNOSIS — I73.9 PERIPHERAL VASCULAR DISEASE, UNSPECIFIED: Primary | ICD-10-CM

## 2025-06-17 DIAGNOSIS — G80.9 CEREBRAL PALSY, UNSPECIFIED TYPE: ICD-10-CM

## 2025-06-17 PROCEDURE — 99999 PR PBB SHADOW E&M-EST. PATIENT-LVL III: CPT | Mod: PBBFAC,,, | Performed by: PODIATRIST

## 2025-06-18 ENCOUNTER — HOSPITAL ENCOUNTER (OUTPATIENT)
Dept: RADIOLOGY | Facility: HOSPITAL | Age: 65
Discharge: HOME | End: 2025-06-18
Attending: INTERNAL MEDICINE
Payer: MEDICARE

## 2025-06-18 DIAGNOSIS — R92.8 ABNORMAL FINDING ON BREAST IMAGING: ICD-10-CM

## 2025-06-18 PROCEDURE — 77065 DX MAMMO INCL CAD UNI: CPT | Mod: TC,RT

## 2025-06-18 PROCEDURE — 77061 BREAST TOMOSYNTHESIS UNI: CPT | Mod: 26,RT,, | Performed by: RADIOLOGY

## 2025-06-18 PROCEDURE — 77065 DX MAMMO INCL CAD UNI: CPT | Mod: 26,RT,, | Performed by: RADIOLOGY

## 2025-06-24 ENCOUNTER — OFFICE VISIT (OUTPATIENT)
Dept: OTOLARYNGOLOGY | Facility: CLINIC | Age: 65
End: 2025-06-24
Payer: MEDICARE

## 2025-06-24 VITALS — DIASTOLIC BLOOD PRESSURE: 88 MMHG | SYSTOLIC BLOOD PRESSURE: 168 MMHG | HEART RATE: 79 BPM

## 2025-06-24 DIAGNOSIS — H61.23 BILATERAL IMPACTED CERUMEN: Primary | ICD-10-CM

## 2025-06-24 DIAGNOSIS — H90.3 SENSORINEURAL HEARING LOSS (SNHL) OF BOTH EARS: ICD-10-CM

## 2025-06-24 NOTE — PROCEDURES
Ear Cerumen Removal    Date/Time: 6/24/2025 1:40 PM    Performed by: Jeanne Mathis PA-C  Authorized by: Jeanne Mathis PA-C    Consent Done?:  Yes (Verbal)    Local anesthetic:  None  Location details:  Both ears  Procedure type: curette    Procedure type comment:  Suction  Cerumen  Removal Results:  Cerumen completely removed  Patient tolerance:  Patient tolerated the procedure well with no immediate complications     Patient with cerebral palsy and history of bilateral SNHL upward sloping. Loves hearing aids she got around a year ago from [employer]. Last audiogram was in March. Denies tinnitus or vertigo.

## 2025-06-25 NOTE — PROGRESS NOTES
Subjective:      Patient ID: Naga Benavidez is a 65 y.o. female.    Chief Complaint: Nail Care    Naga is a 65 y.o. female who presents to the clinic complaining of thick and discolored toenails on both feet.   Naga is inquiring about HRFC 2/2 CP and PVD    Chief Complaint   Patient presents with    Nail Care         Review of Systems   Constitutional: Negative for chills, decreased appetite and fever.   Cardiovascular:  Negative for leg swelling.   Skin:  Positive for dry skin and nail changes. Negative for flushing and itching.   Musculoskeletal:  Positive for muscle weakness, myalgias and stiffness. Negative for arthritis, joint pain, joint swelling and neck pain.   Gastrointestinal:  Negative for nausea and vomiting.   Neurological:  Positive for loss of balance, paresthesias and sensory change. Negative for numbness.           Objective:       Vitals:    06/17/25 1114   BP: (!) 145/81   Pulse: 84   PainSc:   8   PainLoc: Foot        Physical Exam  Vitals reviewed.   Constitutional:       Appearance: She is well-developed. She is not ill-appearing.   Cardiovascular:      Pulses:           Dorsalis pedis pulses are 2+ on the right side and 2+ on the left side.        Posterior tibial pulses are 2+ on the right side and 2+ on the left side.   Musculoskeletal:         General: No tenderness.      Right ankle: Normal.      Left ankle: Normal.      Right foot: No swelling, deformity or crepitus.      Left foot: No swelling, deformity or crepitus.      Comments:          Lymphadenopathy:      Comments: No palpable lymph nodes   Skin:     General: Skin is warm and dry.      Findings: No erythema or rash.      Nails: There is no clubbing.      Comments: Thickened discolored nails w/ subungual debris X 10     Neurological:      Mental Status: She is alert and oriented to person, place, and time.   Psychiatric:         Behavior: Behavior normal.               Assessment:       Encounter Diagnoses   Name  Primary?    Peripheral vascular disease, unspecified Yes    Cerebral palsy, unspecified type     Thickened nails          Plan:       Naga was seen today for nail care.    Diagnoses and all orders for this visit:    Peripheral vascular disease, unspecified    Cerebral palsy, unspecified type    Thickened nails      I counseled the patient on her conditions, their implications and medical management.    Shoe inspection. Foot Education.atient instructed on proper foot hygeine. We discussed wearing proper shoe gear, daily foot inspections, never walking without protective shoe gear, never putting sharp instruments to feet    - With patient's permission, nails were aggressively reduced and debrided x 10 to their soft tissue attachment mechanically , removing all offending nail and debris. Patient relates relief following the procedure. She will continue to monitor the areas daily, inspect her feet, wear protective shoe gear when ambulatory, moisturizer to maintain skin integrity and follow in this office in approximately 2-3 months, sooner p.r.n.

## 2025-07-01 ENCOUNTER — TELEPHONE (OUTPATIENT)
Dept: RADIOLOGY | Facility: HOSPITAL | Age: 65
End: 2025-07-01
Payer: MEDICARE

## 2025-07-21 ENCOUNTER — PATIENT MESSAGE (OUTPATIENT)
Dept: INTERNAL MEDICINE | Facility: CLINIC | Age: 65
End: 2025-07-21
Payer: MEDICARE

## 2025-07-21 DIAGNOSIS — G80.8 OTHER CEREBRAL PALSY: Primary | ICD-10-CM

## 2025-07-22 NOTE — TELEPHONE ENCOUNTER
LOV with Kathrin Salazar MD , 5/2/2025    Patient requesting manual wheelchair  Order pended for your review  Please let me know if appointment needed

## 2025-07-31 ENCOUNTER — TELEPHONE (OUTPATIENT)
Dept: INTERNAL MEDICINE | Facility: CLINIC | Age: 65
End: 2025-07-31
Payer: MEDICARE

## 2025-07-31 NOTE — TELEPHONE ENCOUNTER
Copied from CRM #[id number]. Topic: General Inquiry - Patient Advice  >> Jul 31, 2025  3:03 PM Mayra wrote:  Pt calling for referral for manual wheelchair, calling for an update     Confirmed patient's contact info below:  Contact Name: Naga Benavidez  Phone Number: [phone]

## 2025-08-07 ENCOUNTER — PATIENT MESSAGE (OUTPATIENT)
Dept: INTERNAL MEDICINE | Facility: CLINIC | Age: 65
End: 2025-08-07
Payer: MEDICARE

## 2025-08-14 ENCOUNTER — PATIENT MESSAGE (OUTPATIENT)
Dept: INTERNAL MEDICINE | Facility: CLINIC | Age: 65
End: 2025-08-14
Payer: MEDICARE

## 2025-09-04 ENCOUNTER — TELEPHONE (OUTPATIENT)
Dept: INTERNAL MEDICINE | Facility: CLINIC | Age: 65
End: 2025-09-04
Payer: MEDICARE

## 2025-09-04 ENCOUNTER — IMMUNIZATION (OUTPATIENT)
Dept: INTERNAL MEDICINE | Facility: CLINIC | Age: 65
End: 2025-09-04
Payer: MEDICARE

## 2025-09-04 DIAGNOSIS — Z23 NEED FOR VACCINATION: Primary | ICD-10-CM

## 2025-09-04 PROCEDURE — G0008 ADMIN INFLUENZA VIRUS VAC: HCPCS | Mod: S$GLB,,, | Performed by: INTERNAL MEDICINE

## 2025-09-04 PROCEDURE — 90653 IIV ADJUVANT VACCINE IM: CPT | Mod: S$GLB,,, | Performed by: INTERNAL MEDICINE
